# Patient Record
Sex: MALE | Race: WHITE | Employment: OTHER | URBAN - METROPOLITAN AREA
[De-identification: names, ages, dates, MRNs, and addresses within clinical notes are randomized per-mention and may not be internally consistent; named-entity substitution may affect disease eponyms.]

---

## 2017-01-12 RX ORDER — HYDROCHLOROTHIAZIDE 25 MG/1
25 TABLET ORAL DAILY
Qty: 90 TABLET | Refills: 1 | Status: SHIPPED | OUTPATIENT
Start: 2017-01-12 | End: 2017-03-06 | Stop reason: SDUPTHER

## 2017-01-20 ENCOUNTER — HOSPITAL ENCOUNTER (OUTPATIENT)
Dept: OTHER | Age: 77
Discharge: HOME | End: 2017-01-20
Attending: PHYSICIAN ASSISTANT | Admitting: PHYSICIAN ASSISTANT

## 2017-01-20 ENCOUNTER — OFFICE VISIT (OUTPATIENT)
Dept: ORTHOPEDIC SURGERY | Age: 77
End: 2017-01-20

## 2017-01-20 VITALS
WEIGHT: 259 LBS | BODY MASS INDEX: 37.08 KG/M2 | SYSTOLIC BLOOD PRESSURE: 152 MMHG | DIASTOLIC BLOOD PRESSURE: 80 MMHG | HEIGHT: 70 IN | HEART RATE: 66 BPM

## 2017-01-20 DIAGNOSIS — Z96.651 HISTORY OF TOTAL KNEE REPLACEMENT, RIGHT: Primary | ICD-10-CM

## 2017-01-20 DIAGNOSIS — M25.561 RIGHT KNEE PAIN, UNSPECIFIED CHRONICITY: ICD-10-CM

## 2017-01-20 PROBLEM — Z96.659 HISTORY OF TOTAL KNEE REPLACEMENT: Status: ACTIVE | Noted: 2017-01-20

## 2017-01-20 LAB
BASOPHILS ABSOLUTE: 0.1 K/UL (ref 0–0.2)
BASOPHILS RELATIVE PERCENT: 0.8 %
C-REACTIVE PROTEIN: 4.3 MG/L (ref 0–5.1)
EOSINOPHILS ABSOLUTE: 0.3 K/UL (ref 0–0.6)
EOSINOPHILS RELATIVE PERCENT: 3.1 %
HCT VFR BLD CALC: 42.9 % (ref 40.5–52.5)
HEMOGLOBIN: 14.5 G/DL (ref 13.5–17.5)
LYMPHOCYTES ABSOLUTE: 2.6 K/UL (ref 1–5.1)
LYMPHOCYTES RELATIVE PERCENT: 23.2 %
MCH RBC QN AUTO: 30.4 PG (ref 26–34)
MCHC RBC AUTO-ENTMCNC: 33.8 G/DL (ref 31–36)
MCV RBC AUTO: 90 FL (ref 80–100)
MONOCYTES ABSOLUTE: 0.8 K/UL (ref 0–1.3)
MONOCYTES RELATIVE PERCENT: 6.9 %
NEUTROPHILS ABSOLUTE: 7.4 K/UL (ref 1.7–7.7)
NEUTROPHILS RELATIVE PERCENT: 66 %
PDW BLD-RTO: 13.7 % (ref 12.4–15.4)
PLATELET # BLD: 340 K/UL (ref 135–450)
PMV BLD AUTO: 8.3 FL (ref 5–10.5)
RBC # BLD: 4.77 M/UL (ref 4.2–5.9)
SEDIMENTATION RATE, ERYTHROCYTE: 10 MM/HR (ref 0–20)
WBC # BLD: 11.2 K/UL (ref 4–11)

## 2017-01-20 PROCEDURE — G8427 DOCREV CUR MEDS BY ELIG CLIN: HCPCS | Performed by: PHYSICIAN ASSISTANT

## 2017-01-20 PROCEDURE — G8484 FLU IMMUNIZE NO ADMIN: HCPCS | Performed by: PHYSICIAN ASSISTANT

## 2017-01-20 PROCEDURE — 1036F TOBACCO NON-USER: CPT | Performed by: PHYSICIAN ASSISTANT

## 2017-01-20 PROCEDURE — 4040F PNEUMOC VAC/ADMIN/RCVD: CPT | Performed by: PHYSICIAN ASSISTANT

## 2017-01-20 PROCEDURE — 1123F ACP DISCUSS/DSCN MKR DOCD: CPT | Performed by: PHYSICIAN ASSISTANT

## 2017-01-20 PROCEDURE — G8419 CALC BMI OUT NRM PARAM NOF/U: HCPCS | Performed by: PHYSICIAN ASSISTANT

## 2017-01-20 PROCEDURE — 73562 X-RAY EXAM OF KNEE 3: CPT | Performed by: PHYSICIAN ASSISTANT

## 2017-01-20 PROCEDURE — 99213 OFFICE O/P EST LOW 20 MIN: CPT | Performed by: PHYSICIAN ASSISTANT

## 2017-01-20 PROCEDURE — G8599 NO ASA/ANTIPLAT THER USE RNG: HCPCS | Performed by: PHYSICIAN ASSISTANT

## 2017-01-25 ENCOUNTER — OFFICE VISIT (OUTPATIENT)
Dept: URGENT CARE | Age: 77
End: 2017-01-25

## 2017-01-25 VITALS
WEIGHT: 255 LBS | BODY MASS INDEX: 34.54 KG/M2 | HEART RATE: 80 BPM | HEIGHT: 72 IN | RESPIRATION RATE: 16 BRPM | SYSTOLIC BLOOD PRESSURE: 136 MMHG | TEMPERATURE: 98.6 F | DIASTOLIC BLOOD PRESSURE: 80 MMHG | OXYGEN SATURATION: 95 %

## 2017-01-25 DIAGNOSIS — J40 BRONCHITIS: Primary | ICD-10-CM

## 2017-01-25 PROCEDURE — G8598 ASA/ANTIPLAT THER USED: HCPCS | Performed by: EMERGENCY MEDICINE

## 2017-01-25 PROCEDURE — 1036F TOBACCO NON-USER: CPT | Performed by: EMERGENCY MEDICINE

## 2017-01-25 PROCEDURE — G8427 DOCREV CUR MEDS BY ELIG CLIN: HCPCS | Performed by: EMERGENCY MEDICINE

## 2017-01-25 PROCEDURE — 1123F ACP DISCUSS/DSCN MKR DOCD: CPT | Performed by: EMERGENCY MEDICINE

## 2017-01-25 PROCEDURE — G8419 CALC BMI OUT NRM PARAM NOF/U: HCPCS | Performed by: EMERGENCY MEDICINE

## 2017-01-25 PROCEDURE — 99214 OFFICE O/P EST MOD 30 MIN: CPT | Performed by: EMERGENCY MEDICINE

## 2017-01-25 PROCEDURE — 4040F PNEUMOC VAC/ADMIN/RCVD: CPT | Performed by: EMERGENCY MEDICINE

## 2017-01-25 PROCEDURE — G8484 FLU IMMUNIZE NO ADMIN: HCPCS | Performed by: EMERGENCY MEDICINE

## 2017-01-25 RX ORDER — AZITHROMYCIN 250 MG/1
TABLET, FILM COATED ORAL
Qty: 1 PACKET | Refills: 0 | Status: SHIPPED | OUTPATIENT
Start: 2017-01-25 | End: 2017-02-04

## 2017-01-25 RX ORDER — BENZONATATE 200 MG/1
200 CAPSULE ORAL 3 TIMES DAILY PRN
Qty: 20 CAPSULE | Refills: 0 | Status: SHIPPED | OUTPATIENT
Start: 2017-01-25 | End: 2017-08-10 | Stop reason: ALTCHOICE

## 2017-01-25 ASSESSMENT — ENCOUNTER SYMPTOMS
SHORTNESS OF BREATH: 1
SORE THROAT: 0
COUGH: 1

## 2017-01-30 ENCOUNTER — HOSPITAL ENCOUNTER (OUTPATIENT)
Dept: PHYSICAL THERAPY | Age: 77
Discharge: HOME | End: 2017-01-31
Admitting: PHYSICIAN ASSISTANT

## 2017-02-03 RX ORDER — LISINOPRIL 20 MG/1
20 TABLET ORAL 2 TIMES DAILY
Qty: 180 TABLET | Refills: 3 | Status: SHIPPED | OUTPATIENT
Start: 2017-02-03 | End: 2018-01-01 | Stop reason: SDUPTHER

## 2017-02-21 ENCOUNTER — HOSPITAL ENCOUNTER (OUTPATIENT)
Dept: PHYSICAL THERAPY | Age: 77
Discharge: HOME | End: 2017-02-21
Admitting: PHYSICIAN ASSISTANT

## 2017-02-24 ENCOUNTER — HOSPITAL ENCOUNTER (OUTPATIENT)
Dept: PHYSICAL THERAPY | Age: 77
Discharge: HOME | End: 2017-02-24
Admitting: PHYSICIAN ASSISTANT

## 2017-03-03 ENCOUNTER — HOSPITAL ENCOUNTER (OUTPATIENT)
Dept: PHYSICAL THERAPY | Age: 77
Discharge: HOME | End: 2017-03-03
Admitting: PHYSICIAN ASSISTANT

## 2017-03-06 DIAGNOSIS — E03.9 HYPOTHYROIDISM, UNSPECIFIED TYPE: Primary | ICD-10-CM

## 2017-03-06 RX ORDER — HYDROCHLOROTHIAZIDE 25 MG/1
25 TABLET ORAL 2 TIMES DAILY
Qty: 180 TABLET | Refills: 1 | Status: SHIPPED | OUTPATIENT
Start: 2017-03-06 | End: 2017-08-10 | Stop reason: SDUPTHER

## 2017-03-07 ENCOUNTER — HOSPITAL ENCOUNTER (OUTPATIENT)
Dept: PHYSICAL THERAPY | Age: 77
Discharge: HOME | End: 2017-03-07
Admitting: PHYSICIAN ASSISTANT

## 2017-03-10 ENCOUNTER — OFFICE VISIT (OUTPATIENT)
Dept: FAMILY MEDICINE CLINIC | Age: 77
End: 2017-03-10

## 2017-03-10 VITALS
HEART RATE: 85 BPM | HEIGHT: 72 IN | BODY MASS INDEX: 35.89 KG/M2 | OXYGEN SATURATION: 97 % | SYSTOLIC BLOOD PRESSURE: 135 MMHG | WEIGHT: 265 LBS | DIASTOLIC BLOOD PRESSURE: 80 MMHG

## 2017-03-10 DIAGNOSIS — M15.9 GENERALIZED OA: ICD-10-CM

## 2017-03-10 DIAGNOSIS — E11.9 TYPE 2 DIABETES MELLITUS WITHOUT COMPLICATION, WITHOUT LONG-TERM CURRENT USE OF INSULIN (HCC): Primary | ICD-10-CM

## 2017-03-10 DIAGNOSIS — E03.9 ACQUIRED HYPOTHYROIDISM: ICD-10-CM

## 2017-03-10 DIAGNOSIS — I10 ESSENTIAL HYPERTENSION: ICD-10-CM

## 2017-03-10 DIAGNOSIS — E78.2 MIXED HYPERLIPIDEMIA: ICD-10-CM

## 2017-03-10 DIAGNOSIS — Z12.5 SPECIAL SCREENING FOR MALIGNANT NEOPLASM OF PROSTATE: ICD-10-CM

## 2017-03-10 LAB
ALBUMIN SERPL-MCNC: 4.5 G/DL (ref 3.4–5)
ALP BLD-CCNC: 81 U/L (ref 40–129)
ALT SERPL-CCNC: 21 U/L (ref 10–40)
AST SERPL-CCNC: 15 U/L (ref 15–37)
BILIRUB SERPL-MCNC: 0.5 MG/DL (ref 0–1)
BILIRUBIN DIRECT: <0.2 MG/DL (ref 0–0.3)
BILIRUBIN, INDIRECT: NORMAL MG/DL (ref 0–1)
CHOLESTEROL, TOTAL: 116 MG/DL (ref 0–199)
HBA1C MFR BLD: 7 %
HDLC SERPL-MCNC: 32 MG/DL (ref 40–60)
LDL CHOLESTEROL CALCULATED: 52 MG/DL
PROSTATE SPECIFIC ANTIGEN: 0.02 NG/ML (ref 0–4)
TOTAL PROTEIN: 7.1 G/DL (ref 6.4–8.2)
TRIGL SERPL-MCNC: 159 MG/DL (ref 0–150)
TSH SERPL DL<=0.05 MIU/L-ACNC: 0.11 UIU/ML (ref 0.27–4.2)
VLDLC SERPL CALC-MCNC: 32 MG/DL

## 2017-03-10 PROCEDURE — G8484 FLU IMMUNIZE NO ADMIN: HCPCS | Performed by: FAMILY MEDICINE

## 2017-03-10 PROCEDURE — G8427 DOCREV CUR MEDS BY ELIG CLIN: HCPCS | Performed by: FAMILY MEDICINE

## 2017-03-10 PROCEDURE — G8417 CALC BMI ABV UP PARAM F/U: HCPCS | Performed by: FAMILY MEDICINE

## 2017-03-10 PROCEDURE — 83036 HEMOGLOBIN GLYCOSYLATED A1C: CPT | Performed by: FAMILY MEDICINE

## 2017-03-10 PROCEDURE — 1036F TOBACCO NON-USER: CPT | Performed by: FAMILY MEDICINE

## 2017-03-10 PROCEDURE — 1123F ACP DISCUSS/DSCN MKR DOCD: CPT | Performed by: FAMILY MEDICINE

## 2017-03-10 PROCEDURE — 4040F PNEUMOC VAC/ADMIN/RCVD: CPT | Performed by: FAMILY MEDICINE

## 2017-03-10 PROCEDURE — 99214 OFFICE O/P EST MOD 30 MIN: CPT | Performed by: FAMILY MEDICINE

## 2017-03-10 PROCEDURE — G8598 ASA/ANTIPLAT THER USED: HCPCS | Performed by: FAMILY MEDICINE

## 2017-03-10 ASSESSMENT — ENCOUNTER SYMPTOMS
CONSTIPATION: 1
RESPIRATORY NEGATIVE: 1

## 2017-03-27 RX ORDER — LEVOTHYROXINE SODIUM 112 UG/1
TABLET ORAL
Qty: 180 TABLET | Refills: 1 | Status: SHIPPED | OUTPATIENT
Start: 2017-03-27 | End: 2017-09-12 | Stop reason: SDUPTHER

## 2017-04-12 ENCOUNTER — OFFICE VISIT (OUTPATIENT)
Dept: FAMILY MEDICINE CLINIC | Age: 77
End: 2017-04-12

## 2017-04-12 VITALS
OXYGEN SATURATION: 98 % | DIASTOLIC BLOOD PRESSURE: 70 MMHG | WEIGHT: 262 LBS | SYSTOLIC BLOOD PRESSURE: 138 MMHG | BODY MASS INDEX: 35.49 KG/M2 | HEIGHT: 72 IN | HEART RATE: 59 BPM

## 2017-04-12 DIAGNOSIS — M16.0 PRIMARY OSTEOARTHRITIS OF BOTH HIPS: ICD-10-CM

## 2017-04-12 DIAGNOSIS — M48.061 FORAMINAL STENOSIS OF LUMBAR REGION: ICD-10-CM

## 2017-04-12 DIAGNOSIS — G89.4 CHRONIC PAIN SYNDROME: Primary | ICD-10-CM

## 2017-04-12 DIAGNOSIS — M51.36 DDD (DEGENERATIVE DISC DISEASE), LUMBAR: ICD-10-CM

## 2017-04-12 DIAGNOSIS — I10 ESSENTIAL HYPERTENSION: ICD-10-CM

## 2017-04-12 PROCEDURE — G8417 CALC BMI ABV UP PARAM F/U: HCPCS | Performed by: FAMILY MEDICINE

## 2017-04-12 PROCEDURE — G8427 DOCREV CUR MEDS BY ELIG CLIN: HCPCS | Performed by: FAMILY MEDICINE

## 2017-04-12 PROCEDURE — 4040F PNEUMOC VAC/ADMIN/RCVD: CPT | Performed by: FAMILY MEDICINE

## 2017-04-12 PROCEDURE — 1036F TOBACCO NON-USER: CPT | Performed by: FAMILY MEDICINE

## 2017-04-12 PROCEDURE — 99214 OFFICE O/P EST MOD 30 MIN: CPT | Performed by: FAMILY MEDICINE

## 2017-04-12 PROCEDURE — G8598 ASA/ANTIPLAT THER USED: HCPCS | Performed by: FAMILY MEDICINE

## 2017-04-12 PROCEDURE — 1123F ACP DISCUSS/DSCN MKR DOCD: CPT | Performed by: FAMILY MEDICINE

## 2017-04-12 ASSESSMENT — ENCOUNTER SYMPTOMS
RESPIRATORY NEGATIVE: 1
GASTROINTESTINAL NEGATIVE: 1
BACK PAIN: 1

## 2017-04-27 ENCOUNTER — HOSPITAL ENCOUNTER (OUTPATIENT)
Dept: MRI IMAGING | Age: 77
Discharge: HOME | End: 2017-04-27

## 2017-04-27 DIAGNOSIS — M51.36 LUMBAR DEGENERATIVE DISC DISEASE: ICD-10-CM

## 2017-04-27 DIAGNOSIS — M51.17 INTERVERTEBRAL DISC DISORDER WITH RADICULOPATHY OF LUMBOSACRAL REGION: ICD-10-CM

## 2017-04-27 DIAGNOSIS — G89.29 CHRONIC BILATERAL LOW BACK PAIN WITH SCIATICA, SCIATICA LATERALITY UNSPECIFIED: ICD-10-CM

## 2017-04-27 DIAGNOSIS — M54.40 CHRONIC BILATERAL LOW BACK PAIN WITH SCIATICA, SCIATICA LATERALITY UNSPECIFIED: ICD-10-CM

## 2017-05-04 ENCOUNTER — HOSPITAL ENCOUNTER (OUTPATIENT)
Dept: OTHER | Age: 77
Discharge: HOME | End: 2017-05-04
Attending: INTERNAL MEDICINE | Admitting: INTERNAL MEDICINE

## 2017-05-04 LAB
BASOPHILS ABSOLUTE: 0.1 K/UL (ref 0–0.2)
BASOPHILS RELATIVE PERCENT: 0.7 %
EOSINOPHILS ABSOLUTE: 0.3 K/UL (ref 0–0.6)
EOSINOPHILS RELATIVE PERCENT: 3.2 %
HCT VFR BLD CALC: 40.6 % (ref 40.5–52.5)
HEMOGLOBIN: 13.7 G/DL (ref 13.5–17.5)
LYMPHOCYTES ABSOLUTE: 2.2 K/UL (ref 1–5.1)
LYMPHOCYTES RELATIVE PERCENT: 22.7 %
MCH RBC QN AUTO: 31.6 PG (ref 26–34)
MCHC RBC AUTO-ENTMCNC: 33.8 G/DL (ref 31–36)
MCV RBC AUTO: 93.5 FL (ref 80–100)
MONOCYTES ABSOLUTE: 0.7 K/UL (ref 0–1.3)
MONOCYTES RELATIVE PERCENT: 7.6 %
NEUTROPHILS ABSOLUTE: 6.4 K/UL (ref 1.7–7.7)
NEUTROPHILS RELATIVE PERCENT: 65.8 %
PDW BLD-RTO: 14.4 % (ref 12.4–15.4)
PLATELET # BLD: 334 K/UL (ref 135–450)
PMV BLD AUTO: 8.2 FL (ref 5–10.5)
RBC # BLD: 4.34 M/UL (ref 4.2–5.9)
WBC # BLD: 9.7 K/UL (ref 4–11)

## 2017-05-19 ENCOUNTER — HOSPITAL ENCOUNTER (OUTPATIENT)
Dept: GENERAL RADIOLOGY | Age: 77
Discharge: HOME | End: 2017-05-19
Attending: ORTHOPAEDIC SURGERY | Admitting: ORTHOPAEDIC SURGERY

## 2017-05-19 VITALS — DIASTOLIC BLOOD PRESSURE: 81 MMHG | HEART RATE: 56 BPM | SYSTOLIC BLOOD PRESSURE: 156 MMHG | RESPIRATION RATE: 16 BRPM

## 2017-05-19 DIAGNOSIS — M51.26 OTHER INTERVERTEBRAL DISC DISPLACEMENT, LUMBAR REGION: ICD-10-CM

## 2017-05-19 DIAGNOSIS — G89.29 CHRONIC RIGHT-SIDED LOW BACK PAIN, WITH SCIATICA PRESENCE UNSPECIFIED: ICD-10-CM

## 2017-05-19 DIAGNOSIS — M54.5 CHRONIC RIGHT-SIDED LOW BACK PAIN, WITH SCIATICA PRESENCE UNSPECIFIED: ICD-10-CM

## 2017-05-19 ASSESSMENT — PAIN - FUNCTIONAL ASSESSMENT: PAIN_FUNCTIONAL_ASSESSMENT: 0-10

## 2017-07-14 ENCOUNTER — HOSPITAL ENCOUNTER (OUTPATIENT)
Dept: OTHER | Age: 77
Discharge: HOME | End: 2017-07-14
Attending: INTERNAL MEDICINE | Admitting: INTERNAL MEDICINE

## 2017-08-10 ENCOUNTER — OFFICE VISIT (OUTPATIENT)
Dept: FAMILY MEDICINE CLINIC | Age: 77
End: 2017-08-10

## 2017-08-10 VITALS
WEIGHT: 260 LBS | DIASTOLIC BLOOD PRESSURE: 72 MMHG | HEART RATE: 74 BPM | OXYGEN SATURATION: 98 % | BODY MASS INDEX: 36.4 KG/M2 | HEIGHT: 71 IN | SYSTOLIC BLOOD PRESSURE: 136 MMHG

## 2017-08-10 DIAGNOSIS — R19.7 DIARRHEA, UNSPECIFIED TYPE: ICD-10-CM

## 2017-08-10 DIAGNOSIS — R04.0 EPISTAXIS, RECURRENT: Primary | ICD-10-CM

## 2017-08-10 DIAGNOSIS — R25.2 MUSCLE CRAMPING: ICD-10-CM

## 2017-08-10 PROCEDURE — 4040F PNEUMOC VAC/ADMIN/RCVD: CPT | Performed by: FAMILY MEDICINE

## 2017-08-10 PROCEDURE — 99214 OFFICE O/P EST MOD 30 MIN: CPT | Performed by: FAMILY MEDICINE

## 2017-08-10 PROCEDURE — G8417 CALC BMI ABV UP PARAM F/U: HCPCS | Performed by: FAMILY MEDICINE

## 2017-08-10 PROCEDURE — 1036F TOBACCO NON-USER: CPT | Performed by: FAMILY MEDICINE

## 2017-08-10 PROCEDURE — G8427 DOCREV CUR MEDS BY ELIG CLIN: HCPCS | Performed by: FAMILY MEDICINE

## 2017-08-10 PROCEDURE — 1123F ACP DISCUSS/DSCN MKR DOCD: CPT | Performed by: FAMILY MEDICINE

## 2017-08-10 PROCEDURE — G8598 ASA/ANTIPLAT THER USED: HCPCS | Performed by: FAMILY MEDICINE

## 2017-08-10 RX ORDER — DICYCLOMINE HYDROCHLORIDE 10 MG/1
10 CAPSULE ORAL
Qty: 30 CAPSULE | Refills: 0 | Status: SHIPPED | OUTPATIENT
Start: 2017-08-10 | End: 2017-11-02 | Stop reason: ALTCHOICE

## 2017-08-10 RX ORDER — ERGOCALCIFEROL 1.25 MG/1
CAPSULE ORAL
Refills: 0 | COMMUNITY
Start: 2017-08-01 | End: 2017-08-10

## 2017-08-10 RX ORDER — FOLIC ACID 1 MG/1
1 TABLET ORAL DAILY
COMMUNITY
End: 2017-08-10

## 2017-08-10 RX ORDER — ATORVASTATIN CALCIUM 40 MG/1
40 TABLET, FILM COATED ORAL NIGHTLY
Qty: 90 TABLET | Refills: 3 | Status: SHIPPED | OUTPATIENT
Start: 2017-08-10 | End: 2017-08-23 | Stop reason: SDUPTHER

## 2017-08-10 RX ORDER — HYDROCHLOROTHIAZIDE 25 MG/1
25 TABLET ORAL 2 TIMES DAILY
Qty: 180 TABLET | Refills: 3 | Status: SHIPPED | OUTPATIENT
Start: 2017-08-10 | End: 2018-08-25 | Stop reason: SDUPTHER

## 2017-08-10 ASSESSMENT — ENCOUNTER SYMPTOMS
GASTROINTESTINAL NEGATIVE: 1
RESPIRATORY NEGATIVE: 1

## 2017-08-23 RX ORDER — ATORVASTATIN CALCIUM 40 MG/1
TABLET, FILM COATED ORAL
Qty: 30 TABLET | Refills: 0 | Status: SHIPPED | OUTPATIENT
Start: 2017-08-23 | End: 2019-03-19 | Stop reason: SDUPTHER

## 2017-09-01 ENCOUNTER — HOSPITAL ENCOUNTER (OUTPATIENT)
Dept: GENERAL RADIOLOGY | Age: 77
Discharge: HOME | End: 2017-09-01
Attending: ORTHOPAEDIC SURGERY | Admitting: ORTHOPAEDIC SURGERY

## 2017-09-01 VITALS
DIASTOLIC BLOOD PRESSURE: 100 MMHG | RESPIRATION RATE: 16 BRPM | SYSTOLIC BLOOD PRESSURE: 185 MMHG | OXYGEN SATURATION: 99 % | HEART RATE: 60 BPM

## 2017-09-01 DIAGNOSIS — M51.17 INTERVERTEBRAL DISC DISORDER WITH RADICULOPATHY OF LUMBOSACRAL REGION: ICD-10-CM

## 2017-10-12 ENCOUNTER — OFFICE VISIT (OUTPATIENT)
Dept: FAMILY MEDICINE CLINIC | Age: 77
End: 2017-10-12

## 2017-10-12 VITALS
SYSTOLIC BLOOD PRESSURE: 136 MMHG | DIASTOLIC BLOOD PRESSURE: 70 MMHG | WEIGHT: 256 LBS | OXYGEN SATURATION: 95 % | HEART RATE: 50 BPM | BODY MASS INDEX: 35.84 KG/M2 | HEIGHT: 71 IN

## 2017-10-12 DIAGNOSIS — M15.9 GENERALIZED OA: ICD-10-CM

## 2017-10-12 DIAGNOSIS — E11.9 TYPE 2 DIABETES MELLITUS WITHOUT COMPLICATION, WITHOUT LONG-TERM CURRENT USE OF INSULIN (HCC): Primary | ICD-10-CM

## 2017-10-12 DIAGNOSIS — E03.9 ACQUIRED HYPOTHYROIDISM: ICD-10-CM

## 2017-10-12 DIAGNOSIS — I10 ESSENTIAL HYPERTENSION: ICD-10-CM

## 2017-10-12 LAB
HBA1C MFR BLD: 6.7 %
T4 FREE: 1.6 NG/DL (ref 0.9–1.8)
TSH SERPL DL<=0.05 MIU/L-ACNC: 0.94 UIU/ML (ref 0.27–4.2)

## 2017-10-12 PROCEDURE — 99214 OFFICE O/P EST MOD 30 MIN: CPT | Performed by: FAMILY MEDICINE

## 2017-10-12 PROCEDURE — 1036F TOBACCO NON-USER: CPT | Performed by: FAMILY MEDICINE

## 2017-10-12 PROCEDURE — G8484 FLU IMMUNIZE NO ADMIN: HCPCS | Performed by: FAMILY MEDICINE

## 2017-10-12 PROCEDURE — G8598 ASA/ANTIPLAT THER USED: HCPCS | Performed by: FAMILY MEDICINE

## 2017-10-12 PROCEDURE — 90670 PCV13 VACCINE IM: CPT | Performed by: FAMILY MEDICINE

## 2017-10-12 PROCEDURE — 83036 HEMOGLOBIN GLYCOSYLATED A1C: CPT | Performed by: FAMILY MEDICINE

## 2017-10-12 PROCEDURE — 1123F ACP DISCUSS/DSCN MKR DOCD: CPT | Performed by: FAMILY MEDICINE

## 2017-10-12 PROCEDURE — 4040F PNEUMOC VAC/ADMIN/RCVD: CPT | Performed by: FAMILY MEDICINE

## 2017-10-12 PROCEDURE — G0008 ADMIN INFLUENZA VIRUS VAC: HCPCS | Performed by: FAMILY MEDICINE

## 2017-10-12 PROCEDURE — 90662 IIV NO PRSV INCREASED AG IM: CPT | Performed by: FAMILY MEDICINE

## 2017-10-12 PROCEDURE — G0009 ADMIN PNEUMOCOCCAL VACCINE: HCPCS | Performed by: FAMILY MEDICINE

## 2017-10-12 PROCEDURE — G8417 CALC BMI ABV UP PARAM F/U: HCPCS | Performed by: FAMILY MEDICINE

## 2017-10-12 PROCEDURE — G8427 DOCREV CUR MEDS BY ELIG CLIN: HCPCS | Performed by: FAMILY MEDICINE

## 2017-10-12 ASSESSMENT — ENCOUNTER SYMPTOMS
GASTROINTESTINAL NEGATIVE: 1
RESPIRATORY NEGATIVE: 1

## 2017-10-12 NOTE — PROGRESS NOTES
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 HENT: Negative. Respiratory: Negative. Cardiovascular: Negative. Gastrointestinal: Negative. Musculoskeletal: Positive for arthralgias. Neurological: Negative. Objective:   Physical Exam   Constitutional: He is oriented to person, place, and time. HENT:   Mouth/Throat: Oropharynx is clear and moist.   Eyes: Conjunctivae are normal.   Neck: Neck supple. Carotid bruit is not present. No thyromegaly present. Cardiovascular: Normal rate, regular rhythm and normal heart sounds. Pulmonary/Chest: Effort normal and breath sounds normal.   Abdominal: Soft. He exhibits no distension. There is no tenderness. Musculoskeletal: He exhibits no edema. Lymphadenopathy:     He has no cervical adenopathy. Neurological: He is alert and oriented to person, place, and time. Skin: Skin is warm and dry. Psychiatric: He has a normal mood and affect. His behavior is normal. Judgment and thought content normal.       Assessment:      1. Type 2 diabetes mellitus without complication, without long-term current use of insulin ([address]    2. Essential hypertension    3. Generalized OA            Plan:      Rina Dunbar was seen today for diabetes and hypertension.     Diagnoses and all orders for this visit:    Type 2 diabetes mellitus without complication, without long-term current use of insulin (HCC)  -     POCT glycosylated hemoglobin (Hb A1C)  AIC 6.7-continue meds-lower carbs  Essential hypertension  Continue meds-SOMMER diet  Generalized OA    Tyl as needed

## 2017-11-02 PROBLEM — E87.8 HYPOCHLOREMIA: Status: ACTIVE | Noted: 2017-11-02

## 2017-11-02 PROBLEM — M15.9 GENERALIZED OA: Status: RESOLVED | Noted: 2017-03-10 | Resolved: 2017-11-02

## 2017-11-02 PROBLEM — N17.9 AKI (ACUTE KIDNEY INJURY) (HCC): Status: ACTIVE | Noted: 2017-11-02

## 2017-11-02 PROBLEM — R19.7 DIARRHEA: Status: RESOLVED | Noted: 2017-08-10 | Resolved: 2017-11-02

## 2017-11-02 PROBLEM — E87.1 HYPONATREMIA: Status: ACTIVE | Noted: 2017-11-02

## 2017-11-02 PROBLEM — Z96.659 HISTORY OF TOTAL KNEE REPLACEMENT: Status: RESOLVED | Noted: 2017-01-20 | Resolved: 2017-11-02

## 2017-11-02 PROBLEM — J18.9 RML PNEUMONIA: Status: ACTIVE | Noted: 2017-11-02

## 2017-11-02 PROBLEM — A41.9 SEPSIS (HCC): Status: ACTIVE | Noted: 2017-11-02

## 2017-11-02 PROBLEM — J96.01 ACUTE HYPOXEMIC RESPIRATORY FAILURE (HCC): Status: ACTIVE | Noted: 2017-11-02

## 2017-11-02 PROBLEM — R26.2 AMBULATORY DYSFUNCTION: Chronic | Status: ACTIVE | Noted: 2017-11-02

## 2017-11-02 PROBLEM — I50.32 CHRONIC DIASTOLIC CHF (CONGESTIVE HEART FAILURE) (HCC): Chronic | Status: ACTIVE | Noted: 2017-11-02

## 2017-11-02 PROBLEM — M48.061 FORAMINAL STENOSIS OF LUMBAR REGION: Status: RESOLVED | Noted: 2017-04-12 | Resolved: 2017-11-02

## 2017-11-02 PROBLEM — D64.9 NORMOCYTIC ANEMIA: Status: ACTIVE | Noted: 2017-11-02

## 2017-11-02 PROBLEM — D72.829 LEUKOCYTOSIS: Status: ACTIVE | Noted: 2017-11-02

## 2017-11-03 PROBLEM — I95.9 HYPOTENSION: Status: ACTIVE | Noted: 2017-11-03

## 2017-11-03 PROBLEM — E66.9 OBESITY (BMI 30-39.9): Chronic | Status: ACTIVE | Noted: 2017-11-03

## 2017-11-06 ENCOUNTER — TELEPHONE (OUTPATIENT)
Dept: PHARMACY | Facility: CLINIC | Age: 77
End: 2017-11-06

## 2017-11-06 NOTE — TELEPHONE ENCOUNTER
Attempted to reach patient for transitions of care follow-up after discharge from [facility] on 11/4/17. Left voicemail for patient to return phone call. Will attempt again tomorrow. Tried calling [facility] pharmacy to confirm if antibiotics were picked up, but was put on hold and eventually someone hung up the phone on their end.      Jason Callaway PharmDEIDRE   [address] [place] [address]  [phone] or [phone] (Option 7)    For Pharmacy Admin Tracking Only    TCM Call Made?: Yes

## 2017-11-07 RX ORDER — SENNOSIDES 8.6 MG
650 CAPSULE ORAL EVERY 8 HOURS PRN
COMMUNITY

## 2017-11-07 NOTE — TELEPHONE ENCOUNTER
(TYLENOL 8 HOUR ARTHRITIS PAIN) 650 MG extended release tablet Take 650 mg by mouth every 8 hours as needed for Pain Updated medication list. Patient states he takes no more than 2-4 in a day. These are the medications you have told us you were taking at home, STOP taking them after you leave the hospital   N/A    Meds marked as reviewed    Meds to Beds:No    Estimated Creatinine Clearance: 103 mL/min (based on SCr of 0.8 mg/dL). Assessment/Plan:  - Medication reconciliation completed. Number of medications reviewed: 9    - Pt is taking medications as directed by discharging physician. Number of discrepancies: 1. Instructions per discharge list provided except per below documentation. Identified medication discrepancies/issues:   · Category 1 (0)  · Category 2 (0)  · Category 3 (0)  · Category 4 (2):  1. Tylenol - Not listed on AVS  2.  Hydrochlorothiazide - patient takes 2 tablets once daily instead of 1 tablet BID per AVS    - [facility] active medication list updated:  · Medications Added (1):  Tylenol  · Medications Removed (1):  Azithromycin (therapy complete)  · Medications Changed (1):  Hydrochlorothiazide    - Identified Potential Medication Interactions: No clinically significant interactions identified via LexicoLiquidM Interaction Analysis as category D or higher.    - Renal Dosing: No renal adjustments necessary.    - Follow up appointment date (7 days for more severe illness, 14 days for others):    · Patient was reminded of upcoming appointment with PCP on 11/17/17    Thank you,    Kathy Hernandez, PharmD  [address]  Phone: [phone]    CLINICAL PHARMACY NOTE   POST-DISCHARGE TELEPHONE FOLLOW-UP ADDENDUM    For Pharmacy Admin Tracking Only    TCM Call Made?: Yes  [facility] Patient?: Yes  Total # of Interventions Recommended: 1 - Discontinued Medication #: 1  - New Order #: 1  - Updated Order #: 1  Total # Interventions Accepted: 1  Intervention Severity:   - Level 1 Intervention Present?: No   - Level 2 #: 0   - Level 3 #: 0  Outreach Status: Review Complete  Care Coordinator Outreach to Patient?: No  Provider Contacted?: No  Time Spent (min): 15    Additional Documentation:

## 2017-11-17 ENCOUNTER — OFFICE VISIT (OUTPATIENT)
Dept: FAMILY MEDICINE CLINIC | Age: 77
End: 2017-11-17

## 2017-11-17 VITALS
OXYGEN SATURATION: 98 % | WEIGHT: 251 LBS | DIASTOLIC BLOOD PRESSURE: 76 MMHG | SYSTOLIC BLOOD PRESSURE: 122 MMHG | HEART RATE: 62 BPM | BODY MASS INDEX: 34.52 KG/M2

## 2017-11-17 DIAGNOSIS — I50.32 CHRONIC DIASTOLIC CHF (CONGESTIVE HEART FAILURE) (HCC): ICD-10-CM

## 2017-11-17 DIAGNOSIS — J18.9 PNEUMONIA OF RIGHT MIDDLE LOBE DUE TO INFECTIOUS ORGANISM: Primary | ICD-10-CM

## 2017-11-17 DIAGNOSIS — N17.9 AKI (ACUTE KIDNEY INJURY) (HCC): ICD-10-CM

## 2017-11-17 DIAGNOSIS — R05.9 COUGH: ICD-10-CM

## 2017-11-17 PROCEDURE — G8598 ASA/ANTIPLAT THER USED: HCPCS | Performed by: FAMILY MEDICINE

## 2017-11-17 PROCEDURE — G8417 CALC BMI ABV UP PARAM F/U: HCPCS | Performed by: FAMILY MEDICINE

## 2017-11-17 PROCEDURE — 99214 OFFICE O/P EST MOD 30 MIN: CPT | Performed by: FAMILY MEDICINE

## 2017-11-17 PROCEDURE — G8484 FLU IMMUNIZE NO ADMIN: HCPCS | Performed by: FAMILY MEDICINE

## 2017-11-17 PROCEDURE — 4040F PNEUMOC VAC/ADMIN/RCVD: CPT | Performed by: FAMILY MEDICINE

## 2017-11-17 PROCEDURE — 1123F ACP DISCUSS/DSCN MKR DOCD: CPT | Performed by: FAMILY MEDICINE

## 2017-11-17 PROCEDURE — G8427 DOCREV CUR MEDS BY ELIG CLIN: HCPCS | Performed by: FAMILY MEDICINE

## 2017-11-17 PROCEDURE — 1036F TOBACCO NON-USER: CPT | Performed by: FAMILY MEDICINE

## 2017-11-17 PROCEDURE — 1111F DSCHRG MED/CURRENT MED MERGE: CPT | Performed by: FAMILY MEDICINE

## 2017-11-17 RX ORDER — BENZONATATE 200 MG/1
200 CAPSULE ORAL 3 TIMES DAILY PRN
Qty: 30 CAPSULE | Refills: 1 | Status: SHIPPED | OUTPATIENT
Start: 2017-11-17 | End: 2017-12-10 | Stop reason: SDUPTHER

## 2017-11-17 RX ORDER — ALBUTEROL SULFATE 90 UG/1
2 AEROSOL, METERED RESPIRATORY (INHALATION) EVERY 6 HOURS PRN
Qty: 1 INHALER | Refills: 3 | Status: SHIPPED | OUTPATIENT
Start: 2017-11-17 | End: 2020-01-30

## 2017-11-17 RX ORDER — PROMETHAZINE HYDROCHLORIDE AND CODEINE PHOSPHATE 6.25; 1 MG/5ML; MG/5ML
5 SYRUP ORAL 4 TIMES DAILY PRN
Qty: 118 ML | Refills: 0 | Status: SHIPPED | OUTPATIENT
Start: 2017-11-17 | End: 2017-12-29

## 2017-11-17 NOTE — PROGRESS NOTES
Post-Discharge Transitional Care Management Services      Melaniekristel Estradaner [facility]   YOB: 1940    Date of Visit:  11/17/2017  30 Day Post-Discharge Date: 11/4/17    Allergies   Allergen Reactions    Penicillins Hives    Avelox [Moxifloxacin Hcl In Nacl] Itching, Swelling and Rash     Outpatient Prescriptions Marked as Taking for the 11/17/17 encounter (Office Visit) with Kiara Crawley DO   Medication Sig Dispense Refill    folic acid (FOLVITE) 1 MG tablet Take 1 mg by mouth daily      levothyroxine (SYNTHROID) 112 MCG tablet TAKE 2 TABLETS BY MOUTH EVERY  tablet 0    atorvastatin (LIPITOR) 40 MG tablet TAKE 1 TABLET BY MOUTH NIGHTLY 30 tablet 0    hydrochlorothiazide (HYDRODIURIL) 25 MG tablet Take 1 tablet by mouth 2 times daily (Patient taking differently: Take 50 mg by mouth daily ) 180 tablet 3    lisinopril (PRINIVIL;ZESTRIL) 20 MG tablet Take 1 tablet by mouth 2 times daily 180 tablet 3    metoprolol tartrate (LOPRESSOR) 25 MG tablet 1 bid 180 tablet 3         Vitals:    11/17/17 1252   BP: 122/76   Site: Left Arm   Position: Sitting   Cuff Size: Large Adult   Pulse: 62   SpO2: 98%   Weight: 251 lb (113.9 kg)     Body mass index is 34.52 kg/m². Wt Readings from Last 3 Encounters:   11/17/17 251 lb (113.9 kg)   11/03/17 264 lb (119.7 kg)   10/12/17 256 lb (116.1 kg)     BP Readings from Last 3 Encounters:   11/17/17 122/76   11/04/17 (!) 150/80   10/12/17 136/70        Patient was admitted to [facility] from 11/2/17 to 11/4/17 for pneumonia. 68 y. o. male with a hx of CAD, CHF, LEIA, DM2, prostate cancer, and other comorbidities, who presented to the [facility] ED from home via EMS with 1.5 days of Flu-like symptoms. CXR in the ED revealed a RML PNA. He was mildly hypoxemic to 90% and was placed on 2 L O2 NC. He met sepsis criteria. By discharge, his symptoms improved. He finished his Z-pack and omnicef. He is still having a cough, mostly dry cough.   No more fevers/chills. Inpatient course: Discharge summary reviewed- see chart. Current status: stable, but still having cough. Dry cough is present. Review of Systems:  A comprehensive review of systems was negative except for what was noted in the HPI. Physical Exam:  General Appearance: alert and oriented to person, place and time, well-developed and well-nourished, in no acute distress  Skin: warm and dry, no rash or erythema  Pulmonary/Chest: clear to auscultation bilaterally- no wheezes, rales or rhonchi, normal air movement, no respiratory distress  Cardiovascular: normal rate, normal S1 and S2, no gallops, intact distal pulses and no carotid bruits  Neurologic: gait and coordination normal and speech normal    Initial post-discharge communication occurred between nurse and patient on 11/4/17- see documentation in chart: progress note. Assessment/Plan:  Abraham Abreu was seen today for follow-up from hospital and cough. Diagnoses and all orders for this visit:    Pneumonia of right middle lobe due to infectious organism ([facility]  -     XR Chest PA and Lateral; Future    Cough    BRYON (acute kidney injury) ([facility] [address]    Other orders  -     promethazine-codeine (PHENERGAN WITH CODEINE) 6.25-10 MG/5ML syrup; Take 5 mLs by mouth 4 times daily as needed for Cough . -     benzonatate (TESSALON) 200 MG capsule; Take 1 capsule by mouth 3 times daily as needed for Cough  -     albuterol sulfate  (90 Base) MCG/ACT inhaler; Inhale 2 puffs into the lungs every 6 hours as needed for Shortness of Breath          Diagnostic test results reviewed: inpatient labs, culture/micro-respiratory and chest x-ray    Patient risk of morbidity and mortality: moderate    Medical Decision Making: moderate complexity     Given that the patient still has a cough, I will prescribe Tessalon Perles and he desires codeine. Discussed how this is a narcotic and he should use it in modest amounts.  Given an albuterol inhaler to help open up his lungs. These sound good on physical exam today which is reassuring. We will get a follow-up x-ray as it has been several weeks since his discharge. I doubt the cough is ACE-induced. I spent 25 minutes with the patient and reviewing hospital records, imaging and labs.

## 2017-11-18 ENCOUNTER — HOSPITAL ENCOUNTER (OUTPATIENT)
Dept: OTHER | Age: 77
Discharge: HOME | End: 2017-11-18
Attending: FAMILY MEDICINE | Admitting: FAMILY MEDICINE

## 2017-11-18 ENCOUNTER — HOSPITAL ENCOUNTER (OUTPATIENT)
Dept: OTHER | Age: 77
Discharge: HOME | End: 2017-11-18
Attending: INTERNAL MEDICINE | Admitting: INTERNAL MEDICINE

## 2017-11-18 DIAGNOSIS — J18.9 PNEUMONIA OF RIGHT MIDDLE LOBE DUE TO INFECTIOUS ORGANISM: ICD-10-CM

## 2017-11-18 LAB
BASOPHILS ABSOLUTE: 0.1 K/UL (ref 0–0.2)
BASOPHILS RELATIVE PERCENT: 1 %
EOSINOPHILS ABSOLUTE: 0.3 K/UL (ref 0–0.6)
EOSINOPHILS RELATIVE PERCENT: 2 %
HCT VFR BLD CALC: 37.3 % (ref 40.5–52.5)
HEMOGLOBIN: 12.4 G/DL (ref 13.5–17.5)
LYMPHOCYTES ABSOLUTE: 2.3 K/UL (ref 1–5.1)
LYMPHOCYTES RELATIVE PERCENT: 18 %
MCH RBC QN AUTO: 31.6 PG (ref 26–34)
MCHC RBC AUTO-ENTMCNC: 33.2 G/DL (ref 31–36)
MCV RBC AUTO: 95.1 FL (ref 80–100)
MONOCYTES ABSOLUTE: 0.7 K/UL (ref 0–1.3)
MONOCYTES RELATIVE PERCENT: 5 %
NEUTROPHILS ABSOLUTE: 9.6 K/UL (ref 1.7–7.7)
NEUTROPHILS RELATIVE PERCENT: 74 %
PDW BLD-RTO: 14.9 % (ref 12.4–15.4)
PLATELET # BLD: 532 K/UL (ref 135–450)
PLATELET SLIDE REVIEW: ABNORMAL
PMV BLD AUTO: 7.9 FL (ref 5–10.5)
RBC # BLD: 3.92 M/UL (ref 4.2–5.9)
RBC # BLD: NORMAL 10*6/UL
SLIDE REVIEW: ABNORMAL
WBC # BLD: 13 K/UL (ref 4–11)

## 2017-11-27 ENCOUNTER — OFFICE VISIT (OUTPATIENT)
Dept: FAMILY MEDICINE CLINIC | Age: 77
End: 2017-11-27

## 2017-11-27 VITALS
DIASTOLIC BLOOD PRESSURE: 84 MMHG | BODY MASS INDEX: 36.58 KG/M2 | WEIGHT: 266 LBS | HEART RATE: 67 BPM | SYSTOLIC BLOOD PRESSURE: 132 MMHG | OXYGEN SATURATION: 97 %

## 2017-11-27 DIAGNOSIS — R05.9 COUGH: ICD-10-CM

## 2017-11-27 DIAGNOSIS — J18.9 PNEUMONIA OF RIGHT MIDDLE LOBE DUE TO INFECTIOUS ORGANISM: Primary | ICD-10-CM

## 2017-11-27 LAB
A/G RATIO: 1.6 (ref 1.1–2.2)
ALBUMIN SERPL-MCNC: 4.2 G/DL (ref 3.4–5)
ALP BLD-CCNC: 80 U/L (ref 40–129)
ALT SERPL-CCNC: 19 U/L (ref 10–40)
ANION GAP SERPL CALCULATED.3IONS-SCNC: 17 MMOL/L (ref 3–16)
AST SERPL-CCNC: 16 U/L (ref 15–37)
BASOPHILS ABSOLUTE: 0.1 K/UL (ref 0–0.2)
BASOPHILS RELATIVE PERCENT: 1.2 %
BILIRUB SERPL-MCNC: 0.4 MG/DL (ref 0–1)
BUN BLDV-MCNC: 19 MG/DL (ref 7–20)
CALCIUM SERPL-MCNC: 9.4 MG/DL (ref 8.3–10.6)
CHLORIDE BLD-SCNC: 100 MMOL/L (ref 99–110)
CO2: 28 MMOL/L (ref 21–32)
CREAT SERPL-MCNC: 1 MG/DL (ref 0.8–1.3)
EOSINOPHILS ABSOLUTE: 0.4 K/UL (ref 0–0.6)
EOSINOPHILS RELATIVE PERCENT: 3.7 %
GFR AFRICAN AMERICAN: >60
GFR NON-AFRICAN AMERICAN: >60
GLOBULIN: 2.6 G/DL
GLUCOSE BLD-MCNC: 115 MG/DL (ref 70–99)
HCT VFR BLD CALC: 33.9 % (ref 40.5–52.5)
HEMOGLOBIN: 12.2 G/DL (ref 13.5–17.5)
LYMPHOCYTES ABSOLUTE: 1.9 K/UL (ref 1–5.1)
LYMPHOCYTES RELATIVE PERCENT: 19.2 %
MCH RBC QN AUTO: 36.2 PG (ref 26–34)
MCHC RBC AUTO-ENTMCNC: 36 G/DL (ref 31–36)
MCV RBC AUTO: 100.3 FL (ref 80–100)
MONOCYTES ABSOLUTE: 0.6 K/UL (ref 0–1.3)
MONOCYTES RELATIVE PERCENT: 6.1 %
NEUTROPHILS ABSOLUTE: 6.8 K/UL (ref 1.7–7.7)
NEUTROPHILS RELATIVE PERCENT: 69.8 %
PDW BLD-RTO: 15.1 % (ref 12.4–15.4)
PLATELET # BLD: 449 K/UL (ref 135–450)
PMV BLD AUTO: 7.5 FL (ref 5–10.5)
POTASSIUM SERPL-SCNC: 4.8 MMOL/L (ref 3.5–5.1)
RBC # BLD: 3.38 M/UL (ref 4.2–5.9)
SODIUM BLD-SCNC: 145 MMOL/L (ref 136–145)
TOTAL PROTEIN: 6.8 G/DL (ref 6.4–8.2)
WBC # BLD: 9.7 K/UL (ref 4–11)

## 2017-11-27 PROCEDURE — G8484 FLU IMMUNIZE NO ADMIN: HCPCS | Performed by: FAMILY MEDICINE

## 2017-11-27 PROCEDURE — 1036F TOBACCO NON-USER: CPT | Performed by: FAMILY MEDICINE

## 2017-11-27 PROCEDURE — G8417 CALC BMI ABV UP PARAM F/U: HCPCS | Performed by: FAMILY MEDICINE

## 2017-11-27 PROCEDURE — 99213 OFFICE O/P EST LOW 20 MIN: CPT | Performed by: FAMILY MEDICINE

## 2017-11-27 PROCEDURE — G8427 DOCREV CUR MEDS BY ELIG CLIN: HCPCS | Performed by: FAMILY MEDICINE

## 2017-11-27 PROCEDURE — 1111F DSCHRG MED/CURRENT MED MERGE: CPT | Performed by: FAMILY MEDICINE

## 2017-11-27 PROCEDURE — 4040F PNEUMOC VAC/ADMIN/RCVD: CPT | Performed by: FAMILY MEDICINE

## 2017-11-27 PROCEDURE — 1123F ACP DISCUSS/DSCN MKR DOCD: CPT | Performed by: FAMILY MEDICINE

## 2017-11-27 PROCEDURE — G8598 ASA/ANTIPLAT THER USED: HCPCS | Performed by: FAMILY MEDICINE

## 2017-11-27 ASSESSMENT — ENCOUNTER SYMPTOMS
WHEEZING: 0
COUGH: 1

## 2017-12-04 RX ORDER — LEVOTHYROXINE SODIUM 112 UG/1
TABLET ORAL
Qty: 180 TABLET | Refills: 1 | Status: SHIPPED | OUTPATIENT
Start: 2017-12-04 | End: 2018-08-25 | Stop reason: SDUPTHER

## 2017-12-11 RX ORDER — BENZONATATE 200 MG/1
200 CAPSULE ORAL 3 TIMES DAILY PRN
Qty: 30 CAPSULE | Refills: 1 | Status: SHIPPED | OUTPATIENT
Start: 2017-12-11 | End: 2017-12-29 | Stop reason: DRUGHIGH

## 2017-12-28 ENCOUNTER — TELEPHONE (OUTPATIENT)
Dept: FAMILY MEDICINE CLINIC | Age: 77
End: 2017-12-28

## 2017-12-28 NOTE — TELEPHONE ENCOUNTER
Pt has had coughing, back pain, achiness, chills, warm to touch, no thermometer to check temp. No appts open today or tomorrow. Wife asking for maybe something for the cough, and maybe a Z-pack of a round of prednisone.

## 2017-12-29 ENCOUNTER — OFFICE VISIT (OUTPATIENT)
Dept: FAMILY MEDICINE CLINIC | Age: 77
End: 2017-12-29

## 2017-12-29 VITALS
HEIGHT: 71 IN | WEIGHT: 264 LBS | DIASTOLIC BLOOD PRESSURE: 70 MMHG | SYSTOLIC BLOOD PRESSURE: 128 MMHG | HEART RATE: 62 BPM | BODY MASS INDEX: 36.96 KG/M2 | OXYGEN SATURATION: 87 %

## 2017-12-29 DIAGNOSIS — J20.9 ACUTE BRONCHITIS, UNSPECIFIED ORGANISM: Primary | ICD-10-CM

## 2017-12-29 PROCEDURE — 4040F PNEUMOC VAC/ADMIN/RCVD: CPT | Performed by: FAMILY MEDICINE

## 2017-12-29 PROCEDURE — 99213 OFFICE O/P EST LOW 20 MIN: CPT | Performed by: FAMILY MEDICINE

## 2017-12-29 PROCEDURE — 1123F ACP DISCUSS/DSCN MKR DOCD: CPT | Performed by: FAMILY MEDICINE

## 2017-12-29 PROCEDURE — G8484 FLU IMMUNIZE NO ADMIN: HCPCS | Performed by: FAMILY MEDICINE

## 2017-12-29 PROCEDURE — 1036F TOBACCO NON-USER: CPT | Performed by: FAMILY MEDICINE

## 2017-12-29 PROCEDURE — G8417 CALC BMI ABV UP PARAM F/U: HCPCS | Performed by: FAMILY MEDICINE

## 2017-12-29 PROCEDURE — G8427 DOCREV CUR MEDS BY ELIG CLIN: HCPCS | Performed by: FAMILY MEDICINE

## 2017-12-29 PROCEDURE — G8598 ASA/ANTIPLAT THER USED: HCPCS | Performed by: FAMILY MEDICINE

## 2017-12-29 RX ORDER — HYDROXYCHLOROQUINE SULFATE 200 MG/1
200 TABLET, FILM COATED ORAL 2 TIMES DAILY
COMMUNITY
End: 2021-10-15

## 2017-12-29 RX ORDER — BENZONATATE 100 MG/1
CAPSULE ORAL
Refills: 1 | COMMUNITY
Start: 2017-12-11 | End: 2018-04-11 | Stop reason: ALTCHOICE

## 2017-12-29 RX ORDER — PROMETHAZINE HYDROCHLORIDE AND CODEINE PHOSPHATE 6.25; 1 MG/5ML; MG/5ML
5 SYRUP ORAL EVERY 4 HOURS PRN
Qty: 180 ML | Refills: 0 | Status: SHIPPED | OUTPATIENT
Start: 2017-12-29 | End: 2018-01-05

## 2017-12-29 RX ORDER — AZITHROMYCIN 250 MG/1
TABLET, FILM COATED ORAL
Qty: 6 TABLET | Refills: 0 | Status: SHIPPED | OUTPATIENT
Start: 2017-12-29 | End: 2018-01-08

## 2017-12-29 ASSESSMENT — PATIENT HEALTH QUESTIONNAIRE - PHQ9
SUM OF ALL RESPONSES TO PHQ9 QUESTIONS 1 & 2: 0
1. LITTLE INTEREST OR PLEASURE IN DOING THINGS: 0
SUM OF ALL RESPONSES TO PHQ QUESTIONS 1-9: 0
2. FEELING DOWN, DEPRESSED OR HOPELESS: 0

## 2017-12-29 ASSESSMENT — ENCOUNTER SYMPTOMS
GASTROINTESTINAL NEGATIVE: 1
COUGH: 1

## 2017-12-29 NOTE — PROGRESS NOTES
Authorizing Provider Melina Salvador,     Medication promethazine-codeine (PHENERGAN WITH CODEINE) 6.25-10 MG/5ML syrup, Sig Take 5 mLs by mouth 4 times daily as needed for Cough . , Taking? , Authorizing Provider Miriam Landeros, DO      Past Medical History:  11/2/2017: BRYON (acute kidney injury) ([facility] [address]  8/1/2015: CAD (coronary artery disease)  No date: Cancer [facility])      Comment: PROSTATE  11/2/2017: Chronic dCHF (grade 1 LVDD)  No date: Deafness  No date: Diverticulosis  No date: Fatigue  No date: Hepatitis      Comment: PT NOT SURE WHAT KIND, HE HAD YEARS AGO  No date: Hyperlipidemia  No date: Hypertension  No date: Hypothyroidism  No date: Joint pain  No date: Numbness and tingling      Comment: HANDS AND FEET  7/21/2015: OA (osteoarthritis) of hip  11/3/2017: Obesity (BMI 30-39. 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 HENT: Positive for postnasal drip. Respiratory: Positive for cough. Cardiovascular: Negative. Gastrointestinal: Negative. Objective:   Physical Exam   Constitutional: He is oriented to person, place, and time. HENT:   Mouth/Throat: Oropharynx is clear and moist.   Eyes: Conjunctivae are normal.   Cardiovascular: Normal rate, regular rhythm and normal heart sounds. Pulmonary/Chest: Effort normal. He has rales. Neurological: He is alert and oriented to person, place, and time. Assessment:      1. Acute bronchitis, unspecified organism            Plan:      [address] [facility] was seen today for cough, other, back pain and chills.     Diagnoses and all orders for this visit:    Acute bronchitis, unspecified organism  Rx Z-Arcenio--Rx Phen cod

## 2018-01-02 RX ORDER — LISINOPRIL 20 MG/1
20 TABLET ORAL 2 TIMES DAILY
Qty: 180 TABLET | Refills: 3 | Status: SHIPPED | OUTPATIENT
Start: 2018-01-02 | End: 2019-02-07 | Stop reason: SDUPTHER

## 2018-02-21 DIAGNOSIS — J18.9 PNEUMONIA OF BOTH LUNGS DUE TO INFECTIOUS ORGANISM, UNSPECIFIED PART OF LUNG: Primary | ICD-10-CM

## 2018-03-06 ENCOUNTER — TELEPHONE (OUTPATIENT)
Dept: FAMILY MEDICINE CLINIC | Age: 78
End: 2018-03-06

## 2018-03-20 RX ORDER — CEPHALEXIN 500 MG/1
CAPSULE ORAL
Qty: 12 CAPSULE | Refills: 2 | Status: SHIPPED | OUTPATIENT
Start: 2018-03-20 | End: 2018-04-18

## 2018-03-20 NOTE — TELEPHONE ENCOUNTER
.  Last office visit 12/29/2017     Last written  12-8-16 #2 with 0 refills       Next office visit scheduled 3/22/2018    Requested Prescriptions     Pending Prescriptions Disp Refills    cephALEXin (KEFLEX) 500 MG capsule [Pharmacy Med Name: CEPHALEXIN 500 MG CAPSULE] 12 capsule 2     Sig: TAKE 3 CAPS 60 MINS PRIOR TO DENTAL WORK AND 3 CAPS 2 HOURS POST DENTAL WORK

## 2018-03-22 ENCOUNTER — OFFICE VISIT (OUTPATIENT)
Dept: FAMILY MEDICINE CLINIC | Age: 78
End: 2018-03-22

## 2018-03-22 VITALS
OXYGEN SATURATION: 95 % | BODY MASS INDEX: 36.54 KG/M2 | SYSTOLIC BLOOD PRESSURE: 130 MMHG | DIASTOLIC BLOOD PRESSURE: 66 MMHG | HEIGHT: 71 IN | WEIGHT: 261 LBS | HEART RATE: 78 BPM

## 2018-03-22 DIAGNOSIS — J18.9 PNEUMONIA DUE TO INFECTIOUS ORGANISM, UNSPECIFIED LATERALITY, UNSPECIFIED PART OF LUNG: Primary | ICD-10-CM

## 2018-03-22 PROCEDURE — 4040F PNEUMOC VAC/ADMIN/RCVD: CPT | Performed by: FAMILY MEDICINE

## 2018-03-22 PROCEDURE — 1123F ACP DISCUSS/DSCN MKR DOCD: CPT | Performed by: FAMILY MEDICINE

## 2018-03-22 PROCEDURE — G8599 NO ASA/ANTIPLAT THER USE RNG: HCPCS | Performed by: FAMILY MEDICINE

## 2018-03-22 PROCEDURE — G8482 FLU IMMUNIZE ORDER/ADMIN: HCPCS | Performed by: FAMILY MEDICINE

## 2018-03-22 PROCEDURE — G8427 DOCREV CUR MEDS BY ELIG CLIN: HCPCS | Performed by: FAMILY MEDICINE

## 2018-03-22 PROCEDURE — G8417 CALC BMI ABV UP PARAM F/U: HCPCS | Performed by: FAMILY MEDICINE

## 2018-03-22 PROCEDURE — 99213 OFFICE O/P EST LOW 20 MIN: CPT | Performed by: FAMILY MEDICINE

## 2018-03-22 PROCEDURE — 1036F TOBACCO NON-USER: CPT | Performed by: FAMILY MEDICINE

## 2018-03-22 RX ORDER — LEVOFLOXACIN 500 MG/1
500 TABLET, FILM COATED ORAL DAILY
Qty: 7 TABLET | Refills: 0 | Status: SHIPPED | OUTPATIENT
Start: 2018-03-22 | End: 2018-04-11 | Stop reason: ALTCHOICE

## 2018-03-22 ASSESSMENT — ENCOUNTER SYMPTOMS
WHEEZING: 1
COUGH: 1
SHORTNESS OF BREATH: 1
GASTROINTESTINAL NEGATIVE: 1

## 2018-03-22 NOTE — PATIENT INSTRUCTIONS
Collins Abreu was seen today for pneumonia.     Diagnoses and all orders for this visit:    Pneumonia due to infectious organism, unspecified laterality, unspecified part of lung    Rx levaquin 500  Refer to [facility]

## 2018-03-23 ENCOUNTER — TELEPHONE (OUTPATIENT)
Dept: FAMILY MEDICINE CLINIC | Age: 78
End: 2018-03-23

## 2018-03-26 ENCOUNTER — HOSPITAL ENCOUNTER (OUTPATIENT)
Dept: GENERAL RADIOLOGY | Age: 78
Discharge: HOME | End: 2018-03-26
Attending: INTERNAL MEDICINE | Admitting: INTERNAL MEDICINE

## 2018-03-26 ENCOUNTER — OFFICE VISIT (OUTPATIENT)
Dept: PULMONOLOGY | Age: 78
End: 2018-03-26

## 2018-03-26 VITALS
HEIGHT: 71 IN | WEIGHT: 259 LBS | SYSTOLIC BLOOD PRESSURE: 109 MMHG | OXYGEN SATURATION: 93 % | BODY MASS INDEX: 36.26 KG/M2 | TEMPERATURE: 97.8 F | RESPIRATION RATE: 16 BRPM | HEART RATE: 79 BPM | DIASTOLIC BLOOD PRESSURE: 56 MMHG

## 2018-03-26 DIAGNOSIS — J41.1 BRONCHITIS, MUCOPURULENT RECURRENT (HCC): Primary | ICD-10-CM

## 2018-03-26 DIAGNOSIS — R91.8 MULTIPLE PULMONARY NODULES: ICD-10-CM

## 2018-03-26 DIAGNOSIS — J41.1 BRONCHITIS, MUCOPURULENT RECURRENT (HCC): ICD-10-CM

## 2018-03-26 LAB
IGA: 283 MG/DL (ref 70–400)
IGG: 934 MG/DL (ref 700–1600)
IGM: 37 MG/DL (ref 40–230)

## 2018-03-26 PROCEDURE — G8926 SPIRO NO PERF OR DOC: HCPCS | Performed by: INTERNAL MEDICINE

## 2018-03-26 PROCEDURE — G8482 FLU IMMUNIZE ORDER/ADMIN: HCPCS | Performed by: INTERNAL MEDICINE

## 2018-03-26 PROCEDURE — G8427 DOCREV CUR MEDS BY ELIG CLIN: HCPCS | Performed by: INTERNAL MEDICINE

## 2018-03-26 PROCEDURE — 99205 OFFICE O/P NEW HI 60 MIN: CPT | Performed by: INTERNAL MEDICINE

## 2018-03-26 PROCEDURE — G8417 CALC BMI ABV UP PARAM F/U: HCPCS | Performed by: INTERNAL MEDICINE

## 2018-03-26 PROCEDURE — 3023F SPIROM DOC REV: CPT | Performed by: INTERNAL MEDICINE

## 2018-03-26 PROCEDURE — 4040F PNEUMOC VAC/ADMIN/RCVD: CPT | Performed by: INTERNAL MEDICINE

## 2018-03-26 RX ORDER — BUDESONIDE AND FORMOTEROL FUMARATE DIHYDRATE 160; 4.5 UG/1; UG/1
2 AEROSOL RESPIRATORY (INHALATION) 2 TIMES DAILY
Qty: 1 INHALER | Refills: 0 | COMMUNITY
Start: 2018-03-26 | End: 2018-04-11 | Stop reason: SDUPTHER

## 2018-03-26 ASSESSMENT — ENCOUNTER SYMPTOMS
VOICE CHANGE: 0
EYE DISCHARGE: 0
ABDOMINAL PAIN: 0
DIARRHEA: 0
COUGH: 1
CONSTIPATION: 0
STRIDOR: 0
EYE PAIN: 0
WHEEZING: 1
SHORTNESS OF BREATH: 1
SORE THROAT: 0
EYE ITCHING: 0

## 2018-03-26 NOTE — PROGRESS NOTES
profile    Alpha-1-Antitrypsin w Phenotype    FULL PFT STUDY    Carbon monoxide diffusing capacity       Gabbie Garcia MD

## 2018-03-26 NOTE — PATIENT INSTRUCTIONS
PFT PREP  You have been scheduled for a Pulmonary Function Test on 3/27/2018 at 8:30am at [facility].   Nothing my mouth 1 hour prior to test (water only is OK). No smoking or inhalers 4 hours prior to test unless directed differently by the physician. Please PRE-REGISTER at [phone] option 2, option 2,prior to your test date. Also, please remember to arrive 20 to 30 minutes prior to testing unless otherwise instructed.

## 2018-03-27 ENCOUNTER — HOSPITAL ENCOUNTER (OUTPATIENT)
Dept: PULMONOLOGY | Age: 78
Discharge: HOME | End: 2018-03-27
Attending: INTERNAL MEDICINE | Admitting: INTERNAL MEDICINE

## 2018-03-27 VITALS — OXYGEN SATURATION: 95 %

## 2018-03-27 RX ORDER — ALBUTEROL SULFATE 90 UG/1
4 AEROSOL, METERED RESPIRATORY (INHALATION) ONCE
Status: COMPLETED | OUTPATIENT
Start: 2018-03-27 | End: 2018-03-27

## 2018-03-27 RX ADMIN — ALBUTEROL SULFATE 4 PUFF: 90 AEROSOL, METERED RESPIRATORY (INHALATION) at 08:33

## 2018-03-28 LAB
2000687N OAK TREE IGE: <0.1 KU/L
ALLERGEN ASPERGILLUS ALTERNATA IGE: <0.1 KU/L
ALLERGEN ASPERGILLUS FUMIGATUS IGE: <0.1 KU/L
ALLERGEN BERMUDA GRASS IGE: <0.1 KU/L
ALLERGEN BIRCH IGE: <0.1 KU/L
ALLERGEN CAT DANDER IGE: <0.1 KU/L
ALLERGEN COMMON SHORT RAGWEED IGE: <0.1 KU/L
ALLERGEN COTTONWOOD: <0.1 KU/L
ALLERGEN COW MILK IGE: <0.1 KU/L
ALLERGEN DOG DANDER IGE: <0.1 KU/L
ALLERGEN ELM IGE: <0.1 KU/L
ALLERGEN FUNGI/MOLD M.RACEMOSUS IGE: <0.1 KU/L
ALLERGEN GERMAN COCKROACH IGE: <0.1 KU/L
ALLERGEN HORMODENDRUM HORDEI IGE: <0.1 KU/L
ALLERGEN MAPLE/BOX ELDER IGE: <0.1 KU/L
ALLERGEN MITE DUST FARINAE IGE: <0.1 KU/L
ALLERGEN MITE DUST PTERONYSSINUS IGE: <0.1 KU/L
ALLERGEN MOUNTAIN CEDAR: <0.1 KU/L
ALLERGEN MOUSE EPITHELIA IGE: <0.1 KU/L
ALLERGEN PEANUT (F13) IGE: <0.1 KU/L
ALLERGEN PECAN TREE IGE: <0.1 KU/L
ALLERGEN PENICILLIUM NOTATUM: <0.1 KU/L
ALLERGEN ROUGH PIGWEED (W14) IGE: <0.1 KU/L
ALLERGEN RUSSIAN THISTLE IGE: <0.1 KU/L
ALLERGEN SEE NOTE: NORMAL
ALLERGEN SHEEP SORREL (W18) IGE: <0.1 KU/L
ALLERGEN TIMOTHY GRASS: <0.1 KU/L
ALLERGEN TREE SYCAMORE: <0.1 KU/L
ALLERGEN WALNUT TREE IGE: <0.1 KU/L
ALLERGEN WHITE MULBERRY TREE, IGE: <0.1 KU/L
ALLERGEN, TREE, WHITE ASH IGE: <0.1 KU/L
ALPHA-1 ANTITRYPSIN PHENOTYPE: NORMAL
ALPHA-1 ANTITRYPSIN: 169 MG/DL (ref 90–200)
IGE: <2 KU/L

## 2018-03-29 ENCOUNTER — HOSPITAL ENCOUNTER (OUTPATIENT)
Dept: SPEECH THERAPY | Age: 78
Discharge: HOME | End: 2018-03-29
Admitting: INTERNAL MEDICINE

## 2018-03-29 DIAGNOSIS — J41.1 BRONCHITIS, MUCOPURULENT RECURRENT (HCC): ICD-10-CM

## 2018-03-29 NOTE — COMMUNICATION BODY
INSTRUMENTAL SWALLOW REPORT  MODIFIED BARIUM SWALLOW    NAME: Melecio Hopson   : 1940  MRN: [id number]       Date of Eval: 3/29/2018     Ordering Physician: Dr. Lachelle Gonzalez  Radiologist: Dr. Ladan Llamas     Referring Diagnosis(es): Referring Diagnosis: dysphagia    Past Medical History:  has a past medical history of BRYON (acute kidney injury) ([facility] [address] CAD (coronary artery disease); Cancer ([address] Chronic dCHF (grade 1 LVDD); Deafness; Diverticulosis; Fatigue; Hepatitis; Hyperlipidemia; Hypertension; Hypothyroidism; Joint pain; Numbness and tingling; OA (osteoarthritis) of hip; Obesity (BMI 30-39.9); Other disorders of kidney and ureter; Rheumatoid aortitis; Shortness of breath; Type 2 diabetes mellitus without complication, without long-term current use of insulin ([facility] [address] and Weakness. Past Surgical History:  has a past surgical history that includes Colon surgery; hernia repair; Vasectomy; Prostate surgery; Abdomen surgery (12); other surgical history (2013); Elbow surgery; shoulder surgery; Colonoscopy (2015); Coronary angioplasty with stent (2015); other surgical history (Right); joint replacement (Right, 2016); and knee surgery. Current Diet Solid Consistency: Regular  Current Diet Liquid Consistency: Thin     Type of Study: Initial MBS     Recent CT of Chest: 18 \"Mild Reticulonodular opacity is seen within the left to inferior aspect of right lower lobe, and within the lingula consistent with infectious or inflammatory process. Mild atelectasis within right middle lobe and lingula. MIld peribronchial thickening bilaterally consistent with bronchitis. \"    Patient Complaints/Reason for Referral:  Melecio Hopson was referred for a MBS to assess the efficiency of his/her swallow function, assess for aspiration, and to make recommendations regarding safe dietary consistencies, effective compensatory strategies, and safe eating environment.   Patient complaints: Pt reports that he has had chronic cough with increased phlegm production since 11/17. Pt states that his cough and bronchitis improved initially, and then worsened whiel he was in [place] recently for a few months. Pt reports worsening cough with time where he is unable to stop coughing for a period of time. Pt reports worsening cough when he lays down to go to sleep at night, and also while drinking milk. Pt reports globus sensation at level of suprasternal notch. Onset of problem: 11/17, per pt     Behavior/Cognition/Vision/Hearing:  Behavior/Cognition: Alert, Cooperative, Pleasant mood  Vision: Impaired  Vision Exceptions: Wears glasses at all times  Hearing: Exceptions to [facility]  Hearing Exceptions: Hard of hearing/hearing concerns    General:  Treatment Dx and ICD 10: dysphagia   Patient Position: Lateral, A-P and Patient Degrees: Sitting fully upright in MBS chair throughout study  Consistencies Administered: Reg solid, Puree, Thin cup, Thin teaspoon, Thin straw, Nectar cup  Compensatory Swallowing Strategies Attempted: Upright as possible for all oral intake, Eat/Feed slowly, Remain upright for 30-45 minutes after meals     Impressions:  Oral and pharyngeal phases of swallow appear WNL at time of MBS. No aspiration nor penetration observed. Please refer to pharyngeal and oral phase sections of this report for complete findings. Upper esophageal function appears WFL during MBS. However, based upon pt's report of chronic cough, worsening when laying down at night, and increased cough after po intake, SLP recommends evaluation of esophageal function to rule out GERD/LPRD as cause for chronic cough. Dysphagia Outcome Severity Scale: Level 7: Normal in all situations  Penetration-Aspiration Scale (PAS): 1 - Material does not enter the airway    Recommended Diet:  Solid consistency: Regular  Liquid consistency:  Thin  Liquid administration via: Straw, Cup    Medication administration: PO    Safe Swallow Protocol:  Supervision: Independent  Compensatory Swallowing Strategies:   · Upright as possible for all oral intake,   · Eat/Feed slowly,   · Remain upright for 30-45 minutes after meals    Recommendations/Treatment  Requires SLP Intervention: No  Recommendations: GI Eval to evaluate esophageal function        Education: Images and recommendations were reviewed with pt following this exam.   Patient Education: Dysphagia Tx: Extensive review with pt of results of MBS, aspiration precautions, GERD precautions, and consideration of GI consult to evaluate esophageal function and possible GERD as cause for chronic cough and phlegm production. Patient Education Response: Verbalizes understanding, Demonstrated understanding    Prognosis  Prognosis for safe diet advancement: good  Safety Devices  Safety Devices in place: Not Applicable    Oral Preparation / Oral Phase  Oral phase of swallow grossly appears WNL. No oral phase deficits noted during evaluation. Pharyngeal Phase  Pharyngeal phase of swallow appears grossly WNL  No aspiration/laryngeal penetration noted  Timely swallow initiation  Adequate laryngeal elevation and closure during swallow  Adequate anterior hyoid movement during swallow  Pharyngeal propulsion of bolus WNL  No pharyngeal residue noted post-swallow      Esophageal Phase  Appears WFL;   · no obvious narrowing at UES  · No obvious retrograde flow of trials in esophagus  · No obvious retention of po trials in upper esophagus    Pain   Patient Currently in Pain: Denies     G-Code:  SLP G-Codes  Functional Limitations: Swallowing  Swallow Current Status (): 0 percent impaired, limited or restricted  Swallow Goal Status (): 0 percent impaired, limited or restricted  Swallow Discharge Status (): 0 percent impaired, limited or restricted      Therapy Time:   Individual Concurrent Group Co-treatment   Time In 0930         Time Out 0955         Minutes 25               Genesis REA

## 2018-03-29 NOTE — PROCEDURES
INSTRUMENTAL SWALLOW REPORT  MODIFIED BARIUM SWALLOW    NAME: Haider Mcneill   : 1940  MRN: [id number]       Date of Eval: 3/29/2018     Ordering Physician: Dr. Edgar Santos  Radiologist: Dr. Cheyenne Castellanos     Referring Diagnosis(es): Referring Diagnosis: dysphagia    Past Medical History:  has a past medical history of BRYON (acute kidney injury) ([facility] [address] CAD (coronary artery disease); Cancer ([facility] [address] Chronic dCHF (grade 1 LVDD); Deafness; Diverticulosis; Fatigue; Hepatitis; Hyperlipidemia; Hypertension; Hypothyroidism; Joint pain; Numbness and tingling; OA (osteoarthritis) of hip; Obesity (BMI 30-39.9); Other disorders of kidney and ureter; Rheumatoid aortitis; Shortness of breath; Type 2 diabetes mellitus without complication, without long-term current use of insulin ([facility] [address] and Weakness. Past Surgical History:  has a past surgical history that includes Colon surgery; hernia repair; Vasectomy; Prostate surgery; Abdomen surgery (12); other surgical history (2013); Elbow surgery; shoulder surgery; Colonoscopy (2015); Coronary angioplasty with stent (2015); other surgical history (Right); joint replacement (Right, 2016); and knee surgery. Current Diet Solid Consistency: Regular  Current Diet Liquid Consistency: Thin     Type of Study: Initial MBS     Recent CT of Chest: 18 \"Mild Reticulonodular opacity is seen within the left to inferior aspect of right lower lobe, and within the lingula consistent with infectious or inflammatory process. Mild atelectasis within right middle lobe and lingula. MIld peribronchial thickening bilaterally consistent with bronchitis. \"    Patient Complaints/Reason for Referral:  Haider Mcneill was referred for a MBS to assess the efficiency of his/her swallow function, assess for aspiration, and to make recommendations regarding safe dietary consistencies, effective compensatory strategies, and safe eating environment.   Patient complaints: Pt reports that he

## 2018-04-02 ENCOUNTER — HOSPITAL ENCOUNTER (OUTPATIENT)
Dept: MRI IMAGING | Age: 78
Discharge: HOME | End: 2018-04-02
Attending: ORTHOPAEDIC SURGERY | Admitting: ORTHOPAEDIC SURGERY

## 2018-04-02 DIAGNOSIS — M54.5 LOW BACK PAIN, UNSPECIFIED BACK PAIN LATERALITY, UNSPECIFIED CHRONICITY, WITH SCIATICA PRESENCE UNSPECIFIED: ICD-10-CM

## 2018-04-02 DIAGNOSIS — M54.50 LOW BACK PAIN: ICD-10-CM

## 2018-04-11 ENCOUNTER — OFFICE VISIT (OUTPATIENT)
Dept: PULMONOLOGY | Age: 78
End: 2018-04-11

## 2018-04-11 VITALS
HEART RATE: 54 BPM | OXYGEN SATURATION: 97 % | DIASTOLIC BLOOD PRESSURE: 59 MMHG | WEIGHT: 261 LBS | BODY MASS INDEX: 36.54 KG/M2 | HEIGHT: 71 IN | RESPIRATION RATE: 16 BRPM | TEMPERATURE: 98.4 F | SYSTOLIC BLOOD PRESSURE: 116 MMHG

## 2018-04-11 DIAGNOSIS — J47.9 BRONCHIECTASIS WITHOUT COMPLICATION (HCC): Primary | ICD-10-CM

## 2018-04-11 DIAGNOSIS — R91.8 MULTIPLE PULMONARY NODULES: ICD-10-CM

## 2018-04-11 PROCEDURE — 1123F ACP DISCUSS/DSCN MKR DOCD: CPT | Performed by: INTERNAL MEDICINE

## 2018-04-11 PROCEDURE — G8417 CALC BMI ABV UP PARAM F/U: HCPCS | Performed by: INTERNAL MEDICINE

## 2018-04-11 PROCEDURE — 1036F TOBACCO NON-USER: CPT | Performed by: INTERNAL MEDICINE

## 2018-04-11 PROCEDURE — 99214 OFFICE O/P EST MOD 30 MIN: CPT | Performed by: INTERNAL MEDICINE

## 2018-04-11 PROCEDURE — G8599 NO ASA/ANTIPLAT THER USE RNG: HCPCS | Performed by: INTERNAL MEDICINE

## 2018-04-11 PROCEDURE — 4040F PNEUMOC VAC/ADMIN/RCVD: CPT | Performed by: INTERNAL MEDICINE

## 2018-04-11 PROCEDURE — G8427 DOCREV CUR MEDS BY ELIG CLIN: HCPCS | Performed by: INTERNAL MEDICINE

## 2018-04-11 RX ORDER — BUDESONIDE AND FORMOTEROL FUMARATE DIHYDRATE 160; 4.5 UG/1; UG/1
2 AEROSOL RESPIRATORY (INHALATION) 2 TIMES DAILY
Qty: 1 INHALER | Refills: 6 | Status: SHIPPED | OUTPATIENT
Start: 2018-04-11 | End: 2021-10-15 | Stop reason: ALTCHOICE

## 2018-04-11 ASSESSMENT — ENCOUNTER SYMPTOMS
VOICE CHANGE: 0
WHEEZING: 0
EYE ITCHING: 0
SORE THROAT: 0
SHORTNESS OF BREATH: 0
CONSTIPATION: 0
ABDOMINAL PAIN: 0
EYE PAIN: 0
DIARRHEA: 0
COUGH: 0
EYE DISCHARGE: 0

## 2018-04-18 ENCOUNTER — OFFICE VISIT (OUTPATIENT)
Dept: FAMILY MEDICINE CLINIC | Age: 78
End: 2018-04-18

## 2018-04-18 VITALS
WEIGHT: 262 LBS | HEIGHT: 71 IN | DIASTOLIC BLOOD PRESSURE: 74 MMHG | BODY MASS INDEX: 36.68 KG/M2 | SYSTOLIC BLOOD PRESSURE: 136 MMHG | OXYGEN SATURATION: 96 % | HEART RATE: 56 BPM

## 2018-04-18 DIAGNOSIS — E11.9 TYPE 2 DIABETES MELLITUS WITHOUT COMPLICATION, WITHOUT LONG-TERM CURRENT USE OF INSULIN (HCC): Primary | ICD-10-CM

## 2018-04-18 DIAGNOSIS — I10 ESSENTIAL HYPERTENSION: Chronic | ICD-10-CM

## 2018-04-18 DIAGNOSIS — E03.9 ACQUIRED HYPOTHYROIDISM: Chronic | ICD-10-CM

## 2018-04-18 LAB — HBA1C MFR BLD: 6.8 %

## 2018-04-18 PROCEDURE — 1036F TOBACCO NON-USER: CPT | Performed by: FAMILY MEDICINE

## 2018-04-18 PROCEDURE — 83036 HEMOGLOBIN GLYCOSYLATED A1C: CPT | Performed by: FAMILY MEDICINE

## 2018-04-18 PROCEDURE — G8417 CALC BMI ABV UP PARAM F/U: HCPCS | Performed by: FAMILY MEDICINE

## 2018-04-18 PROCEDURE — 4040F PNEUMOC VAC/ADMIN/RCVD: CPT | Performed by: FAMILY MEDICINE

## 2018-04-18 PROCEDURE — 1123F ACP DISCUSS/DSCN MKR DOCD: CPT | Performed by: FAMILY MEDICINE

## 2018-04-18 PROCEDURE — G8427 DOCREV CUR MEDS BY ELIG CLIN: HCPCS | Performed by: FAMILY MEDICINE

## 2018-04-18 PROCEDURE — G8599 NO ASA/ANTIPLAT THER USE RNG: HCPCS | Performed by: FAMILY MEDICINE

## 2018-04-18 PROCEDURE — 99214 OFFICE O/P EST MOD 30 MIN: CPT | Performed by: FAMILY MEDICINE

## 2018-04-18 ASSESSMENT — ENCOUNTER SYMPTOMS
BACK PAIN: 1
GASTROINTESTINAL NEGATIVE: 1

## 2018-05-04 ENCOUNTER — OFFICE VISIT (OUTPATIENT)
Dept: FAMILY MEDICINE CLINIC | Age: 78
End: 2018-05-04

## 2018-05-04 VITALS
WEIGHT: 263 LBS | HEART RATE: 76 BPM | DIASTOLIC BLOOD PRESSURE: 60 MMHG | OXYGEN SATURATION: 95 % | HEIGHT: 72 IN | SYSTOLIC BLOOD PRESSURE: 110 MMHG | BODY MASS INDEX: 35.62 KG/M2

## 2018-05-04 DIAGNOSIS — Z01.818 PRE-OP EVALUATION: ICD-10-CM

## 2018-05-04 DIAGNOSIS — Z01.818 PRE-OP EVALUATION: Primary | ICD-10-CM

## 2018-05-04 PROCEDURE — 93000 ELECTROCARDIOGRAM COMPLETE: CPT | Performed by: PHYSICIAN ASSISTANT

## 2018-05-04 PROCEDURE — 99213 OFFICE O/P EST LOW 20 MIN: CPT | Performed by: PHYSICIAN ASSISTANT

## 2018-05-05 LAB
A/G RATIO: 1.8 (ref 1.1–2.2)
ALBUMIN SERPL-MCNC: 4.4 G/DL (ref 3.4–5)
ALP BLD-CCNC: 93 U/L (ref 40–129)
ALT SERPL-CCNC: 12 U/L (ref 10–40)
ANION GAP SERPL CALCULATED.3IONS-SCNC: 14 MMOL/L (ref 3–16)
AST SERPL-CCNC: 12 U/L (ref 15–37)
BILIRUB SERPL-MCNC: 0.4 MG/DL (ref 0–1)
BUN BLDV-MCNC: 26 MG/DL (ref 7–20)
CALCIUM SERPL-MCNC: 9.6 MG/DL (ref 8.3–10.6)
CHLORIDE BLD-SCNC: 102 MMOL/L (ref 99–110)
CO2: 26 MMOL/L (ref 21–32)
CREAT SERPL-MCNC: 1 MG/DL (ref 0.8–1.3)
GFR AFRICAN AMERICAN: >60
GFR NON-AFRICAN AMERICAN: >60
GLOBULIN: 2.5 G/DL
GLUCOSE BLD-MCNC: 87 MG/DL (ref 70–99)
HCT VFR BLD CALC: 38.5 % (ref 40.5–52.5)
HEMOGLOBIN: 12.7 G/DL (ref 13.5–17.5)
MCH RBC QN AUTO: 30.3 PG (ref 26–34)
MCHC RBC AUTO-ENTMCNC: 32.9 G/DL (ref 31–36)
MCV RBC AUTO: 92.1 FL (ref 80–100)
PDW BLD-RTO: 15 % (ref 12.4–15.4)
PLATELET # BLD: 458 K/UL (ref 135–450)
PMV BLD AUTO: 7.9 FL (ref 5–10.5)
POTASSIUM SERPL-SCNC: 4.7 MMOL/L (ref 3.5–5.1)
RBC # BLD: 4.18 M/UL (ref 4.2–5.9)
SODIUM BLD-SCNC: 142 MMOL/L (ref 136–145)
TOTAL PROTEIN: 6.9 G/DL (ref 6.4–8.2)
WBC # BLD: 11.2 K/UL (ref 4–11)

## 2018-05-10 ENCOUNTER — TELEPHONE (OUTPATIENT)
Dept: FAMILY MEDICINE CLINIC | Age: 78
End: 2018-05-10

## 2018-05-11 PROBLEM — M54.16 LUMBAR RADICULOPATHY: Status: ACTIVE | Noted: 2018-05-11

## 2018-05-11 PROBLEM — M54.16 SPINAL STENOSIS OF LUMBAR REGION WITH RADICULOPATHY: Status: ACTIVE | Noted: 2018-05-11

## 2018-05-11 PROBLEM — M51.36 LUMBAR DEGENERATIVE DISC DISEASE: Status: ACTIVE | Noted: 2018-05-11

## 2018-05-11 PROBLEM — M47.16 LUMBAR SPONDYLOSIS WITH MYELOPATHY: Status: ACTIVE | Noted: 2018-05-11

## 2018-05-11 PROBLEM — M51.369 LUMBAR DEGENERATIVE DISC DISEASE: Status: ACTIVE | Noted: 2018-05-11

## 2018-05-11 PROBLEM — M48.061 SPINAL STENOSIS OF LUMBAR REGION WITH RADICULOPATHY: Status: ACTIVE | Noted: 2018-05-11

## 2018-05-11 PROBLEM — M43.16 SPONDYLOLISTHESIS OF LUMBAR REGION: Status: ACTIVE | Noted: 2018-05-11

## 2018-05-11 PROBLEM — M48.062 LUMBAR STENOSIS WITH NEUROGENIC CLAUDICATION: Status: ACTIVE | Noted: 2018-05-11

## 2018-05-29 ENCOUNTER — CARE COORDINATION (OUTPATIENT)
Dept: CASE MANAGEMENT | Age: 78
End: 2018-05-29

## 2018-06-05 ENCOUNTER — CARE COORDINATION (OUTPATIENT)
Dept: CASE MANAGEMENT | Age: 78
End: 2018-06-05

## 2018-06-14 ENCOUNTER — HOSPITAL ENCOUNTER (OUTPATIENT)
Dept: OTHER | Age: 78
Discharge: HOME | End: 2018-06-14
Attending: INTERNAL MEDICINE | Admitting: INTERNAL MEDICINE

## 2018-06-14 LAB
A/G RATIO: 1.3 (ref 1.1–2.2)
ALBUMIN SERPL-MCNC: 4.1 G/DL (ref 3.4–5)
ALP BLD-CCNC: 99 U/L (ref 40–129)
ALT SERPL-CCNC: 11 U/L (ref 10–40)
ANION GAP SERPL CALCULATED.3IONS-SCNC: 17 MMOL/L (ref 3–16)
AST SERPL-CCNC: 13 U/L (ref 15–37)
BILIRUB SERPL-MCNC: 0.3 MG/DL (ref 0–1)
BUN BLDV-MCNC: 19 MG/DL (ref 7–20)
C-REACTIVE PROTEIN: 7.1 MG/L (ref 0–5.1)
CALCIUM SERPL-MCNC: 9.1 MG/DL (ref 8.3–10.6)
CHLORIDE BLD-SCNC: 106 MMOL/L (ref 99–110)
CO2: 24 MMOL/L (ref 21–32)
CREAT SERPL-MCNC: 1.1 MG/DL (ref 0.8–1.3)
GFR AFRICAN AMERICAN: >60
GFR NON-AFRICAN AMERICAN: >60
GLOBULIN: 3.1 G/DL
GLUCOSE BLD-MCNC: 120 MG/DL (ref 70–99)
HCT VFR BLD CALC: 35.2 % (ref 40.5–52.5)
HEMOGLOBIN: 12 G/DL (ref 13.5–17.5)
MCH RBC QN AUTO: 30.1 PG (ref 26–34)
MCHC RBC AUTO-ENTMCNC: 34 G/DL (ref 31–36)
MCV RBC AUTO: 88.6 FL (ref 80–100)
PDW BLD-RTO: 15 % (ref 12.4–15.4)
PLATELET # BLD: 376 K/UL (ref 135–450)
PMV BLD AUTO: 7.8 FL (ref 5–10.5)
POTASSIUM SERPL-SCNC: 4.2 MMOL/L (ref 3.5–5.1)
RBC # BLD: 3.97 M/UL (ref 4.2–5.9)
SODIUM BLD-SCNC: 147 MMOL/L (ref 136–145)
TOTAL PROTEIN: 7.2 G/DL (ref 6.4–8.2)
WBC # BLD: 9.2 K/UL (ref 4–11)

## 2018-06-15 ENCOUNTER — CARE COORDINATION (OUTPATIENT)
Dept: CASE MANAGEMENT | Age: 78
End: 2018-06-15

## 2018-06-15 DIAGNOSIS — E66.9 OBESITY (BMI 30-39.9): Primary | Chronic | ICD-10-CM

## 2018-06-15 PROCEDURE — 1111F DSCHRG MED/CURRENT MED MERGE: CPT | Performed by: FAMILY MEDICINE

## 2018-06-18 ENCOUNTER — OFFICE VISIT (OUTPATIENT)
Dept: FAMILY MEDICINE CLINIC | Age: 78
End: 2018-06-18

## 2018-06-18 VITALS
DIASTOLIC BLOOD PRESSURE: 60 MMHG | HEIGHT: 71 IN | SYSTOLIC BLOOD PRESSURE: 130 MMHG | BODY MASS INDEX: 36.26 KG/M2 | WEIGHT: 259 LBS | HEART RATE: 68 BPM | OXYGEN SATURATION: 96 %

## 2018-06-18 DIAGNOSIS — Z98.890 POST-OPERATIVE STATE: Primary | ICD-10-CM

## 2018-06-18 PROCEDURE — G8427 DOCREV CUR MEDS BY ELIG CLIN: HCPCS | Performed by: FAMILY MEDICINE

## 2018-06-18 PROCEDURE — G8599 NO ASA/ANTIPLAT THER USE RNG: HCPCS | Performed by: FAMILY MEDICINE

## 2018-06-18 PROCEDURE — 1036F TOBACCO NON-USER: CPT | Performed by: FAMILY MEDICINE

## 2018-06-18 PROCEDURE — 4040F PNEUMOC VAC/ADMIN/RCVD: CPT | Performed by: FAMILY MEDICINE

## 2018-06-18 PROCEDURE — G8417 CALC BMI ABV UP PARAM F/U: HCPCS | Performed by: FAMILY MEDICINE

## 2018-06-18 PROCEDURE — 99214 OFFICE O/P EST MOD 30 MIN: CPT | Performed by: FAMILY MEDICINE

## 2018-06-18 PROCEDURE — 1123F ACP DISCUSS/DSCN MKR DOCD: CPT | Performed by: FAMILY MEDICINE

## 2018-06-18 RX ORDER — BUDESONIDE AND FORMOTEROL FUMARATE DIHYDRATE 160; 4.5 UG/1; UG/1
AEROSOL RESPIRATORY (INHALATION)
Refills: 6 | COMMUNITY
Start: 2018-06-07 | End: 2018-12-19

## 2018-06-18 RX ORDER — HYDROCODONE BITARTRATE AND ACETAMINOPHEN 5; 325 MG/1; MG/1
1 TABLET ORAL EVERY 6 HOURS PRN
COMMUNITY
End: 2018-10-31

## 2018-06-18 ASSESSMENT — ENCOUNTER SYMPTOMS
VOMITING: 0
NAUSEA: 0
COUGH: 0
ABDOMINAL PAIN: 0
SHORTNESS OF BREATH: 0
BACK PAIN: 1

## 2018-06-25 ENCOUNTER — TELEPHONE (OUTPATIENT)
Dept: FAMILY MEDICINE CLINIC | Age: 78
End: 2018-06-25

## 2018-06-27 ENCOUNTER — CARE COORDINATION (OUTPATIENT)
Dept: CASE MANAGEMENT | Age: 78
End: 2018-06-27

## 2018-07-30 ENCOUNTER — OFFICE VISIT (OUTPATIENT)
Dept: ORTHOPEDIC SURGERY | Age: 78
End: 2018-07-30

## 2018-07-30 VITALS
BODY MASS INDEX: 35.49 KG/M2 | HEIGHT: 72 IN | WEIGHT: 262 LBS | DIASTOLIC BLOOD PRESSURE: 82 MMHG | SYSTOLIC BLOOD PRESSURE: 156 MMHG | HEART RATE: 60 BPM

## 2018-07-30 DIAGNOSIS — I73.9 ARTERIAL INSUFFICIENCY OF LOWER EXTREMITY (HCC): ICD-10-CM

## 2018-07-30 DIAGNOSIS — Z96.659 PAINFUL TOTAL KNEE REPLACEMENT, INITIAL ENCOUNTER (HCC): ICD-10-CM

## 2018-07-30 DIAGNOSIS — Z96.651 HISTORY OF TOTAL KNEE REPLACEMENT, RIGHT: ICD-10-CM

## 2018-07-30 DIAGNOSIS — R52 PAIN: Primary | ICD-10-CM

## 2018-07-30 DIAGNOSIS — T84.84XA PAINFUL TOTAL KNEE REPLACEMENT, INITIAL ENCOUNTER (HCC): ICD-10-CM

## 2018-07-30 PROCEDURE — 99213 OFFICE O/P EST LOW 20 MIN: CPT | Performed by: ORTHOPAEDIC SURGERY

## 2018-07-30 PROCEDURE — G8427 DOCREV CUR MEDS BY ELIG CLIN: HCPCS | Performed by: ORTHOPAEDIC SURGERY

## 2018-07-30 PROCEDURE — G8417 CALC BMI ABV UP PARAM F/U: HCPCS | Performed by: ORTHOPAEDIC SURGERY

## 2018-07-30 PROCEDURE — 1123F ACP DISCUSS/DSCN MKR DOCD: CPT | Performed by: ORTHOPAEDIC SURGERY

## 2018-07-30 PROCEDURE — 1101F PT FALLS ASSESS-DOCD LE1/YR: CPT | Performed by: ORTHOPAEDIC SURGERY

## 2018-07-30 PROCEDURE — G8599 NO ASA/ANTIPLAT THER USE RNG: HCPCS | Performed by: ORTHOPAEDIC SURGERY

## 2018-07-30 PROCEDURE — 1036F TOBACCO NON-USER: CPT | Performed by: ORTHOPAEDIC SURGERY

## 2018-07-30 PROCEDURE — 4040F PNEUMOC VAC/ADMIN/RCVD: CPT | Performed by: ORTHOPAEDIC SURGERY

## 2018-08-08 ENCOUNTER — HOSPITAL ENCOUNTER (OUTPATIENT)
Age: 78
Discharge: HOME | End: 2018-08-08
Payer: MEDICARE

## 2018-08-08 ENCOUNTER — HOSPITAL ENCOUNTER (OUTPATIENT)
Dept: NUCLEAR MEDICINE | Age: 78
Discharge: HOME | End: 2018-08-08
Payer: MEDICARE

## 2018-08-08 DIAGNOSIS — Z96.659 PAINFUL TOTAL KNEE REPLACEMENT, INITIAL ENCOUNTER (HCC): ICD-10-CM

## 2018-08-08 DIAGNOSIS — T84.84XA PAINFUL TOTAL KNEE REPLACEMENT, INITIAL ENCOUNTER (HCC): ICD-10-CM

## 2018-08-08 DIAGNOSIS — I73.9 ARTERIAL INSUFFICIENCY OF LOWER EXTREMITY (HCC): ICD-10-CM

## 2018-08-08 DIAGNOSIS — R52 PAIN: ICD-10-CM

## 2018-08-08 DIAGNOSIS — Z96.651 HISTORY OF TOTAL KNEE REPLACEMENT, RIGHT: ICD-10-CM

## 2018-08-08 DIAGNOSIS — M25.561 RIGHT KNEE PAIN, UNSPECIFIED CHRONICITY: ICD-10-CM

## 2018-08-08 LAB
BASOPHILS ABSOLUTE: 0.1 K/UL (ref 0–0.2)
BASOPHILS RELATIVE PERCENT: 1 %
C-REACTIVE PROTEIN: 5.4 MG/L (ref 0–5.1)
EOSINOPHILS ABSOLUTE: 0.3 K/UL (ref 0–0.6)
EOSINOPHILS RELATIVE PERCENT: 3.6 %
HCT VFR BLD CALC: 37.8 % (ref 40.5–52.5)
HEMOGLOBIN: 12.6 G/DL (ref 13.5–17.5)
LYMPHOCYTES ABSOLUTE: 1.8 K/UL (ref 1–5.1)
LYMPHOCYTES RELATIVE PERCENT: 18.2 %
MCH RBC QN AUTO: 29.5 PG (ref 26–34)
MCHC RBC AUTO-ENTMCNC: 33.4 G/DL (ref 31–36)
MCV RBC AUTO: 88.4 FL (ref 80–100)
MONOCYTES ABSOLUTE: 0.8 K/UL (ref 0–1.3)
MONOCYTES RELATIVE PERCENT: 7.9 %
NEUTROPHILS ABSOLUTE: 6.7 K/UL (ref 1.7–7.7)
NEUTROPHILS RELATIVE PERCENT: 69.3 %
PDW BLD-RTO: 15.2 % (ref 12.4–15.4)
PLATELET # BLD: 414 K/UL (ref 135–450)
PMV BLD AUTO: 8.1 FL (ref 5–10.5)
RBC # BLD: 4.28 M/UL (ref 4.2–5.9)
SEDIMENTATION RATE, ERYTHROCYTE: 15 MM/HR (ref 0–20)
WBC # BLD: 9.7 K/UL (ref 4–11)

## 2018-08-08 PROCEDURE — 85652 RBC SED RATE AUTOMATED: CPT

## 2018-08-08 PROCEDURE — A9503 TC99M MEDRONATE: HCPCS | Performed by: ORTHOPAEDIC SURGERY

## 2018-08-08 PROCEDURE — 36415 COLL VENOUS BLD VENIPUNCTURE: CPT

## 2018-08-08 PROCEDURE — 86140 C-REACTIVE PROTEIN: CPT

## 2018-08-08 PROCEDURE — 3430000000 HC RX DIAGNOSTIC RADIOPHARMACEUTICAL: Performed by: ORTHOPAEDIC SURGERY

## 2018-08-08 PROCEDURE — 85025 COMPLETE CBC W/AUTO DIFF WBC: CPT

## 2018-08-08 RX ORDER — TC 99M MEDRONATE 20 MG/10ML
26.5 INJECTION, POWDER, LYOPHILIZED, FOR SOLUTION INTRAVENOUS ONCE
Status: COMPLETED | OUTPATIENT
Start: 2018-08-08 | End: 2018-08-08

## 2018-08-08 RX ADMIN — Medication 26.5 MILLICURIE: at 08:45

## 2018-08-16 ENCOUNTER — OFFICE VISIT (OUTPATIENT)
Dept: ORTHOPEDIC SURGERY | Age: 78
End: 2018-08-16

## 2018-08-16 DIAGNOSIS — M70.51 PES ANSERINUS BURSITIS OF RIGHT KNEE: Primary | ICD-10-CM

## 2018-08-16 PROCEDURE — 1101F PT FALLS ASSESS-DOCD LE1/YR: CPT | Performed by: ORTHOPAEDIC SURGERY

## 2018-08-16 PROCEDURE — 1036F TOBACCO NON-USER: CPT | Performed by: ORTHOPAEDIC SURGERY

## 2018-08-16 PROCEDURE — G8599 NO ASA/ANTIPLAT THER USE RNG: HCPCS | Performed by: ORTHOPAEDIC SURGERY

## 2018-08-16 PROCEDURE — G8427 DOCREV CUR MEDS BY ELIG CLIN: HCPCS | Performed by: ORTHOPAEDIC SURGERY

## 2018-08-16 PROCEDURE — 4040F PNEUMOC VAC/ADMIN/RCVD: CPT | Performed by: ORTHOPAEDIC SURGERY

## 2018-08-16 PROCEDURE — 1123F ACP DISCUSS/DSCN MKR DOCD: CPT | Performed by: ORTHOPAEDIC SURGERY

## 2018-08-16 PROCEDURE — G8417 CALC BMI ABV UP PARAM F/U: HCPCS | Performed by: ORTHOPAEDIC SURGERY

## 2018-08-16 PROCEDURE — 99213 OFFICE O/P EST LOW 20 MIN: CPT | Performed by: ORTHOPAEDIC SURGERY

## 2018-08-16 RX ORDER — DEXAMETHASONE SODIUM PHOSPHATE 4 MG/ML
INJECTION, SOLUTION INTRA-ARTICULAR; INTRALESIONAL; INTRAMUSCULAR; INTRAVENOUS; SOFT TISSUE
Qty: 30 ML | Refills: 0 | Status: SHIPPED | OUTPATIENT
Start: 2018-08-16 | End: 2018-10-31

## 2018-08-16 NOTE — PROGRESS NOTES
unspecified chronicity   TECHNOLOGIST PROVIDED HISTORY:   3 phase whole body, r/o loosening vs. Septic right knee   Ordering Physician Provided Reason for Exam: right knee replacement 2016   \"never been right\", prostate cancer   Acuity: Acute   Type of Exam: Initial   Relevant Medical/Surgical History: diabetic, former smoker, prostate cancer,   hx back surgery       FINDINGS:   In the flow phase, no marked asymmetric activity about the knees.       In the blood pool phase, photopenia is seen related to right knee   arthroplasty.  No marked asymmetric activity is seen about the knees.       In the delayed phase, photopenia is seen related to right knee arthroplasty. Only mild nonspecific activity is seen about the arthroplasty.  Mild activity   is seen at the native left knee as well.       Diffuse increased activity is seen throughout the lumbar spine.  A lesser   degree of diffuse activity is seen within the thoracic spine.       Asymmetric activity is demonstrated at the right sternoclavicular joint,   typically degenerative.  Mild activity at the shoulders and right foot,   likely also degenerative.       Physiologic activity is seen within the kidneys and urinary bladder.  Urinary   contamination is seen between the upper thighs.           Impression   Only mild nonspecific activity is seen about the right knee arthroplasty,   which can be postoperative.  No findings to explain etiology of pain.       Moderate to marked activity seen diffusely within the lumbar spine.  Some   considerations include postoperative change, advanced degenerative change,   and/or sequela of healing fracture if patient has such a history.  Metastatic   disease is less favored given lack of findings elsewhere.  If patient is   focally symptomatic, radiographs could be considered.      Acute phase reactants    WBC 9.7  Sed rate 15  CRP 5.4         Assessment : Pes anserine bursitis-status post total knee

## 2018-08-21 ENCOUNTER — HOSPITAL ENCOUNTER (OUTPATIENT)
Dept: PHYSICAL THERAPY | Age: 78
Setting detail: THERAPIES SERIES
Discharge: HOME | End: 2018-08-21
Payer: MEDICARE

## 2018-08-21 PROCEDURE — G8979 MOBILITY GOAL STATUS: HCPCS | Performed by: PHYSICAL THERAPIST

## 2018-08-21 PROCEDURE — 97161 PT EVAL LOW COMPLEX 20 MIN: CPT | Performed by: PHYSICAL THERAPIST

## 2018-08-21 PROCEDURE — G8978 MOBILITY CURRENT STATUS: HCPCS | Performed by: PHYSICAL THERAPIST

## 2018-08-21 PROCEDURE — 97110 THERAPEUTIC EXERCISES: CPT | Performed by: PHYSICAL THERAPIST

## 2018-08-21 PROCEDURE — 97033 APP MDLTY 1+IONTPHRSIS EA 15: CPT | Performed by: PHYSICAL THERAPIST

## 2018-08-21 NOTE — FLOWSHEET NOTE
[facility]  Orthopaedics and Sports Rehabilitation, [facility]    Physical Therapy Daily Treatment Note  Date:  2018    Patient Name:  Elisha Chilel    :  1940  MRN: [id number]  Restrictions/Precautions:    Medical/Treatment Diagnosis Information:    M70.51 R pes anserinus          ·  M25.561 R knee pain        Insurance/Certification information:   [facility]  Physician Information:   Walker Carrier of care signed (Y/N):     Date of Patient follow up with Physician:     G-Code (if applicable):      Date G-Code Applied:    PT G-Codes  Functional Assessment Tool Used: lefs  Score: 59%  Functional Limitation: Mobility: Walking and moving around  Mobility: Walking and Moving Around Current Status (): At least 40 percent but less than 60 percent impaired, limited or restricted  Mobility: Walking and Moving Around Goal Status ():  At least 1 percent but less than 20 percent impaired, limited or restricted    Progress Note: [x]  Yes  []  No  Next due by: Visit #10       Latex Allergy:  [x]NO      []YES  Preferred Language for Healthcare:   [x]English       []other:    Visit # Insurance Allowable   1 MC     Pain level:  5-7/10     SUBJECTIVE:  See eval    OBJECTIVE: See eval  Observation:   Test measurements:      RESTRICTIONS/PRECAUTIONS: no lifting over 25lbs from back surgery    Exercises/Interventions:     Therapeutic Ex Sets/sec Reps Notes HEP   Belt stretch  HSS seated  SLR  Bridge  LAQ  HS curls  3x30s  3x30s  x10  x10  x30  x15   Foot off edge    Not to increase LBP    red                                       ionto take home patch x  Instructed on use                                                                   Manual Intervention                                                 NMR re-education                                                                          Therapeutic Exercise and NMR EXR  [x] (68127) Provided verbal/tactile cueing for activities related to strengthening, flexibility, endurance, ROM for improvements in LE, proximal hip, and core control with self care, mobility, lifting, ambulation.  [] (37825) Provided verbal/tactile cueing for activities related to improving balance, coordination, kinesthetic sense, posture, motor skill, proprioception  to assist with LE, proximal hip, and core control in self care, mobility, lifting, ambulation and eccentric single leg control. NMR and Therapeutic Activities:    [] (12480 or 03665) Provided verbal/tactile cueing for activities related to improving balance, coordination, kinesthetic sense, posture, motor skill, proprioception and motor activation to allow for proper function of core, proximal hip and LE with self care and ADLs  [] (54585) Gait Re-education- Provided training and instruction to the patient for proper LE, core and proximal hip recruitment and positioning and eccentric body weight control with ambulation re-education including up and down stairs     Home Exercise Program:    [x] (62820) Reviewed/Progressed HEP activities related to strengthening, flexibility, endurance, ROM of core, proximal hip and LE for functional self-care, mobility, lifting and ambulation/stair navigation   [] (06677)Reviewed/Progressed HEP activities related to improving balance, coordination, kinesthetic sense, posture, motor skill, proprioception of core, proximal hip and LE for self care, mobility, lifting, and ambulation/stair navigation      Manual Treatments:  PROM / STM / Oscillations-Mobs:  G-I, II, III, IV (PA's, Inf., Post.)  [] (67754) Provided manual therapy to mobilize LE, proximal hip and/or LS spine soft tissue/joints for the purpose of modulating pain, promoting relaxation,  increasing ROM, reducing/eliminating soft tissue swelling/inflammation/restriction, improving soft tissue extensibility and allowing for proper ROM for normal function with self care, mobility, lifting and ambulation.      Modalities:  ionto take home Requires Follow-up: [x] Yes  [] No    PLAN: See eval  [] Continue per plan of care [] Alter current plan (see comments)  [x] Plan of care initiated [] Hold pending MD visit [] Discharge    Electronically signed by: Shanel Kraft PT

## 2018-08-21 NOTE — PLAN OF CARE
strength and neuromuscular control   [x]Decreased LE functional strength   [x]Reduced balance/proprioceptive control   []other:      Functional Activity Limitations (from functional questionnaire and intake)   []Reduced ability to tolerate prolonged functional positions   [x]Reduced ability or difficulty with changes of positions or transfers between positions   []Reduced ability to maintain good posture and demonstrate good body mechanics with sitting, bending, and lifting   [x]Reduced ability to sleep   [] Reduced ability or tolerance with driving and/or computer work   []Reduced ability to perform lifting, carrying tasks   [x]Reduced ability to squat   []Reduced ability to forward bend   [x]Reduced ability to ambulate prolonged functional periods/distances/surfaces   [x]Reduced ability to ascend/descend stairs   []Reduced ability to run, hop, cut or jump   []other:    Participation Restrictions   [x]Reduced participation in self care activities   [x]Reduced participation in home management activities   []Reduced participation in work activities   [x]Reduced participation in social activities. []Reduced participation in sport/recreation activities. Classification :    []Signs/symptoms consistent with post-surgical status including decreased ROM, strength and function.    [x]Signs/symptoms consistent with joint sprain/strain   []Signs/symptoms consistent with patella-femoral syndrome   []Signs/symptoms consistent with knee OA/hip OA   []Signs/symptoms consistent with internal derangement of knee/Hip   []Signs/symptoms consistent with functional hip weakness/NMR control      []Signs/symptoms consistent with tendinitis/tendinosis    []signs/symptoms consistent with pathology which may benefit from Dry needling      []other:      Prognosis/Rehab Potential:      []Excellent   [x]Good    []Fair   []Poor    Tolerance of evaluation/treatment:    []Excellent   [x]Good    []Fair   []Poor    Physical Therapy Evaluation in HEP and progression per patient tolerance, in order to prevent re-injury. 2. Patient will have a decrease in pain to facilitate improvement in movement, function, and ADLs as indicated by Functional Deficits. Long Term Goals: To be achieved in: 10 weeks  1. Disability index score of 19% or less for the LEFS to assist with reaching prior level of function. 2. Patient will demonstrate increased AROM to 0-120 to allow for proper joint functioning as indicated by patients Functional Deficits. 3. Patient will demonstrate an increase in Strength to good proximal hip strength and control, within 5/5 in LE to allow for proper functional mobility as indicated by patients Functional Deficits. 4. Patient will return to full functional activities without increased symptoms or restriction.    5. amb down steps pain free 100% of time (patient specific functional goal)       Electronically signed by:  Iris Ramos PT

## 2018-08-23 ENCOUNTER — HOSPITAL ENCOUNTER (OUTPATIENT)
Dept: PHYSICAL THERAPY | Age: 78
Setting detail: THERAPIES SERIES
Discharge: HOME | End: 2018-08-23
Payer: MEDICARE

## 2018-08-23 PROCEDURE — 97033 APP MDLTY 1+IONTPHRSIS EA 15: CPT | Performed by: PHYSICAL THERAPIST

## 2018-08-23 PROCEDURE — 97110 THERAPEUTIC EXERCISES: CPT | Performed by: PHYSICAL THERAPIST

## 2018-08-23 NOTE — FLOWSHEET NOTE
proprioception  to assist with LE, proximal hip, and core control in self care, mobility, lifting, ambulation and eccentric single leg control. NMR and Therapeutic Activities:    [] (37471 or 25860) Provided verbal/tactile cueing for activities related to improving balance, coordination, kinesthetic sense, posture, motor skill, proprioception and motor activation to allow for proper function of core, proximal hip and LE with self care and ADLs  [] (22734) Gait Re-education- Provided training and instruction to the patient for proper LE, core and proximal hip recruitment and positioning and eccentric body weight control with ambulation re-education including up and down stairs     Home Exercise Program:    [x] (29495) Reviewed/Progressed HEP activities related to strengthening, flexibility, endurance, ROM of core, proximal hip and LE for functional self-care, mobility, lifting and ambulation/stair navigation   [] (39067)Reviewed/Progressed HEP activities related to improving balance, coordination, kinesthetic sense, posture, motor skill, proprioception of core, proximal hip and LE for self care, mobility, lifting, and ambulation/stair navigation      Manual Treatments:  PROM / STM / Oscillations-Mobs:  G-I, II, III, IV (PA's, Inf., Post.)  [] (40523) Provided manual therapy to mobilize LE, proximal hip and/or LS spine soft tissue/joints for the purpose of modulating pain, promoting relaxation,  increasing ROM, reducing/eliminating soft tissue swelling/inflammation/restriction, improving soft tissue extensibility and allowing for proper ROM for normal function with self care, mobility, lifting and ambulation.      Modalities:  ionto take home patch    Charges:  Timed Code Treatment Minutes: 30   Total Treatment Minutes: 35     [] EVAL(LOW) 87842 (typically 20 minutes face-to-face)  [x] OT(16352) x  2   [x] IONTO  [] NMR (23610) x      [] VASO  [] Manual (79198) x       [] Other:  [] TA x       [] Mech Traction

## 2018-08-27 RX ORDER — HYDROCHLOROTHIAZIDE 25 MG/1
25 TABLET ORAL 2 TIMES DAILY
Qty: 180 TABLET | Refills: 3 | Status: SHIPPED | OUTPATIENT
Start: 2018-08-27 | End: 2019-07-23 | Stop reason: SDUPTHER

## 2018-08-27 RX ORDER — LEVOTHYROXINE SODIUM 112 UG/1
TABLET ORAL
Qty: 180 TABLET | Refills: 1 | Status: SHIPPED | OUTPATIENT
Start: 2018-08-27 | End: 2019-02-07 | Stop reason: SDUPTHER

## 2018-08-28 ENCOUNTER — HOSPITAL ENCOUNTER (OUTPATIENT)
Dept: PHYSICAL THERAPY | Age: 78
Setting detail: THERAPIES SERIES
Discharge: HOME | End: 2018-08-28
Payer: MEDICARE

## 2018-08-28 PROCEDURE — 97112 NEUROMUSCULAR REEDUCATION: CPT | Performed by: PHYSICAL THERAPIST

## 2018-08-28 PROCEDURE — 97033 APP MDLTY 1+IONTPHRSIS EA 15: CPT | Performed by: PHYSICAL THERAPIST

## 2018-08-28 PROCEDURE — 97110 THERAPEUTIC EXERCISES: CPT | Performed by: PHYSICAL THERAPIST

## 2018-08-28 NOTE — FLOWSHEET NOTE
[facility]  Orthopaedics and Sports Rehabilitation, [facility]    Physical Therapy Daily Treatment Note  Date:  2018    Patient Name:  Leyla Barclay    :  1940  MRN: [id number]  Restrictions/Precautions:    Medical/Treatment Diagnosis Information:    M70.51 R pes anserinus          ·  M25.561 R knee pain        Insurance/Certification information:   [facility]  Physician Information:   Ivon Rea of care signed (Y/N):     Date of Patient follow up with Physician:     G-Code (if applicable):      Date G-Code Applied:         Progress Note: [x]  Yes  []  No  Next due by: Visit #10  MD 4-6 wks if not doing well     Latex Allergy:  [x]NO      []YES  Preferred Language for Healthcare:   [x]English       []other:    Visit # Insurance Allowable   3 MC     Pain level:  -7/10     SUBJECTIVE:  Sharp pain has decreased in frequency.      OBJECTIVE:   Observation: tender med knee over HS  Test measurements:  Knee flex 121    RESTRICTIONS/PRECAUTIONS: no lifting over 25lbs from back surgery    Exercises/Interventions:     Therapeutic Ex Sets/sec Reps Notes HEP   Belt stretch  HSS seated  SLR  Bridge  LAQ  HS curls  SSLR  3x30s  3x30s B  x20  x30  x30 3#  x30  L x15/ R x25   Foot off edge  Fatigued easily  Not to increase LBP  increase  red             X                                       ionto take home patch x  Instructed on use                                                                   Manual Intervention                                                 NMR re-education                                                                          Therapeutic Exercise and NMR EXR  [x] (99652) Provided verbal/tactile cueing for activities related to strengthening, flexibility, endurance, ROM for improvements in LE, proximal hip, and core control with self care, mobility, lifting, ambulation.  [] (95147) Provided verbal/tactile cueing for activities related to improving balance, coordination, kinesthetic

## 2018-08-30 ENCOUNTER — HOSPITAL ENCOUNTER (OUTPATIENT)
Dept: PHYSICAL THERAPY | Age: 78
Setting detail: THERAPIES SERIES
Discharge: HOME | End: 2018-08-30
Payer: MEDICARE

## 2018-08-30 PROCEDURE — 97110 THERAPEUTIC EXERCISES: CPT | Performed by: PHYSICAL THERAPIST

## 2018-08-30 PROCEDURE — 97112 NEUROMUSCULAR REEDUCATION: CPT | Performed by: PHYSICAL THERAPIST

## 2018-08-30 PROCEDURE — 97033 APP MDLTY 1+IONTPHRSIS EA 15: CPT | Performed by: PHYSICAL THERAPIST

## 2018-08-30 NOTE — FLOWSHEET NOTE
[facility]  Orthopaedics and Sports Rehabilitation, [facility]    Physical Therapy Daily Treatment Note  Date:  2018    Patient Name:  Michael Yeager    :  1940  MRN: [id number]  Restrictions/Precautions:    Medical/Treatment Diagnosis Information:    M70.51 R pes anserinus          ·  M25.561 R knee pain        Insurance/Certification information:   [facility]  Physician Information:   Marni Hernandez of care signed (Y/N):     Date of Patient follow up with Physician:     G-Code (if applicable):      Date G-Code Applied:         Progress Note: [x]  Yes  []  No  Next due by: Visit #10  MD 4-6 wks if not doing well     Latex Allergy:  [x]NO      []YES  Preferred Language for Healthcare:   [x]English       []other:    Visit # Insurance Allowable   4 MC     Pain level:  -7/10     SUBJECTIVE:  Sore today.  Patch stayed on    OBJECTIVE:   Observation: tender med knee over HS  Test measurements:  Knee flex 121    RESTRICTIONS/PRECAUTIONS: no lifting over 25lbs from back surgery    Exercises/Interventions:     Therapeutic Ex Sets/sec Reps Notes HEP   Belt stretch  HSS seated  SLR  Bridge  LAQ  HS curls  SSLR  3x30s  3x30s B  x30  x30  x30 5#  x30  L x20/ R x30   Foot off edge  Fatigued easily  Not to increase LBP  increase  red             X                                       ionto take home patch x  Instructed on use                                                                   Manual Intervention                                                 NMR re-education                                                                          Therapeutic Exercise and NMR EXR  [x] (50481) Provided verbal/tactile cueing for activities related to strengthening, flexibility, endurance, ROM for improvements in LE, proximal hip, and core control with self care, mobility, lifting, ambulation.  [] (14849) Provided verbal/tactile cueing for activities related to improving balance, coordination, kinesthetic sense, posture, motor skill, proprioception  to assist with LE, proximal hip, and core control in self care, mobility, lifting, ambulation and eccentric single leg control. NMR and Therapeutic Activities:    [] (07741 or 89825) Provided verbal/tactile cueing for activities related to improving balance, coordination, kinesthetic sense, posture, motor skill, proprioception and motor activation to allow for proper function of core, proximal hip and LE with self care and ADLs  [] (00474) Gait Re-education- Provided training and instruction to the patient for proper LE, core and proximal hip recruitment and positioning and eccentric body weight control with ambulation re-education including up and down stairs     Home Exercise Program:    [x] (89262) Reviewed/Progressed HEP activities related to strengthening, flexibility, endurance, ROM of core, proximal hip and LE for functional self-care, mobility, lifting and ambulation/stair navigation   [] (45446)Reviewed/Progressed HEP activities related to improving balance, coordination, kinesthetic sense, posture, motor skill, proprioception of core, proximal hip and LE for self care, mobility, lifting, and ambulation/stair navigation      Manual Treatments:  PROM / STM / Oscillations-Mobs:  G-I, II, III, IV (PA's, Inf., Post.)  [] (14480) Provided manual therapy to mobilize LE, proximal hip and/or LS spine soft tissue/joints for the purpose of modulating pain, promoting relaxation,  increasing ROM, reducing/eliminating soft tissue swelling/inflammation/restriction, improving soft tissue extensibility and allowing for proper ROM for normal function with self care, mobility, lifting and ambulation.      Modalities:  ionto take home patch    Charges:  Timed Code Treatment Minutes: 40   Total Treatment Minutes: 45     [] EVAL(LOW) 47968 (typically 20 minutes face-to-face)  [x] MY(97175) x  2   [x] IONTO  [x] NMR (08740) x      [] VASO  [] Manual (39212) x       [] Other:  [] TA x       [] Susu Lobitok Traction (48505)  [] ES(attended) (45999)      [] ES (un) (90625):     Herman Christina stated goal: no pain with function     Therapist goals for Patient:   Short Term Goals: To be achieved in: 2 weeks  1. Independent in HEP and progression per patient tolerance, in order to prevent re-injury. 2. Patient will have a decrease in pain to facilitate improvement in movement, function, and ADLs as indicated by Functional Deficits.     Long Term Goals: To be achieved in: 10 weeks  1. Disability index score of 19% or less for the LEFS to assist with reaching prior level of function. 2. Patient will demonstrate increased AROM to 0-120 to allow for proper joint functioning as indicated by patients Functional Deficits. 3. Patient will demonstrate an increase in Strength to good proximal hip strength and control, within 5/5 in LE to allow for proper functional mobility as indicated by patients Functional Deficits. 4. Patient will return to full functional activities without increased symptoms or restriction. 5. amb down steps pain free 100% of time (patient specific functional goal)                   Progression Towards Functional goals:  [] Patient is progressing as expected towards functional goals listed. [] Progression is slowed due to complexities listed. [] Progression has been slowed due to co-morbidities.   [x] Plan just implemented, too soon to assess goals progression  [] Other:     ASSESSMENT:  Progressing with increased reps and weights    Treatment/Activity Tolerance:  [x] Patient tolerated treatment well [] Patient limited by fatique  [] Patient limited by pain  [] Patient limited by other medical complications  [] Other:     Prognosis: [x] Good [] Fair  [] Poor    Patient Requires Follow-up: [x] Yes  [] No    PLAN:   [x] Continue per plan of care [] Alter current plan (see comments)  [] Plan of care initiated [] Hold pending MD visit [] Discharge    Electronically signed by: Sydnee Kirkland PT

## 2018-09-04 ENCOUNTER — HOSPITAL ENCOUNTER (OUTPATIENT)
Dept: PHYSICAL THERAPY | Age: 78
Setting detail: THERAPIES SERIES
Discharge: HOME | End: 2018-09-04
Payer: MEDICARE

## 2018-09-04 PROCEDURE — 97033 APP MDLTY 1+IONTPHRSIS EA 15: CPT | Performed by: PHYSICAL THERAPIST

## 2018-09-04 PROCEDURE — 97112 NEUROMUSCULAR REEDUCATION: CPT | Performed by: PHYSICAL THERAPIST

## 2018-09-04 PROCEDURE — 97110 THERAPEUTIC EXERCISES: CPT | Performed by: PHYSICAL THERAPIST

## 2018-09-04 NOTE — FLOWSHEET NOTE
ambulation.  [] (43352) Provided verbal/tactile cueing for activities related to improving balance, coordination, kinesthetic sense, posture, motor skill, proprioception  to assist with LE, proximal hip, and core control in self care, mobility, lifting, ambulation and eccentric single leg control. NMR and Therapeutic Activities:    [] (71560 or 50603) Provided verbal/tactile cueing for activities related to improving balance, coordination, kinesthetic sense, posture, motor skill, proprioception and motor activation to allow for proper function of core, proximal hip and LE with self care and ADLs  [] (66120) Gait Re-education- Provided training and instruction to the patient for proper LE, core and proximal hip recruitment and positioning and eccentric body weight control with ambulation re-education including up and down stairs     Home Exercise Program:    [x] (68810) Reviewed/Progressed HEP activities related to strengthening, flexibility, endurance, ROM of core, proximal hip and LE for functional self-care, mobility, lifting and ambulation/stair navigation   [] (68986)Reviewed/Progressed HEP activities related to improving balance, coordination, kinesthetic sense, posture, motor skill, proprioception of core, proximal hip and LE for self care, mobility, lifting, and ambulation/stair navigation      Manual Treatments:  PROM / STM / Oscillations-Mobs:  G-I, II, III, IV (PA's, Inf., Post.)  [] (83805) Provided manual therapy to mobilize LE, proximal hip and/or LS spine soft tissue/joints for the purpose of modulating pain, promoting relaxation,  increasing ROM, reducing/eliminating soft tissue swelling/inflammation/restriction, improving soft tissue extensibility and allowing for proper ROM for normal function with self care, mobility, lifting and ambulation.      Modalities:  ionto take home patch    Charges:  Timed Code Treatment Minutes: 40   Total Treatment Minutes: 45     [] EVAL(LOW) 31198 (typically 20 current plan (see comments)  [] Plan of care initiated [] Hold pending MD visit [] Discharge    Electronically signed by: Bandar Del Cid PT

## 2018-09-06 ENCOUNTER — HOSPITAL ENCOUNTER (OUTPATIENT)
Dept: PHYSICAL THERAPY | Age: 78
Setting detail: THERAPIES SERIES
Discharge: HOME | End: 2018-09-06
Payer: MEDICARE

## 2018-09-06 PROCEDURE — 97033 APP MDLTY 1+IONTPHRSIS EA 15: CPT | Performed by: PHYSICAL THERAPIST

## 2018-09-06 PROCEDURE — 97110 THERAPEUTIC EXERCISES: CPT | Performed by: PHYSICAL THERAPIST

## 2018-09-06 PROCEDURE — 97112 NEUROMUSCULAR REEDUCATION: CPT | Performed by: PHYSICAL THERAPIST

## 2018-09-06 NOTE — FLOWSHEET NOTE
motor skill, proprioception  to assist with LE, proximal hip, and core control in self care, mobility, lifting, ambulation and eccentric single leg control. NMR and Therapeutic Activities:    [] (81070 or 73214) Provided verbal/tactile cueing for activities related to improving balance, coordination, kinesthetic sense, posture, motor skill, proprioception and motor activation to allow for proper function of core, proximal hip and LE with self care and ADLs  [] (50527) Gait Re-education- Provided training and instruction to the patient for proper LE, core and proximal hip recruitment and positioning and eccentric body weight control with ambulation re-education including up and down stairs     Home Exercise Program:    [x] (54491) Reviewed/Progressed HEP activities related to strengthening, flexibility, endurance, ROM of core, proximal hip and LE for functional self-care, mobility, lifting and ambulation/stair navigation   [] (97508)Reviewed/Progressed HEP activities related to improving balance, coordination, kinesthetic sense, posture, motor skill, proprioception of core, proximal hip and LE for self care, mobility, lifting, and ambulation/stair navigation      Manual Treatments:  PROM / STM / Oscillations-Mobs:  G-I, II, III, IV (PA's, Inf., Post.)  [] (10825) Provided manual therapy to mobilize LE, proximal hip and/or LS spine soft tissue/joints for the purpose of modulating pain, promoting relaxation,  increasing ROM, reducing/eliminating soft tissue swelling/inflammation/restriction, improving soft tissue extensibility and allowing for proper ROM for normal function with self care, mobility, lifting and ambulation.      Modalities:  ionto take home patch    Charges:  Timed Code Treatment Minutes: 40   Total Treatment Minutes: 45     [] EVAL(LOW) 33547 (typically 20 minutes face-to-face)  [x] FY(04174) x  2   [x] IONTO  [x] NMR (10185) x      [] VASO  [] Manual (93309) x       [] Other:  [] TA x       [] Thu Aris

## 2018-09-11 ENCOUNTER — HOSPITAL ENCOUNTER (OUTPATIENT)
Dept: CT IMAGING | Age: 78
Discharge: HOME | End: 2018-09-11
Payer: MEDICARE

## 2018-09-11 DIAGNOSIS — R91.8 MULTIPLE PULMONARY NODULES: ICD-10-CM

## 2018-09-11 DIAGNOSIS — R91.8 OTHER NONSPECIFIC ABNORMAL FINDING OF LUNG FIELD (CODE): ICD-10-CM

## 2018-09-11 PROCEDURE — 71250 CT THORAX DX C-: CPT

## 2018-09-12 ENCOUNTER — OFFICE VISIT (OUTPATIENT)
Dept: PULMONOLOGY | Age: 78
End: 2018-09-12

## 2018-09-12 VITALS
HEART RATE: 60 BPM | HEIGHT: 71 IN | TEMPERATURE: 97.8 F | BODY MASS INDEX: 37.1 KG/M2 | SYSTOLIC BLOOD PRESSURE: 125 MMHG | WEIGHT: 265 LBS | DIASTOLIC BLOOD PRESSURE: 67 MMHG | OXYGEN SATURATION: 95 % | RESPIRATION RATE: 16 BRPM

## 2018-09-12 DIAGNOSIS — J43.2 CENTRILOBULAR EMPHYSEMA (HCC): ICD-10-CM

## 2018-09-12 DIAGNOSIS — J06.9 ACUTE UPPER RESPIRATORY INFECTION: Primary | ICD-10-CM

## 2018-09-12 PROCEDURE — G8599 NO ASA/ANTIPLAT THER USE RNG: HCPCS | Performed by: INTERNAL MEDICINE

## 2018-09-12 PROCEDURE — 1101F PT FALLS ASSESS-DOCD LE1/YR: CPT | Performed by: INTERNAL MEDICINE

## 2018-09-12 PROCEDURE — G8926 SPIRO NO PERF OR DOC: HCPCS | Performed by: INTERNAL MEDICINE

## 2018-09-12 PROCEDURE — G8427 DOCREV CUR MEDS BY ELIG CLIN: HCPCS | Performed by: INTERNAL MEDICINE

## 2018-09-12 PROCEDURE — 99214 OFFICE O/P EST MOD 30 MIN: CPT | Performed by: INTERNAL MEDICINE

## 2018-09-12 PROCEDURE — G8417 CALC BMI ABV UP PARAM F/U: HCPCS | Performed by: INTERNAL MEDICINE

## 2018-09-12 PROCEDURE — 4040F PNEUMOC VAC/ADMIN/RCVD: CPT | Performed by: INTERNAL MEDICINE

## 2018-09-12 PROCEDURE — 3023F SPIROM DOC REV: CPT | Performed by: INTERNAL MEDICINE

## 2018-09-12 PROCEDURE — 1036F TOBACCO NON-USER: CPT | Performed by: INTERNAL MEDICINE

## 2018-09-12 PROCEDURE — 1123F ACP DISCUSS/DSCN MKR DOCD: CPT | Performed by: INTERNAL MEDICINE

## 2018-09-12 RX ORDER — AZELASTINE 1 MG/ML
1 SPRAY, METERED NASAL 2 TIMES DAILY
Qty: 1 BOTTLE | Refills: 0 | Status: SHIPPED | OUTPATIENT
Start: 2018-09-12 | End: 2018-10-09 | Stop reason: SDUPTHER

## 2018-09-12 RX ORDER — AZITHROMYCIN 250 MG/1
500 TABLET, FILM COATED ORAL DAILY
Qty: 10 TABLET | Refills: 0 | Status: SHIPPED | OUTPATIENT
Start: 2018-09-12 | End: 2018-09-17

## 2018-09-12 ASSESSMENT — ENCOUNTER SYMPTOMS
VOICE CHANGE: 0
ABDOMINAL PAIN: 0
EYE PAIN: 0
SORE THROAT: 0
SHORTNESS OF BREATH: 0
EYE ITCHING: 0
EYE DISCHARGE: 0
CONSTIPATION: 0
DIARRHEA: 0
COUGH: 0
CHOKING: 0

## 2018-09-12 NOTE — PROGRESS NOTES
Past Surgical History:   Procedure Laterality Date    ABDOMEN SURGERY  2/7/12    abdominal wall exploration,w/ mesh;lysis of adhesions    COLON SURGERY      COLONOSCOPY  03/26/2015    diverticulosis    CORONARY ANGIOPLASTY WITH STENT PLACEMENT  7/31/2015    ELBOW SURGERY      bilat    HERNIA REPAIR      JOINT REPLACEMENT Right 07/05/2016    Right total knee replacement    KNEE SURGERY      LUMBAR LAMINECTOMY  05/11/2018    LUMBAR LAMINECTOMIES , BILATERAL LAMINOTOMIES L23 TO L5S1, BONE GRAFTING FOR FUSION L23 TO L5S1 , INST FUSION STABILIZATION REDUCTION OF SPONDYLOLISTHESIS L34 AND L45 , AUTOGRAFT , ALLOGRAFT     OTHER SURGICAL HISTORY  01/09/2013    excision of suture granuloma on the abdomen    OTHER SURGICAL HISTORY Right     excision of cyst on thigh    PROSTATE SURGERY      cancer    SHOULDER SURGERY      right    VASECTOMY         Social History   Substance Use Topics    Smoking status: Former Smoker     Packs/day: 9.00     Years: 24.00     Types: Cigarettes, Cigars     Quit date: 2/7/1982    Smokeless tobacco: Former User     Types: Snuff, Chew     Quit date: 7/5/1982    Alcohol use No       Family History   Problem Relation Age of Onset    Heart Disease Neg Hx          Current Outpatient Prescriptions:     azithromycin (ZITHROMAX) 250 MG tablet, Take 2 tablets by mouth daily for 5 days, Disp: 10 tablet, Rfl: 0    azelastine (ASTELIN) 0.1 % nasal spray, 1 spray by Nasal route 2 times daily Use in each nostril as directed, Disp: 1 Bottle, Rfl: 0    levothyroxine (SYNTHROID) 112 MCG tablet, TAKE 2 TABLETS BY MOUTH EVERY DAY, Disp: 180 tablet, Rfl: 1    hydrochlorothiazide (HYDRODIURIL) 25 MG tablet, TAKE 1 TABLET BY MOUTH 2 TIMES DAILY, Disp: 180 tablet, Rfl: 3    dexamethasone (DECADRON) 4 MG/ML injection, 1.3-1.5mL applied transdermally q 24-48 hours as directed by physical therapist, Disp: 30 mL, Rfl: 0    SYMBICORT 160-4.5 MCG/ACT AERO, INHALE 2 PUFFS INTO THE LUNGS 2 TIMES DAILY, Disp: , Rfl: 6    HYDROcodone-acetaminophen (NORCO) 5-325 MG per tablet, Take 1 tablet by mouth every 6 hours as needed for Pain. ., Disp: , Rfl:     metoprolol tartrate (LOPRESSOR) 25 MG tablet, TAKE 1 TABLET BY MOUTH TWICE A DAY, Disp: 180 tablet, Rfl: 1    lisinopril (PRINIVIL;ZESTRIL) 20 MG tablet, TAKE 1 TABLET BY MOUTH 2 TIMES DAILY, Disp: 180 tablet, Rfl: 3    hydroxychloroquine (PLAQUENIL) 200 MG tablet, Take 200 mg by mouth 2 times daily, Disp: , Rfl:     Ergocalciferol (VITAMIN D2 PO), Take 1.25 mg by mouth once a week, Disp: , Rfl:     albuterol sulfate  (90 Base) MCG/ACT inhaler, Inhale 2 puffs into the lungs every 6 hours as needed for Shortness of Breath, Disp: 1 Inhaler, Rfl: 3    acetaminophen (TYLENOL 8 HOUR ARTHRITIS PAIN) 650 MG extended release tablet, Take 650 mg by mouth every 8 hours as needed for Pain, Disp: , Rfl:     folic acid (FOLVITE) 1 MG tablet, Take 1 mg by mouth daily, Disp: , Rfl:     atorvastatin (LIPITOR) 40 MG tablet, TAKE 1 TABLET BY MOUTH NIGHTLY, Disp: 30 tablet, Rfl: 0    gabapentin (NEURONTIN) 100 MG capsule, Take 1 capsule by mouth 3 times daily for 30 days. ., Disp: 90 capsule, Rfl: 1    budesonide-formoterol (SYMBICORT) 160-4.5 MCG/ACT AERO, Inhale 2 puffs into the lungs 2 times daily, Disp: 1 Inhaler, Rfl: 6    Penicillins and Avelox [moxifloxacin hcl in nacl]    Vitals:    09/12/18 0833 09/12/18 0840   BP: (!) 147/74 125/67   Site: Right Upper Arm Left Upper Arm   Cuff Size: Large Adult Large Adult   Pulse: 60    Resp: 16    Temp: 97.8 °F (36.6 °C)    TempSrc: Oral    SpO2: 95%    Weight: 265 lb (120.2 kg)    Height: 5' 11\" (1.803 m)        Review of Systems   Constitutional: Negative for chills, fever and unexpected weight change. HENT: Negative for mouth sores, sore throat and voice change. Eyes: Negative for pain, discharge and itching. Respiratory: Negative for cough, choking and shortness of breath.     Cardiovascular: Negative for to restart. -CT imaging reviewed, nodules are stable and favor a benign etiology. No additional imaging needed at this time.   -Will check nocturnal oxim study for desaturations given his sleep issues and known emphysema, if abnormal will start supplemental oxygen at night which I discussed with him. Can consider a sleep study test in the future if symptoms are not improving.      Orders Placed This Encounter   Procedures    Pulse oximetry, overnight       Margie Malone MD

## 2018-09-13 ENCOUNTER — HOSPITAL ENCOUNTER (OUTPATIENT)
Dept: PHYSICAL THERAPY | Age: 78
Setting detail: THERAPIES SERIES
Discharge: HOME | End: 2018-09-13
Payer: MEDICARE

## 2018-09-13 PROCEDURE — 97033 APP MDLTY 1+IONTPHRSIS EA 15: CPT | Performed by: PHYSICAL THERAPIST

## 2018-09-13 PROCEDURE — 97110 THERAPEUTIC EXERCISES: CPT | Performed by: PHYSICAL THERAPIST

## 2018-09-13 PROCEDURE — 97112 NEUROMUSCULAR REEDUCATION: CPT | Performed by: PHYSICAL THERAPIST

## 2018-09-13 NOTE — FLOWSHEET NOTE
[facility]  Orthopaedics and Sports Rehabilitation, [employer]    Physical [facility] Daily Treatment Note  Date:  2018    Patient Name:  Светлана Chaparro    :  1940  MRN: [id number]  Restrictions/Precautions:    Medical/Treatment Diagnosis Information:    M70.51 R pes anserinus          ·  M25.561 R knee pain        Insurance/Certification information:   [facility]  Physician Information:   Stephen Narrow of care signed (Y/N):     Date of Patient follow up with Physician:     G-Code (if applicable):      Date G-Code Applied:         Progress Note: [x]  Yes  []  No  Next due by: Visit #10  MD 4-6 wks if not doing well     Latex Allergy:  [x]NO      []YES  Preferred Language for Healthcare:   [x]English       []other:    Visit # Insurance Allowable   7 MC     Pain level:  -7/10     SUBJECTIVE:  Has been sick all week with sinus infection, so has not noticed his knee pain. States he had a CT of his chest where MD wants to monitor his O2 levels while he sleeps so he may have to hold on therapy for a little.     OBJECTIVE:   Observation: tender med knee over HS  Test measurements:  Knee flex 121    RESTRICTIONS/PRECAUTIONS: no lifting over 25lbs from back surgery    Exercises/Interventions:     Therapeutic Ex Sets/sec Reps Notes HEP   Belt stretch  HSS seated  SLR  Bridge  LAQ  HS curls  SSLR  3x30s  3x30s B  X20 R/ x20 L  x30  x40 7.5#  x30  L x20/ R x20   Foot off edge  Fatigued easily  Not to increase LBP  increase  green             X    Bike 5 min                                   ionto take home patch x  Instructed on use                                                                   Manual Intervention                                                 NMR re-education                                                                          Therapeutic Exercise and NMR EXR  [x] (36966) Provided verbal/tactile cueing for activities related to strengthening, flexibility, endurance, ROM for improvements in

## 2018-09-18 ENCOUNTER — HOSPITAL ENCOUNTER (OUTPATIENT)
Dept: PHYSICAL THERAPY | Age: 78
Setting detail: THERAPIES SERIES
Discharge: HOME | End: 2018-09-18
Payer: MEDICARE

## 2018-09-18 PROCEDURE — 97110 THERAPEUTIC EXERCISES: CPT | Performed by: PHYSICAL THERAPIST

## 2018-09-18 PROCEDURE — 97033 APP MDLTY 1+IONTPHRSIS EA 15: CPT | Performed by: PHYSICAL THERAPIST

## 2018-09-18 NOTE — FLOWSHEET NOTE
current plan (see comments)  [] Plan of care initiated [] Hold pending MD visit [] Discharge    Electronically signed by: Timothy Bello PT

## 2018-09-20 ENCOUNTER — HOSPITAL ENCOUNTER (OUTPATIENT)
Dept: PHYSICAL THERAPY | Age: 78
Setting detail: THERAPIES SERIES
Discharge: HOME | End: 2018-09-20
Payer: MEDICARE

## 2018-09-20 PROCEDURE — G8980 MOBILITY D/C STATUS: HCPCS | Performed by: PHYSICAL THERAPIST

## 2018-09-20 PROCEDURE — G8979 MOBILITY GOAL STATUS: HCPCS | Performed by: PHYSICAL THERAPIST

## 2018-09-20 PROCEDURE — 97110 THERAPEUTIC EXERCISES: CPT | Performed by: PHYSICAL THERAPIST

## 2018-09-20 PROCEDURE — G8978 MOBILITY CURRENT STATUS: HCPCS | Performed by: PHYSICAL THERAPIST

## 2018-09-20 PROCEDURE — 97033 APP MDLTY 1+IONTPHRSIS EA 15: CPT | Performed by: PHYSICAL THERAPIST

## 2018-09-20 PROCEDURE — 97112 NEUROMUSCULAR REEDUCATION: CPT | Performed by: PHYSICAL THERAPIST

## 2018-09-20 NOTE — FLOWSHEET NOTE
Physical Therapist Assistant Activity Sheet    Date:  2018    Patient Name:  Valentin Cline    :  1940  MRN: [id number]  Restrictions/Precautions:    Medical/Treatment Diagnosis Information:     M70.51 R pes anserinus          ·  B43.992 R knee pain        Physician Information:       Weeks Post-op  8 wks  12 wks 16 wks 20 wks   24 wks                            Activities                                                DOS/DOI:                                                    Date: 18    Bike    Elliptical    Treadmill    Airdyne        Gastroc stretch    Soleus stretch    Hamstring stretch    ITB stretch    Hip Flexor stretch    Quad stretch    Adductor stretch        Weight Shifting sp                              fp                              tp    Lateral walking (with/w/o TB)        Balance: PEP/[place]ison board                   SLS 5\" x 10         Star excursion load/explode          Extremity reach UE/LE        Leg Press Mario. Ecc.                      Inv. Calf Press Mario. Ecc.                      Inv.        SAHIL   Flex 30# x30 ea              ABd R/L 30# x 30 ea              ADd               TKE               Ext R/L 30# x 30 ea       Steps  Up 8\" x 20               Up and Over                Down                Lateral                Rotation        Squats:  Mini                   Wall                   BOSU        Lunges:  Lunge to Balance                   Balance to Lunge                   Walking        Knee Extension Bilat. Ecc.                               Inv. 30# x 35       Hamstring Curls Bilat. Ecc.                                Inv. 40# x 35       Soleus Press Bilat. Ecc.                            Inv.         Ladders    Square    Jump/Hop  Low                       Med.                       High            Modality    PTA Assessment Verbalized understanding of exercises to continue at [facility] [employer].    Time Based Treatment 35 minutes

## 2018-09-20 NOTE — DISCHARGE SUMMARY
than 40 percent impaired, limited or restricted  Mobility: Walking and Moving Around Goal Status (): At least 1 percent but less than 20 percent impaired, limited or restricted  Mobility: Walking and Moving Around Discharge Status (): At least 20 percent but less than 40 percent impaired, limited or restricted    Progress Note: [x]  Yes  []  No  Next due by: Visit #10  MD 4-6 wks if not doing well     Latex Allergy:  [x]NO      []YES  Preferred Language for Healthcare:   [x]English       []other:    Visit # Insurance Allowable   9 MC     Pain level:  5-7/10     SUBJECTIVE:  States he doesn't feel the therapy is helping.  Still having the same sharp pain in his knee with certain movements    OBJECTIVE:   Observation: tender med knee over HS  Test measurements:  Knee flex 121       MMT: Hip Flex: 4/5 (pain in hip) Knee Flex: 4/5 Ext: 4/5    RESTRICTIONS/PRECAUTIONS: no lifting over 25lbs from back surgery    Exercises/Interventions:     Therapeutic Ex Sets/sec Reps Notes HEP   Belt stretch  HSS seated  SLR  Bridge  LAQ  HS curls  SSLR     Foot off edge  Fatigued easily  Not to increase LBP  increase  green             X    Bike 5 min                     See other flow sheet  x            ionto take home patch x  Instructed on use           Pt ed on joining a gym for cont strengthenting  5 min                                                      Manual Intervention                                                 NMR re-education                                                                          Therapeutic Exercise and NMR EXR  [x] (65468) Provided verbal/tactile cueing for activities related to strengthening, flexibility, endurance, ROM for improvements in LE, proximal hip, and core control with self care, mobility, lifting, ambulation.  [] (07404) Provided verbal/tactile cueing for activities related to improving balance, coordination, kinesthetic sense, posture, motor skill, proprioception  to assist (14669)      [] ES () (88590):     Quintin Goodrich stated goal: no pain with function     Therapist goals for Patient:   Short Term Goals: To be achieved in: 2 weeks  1. Independent in HEP and progression per patient tolerance, in order to prevent re-injury. 2. Patient will have a decrease in pain to facilitate improvement in movement, function, and ADLs as indicated by Functional Deficits.     Long Term Goals: To be achieved in: 10 weeks  1. Disability index score of 19% or less for the LEFS to assist with reaching prior level of function. 2. Patient will demonstrate increased AROM to 0-120 to allow for proper joint functioning as indicated by patients Functional Deficits. 3. Patient will demonstrate an increase in Strength to good proximal hip strength and control, within 5/5 in LE to allow for proper functional mobility as indicated by patients Functional Deficits. 4. Patient will return to full functional activities without increased symptoms or restriction. 5. amb down steps pain free 100% of time (patient specific functional goal)                   Progression Towards Functional goals:  [] Patient is progressing as expected towards functional goals listed. [] Progression is slowed due to complexities listed. [] Progression has been slowed due to co-morbidities. [x] Plan just implemented, too soon to assess goals progression  [] Other:     ASSESSMENT:  Patient feels he is not making progress, wishes to continue I with HEP/ gym routine at [facility] [employer].      Treatment/Activity Tolerance:  [x] Patient tolerated treatment well [] Patient limited by fatique  [] Patient limited by pain  [] Patient limited by other medical complications  [] Other:     Prognosis: [x] Good [] Fair  [] Poor    Patient Requires Follow-up: [x] Yes  [] No    PLAN:  Patient plans to start gym routine with [employer], considering FU with MD.  [] Continue per plan of care [] Alter current plan (see comments)  [] Plan of care initiated [] Hold pending MD visit [x] Discharge    Electronically signed by: Crissy Mahoney PT

## 2018-10-01 ENCOUNTER — TELEPHONE (OUTPATIENT)
Dept: PULMONOLOGY | Age: 78
End: 2018-10-01

## 2018-10-01 DIAGNOSIS — J43.2 CENTRILOBULAR EMPHYSEMA (HCC): ICD-10-CM

## 2018-10-01 DIAGNOSIS — J96.11 CHRONIC RESPIRATORY FAILURE WITH HYPOXIA (HCC): Primary | ICD-10-CM

## 2018-10-04 ENCOUNTER — OFFICE VISIT (OUTPATIENT)
Dept: PULMONOLOGY | Age: 78
End: 2018-10-04
Payer: MEDICARE

## 2018-10-04 VITALS
DIASTOLIC BLOOD PRESSURE: 66 MMHG | HEIGHT: 71 IN | HEART RATE: 68 BPM | RESPIRATION RATE: 16 BRPM | BODY MASS INDEX: 36.26 KG/M2 | OXYGEN SATURATION: 94 % | SYSTOLIC BLOOD PRESSURE: 124 MMHG | WEIGHT: 259 LBS | TEMPERATURE: 98.2 F

## 2018-10-04 DIAGNOSIS — J96.11 CHRONIC RESPIRATORY FAILURE WITH HYPOXIA (HCC): ICD-10-CM

## 2018-10-04 DIAGNOSIS — J43.2 CENTRILOBULAR EMPHYSEMA (HCC): Primary | ICD-10-CM

## 2018-10-04 PROCEDURE — 99214 OFFICE O/P EST MOD 30 MIN: CPT | Performed by: INTERNAL MEDICINE

## 2018-10-04 PROCEDURE — G8417 CALC BMI ABV UP PARAM F/U: HCPCS | Performed by: INTERNAL MEDICINE

## 2018-10-04 PROCEDURE — 4040F PNEUMOC VAC/ADMIN/RCVD: CPT | Performed by: INTERNAL MEDICINE

## 2018-10-04 PROCEDURE — 90662 IIV NO PRSV INCREASED AG IM: CPT | Performed by: INTERNAL MEDICINE

## 2018-10-04 PROCEDURE — G8427 DOCREV CUR MEDS BY ELIG CLIN: HCPCS | Performed by: INTERNAL MEDICINE

## 2018-10-04 PROCEDURE — 1123F ACP DISCUSS/DSCN MKR DOCD: CPT | Performed by: INTERNAL MEDICINE

## 2018-10-04 PROCEDURE — 3023F SPIROM DOC REV: CPT | Performed by: INTERNAL MEDICINE

## 2018-10-04 PROCEDURE — G8599 NO ASA/ANTIPLAT THER USE RNG: HCPCS | Performed by: INTERNAL MEDICINE

## 2018-10-04 PROCEDURE — G8482 FLU IMMUNIZE ORDER/ADMIN: HCPCS | Performed by: INTERNAL MEDICINE

## 2018-10-04 PROCEDURE — 1036F TOBACCO NON-USER: CPT | Performed by: INTERNAL MEDICINE

## 2018-10-04 PROCEDURE — G8926 SPIRO NO PERF OR DOC: HCPCS | Performed by: INTERNAL MEDICINE

## 2018-10-04 PROCEDURE — G0008 ADMIN INFLUENZA VIRUS VAC: HCPCS | Performed by: INTERNAL MEDICINE

## 2018-10-04 PROCEDURE — 1101F PT FALLS ASSESS-DOCD LE1/YR: CPT | Performed by: INTERNAL MEDICINE

## 2018-10-04 ASSESSMENT — ENCOUNTER SYMPTOMS
VOICE CHANGE: 0
CONSTIPATION: 0
EYE DISCHARGE: 0
EYE ITCHING: 0
ABDOMINAL PAIN: 0
SORE THROAT: 0
CHOKING: 0
SHORTNESS OF BREATH: 0
COUGH: 0
EYE PAIN: 0
DIARRHEA: 0

## 2018-10-04 NOTE — PATIENT INSTRUCTIONS
available. There is no live flu virus in flu shots. They cannot cause the flu. There are many flu viruses, and they are always changing. Each year a new flu vaccine is made to protect against three or four viruses that are likely to cause disease in the upcoming flu season. But even when the vaccine doesnt exactly match these viruses, it may still provide some protection    Flu vaccine cannot prevent:   flu that is caused by a virus not covered by the vaccine, or   illnesses that look like flu but are not. It takes about 2 weeks for protection to develop after vaccination, and protection lasts through the flu season. 3. Some people should not get this vaccine    Tell the person who is giving you the vaccine:     If you have any severe, life-threatening allergies. If you ever had a life-threatening allergic reaction after a dose of flu vaccine, or have a severe allergy to any part of this vaccine, you may be advised not to get vaccinated. Most, but not all, types of flu vaccine contain a small amount of egg protein.  If you ever had Guillain-Barré Syndrome (also called GBS). Some people with a history of GBS should not get this vaccine. This should be discussed with your doctor.  If you are not feeling well. It is usually okay to get flu vaccine when you have a mild illness, but you might be asked to come back when you feel better. 4. Risks of a vaccine reaction    With any medicine, including vaccines, there is a chance of reactions. These are usually mild and go away on their own, but serious reactions are also possible. Most people who get a flu shot do not have any problems with it.      Minor problems following a flu shot include:    soreness, redness, or swelling where the shot was given     hoarseness   sore, red or itchy eyes   cough   fever   aches   headache   itching   fatigue  If these problems occur, they usually begin soon after the shot and last 1 or 2 days. More serious problems following a flu shot can include the following:     There may be a small increased risk of Guillain-Barré Syndrome (GBS) after inactivated flu vaccine. This risk has been estimated at 1 or 2 additional cases per million people vaccinated. This is much lower than the risk of severe complications from flu, which can be prevented by flu vaccine.  Young children who get the flu shot along with pneumococcal vaccine (PCV13) and/or DTaP vaccine at the same time might be slightly more likely to have a seizure caused by fever. Ask your doctor for more information. Tell your doctor if a child who is getting flu vaccine has ever had a seizure. Problems that could happen after any injected vaccine:      People sometimes faint after a medical procedure, including vaccination. Sitting or lying down for about 15 minutes can help prevent fainting, and injuries caused by a fall. Tell your doctor if you feel dizzy, or have vision changes or ringing in the ears.  Some people get severe pain in the shoulder and have difficulty moving the arm where a shot was given. This happens very rarely.  Any medication can cause a severe allergic reaction. Such reactions from a vaccine are very rare, estimated at about 1 in a million doses, and would happen within a few minutes to a few hours after the vaccination. As with any medicine, there is a very remote chance of a vaccine causing a serious injury or death. The safety of vaccines is always being monitored. For more information, visit: www.cdc.gov/vaccinesafety/    5. What if there is a serious reaction? What should I look for?  Look for anything that concerns you, such as signs of a severe allergic reaction, very high fever, or unusual behavior.     Signs of a severe allergic reaction can include hives, swelling of the face and throat, difficulty breathing, a fast heartbeat, dizziness, and weakness  usually within a few

## 2018-10-04 NOTE — PROGRESS NOTES
Vaccine Information Sheet, \"Influenza - Inactivated\"  given to Mendoza Martinez, or parent/legal guardian of  Mendoza Martinez and verbalized understanding. Patient responses:    Have you ever had a reaction to a flu vaccine? No  Are you able to eat eggs without adverse effects? Yes  Do you have any current illness?  no  Have you ever had Guillian Ellenwood Syndrome? No    Flu vaccine given per order. Please see immunization tab.
LAMINOTOMIES L23 TO L5S1, BONE GRAFTING FOR FUSION L23 TO L5S1 , INST FUSION STABILIZATION REDUCTION OF SPONDYLOLISTHESIS L34 AND L45 , AUTOGRAFT , ALLOGRAFT     OTHER SURGICAL HISTORY  01/09/2013    excision of suture granuloma on the abdomen    OTHER SURGICAL HISTORY Right     excision of cyst on thigh    PROSTATE SURGERY      cancer    SHOULDER SURGERY      right    VASECTOMY         Social History   Substance Use Topics    Smoking status: Former Smoker     Packs/day: 9.00     Years: 24.00     Types: Cigarettes, Cigars     Quit date: 2/7/1982    Smokeless tobacco: Former User     Types: Snuff, Chew     Quit date: 7/5/1982    Alcohol use No       Family History   Problem Relation Age of Onset    Heart Disease Neg Hx          Current Outpatient Prescriptions:     azelastine (ASTELIN) 0.1 % nasal spray, 1 spray by Nasal route 2 times daily Use in each nostril as directed, Disp: 1 Bottle, Rfl: 0    levothyroxine (SYNTHROID) 112 MCG tablet, TAKE 2 TABLETS BY MOUTH EVERY DAY, Disp: 180 tablet, Rfl: 1    hydrochlorothiazide (HYDRODIURIL) 25 MG tablet, TAKE 1 TABLET BY MOUTH 2 TIMES DAILY, Disp: 180 tablet, Rfl: 3    dexamethasone (DECADRON) 4 MG/ML injection, 1.3-1.5mL applied transdermally q 24-48 hours as directed by physical therapist, Disp: 30 mL, Rfl: 0    SYMBICORT 160-4.5 MCG/ACT AERO, INHALE 2 PUFFS INTO THE LUNGS 2 TIMES DAILY, Disp: , Rfl: 6    HYDROcodone-acetaminophen (NORCO) 5-325 MG per tablet, Take 1 tablet by mouth every 6 hours as needed for Pain. ., Disp: , Rfl:     metoprolol tartrate (LOPRESSOR) 25 MG tablet, TAKE 1 TABLET BY MOUTH TWICE A DAY, Disp: 180 tablet, Rfl: 1    lisinopril (PRINIVIL;ZESTRIL) 20 MG tablet, TAKE 1 TABLET BY MOUTH 2 TIMES DAILY, Disp: 180 tablet, Rfl: 3    hydroxychloroquine (PLAQUENIL) 200 MG tablet, Take 200 mg by mouth 2 times daily, Disp: , Rfl:     Ergocalciferol (VITAMIN D2 PO), Take 1.25 mg by mouth once a week, Disp: , Rfl:     albuterol sulfate HFA

## 2018-10-09 ENCOUNTER — OFFICE VISIT (OUTPATIENT)
Dept: ORTHOPEDIC SURGERY | Age: 78
End: 2018-10-09
Payer: MEDICARE

## 2018-10-09 VITALS — WEIGHT: 260 LBS | HEIGHT: 72 IN | BODY MASS INDEX: 35.21 KG/M2

## 2018-10-09 DIAGNOSIS — J06.9 ACUTE UPPER RESPIRATORY INFECTION: ICD-10-CM

## 2018-10-09 DIAGNOSIS — M70.62 TROCHANTERIC BURSITIS OF BOTH HIPS: Primary | ICD-10-CM

## 2018-10-09 DIAGNOSIS — M70.61 TROCHANTERIC BURSITIS OF BOTH HIPS: Primary | ICD-10-CM

## 2018-10-09 PROCEDURE — G8599 NO ASA/ANTIPLAT THER USE RNG: HCPCS | Performed by: ORTHOPAEDIC SURGERY

## 2018-10-09 PROCEDURE — 1101F PT FALLS ASSESS-DOCD LE1/YR: CPT | Performed by: ORTHOPAEDIC SURGERY

## 2018-10-09 PROCEDURE — 4040F PNEUMOC VAC/ADMIN/RCVD: CPT | Performed by: ORTHOPAEDIC SURGERY

## 2018-10-09 PROCEDURE — G8482 FLU IMMUNIZE ORDER/ADMIN: HCPCS | Performed by: ORTHOPAEDIC SURGERY

## 2018-10-09 PROCEDURE — G8427 DOCREV CUR MEDS BY ELIG CLIN: HCPCS | Performed by: ORTHOPAEDIC SURGERY

## 2018-10-09 PROCEDURE — 99213 OFFICE O/P EST LOW 20 MIN: CPT | Performed by: ORTHOPAEDIC SURGERY

## 2018-10-09 PROCEDURE — G8417 CALC BMI ABV UP PARAM F/U: HCPCS | Performed by: ORTHOPAEDIC SURGERY

## 2018-10-09 PROCEDURE — 1036F TOBACCO NON-USER: CPT | Performed by: ORTHOPAEDIC SURGERY

## 2018-10-09 PROCEDURE — 1123F ACP DISCUSS/DSCN MKR DOCD: CPT | Performed by: ORTHOPAEDIC SURGERY

## 2018-10-09 PROCEDURE — 20610 DRAIN/INJ JOINT/BURSA W/O US: CPT | Performed by: ORTHOPAEDIC SURGERY

## 2018-10-09 RX ORDER — AZELASTINE 1 MG/ML
1 SPRAY, METERED NASAL 2 TIMES DAILY
Qty: 1 BOTTLE | Refills: 3 | Status: SHIPPED | OUTPATIENT
Start: 2018-10-09 | End: 2021-10-15 | Stop reason: ALTCHOICE

## 2018-10-09 NOTE — PROGRESS NOTES
Site: BILATERAL HIPS    Betamethazone  Lot number: [id number]  NDC#  6490-2678-67(DELT)    Lidocaine  LidocaineNDC# 5639-4528-52  Lot number: [id number]    Bupivacaine  NDC# 9829-5353-93  Lot number: [id number]
hip range of motion with no signs of failure or restriction    Strength:  Normal symmetric to both lower extremities    Special Tests:  Negative straight leg raise but positive Evan's maneuver    Skin: There are no rashes, ulcerations or lesions. Gait: Relatively normal    Reflex present bilaterally    Additional Comments:       Additional Examinations:             Radiology:     X-rays 2 views of both hips that were taken elsewhere were reviewed. Unremarkable for acute or chronic pathology   An MRI confirmed greater trochanteric bursitis bilaterally      Assessment :  Bilateral hip pain secondary to greater trochanteric bursitis. Impression:  Encounter Diagnosis   Name Primary?  Trochanteric bursitis of both hips Yes       Office Procedures:  Orders Placed This Encounter   Procedures    NM BETAMETHASONE ACET&SOD PHOSP    NM ARTHROCENTESIS ASPIR&/INJ MAJOR JT/BURSA W/O US       Treatment Plan:  Cortisone injections into both hips. Return in 2 weeks. I will determine whether any additional therapy such as therapy, injections or surgery might be indicated at that time    After informed consent was received from the patient, the left hip was sterilely prepped using Betadine with the patient in a lateral decubitusposition then using ethyl chloride as a topical refrigerant andan injection using 1 mL of 6mg/ml Bethamethasone, 2 mL each of 1% Xylocaine and 0.5%Marcaine in a 5 mL syringe and a 25-gauge spinal needle was used to inject the medication into the greater trochanteric region and to the point of maximum  tenderness. The patient otherwise appeared to tolerate the injection well.       After informed consent was received from the patient, the right hip was sterilely prepped using Betadine with the patient in a lateral decubitusposition then using ethyl chloride as a topical refrigerant andan injection using 1 mL of 6mg/ml Bethamethasone, 2 mL each of 1% Xylocaine and 0.5%Marcaine in a 5 mL syringe and

## 2018-10-16 PROBLEM — J41.1 BRONCHITIS, MUCOPURULENT RECURRENT (HCC): Status: ACTIVE | Noted: 2018-10-16

## 2018-10-23 ENCOUNTER — OFFICE VISIT (OUTPATIENT)
Dept: ORTHOPEDIC SURGERY | Age: 78
End: 2018-10-23
Payer: MEDICARE

## 2018-10-23 VITALS — WEIGHT: 259.92 LBS | BODY MASS INDEX: 36.39 KG/M2 | HEIGHT: 71 IN

## 2018-10-23 DIAGNOSIS — M70.61 TROCHANTERIC BURSITIS OF BOTH HIPS: Primary | ICD-10-CM

## 2018-10-23 DIAGNOSIS — M70.62 TROCHANTERIC BURSITIS OF BOTH HIPS: Primary | ICD-10-CM

## 2018-10-23 DIAGNOSIS — G57.03 PIRIFORMIS SYNDROME OF BOTH SIDES: ICD-10-CM

## 2018-10-23 PROCEDURE — G8599 NO ASA/ANTIPLAT THER USE RNG: HCPCS | Performed by: ORTHOPAEDIC SURGERY

## 2018-10-23 PROCEDURE — 4040F PNEUMOC VAC/ADMIN/RCVD: CPT | Performed by: ORTHOPAEDIC SURGERY

## 2018-10-23 PROCEDURE — G8417 CALC BMI ABV UP PARAM F/U: HCPCS | Performed by: ORTHOPAEDIC SURGERY

## 2018-10-23 PROCEDURE — 1101F PT FALLS ASSESS-DOCD LE1/YR: CPT | Performed by: ORTHOPAEDIC SURGERY

## 2018-10-23 PROCEDURE — G8482 FLU IMMUNIZE ORDER/ADMIN: HCPCS | Performed by: ORTHOPAEDIC SURGERY

## 2018-10-23 PROCEDURE — 1123F ACP DISCUSS/DSCN MKR DOCD: CPT | Performed by: ORTHOPAEDIC SURGERY

## 2018-10-23 PROCEDURE — 1036F TOBACCO NON-USER: CPT | Performed by: ORTHOPAEDIC SURGERY

## 2018-10-23 PROCEDURE — G8427 DOCREV CUR MEDS BY ELIG CLIN: HCPCS | Performed by: ORTHOPAEDIC SURGERY

## 2018-10-23 PROCEDURE — 99213 OFFICE O/P EST LOW 20 MIN: CPT | Performed by: ORTHOPAEDIC SURGERY

## 2018-10-23 NOTE — PROGRESS NOTES
Radiology:     X-rays 2 views of both hips that were taken elsewhere were reviewed. Unremarkable for acute or chronic pathology   An MRI confirmed greater trochanteric bursitis bilaterally      Assessment :  Bilateral hip pain secondary to greater trochanteric bursitis. Bilateral piriformis syndrome     Impression:  Encounter Diagnoses   Name Primary?  Trochanteric bursitis of both hips Yes    Piriformis syndrome of both sides        Office Procedures:  No orders of the defined types were placed in this encounter. Treatment Plan:  Because of the nature of his pain. I would recommend doing. I left hip abductor fasciotomy with greater trochanteric bursectomy and piriformis release. Depending on how he recovers from that surgery would tend or at least determine how we approach his right side. INFORMED CONSENT NOTE        We discussed the risks, benefits, and alternatives to the proposed procedure, as well as the necessity of other members of the healthcare team participating in the procedure. All questions were answered and the patient elected to proceed with the proposed procedure and signed the informed consent form.

## 2018-10-24 ENCOUNTER — TELEPHONE (OUTPATIENT)
Dept: ORTHOPEDIC SURGERY | Age: 78
End: 2018-10-24

## 2018-10-24 NOTE — TELEPHONE ENCOUNTER
Auth: NPR  Date: 11/5/18  Reference # None  Type of SX: Outpatient  Location: [address] [id number], [id number], [id number]   SX area: [facility]

## 2018-10-31 ENCOUNTER — OFFICE VISIT (OUTPATIENT)
Dept: FAMILY MEDICINE CLINIC | Age: 78
End: 2018-10-31
Payer: MEDICARE

## 2018-10-31 VITALS
SYSTOLIC BLOOD PRESSURE: 130 MMHG | OXYGEN SATURATION: 94 % | BODY MASS INDEX: 37 KG/M2 | WEIGHT: 269 LBS | HEART RATE: 65 BPM | DIASTOLIC BLOOD PRESSURE: 80 MMHG

## 2018-10-31 DIAGNOSIS — G47.33 OSA (OBSTRUCTIVE SLEEP APNEA): Chronic | ICD-10-CM

## 2018-10-31 DIAGNOSIS — I25.10 CORONARY ARTERY DISEASE INVOLVING NATIVE CORONARY ARTERY OF NATIVE HEART WITHOUT ANGINA PECTORIS: Chronic | ICD-10-CM

## 2018-10-31 DIAGNOSIS — E03.9 HYPOTHYROIDISM, UNSPECIFIED TYPE: ICD-10-CM

## 2018-10-31 DIAGNOSIS — E78.5 HYPERLIPIDEMIA, UNSPECIFIED HYPERLIPIDEMIA TYPE: Chronic | ICD-10-CM

## 2018-10-31 DIAGNOSIS — M70.62 TROCHANTERIC BURSITIS OF LEFT HIP: ICD-10-CM

## 2018-10-31 DIAGNOSIS — I10 ESSENTIAL HYPERTENSION: Chronic | ICD-10-CM

## 2018-10-31 DIAGNOSIS — Z01.818 PRE-OP EXAM: Primary | ICD-10-CM

## 2018-10-31 DIAGNOSIS — Z01.818 PRE-OP EXAM: ICD-10-CM

## 2018-10-31 LAB
A/G RATIO: 1.8 (ref 1.1–2.2)
ALBUMIN SERPL-MCNC: 4.4 G/DL (ref 3.4–5)
ALP BLD-CCNC: 85 U/L (ref 40–129)
ALT SERPL-CCNC: 17 U/L (ref 10–40)
ANION GAP SERPL CALCULATED.3IONS-SCNC: 15 MMOL/L (ref 3–16)
AST SERPL-CCNC: 14 U/L (ref 15–37)
BILIRUB SERPL-MCNC: 0.4 MG/DL (ref 0–1)
BUN BLDV-MCNC: 20 MG/DL (ref 7–20)
CALCIUM SERPL-MCNC: 9.4 MG/DL (ref 8.3–10.6)
CHLORIDE BLD-SCNC: 102 MMOL/L (ref 99–110)
CHOLESTEROL, TOTAL: 112 MG/DL (ref 0–199)
CO2: 27 MMOL/L (ref 21–32)
CREAT SERPL-MCNC: 1.1 MG/DL (ref 0.8–1.3)
GFR AFRICAN AMERICAN: >60
GFR NON-AFRICAN AMERICAN: >60
GLOBULIN: 2.5 G/DL
GLUCOSE BLD-MCNC: 135 MG/DL (ref 70–99)
HCT VFR BLD CALC: 39.1 % (ref 40.5–52.5)
HDLC SERPL-MCNC: 28 MG/DL (ref 40–60)
HEMOGLOBIN: 12.9 G/DL (ref 13.5–17.5)
LDL CHOLESTEROL CALCULATED: 38 MG/DL
MCH RBC QN AUTO: 30.3 PG (ref 26–34)
MCHC RBC AUTO-ENTMCNC: 33 G/DL (ref 31–36)
MCV RBC AUTO: 91.9 FL (ref 80–100)
PDW BLD-RTO: 15.2 % (ref 12.4–15.4)
PLATELET # BLD: 332 K/UL (ref 135–450)
PMV BLD AUTO: 7.6 FL (ref 5–10.5)
POTASSIUM SERPL-SCNC: 4.1 MMOL/L (ref 3.5–5.1)
RBC # BLD: 4.26 M/UL (ref 4.2–5.9)
SODIUM BLD-SCNC: 144 MMOL/L (ref 136–145)
T4 FREE: 1.4 NG/DL (ref 0.9–1.8)
TOTAL PROTEIN: 6.9 G/DL (ref 6.4–8.2)
TRIGL SERPL-MCNC: 232 MG/DL (ref 0–150)
TSH SERPL DL<=0.05 MIU/L-ACNC: 4.01 UIU/ML (ref 0.27–4.2)
VLDLC SERPL CALC-MCNC: 46 MG/DL
WBC # BLD: 8.9 K/UL (ref 4–11)

## 2018-10-31 PROCEDURE — G8427 DOCREV CUR MEDS BY ELIG CLIN: HCPCS | Performed by: PHYSICIAN ASSISTANT

## 2018-10-31 PROCEDURE — 4040F PNEUMOC VAC/ADMIN/RCVD: CPT | Performed by: PHYSICIAN ASSISTANT

## 2018-10-31 PROCEDURE — 1123F ACP DISCUSS/DSCN MKR DOCD: CPT | Performed by: PHYSICIAN ASSISTANT

## 2018-10-31 PROCEDURE — 1036F TOBACCO NON-USER: CPT | Performed by: PHYSICIAN ASSISTANT

## 2018-10-31 PROCEDURE — 93000 ELECTROCARDIOGRAM COMPLETE: CPT | Performed by: PHYSICIAN ASSISTANT

## 2018-10-31 PROCEDURE — 1101F PT FALLS ASSESS-DOCD LE1/YR: CPT | Performed by: PHYSICIAN ASSISTANT

## 2018-10-31 PROCEDURE — G8599 NO ASA/ANTIPLAT THER USE RNG: HCPCS | Performed by: PHYSICIAN ASSISTANT

## 2018-10-31 PROCEDURE — 99214 OFFICE O/P EST MOD 30 MIN: CPT | Performed by: PHYSICIAN ASSISTANT

## 2018-10-31 PROCEDURE — G8417 CALC BMI ABV UP PARAM F/U: HCPCS | Performed by: PHYSICIAN ASSISTANT

## 2018-10-31 PROCEDURE — G8482 FLU IMMUNIZE ORDER/ADMIN: HCPCS | Performed by: PHYSICIAN ASSISTANT

## 2018-10-31 ASSESSMENT — PATIENT HEALTH QUESTIONNAIRE - PHQ9
2. FEELING DOWN, DEPRESSED OR HOPELESS: 0
SUM OF ALL RESPONSES TO PHQ9 QUESTIONS 1 & 2: 0
SUM OF ALL RESPONSES TO PHQ QUESTIONS 1-9: 0
1. LITTLE INTEREST OR PLEASURE IN DOING THINGS: 0
SUM OF ALL RESPONSES TO PHQ QUESTIONS 1-9: 0

## 2018-10-31 NOTE — PROGRESS NOTES
Preoperative Consultation      Ægissidu 65  YOB: 1940    Date of Service:  10/31/2018    Vitals:    10/31/18 0756   BP: 130/80   Site: Left Upper Arm   Position: Sitting   Cuff Size: Large Adult   Pulse: 65   SpO2: 94%   Weight: 269 lb (122 kg)      Wt Readings from Last 2 Encounters:   10/31/18 269 lb (122 kg)   10/23/18 259 lb 14.8 oz (117.9 kg)     BP Readings from Last 3 Encounters:   10/31/18 130/80   10/04/18 124/66   09/12/18 125/67        Chief Complaint   Patient presents with   Lala Tapia Pre-op Exam     Surgery 11-5-18, [facility], LEFT HIP ABDUCTOR FASCIOTOMY, GREATER TROCHANTERIC BURSECTOMY, PIRIFORMIS RELEASE, Dr. Penny Marrero     Allergies   Allergen Reactions    Penicillins Hives    Avelox [Moxifloxacin Hcl In Nacl] Itching, Swelling and Rash     Outpatient Prescriptions Marked as Taking for the 10/31/18 encounter (Office Visit) with TARA Nguyen   Medication Sig Dispense Refill    azelastine (ASTELIN) 0.1 % nasal spray 1 SPRAY BY NASAL ROUTE 2 TIMES DAILY USE IN EACH NOSTRIL AS DIRECTED 1 Bottle 3    levothyroxine (SYNTHROID) 112 MCG tablet TAKE 2 TABLETS BY MOUTH EVERY  tablet 1    hydrochlorothiazide (HYDRODIURIL) 25 MG tablet TAKE 1 TABLET BY MOUTH 2 TIMES DAILY 180 tablet 3    dexamethasone (DECADRON) 4 MG/ML injection 1.3-1.5mL applied transdermally q 24-48 hours as directed by physical therapist 30 mL 0    SYMBICORT 160-4.5 MCG/ACT AERO INHALE 2 PUFFS INTO THE LUNGS 2 TIMES DAILY  6    HYDROcodone-acetaminophen (NORCO) 5-325 MG per tablet Take 1 tablet by mouth every 6 hours as needed for Pain. Nora Southern metoprolol tartrate (LOPRESSOR) 25 MG tablet TAKE 1 TABLET BY MOUTH TWICE A  tablet 1    lisinopril (PRINIVIL;ZESTRIL) 20 MG tablet TAKE 1 TABLET BY MOUTH 2 TIMES DAILY 180 tablet 3    hydroxychloroquine (PLAQUENIL) 200 MG tablet Take 200 mg by mouth 2 times daily      Ergocalciferol (VITAMIN D2 PO) Take 1.25 mg by mouth once a week     

## 2018-11-02 ENCOUNTER — ANESTHESIA EVENT (OUTPATIENT)
Dept: OPERATING ROOM | Age: 78
End: 2018-11-02
Payer: MEDICARE

## 2018-11-05 ENCOUNTER — ANESTHESIA (OUTPATIENT)
Dept: OPERATING ROOM | Age: 78
End: 2018-11-05
Payer: MEDICARE

## 2018-11-05 ENCOUNTER — HOSPITAL ENCOUNTER (OUTPATIENT)
Age: 78
Setting detail: OUTPATIENT SURGERY
Discharge: HOME | End: 2018-11-05
Attending: ORTHOPAEDIC SURGERY | Admitting: ORTHOPAEDIC SURGERY
Payer: MEDICARE

## 2018-11-05 VITALS
RESPIRATION RATE: 1 BRPM | DIASTOLIC BLOOD PRESSURE: 81 MMHG | SYSTOLIC BLOOD PRESSURE: 153 MMHG | OXYGEN SATURATION: 94 %

## 2018-11-05 VITALS
BODY MASS INDEX: 35.89 KG/M2 | RESPIRATION RATE: 15 BRPM | WEIGHT: 265 LBS | TEMPERATURE: 98 F | DIASTOLIC BLOOD PRESSURE: 77 MMHG | OXYGEN SATURATION: 95 % | SYSTOLIC BLOOD PRESSURE: 134 MMHG | HEIGHT: 72 IN | HEART RATE: 65 BPM

## 2018-11-05 DIAGNOSIS — M70.62 TROCHANTERIC BURSITIS OF BOTH HIPS: ICD-10-CM

## 2018-11-05 DIAGNOSIS — M70.61 TROCHANTERIC BURSITIS OF BOTH HIPS: ICD-10-CM

## 2018-11-05 DIAGNOSIS — G57.03 PIRIFORMIS SYNDROME OF BOTH SIDES: Primary | ICD-10-CM

## 2018-11-05 LAB
GLUCOSE BLD-MCNC: 142 MG/DL (ref 70–99)
GLUCOSE BLD-MCNC: 146 MG/DL (ref 70–99)
PERFORMED ON: ABNORMAL
PERFORMED ON: ABNORMAL

## 2018-11-05 PROCEDURE — 7100000010 HC PHASE II RECOVERY - FIRST 15 MIN: Performed by: ORTHOPAEDIC SURGERY

## 2018-11-05 PROCEDURE — 6360000002 HC RX W HCPCS: Performed by: ORTHOPAEDIC SURGERY

## 2018-11-05 PROCEDURE — 7100000011 HC PHASE II RECOVERY - ADDTL 15 MIN: Performed by: ORTHOPAEDIC SURGERY

## 2018-11-05 PROCEDURE — 3600000013 HC SURGERY LEVEL 3 ADDTL 15MIN: Performed by: ORTHOPAEDIC SURGERY

## 2018-11-05 PROCEDURE — 7100000001 HC PACU RECOVERY - ADDTL 15 MIN: Performed by: ORTHOPAEDIC SURGERY

## 2018-11-05 PROCEDURE — 3700000000 HC ANESTHESIA ATTENDED CARE: Performed by: ORTHOPAEDIC SURGERY

## 2018-11-05 PROCEDURE — 2500000003 HC RX 250 WO HCPCS: Performed by: ANESTHESIOLOGY

## 2018-11-05 PROCEDURE — 3700000001 HC ADD 15 MINUTES (ANESTHESIA): Performed by: ORTHOPAEDIC SURGERY

## 2018-11-05 PROCEDURE — 2580000003 HC RX 258: Performed by: NURSE ANESTHETIST, CERTIFIED REGISTERED

## 2018-11-05 PROCEDURE — 6360000002 HC RX W HCPCS: Performed by: ANESTHESIOLOGY

## 2018-11-05 PROCEDURE — 3600000003 HC SURGERY LEVEL 3 BASE: Performed by: ORTHOPAEDIC SURGERY

## 2018-11-05 PROCEDURE — 2500000003 HC RX 250 WO HCPCS: Performed by: NURSE ANESTHETIST, CERTIFIED REGISTERED

## 2018-11-05 PROCEDURE — 2709999900 HC NON-CHARGEABLE SUPPLY: Performed by: ORTHOPAEDIC SURGERY

## 2018-11-05 PROCEDURE — 6360000002 HC RX W HCPCS: Performed by: NURSE ANESTHETIST, CERTIFIED REGISTERED

## 2018-11-05 PROCEDURE — 7100000000 HC PACU RECOVERY - FIRST 15 MIN: Performed by: ORTHOPAEDIC SURGERY

## 2018-11-05 PROCEDURE — 2580000003 HC RX 258: Performed by: ANESTHESIOLOGY

## 2018-11-05 RX ORDER — OXYCODONE HYDROCHLORIDE AND ACETAMINOPHEN 5; 325 MG/1; MG/1
1 TABLET ORAL PRN
Status: DISCONTINUED | OUTPATIENT
Start: 2018-11-05 | End: 2018-11-05 | Stop reason: HOSPADM

## 2018-11-05 RX ORDER — SODIUM CHLORIDE, SODIUM LACTATE, POTASSIUM CHLORIDE, CALCIUM CHLORIDE 600; 310; 30; 20 MG/100ML; MG/100ML; MG/100ML; MG/100ML
INJECTION, SOLUTION INTRAVENOUS CONTINUOUS
Status: DISCONTINUED | OUTPATIENT
Start: 2018-11-05 | End: 2018-11-05 | Stop reason: HOSPADM

## 2018-11-05 RX ORDER — OXYCODONE HYDROCHLORIDE AND ACETAMINOPHEN 5; 325 MG/1; MG/1
2 TABLET ORAL PRN
Status: DISCONTINUED | OUTPATIENT
Start: 2018-11-05 | End: 2018-11-05 | Stop reason: HOSPADM

## 2018-11-05 RX ORDER — LABETALOL HYDROCHLORIDE 5 MG/ML
5 INJECTION, SOLUTION INTRAVENOUS EVERY 10 MIN PRN
Status: DISCONTINUED | OUTPATIENT
Start: 2018-11-05 | End: 2018-11-05 | Stop reason: HOSPADM

## 2018-11-05 RX ORDER — LIDOCAINE HYDROCHLORIDE 20 MG/ML
INJECTION, SOLUTION EPIDURAL; INFILTRATION; INTRACAUDAL; PERINEURAL PRN
Status: DISCONTINUED | OUTPATIENT
Start: 2018-11-05 | End: 2018-11-05 | Stop reason: SDUPTHER

## 2018-11-05 RX ORDER — SODIUM CHLORIDE 0.9 % (FLUSH) 0.9 %
10 SYRINGE (ML) INJECTION EVERY 12 HOURS SCHEDULED
Status: DISCONTINUED | OUTPATIENT
Start: 2018-11-05 | End: 2018-11-05 | Stop reason: HOSPADM

## 2018-11-05 RX ORDER — ONDANSETRON 2 MG/ML
INJECTION INTRAMUSCULAR; INTRAVENOUS PRN
Status: DISCONTINUED | OUTPATIENT
Start: 2018-11-05 | End: 2018-11-05 | Stop reason: SDUPTHER

## 2018-11-05 RX ORDER — ATORVASTATIN CALCIUM 40 MG/1
40 TABLET, FILM COATED ORAL NIGHTLY
Qty: 90 TABLET | Refills: 3 | Status: SHIPPED | OUTPATIENT
Start: 2018-11-05 | End: 2019-10-18 | Stop reason: SDUPTHER

## 2018-11-05 RX ORDER — PROPOFOL 10 MG/ML
INJECTION, EMULSION INTRAVENOUS PRN
Status: DISCONTINUED | OUTPATIENT
Start: 2018-11-05 | End: 2018-11-05 | Stop reason: SDUPTHER

## 2018-11-05 RX ORDER — HYDROMORPHONE HCL 110MG/55ML
0.25 PATIENT CONTROLLED ANALGESIA SYRINGE INTRAVENOUS EVERY 5 MIN PRN
Status: DISCONTINUED | OUTPATIENT
Start: 2018-11-05 | End: 2018-11-05 | Stop reason: HOSPADM

## 2018-11-05 RX ORDER — SODIUM CHLORIDE, SODIUM LACTATE, POTASSIUM CHLORIDE, CALCIUM CHLORIDE 600; 310; 30; 20 MG/100ML; MG/100ML; MG/100ML; MG/100ML
INJECTION, SOLUTION INTRAVENOUS CONTINUOUS PRN
Status: DISCONTINUED | OUTPATIENT
Start: 2018-11-05 | End: 2018-11-05 | Stop reason: SDUPTHER

## 2018-11-05 RX ORDER — FENTANYL CITRATE 50 UG/ML
INJECTION, SOLUTION INTRAMUSCULAR; INTRAVENOUS PRN
Status: DISCONTINUED | OUTPATIENT
Start: 2018-11-05 | End: 2018-11-05 | Stop reason: SDUPTHER

## 2018-11-05 RX ORDER — LIDOCAINE HYDROCHLORIDE 10 MG/ML
1 INJECTION, SOLUTION EPIDURAL; INFILTRATION; INTRACAUDAL; PERINEURAL
Status: COMPLETED | OUTPATIENT
Start: 2018-11-05 | End: 2018-11-05

## 2018-11-05 RX ORDER — HYDROMORPHONE HCL 110MG/55ML
0.5 PATIENT CONTROLLED ANALGESIA SYRINGE INTRAVENOUS EVERY 5 MIN PRN
Status: DISCONTINUED | OUTPATIENT
Start: 2018-11-05 | End: 2018-11-05 | Stop reason: HOSPADM

## 2018-11-05 RX ORDER — HYDROCODONE BITARTRATE AND ACETAMINOPHEN 7.5; 325 MG/1; MG/1
1 TABLET ORAL EVERY 6 HOURS PRN
Qty: 28 TABLET | Refills: 0 | Status: SHIPPED | OUTPATIENT
Start: 2018-11-05 | End: 2018-11-12

## 2018-11-05 RX ORDER — SODIUM CHLORIDE 0.9 % (FLUSH) 0.9 %
10 SYRINGE (ML) INJECTION PRN
Status: DISCONTINUED | OUTPATIENT
Start: 2018-11-05 | End: 2018-11-05 | Stop reason: HOSPADM

## 2018-11-05 RX ORDER — BUPIVACAINE HYDROCHLORIDE AND EPINEPHRINE 5; 5 MG/ML; UG/ML
INJECTION, SOLUTION PERINEURAL PRN
Status: DISCONTINUED | OUTPATIENT
Start: 2018-11-05 | End: 2018-11-05 | Stop reason: HOSPADM

## 2018-11-05 RX ORDER — MEPERIDINE HYDROCHLORIDE 25 MG/ML
12.5 INJECTION INTRAMUSCULAR; INTRAVENOUS; SUBCUTANEOUS EVERY 5 MIN PRN
Status: DISCONTINUED | OUTPATIENT
Start: 2018-11-05 | End: 2018-11-05 | Stop reason: HOSPADM

## 2018-11-05 RX ORDER — MORPHINE SULFATE 10 MG/ML
2 INJECTION, SOLUTION INTRAMUSCULAR; INTRAVENOUS EVERY 5 MIN PRN
Status: DISCONTINUED | OUTPATIENT
Start: 2018-11-05 | End: 2018-11-05 | Stop reason: HOSPADM

## 2018-11-05 RX ORDER — HYDRALAZINE HYDROCHLORIDE 20 MG/ML
5 INJECTION INTRAMUSCULAR; INTRAVENOUS
Status: DISCONTINUED | OUTPATIENT
Start: 2018-11-05 | End: 2018-11-05 | Stop reason: HOSPADM

## 2018-11-05 RX ORDER — ACETAMINOPHEN 10 MG/ML
1000 INJECTION, SOLUTION INTRAVENOUS ONCE
Status: COMPLETED | OUTPATIENT
Start: 2018-11-05 | End: 2018-11-05

## 2018-11-05 RX ORDER — MORPHINE SULFATE 10 MG/ML
1 INJECTION, SOLUTION INTRAMUSCULAR; INTRAVENOUS EVERY 5 MIN PRN
Status: DISCONTINUED | OUTPATIENT
Start: 2018-11-05 | End: 2018-11-05 | Stop reason: HOSPADM

## 2018-11-05 RX ORDER — ONDANSETRON 2 MG/ML
4 INJECTION INTRAMUSCULAR; INTRAVENOUS
Status: DISCONTINUED | OUTPATIENT
Start: 2018-11-05 | End: 2018-11-05 | Stop reason: HOSPADM

## 2018-11-05 RX ADMIN — ACETAMINOPHEN 1000 MG: 10 INJECTION, SOLUTION INTRAVENOUS at 08:22

## 2018-11-05 RX ADMIN — ONDANSETRON 4 MG: 2 INJECTION, SOLUTION INTRAMUSCULAR; INTRAVENOUS at 09:41

## 2018-11-05 RX ADMIN — LIDOCAINE HYDROCHLORIDE 0.1 ML: 10 INJECTION, SOLUTION EPIDURAL; INFILTRATION; INTRACAUDAL; PERINEURAL at 08:21

## 2018-11-05 RX ADMIN — Medication 2000 MG: at 08:38

## 2018-11-05 RX ADMIN — SODIUM CHLORIDE, POTASSIUM CHLORIDE, SODIUM LACTATE AND CALCIUM CHLORIDE: 600; 310; 30; 20 INJECTION, SOLUTION INTRAVENOUS at 08:22

## 2018-11-05 RX ADMIN — LIDOCAINE HYDROCHLORIDE 50 MG: 20 INJECTION, SOLUTION EPIDURAL; INFILTRATION; INTRACAUDAL; PERINEURAL at 09:15

## 2018-11-05 RX ADMIN — PROPOFOL 140 MG: 10 INJECTION, EMULSION INTRAVENOUS at 09:15

## 2018-11-05 RX ADMIN — FENTANYL CITRATE 50 MCG: 50 INJECTION INTRAMUSCULAR; INTRAVENOUS at 09:26

## 2018-11-05 RX ADMIN — FENTANYL CITRATE 50 MCG: 50 INJECTION INTRAMUSCULAR; INTRAVENOUS at 09:15

## 2018-11-05 RX ADMIN — SODIUM CHLORIDE, POTASSIUM CHLORIDE, SODIUM LACTATE AND CALCIUM CHLORIDE: 600; 310; 30; 20 INJECTION, SOLUTION INTRAVENOUS at 09:10

## 2018-11-05 RX ADMIN — HYDROMORPHONE HYDROCHLORIDE 0.5 MG: 2 INJECTION INTRAMUSCULAR; INTRAVENOUS; SUBCUTANEOUS at 10:14

## 2018-11-05 ASSESSMENT — PULMONARY FUNCTION TESTS
PIF_VALUE: 3
PIF_VALUE: 2
PIF_VALUE: 1
PIF_VALUE: 17
PIF_VALUE: 3
PIF_VALUE: 2
PIF_VALUE: 2
PIF_VALUE: 3
PIF_VALUE: 2
PIF_VALUE: 2
PIF_VALUE: 3
PIF_VALUE: 16
PIF_VALUE: 8
PIF_VALUE: 2
PIF_VALUE: 3
PIF_VALUE: 2
PIF_VALUE: 3
PIF_VALUE: 17
PIF_VALUE: 3
PIF_VALUE: 3
PIF_VALUE: 2
PIF_VALUE: 3
PIF_VALUE: 1
PIF_VALUE: 2
PIF_VALUE: 3
PIF_VALUE: 2
PIF_VALUE: 2
PIF_VALUE: 15
PIF_VALUE: 2
PIF_VALUE: 17
PIF_VALUE: 3
PIF_VALUE: 2
PIF_VALUE: 2
PIF_VALUE: 1
PIF_VALUE: 1
PIF_VALUE: 2
PIF_VALUE: 1
PIF_VALUE: 2
PIF_VALUE: 2
PIF_VALUE: 17
PIF_VALUE: 2
PIF_VALUE: 2
PIF_VALUE: 7
PIF_VALUE: 2
PIF_VALUE: 2

## 2018-11-05 ASSESSMENT — PAIN DESCRIPTION - LOCATION
LOCATION: HIP
LOCATION: HIP

## 2018-11-05 ASSESSMENT — PAIN DESCRIPTION - PAIN TYPE
TYPE: SURGICAL PAIN
TYPE: SURGICAL PAIN

## 2018-11-05 ASSESSMENT — PAIN - FUNCTIONAL ASSESSMENT: PAIN_FUNCTIONAL_ASSESSMENT: 0-10

## 2018-11-05 ASSESSMENT — PAIN DESCRIPTION - ORIENTATION
ORIENTATION: LEFT
ORIENTATION: LEFT

## 2018-11-05 ASSESSMENT — PAIN SCALES - GENERAL
PAINLEVEL_OUTOF10: 4
PAINLEVEL_OUTOF10: 7

## 2018-11-05 ASSESSMENT — ACTIVITIES OF DAILY LIVING (ADL): EFFECT OF PAIN ON DAILY ACTIVITIES: WALKING

## 2018-11-05 ASSESSMENT — PAIN DESCRIPTION - DESCRIPTORS
DESCRIPTORS: ACHING
DESCRIPTORS: SHARP
DESCRIPTORS: ACHING

## 2018-11-05 ASSESSMENT — ENCOUNTER SYMPTOMS: SHORTNESS OF BREATH: 1

## 2018-11-05 NOTE — ANESTHESIA PRE PROCEDURE
VASECTOMY         Social History:    Social History   Substance Use Topics    Smoking status: Former Smoker     Packs/day: 9.00     Years: 24.00     Types: Cigarettes, Cigars     Quit date: 2/7/1982    Smokeless tobacco: Former User     Types: Snuff, Chew     Quit date: 7/5/1982    Alcohol use No                                Counseling given: Not Answered      Vital Signs (Current):   Vitals:    11/05/18 0757 11/05/18 0759   BP: (!) 143/79    Pulse: 67    Resp: 16    Temp: 97.6 °F (36.4 °C)    SpO2: 99%    Weight: 262 lb (118.8 kg) 265 lb (120.2 kg)   Height: 5' 11.5\" (1.816 m)                                               BP Readings from Last 3 Encounters:   11/05/18 (!) 143/79   10/31/18 130/80   10/04/18 124/66       NPO Status: Time of last liquid consumption: 2230                        Time of last solid consumption: 1700                        Date of last liquid consumption: 11/04/18                        Date of last solid food consumption: 11/04/18    BMI:   Wt Readings from Last 3 Encounters:   11/05/18 265 lb (120.2 kg)   10/31/18 269 lb (122 kg)   10/23/18 259 lb 14.8 oz (117.9 kg)     Body mass index is 36.44 kg/m².     CBC:   Lab Results   Component Value Date    WBC 8.9 10/31/2018    RBC 4.26 10/31/2018    HGB 12.9 10/31/2018    HCT 39.1 10/31/2018    MCV 91.9 10/31/2018    RDW 15.2 10/31/2018     10/31/2018       CMP:   Lab Results   Component Value Date     10/31/2018    K 4.1 10/31/2018     10/31/2018    CO2 27 10/31/2018    BUN 20 10/31/2018    CREATININE 1.1 10/31/2018    GFRAA >60 10/31/2018    GFRAA >60 02/12/2012    AGRATIO 1.8 10/31/2018    LABGLOM >60 10/31/2018    GLUCOSE 135 10/31/2018    PROT 6.9 10/31/2018    PROT 7.0 12/19/2011    CALCIUM 9.4 10/31/2018    BILITOT 0.4 10/31/2018    ALKPHOS 85 10/31/2018    AST 14 10/31/2018    ALT 17 10/31/2018       POC Tests:   Recent Labs      11/05/18   0819   POCGLU  142*       Coags:   Lab Results   Component Value Date

## 2018-11-05 NOTE — ANESTHESIA POSTPROCEDURE EVALUATION
WBC                      8.9                 10/31/2018 08:55 AM        HGB                      12.9 (L)            10/31/2018 08:55 AM        HCT                      39.1 (L)            10/31/2018 08:55 AM        PLT                      332                 10/31/2018 08:55 AM   RENAL  Lab Results       Component                Value               Date/Time                  NA                       144                 10/31/2018 08:55 AM        K                        4.1                 10/31/2018 08:55 AM        CL                       102                 10/31/2018 08:55 AM        CO2                      27                  10/31/2018 08:55 AM        BUN                      20                  10/31/2018 08:55 AM        CREATININE               1.1                 10/31/2018 08:55 AM        GLUCOSE                  135 (H)             10/31/2018 08:55 AM   COAGS  Lab Results       Component                Value               Date/Time                  PROTIME                  13.9 (H)            11/03/2017 05:38 AM        INR                      1.23 (H)            11/03/2017 05:38 AM        APTT                     28.3                11/03/2017 05:38 AM     Intake & Output:  @23VSXB@    Nausea & Vomiting:  No    Level of Consciousness:  Awake    Pain Assessment:  Adequate analgesia    Anesthesia Complications:  No apparent anesthetic complications    SUMMARY      Vital signs stable  OK to discharge from Stage I post anesthesia care.   Care transferred from Anesthesiology department on discharge from perioperative area

## 2018-11-05 NOTE — PROGRESS NOTES
Discharged home via w/c in good condition. Verbalized understanding of discharge instructions and written instructions also given to patient's caregiver. No changes to previous assessment.

## 2018-11-13 ENCOUNTER — OFFICE VISIT (OUTPATIENT)
Dept: ORTHOPEDIC SURGERY | Age: 78
End: 2018-11-13

## 2018-11-13 DIAGNOSIS — M70.62 TROCHANTERIC BURSITIS OF BOTH HIPS: Primary | ICD-10-CM

## 2018-11-13 DIAGNOSIS — M70.61 TROCHANTERIC BURSITIS OF BOTH HIPS: Primary | ICD-10-CM

## 2018-11-13 PROCEDURE — 99024 POSTOP FOLLOW-UP VISIT: CPT | Performed by: ORTHOPAEDIC SURGERY

## 2018-12-12 ENCOUNTER — OFFICE VISIT (OUTPATIENT)
Dept: PULMONOLOGY | Age: 78
End: 2018-12-12
Payer: MEDICARE

## 2018-12-12 VITALS
RESPIRATION RATE: 16 BRPM | BODY MASS INDEX: 37.8 KG/M2 | SYSTOLIC BLOOD PRESSURE: 143 MMHG | TEMPERATURE: 98.8 F | OXYGEN SATURATION: 98 % | DIASTOLIC BLOOD PRESSURE: 68 MMHG | WEIGHT: 270 LBS | HEART RATE: 62 BPM | HEIGHT: 71 IN

## 2018-12-12 DIAGNOSIS — J96.11 CHRONIC RESPIRATORY FAILURE WITH HYPOXIA (HCC): ICD-10-CM

## 2018-12-12 DIAGNOSIS — J43.2 CENTRILOBULAR EMPHYSEMA (HCC): Primary | ICD-10-CM

## 2018-12-12 PROCEDURE — G8482 FLU IMMUNIZE ORDER/ADMIN: HCPCS | Performed by: INTERNAL MEDICINE

## 2018-12-12 PROCEDURE — G8599 NO ASA/ANTIPLAT THER USE RNG: HCPCS | Performed by: INTERNAL MEDICINE

## 2018-12-12 PROCEDURE — G8427 DOCREV CUR MEDS BY ELIG CLIN: HCPCS | Performed by: INTERNAL MEDICINE

## 2018-12-12 PROCEDURE — 1036F TOBACCO NON-USER: CPT | Performed by: INTERNAL MEDICINE

## 2018-12-12 PROCEDURE — G8417 CALC BMI ABV UP PARAM F/U: HCPCS | Performed by: INTERNAL MEDICINE

## 2018-12-12 PROCEDURE — 1101F PT FALLS ASSESS-DOCD LE1/YR: CPT | Performed by: INTERNAL MEDICINE

## 2018-12-12 PROCEDURE — 4040F PNEUMOC VAC/ADMIN/RCVD: CPT | Performed by: INTERNAL MEDICINE

## 2018-12-12 PROCEDURE — 1123F ACP DISCUSS/DSCN MKR DOCD: CPT | Performed by: INTERNAL MEDICINE

## 2018-12-12 PROCEDURE — 3023F SPIROM DOC REV: CPT | Performed by: INTERNAL MEDICINE

## 2018-12-12 PROCEDURE — 99214 OFFICE O/P EST MOD 30 MIN: CPT | Performed by: INTERNAL MEDICINE

## 2018-12-12 PROCEDURE — G8926 SPIRO NO PERF OR DOC: HCPCS | Performed by: INTERNAL MEDICINE

## 2018-12-12 ASSESSMENT — ENCOUNTER SYMPTOMS
ABDOMINAL PAIN: 0
CHEST TIGHTNESS: 0
EYE PAIN: 0
EYE ITCHING: 0
EYE DISCHARGE: 0
SORE THROAT: 0
CHOKING: 0
DIARRHEA: 0
VOICE CHANGE: 0
APNEA: 0
CONSTIPATION: 0

## 2018-12-12 NOTE — PROGRESS NOTES
exudate. Eyes: Pupils are equal, round, and reactive to light. EOM are normal.   Neck: Neck supple. No JVD present. Cardiovascular: Normal heart sounds. Exam reveals no gallop and no friction rub. No murmur heard. Pulmonary/Chest: Effort normal. He has no wheezes. He has no rales. Equal chest rise and expansion bilaterally   Abdominal: Soft. Bowel sounds are normal. He exhibits no distension. There is no tenderness. Musculoskeletal: Normal range of motion. He exhibits no edema. Lymphadenopathy:     He has no cervical adenopathy. Neurological: He is alert. No cranial nerve deficit. CN 2-12 grossly intact   Skin: Skin is warm and dry. No rash noted. He is not diaphoretic. PFT personally reviewed, mild obstruction without significant post bronchodilator improvement  CT chest personally reviewed, stable pulm nodules with calcifications favoring granulomatous disease. Centrilobular emphysema, no focal airspace disease or inflammation. ASSESSMENT:    1. Centrilobular emphysema ([address]    2. Chronic respiratory failure with hypoxia (HCC)      PLAN:    -Continue PRN albuterol, I encouraged him to be more adherent with his ICS/LABA agent; especially if his symptoms worsen  -Ongoing cough due to bronchiectasis, no further treatment needed unless he experiences interval worsening.  Low threshold to initiate treatment for an acute exac with steroids and atb if he develops associated symptoms which I reviewed with him (increased sputum production, shortness of breath, fevers, etc)  -Was tried on azelastine nasal spray for his cough without efficacy; ok to discontinue to minimize adverse effects   -Up to date on vaccinations  -Return to clinic in 4-5 months; sooner if needed        Natan Santiago MD

## 2018-12-18 ENCOUNTER — OFFICE VISIT (OUTPATIENT)
Dept: ORTHOPEDIC SURGERY | Age: 78
End: 2018-12-18

## 2018-12-18 VITALS — HEIGHT: 71 IN | BODY MASS INDEX: 37.81 KG/M2 | WEIGHT: 270.06 LBS

## 2018-12-18 DIAGNOSIS — M70.62 TROCHANTERIC BURSITIS OF BOTH HIPS: Primary | ICD-10-CM

## 2018-12-18 DIAGNOSIS — M70.61 TROCHANTERIC BURSITIS OF BOTH HIPS: Primary | ICD-10-CM

## 2018-12-18 PROCEDURE — 99024 POSTOP FOLLOW-UP VISIT: CPT | Performed by: ORTHOPAEDIC SURGERY

## 2018-12-19 ENCOUNTER — OFFICE VISIT (OUTPATIENT)
Dept: FAMILY MEDICINE CLINIC | Age: 78
End: 2018-12-19
Payer: MEDICARE

## 2018-12-19 VITALS
BODY MASS INDEX: 37.26 KG/M2 | OXYGEN SATURATION: 96 % | WEIGHT: 267 LBS | DIASTOLIC BLOOD PRESSURE: 60 MMHG | HEART RATE: 70 BPM | SYSTOLIC BLOOD PRESSURE: 120 MMHG

## 2018-12-19 DIAGNOSIS — E11.9 TYPE 2 DIABETES MELLITUS WITHOUT COMPLICATION, WITHOUT LONG-TERM CURRENT USE OF INSULIN (HCC): Primary | Chronic | ICD-10-CM

## 2018-12-19 DIAGNOSIS — I10 ESSENTIAL HYPERTENSION: ICD-10-CM

## 2018-12-19 DIAGNOSIS — E03.9 HYPOTHYROIDISM, UNSPECIFIED TYPE: Chronic | ICD-10-CM

## 2018-12-19 LAB — HBA1C MFR BLD: 6.8 %

## 2018-12-19 PROCEDURE — 1036F TOBACCO NON-USER: CPT | Performed by: FAMILY MEDICINE

## 2018-12-19 PROCEDURE — G8427 DOCREV CUR MEDS BY ELIG CLIN: HCPCS | Performed by: FAMILY MEDICINE

## 2018-12-19 PROCEDURE — 1101F PT FALLS ASSESS-DOCD LE1/YR: CPT | Performed by: FAMILY MEDICINE

## 2018-12-19 PROCEDURE — G8417 CALC BMI ABV UP PARAM F/U: HCPCS | Performed by: FAMILY MEDICINE

## 2018-12-19 PROCEDURE — 99214 OFFICE O/P EST MOD 30 MIN: CPT | Performed by: FAMILY MEDICINE

## 2018-12-19 PROCEDURE — G8599 NO ASA/ANTIPLAT THER USE RNG: HCPCS | Performed by: FAMILY MEDICINE

## 2018-12-19 PROCEDURE — 83036 HEMOGLOBIN GLYCOSYLATED A1C: CPT | Performed by: FAMILY MEDICINE

## 2018-12-19 PROCEDURE — 1123F ACP DISCUSS/DSCN MKR DOCD: CPT | Performed by: FAMILY MEDICINE

## 2018-12-19 PROCEDURE — 4040F PNEUMOC VAC/ADMIN/RCVD: CPT | Performed by: FAMILY MEDICINE

## 2018-12-19 PROCEDURE — G8482 FLU IMMUNIZE ORDER/ADMIN: HCPCS | Performed by: FAMILY MEDICINE

## 2018-12-19 NOTE — PROGRESS NOTES
cervical adenopathy. Neurological: He is alert and oriented to person, place, and time. Skin: Skin is warm and dry. Psychiatric: He has a normal mood and affect. His behavior is normal. Judgment and thought content normal.       Assessment:       Diagnosis Orders   1. Type 2 diabetes mellitus without complication, without long-term current use of insulin (HCC)  POCT glycosylated hemoglobin (Hb A1C)   2. Essential hypertension     3. Hypothyroidism, unspecified type           Plan:      Nika Little was seen today for 6 month follow-up, diabetes, hypertension and other.     Diagnoses and all orders for this visit:    Type 2 diabetes mellitus without complication, without long-term current use of insulin (HCC)  -     POCT glycosylated hemoglobin (Hb A1C)  AIC 6.8-continue meds-lower carbs  Essential hypertension  Continue meds-SOMMER diet  Hypothyroidism, unspecified type  Continue meds     See me 6 mos     Jeromy Salvador, DO

## 2019-02-07 RX ORDER — DULOXETIN HYDROCHLORIDE 30 MG/1
30 CAPSULE, DELAYED RELEASE ORAL DAILY
Qty: 30 CAPSULE | Refills: 0 | Status: SHIPPED | OUTPATIENT
Start: 2019-02-07 | End: 2019-02-20

## 2019-02-08 RX ORDER — LISINOPRIL 20 MG/1
20 TABLET ORAL 2 TIMES DAILY
Qty: 180 TABLET | Refills: 3 | Status: SHIPPED | OUTPATIENT
Start: 2019-02-08 | End: 2019-10-16 | Stop reason: SDUPTHER

## 2019-02-20 RX ORDER — BUPROPION HYDROCHLORIDE 150 MG/1
TABLET, EXTENDED RELEASE ORAL
Qty: 60 TABLET | Refills: 0 | Status: SHIPPED | OUTPATIENT
Start: 2019-02-20 | End: 2019-04-03 | Stop reason: SDUPTHER

## 2019-03-12 ENCOUNTER — TELEPHONE (OUTPATIENT)
Dept: PULMONOLOGY | Age: 79
End: 2019-03-12

## 2019-03-13 RX ORDER — PREDNISONE 20 MG/1
20 TABLET ORAL 2 TIMES DAILY
Qty: 10 TABLET | Refills: 0 | Status: SHIPPED | OUTPATIENT
Start: 2019-03-13 | End: 2019-03-18

## 2019-03-13 RX ORDER — DOXYCYCLINE HYCLATE 100 MG
100 TABLET ORAL 2 TIMES DAILY
Qty: 14 TABLET | Refills: 0 | Status: SHIPPED | OUTPATIENT
Start: 2019-03-13 | End: 2019-03-19 | Stop reason: CLARIF

## 2019-03-19 ENCOUNTER — OFFICE VISIT (OUTPATIENT)
Dept: FAMILY MEDICINE CLINIC | Age: 79
End: 2019-03-19
Payer: MEDICARE

## 2019-03-19 VITALS
WEIGHT: 264 LBS | TEMPERATURE: 97.6 F | OXYGEN SATURATION: 95 % | RESPIRATION RATE: 20 BRPM | HEART RATE: 90 BPM | BODY MASS INDEX: 36.84 KG/M2 | SYSTOLIC BLOOD PRESSURE: 126 MMHG | DIASTOLIC BLOOD PRESSURE: 68 MMHG

## 2019-03-19 DIAGNOSIS — R68.83 CHILLS: ICD-10-CM

## 2019-03-19 DIAGNOSIS — R52 BODY ACHES: ICD-10-CM

## 2019-03-19 DIAGNOSIS — J10.1 INFLUENZA A: ICD-10-CM

## 2019-03-19 LAB
INFLUENZA A ANTIBODY: ABNORMAL
INFLUENZA B ANTIBODY: ABNORMAL

## 2019-03-19 PROCEDURE — 99213 OFFICE O/P EST LOW 20 MIN: CPT | Performed by: NURSE PRACTITIONER

## 2019-03-19 PROCEDURE — 87804 INFLUENZA ASSAY W/OPTIC: CPT | Performed by: NURSE PRACTITIONER

## 2019-03-19 PROCEDURE — G8510 SCR DEP NEG, NO PLAN REQD: HCPCS | Performed by: NURSE PRACTITIONER

## 2019-03-19 PROCEDURE — G8599 NO ASA/ANTIPLAT THER USE RNG: HCPCS | Performed by: NURSE PRACTITIONER

## 2019-03-19 PROCEDURE — G8417 CALC BMI ABV UP PARAM F/U: HCPCS | Performed by: NURSE PRACTITIONER

## 2019-03-19 PROCEDURE — 4040F PNEUMOC VAC/ADMIN/RCVD: CPT | Performed by: NURSE PRACTITIONER

## 2019-03-19 PROCEDURE — 1123F ACP DISCUSS/DSCN MKR DOCD: CPT | Performed by: NURSE PRACTITIONER

## 2019-03-19 PROCEDURE — 1101F PT FALLS ASSESS-DOCD LE1/YR: CPT | Performed by: NURSE PRACTITIONER

## 2019-03-19 PROCEDURE — 1036F TOBACCO NON-USER: CPT | Performed by: NURSE PRACTITIONER

## 2019-03-19 PROCEDURE — G8482 FLU IMMUNIZE ORDER/ADMIN: HCPCS | Performed by: NURSE PRACTITIONER

## 2019-03-19 PROCEDURE — G8427 DOCREV CUR MEDS BY ELIG CLIN: HCPCS | Performed by: NURSE PRACTITIONER

## 2019-03-19 RX ORDER — OSELTAMIVIR PHOSPHATE 75 MG/1
75 CAPSULE ORAL 2 TIMES DAILY
Qty: 10 CAPSULE | Refills: 0 | Status: SHIPPED | OUTPATIENT
Start: 2019-03-19 | End: 2019-03-24

## 2019-03-19 ASSESSMENT — ENCOUNTER SYMPTOMS
RHINORRHEA: 1
NAUSEA: 1
WHEEZING: 0
SHORTNESS OF BREATH: 0
ABDOMINAL PAIN: 0
COUGH: 1

## 2019-03-19 ASSESSMENT — PATIENT HEALTH QUESTIONNAIRE - PHQ9
SUM OF ALL RESPONSES TO PHQ9 QUESTIONS 1 & 2: 0
SUM OF ALL RESPONSES TO PHQ QUESTIONS 1-9: 0
2. FEELING DOWN, DEPRESSED OR HOPELESS: 0
1. LITTLE INTEREST OR PLEASURE IN DOING THINGS: 0
SUM OF ALL RESPONSES TO PHQ QUESTIONS 1-9: 0

## 2019-03-22 ENCOUNTER — OFFICE VISIT (OUTPATIENT)
Dept: FAMILY MEDICINE CLINIC | Age: 79
End: 2019-03-22
Payer: MEDICARE

## 2019-03-22 VITALS
BODY MASS INDEX: 36.28 KG/M2 | TEMPERATURE: 97.4 F | OXYGEN SATURATION: 96 % | SYSTOLIC BLOOD PRESSURE: 128 MMHG | DIASTOLIC BLOOD PRESSURE: 74 MMHG | WEIGHT: 260 LBS | HEART RATE: 56 BPM | RESPIRATION RATE: 18 BRPM

## 2019-03-22 DIAGNOSIS — J10.1 INFLUENZA A: ICD-10-CM

## 2019-03-22 DIAGNOSIS — J43.2 CENTRILOBULAR EMPHYSEMA (HCC): ICD-10-CM

## 2019-03-22 PROCEDURE — G8599 NO ASA/ANTIPLAT THER USE RNG: HCPCS | Performed by: NURSE PRACTITIONER

## 2019-03-22 PROCEDURE — 1123F ACP DISCUSS/DSCN MKR DOCD: CPT | Performed by: NURSE PRACTITIONER

## 2019-03-22 PROCEDURE — 1036F TOBACCO NON-USER: CPT | Performed by: NURSE PRACTITIONER

## 2019-03-22 PROCEDURE — G8427 DOCREV CUR MEDS BY ELIG CLIN: HCPCS | Performed by: NURSE PRACTITIONER

## 2019-03-22 PROCEDURE — G8482 FLU IMMUNIZE ORDER/ADMIN: HCPCS | Performed by: NURSE PRACTITIONER

## 2019-03-22 PROCEDURE — 4040F PNEUMOC VAC/ADMIN/RCVD: CPT | Performed by: NURSE PRACTITIONER

## 2019-03-22 PROCEDURE — 99213 OFFICE O/P EST LOW 20 MIN: CPT | Performed by: NURSE PRACTITIONER

## 2019-03-22 PROCEDURE — G8417 CALC BMI ABV UP PARAM F/U: HCPCS | Performed by: NURSE PRACTITIONER

## 2019-03-22 PROCEDURE — G8926 SPIRO NO PERF OR DOC: HCPCS | Performed by: NURSE PRACTITIONER

## 2019-03-22 PROCEDURE — 1101F PT FALLS ASSESS-DOCD LE1/YR: CPT | Performed by: NURSE PRACTITIONER

## 2019-03-22 PROCEDURE — 3023F SPIROM DOC REV: CPT | Performed by: NURSE PRACTITIONER

## 2019-03-22 ASSESSMENT — ENCOUNTER SYMPTOMS
COUGH: 0
GASTROINTESTINAL NEGATIVE: 1
RHINORRHEA: 1
CHEST TIGHTNESS: 0
SHORTNESS OF BREATH: 1

## 2019-03-25 ENCOUNTER — OFFICE VISIT (OUTPATIENT)
Dept: ORTHOPEDIC SURGERY | Age: 79
End: 2019-03-25
Payer: MEDICARE

## 2019-03-25 DIAGNOSIS — M48.062 SPINAL STENOSIS, LUMBAR REGION, WITH NEUROGENIC CLAUDICATION: Primary | Chronic | ICD-10-CM

## 2019-03-25 PROCEDURE — 99213 OFFICE O/P EST LOW 20 MIN: CPT | Performed by: ORTHOPAEDIC SURGERY

## 2019-03-25 PROCEDURE — G8482 FLU IMMUNIZE ORDER/ADMIN: HCPCS | Performed by: ORTHOPAEDIC SURGERY

## 2019-03-25 PROCEDURE — G8599 NO ASA/ANTIPLAT THER USE RNG: HCPCS | Performed by: ORTHOPAEDIC SURGERY

## 2019-03-25 PROCEDURE — 1036F TOBACCO NON-USER: CPT | Performed by: ORTHOPAEDIC SURGERY

## 2019-03-25 PROCEDURE — 4040F PNEUMOC VAC/ADMIN/RCVD: CPT | Performed by: ORTHOPAEDIC SURGERY

## 2019-03-25 PROCEDURE — 1101F PT FALLS ASSESS-DOCD LE1/YR: CPT | Performed by: ORTHOPAEDIC SURGERY

## 2019-03-25 PROCEDURE — G8417 CALC BMI ABV UP PARAM F/U: HCPCS | Performed by: ORTHOPAEDIC SURGERY

## 2019-03-25 PROCEDURE — 1123F ACP DISCUSS/DSCN MKR DOCD: CPT | Performed by: ORTHOPAEDIC SURGERY

## 2019-03-25 PROCEDURE — G8427 DOCREV CUR MEDS BY ELIG CLIN: HCPCS | Performed by: ORTHOPAEDIC SURGERY

## 2019-04-03 RX ORDER — BUPROPION HYDROCHLORIDE 150 MG/1
TABLET, EXTENDED RELEASE ORAL
Qty: 60 TABLET | Refills: 2 | Status: SHIPPED | OUTPATIENT
Start: 2019-04-03 | End: 2019-06-19 | Stop reason: SDUPTHER

## 2019-04-03 NOTE — TELEPHONE ENCOUNTER
.  Last office visit 3/22/2019     Last written 19 #60 no refills    Next office visit scheduled 2019    Requested Prescriptions     Pending Prescriptions Disp Refills    buPROPion (WELLBUTRIN SR) 150 MG extended release tablet 60 tablet 0     Si daily x 1 week then 1 bid--2nd dose no later than 5 PM

## 2019-05-08 RX ORDER — LEVOTHYROXINE SODIUM 112 UG/1
TABLET ORAL
Qty: 180 TABLET | Refills: 1 | Status: SHIPPED | OUTPATIENT
Start: 2019-05-08 | End: 2019-10-16 | Stop reason: SDUPTHER

## 2019-06-18 ENCOUNTER — OFFICE VISIT (OUTPATIENT)
Dept: PULMONOLOGY | Age: 79
End: 2019-06-18
Payer: MEDICARE

## 2019-06-18 VITALS
HEART RATE: 57 BPM | BODY MASS INDEX: 34.16 KG/M2 | HEIGHT: 71 IN | RESPIRATION RATE: 18 BRPM | SYSTOLIC BLOOD PRESSURE: 128 MMHG | OXYGEN SATURATION: 98 % | WEIGHT: 244 LBS | DIASTOLIC BLOOD PRESSURE: 65 MMHG

## 2019-06-18 DIAGNOSIS — J41.0 SIMPLE CHRONIC BRONCHITIS (HCC): Primary | ICD-10-CM

## 2019-06-18 PROCEDURE — G8926 SPIRO NO PERF OR DOC: HCPCS | Performed by: INTERNAL MEDICINE

## 2019-06-18 PROCEDURE — 4040F PNEUMOC VAC/ADMIN/RCVD: CPT | Performed by: INTERNAL MEDICINE

## 2019-06-18 PROCEDURE — G8599 NO ASA/ANTIPLAT THER USE RNG: HCPCS | Performed by: INTERNAL MEDICINE

## 2019-06-18 PROCEDURE — 1123F ACP DISCUSS/DSCN MKR DOCD: CPT | Performed by: INTERNAL MEDICINE

## 2019-06-18 PROCEDURE — 99213 OFFICE O/P EST LOW 20 MIN: CPT | Performed by: INTERNAL MEDICINE

## 2019-06-18 PROCEDURE — 3023F SPIROM DOC REV: CPT | Performed by: INTERNAL MEDICINE

## 2019-06-18 PROCEDURE — G8427 DOCREV CUR MEDS BY ELIG CLIN: HCPCS | Performed by: INTERNAL MEDICINE

## 2019-06-18 PROCEDURE — 1036F TOBACCO NON-USER: CPT | Performed by: INTERNAL MEDICINE

## 2019-06-18 PROCEDURE — G8417 CALC BMI ABV UP PARAM F/U: HCPCS | Performed by: INTERNAL MEDICINE

## 2019-06-18 ASSESSMENT — ENCOUNTER SYMPTOMS
DIARRHEA: 0
ABDOMINAL PAIN: 0
CHEST TIGHTNESS: 0
CONSTIPATION: 0
EYE DISCHARGE: 0
CHOKING: 0
EYE ITCHING: 0
STRIDOR: 0
EYE PAIN: 0
VOICE CHANGE: 0
SORE THROAT: 0

## 2019-06-18 NOTE — PROGRESS NOTES
Pulmonary Outpatient Note   Talita Mays MD       6/18/2019    Chief Complaint:  6 Month Follow-Up     HPI:   78y.o. year old male here for follow up of COPD. No longer using oxygen or Symbicort, states that he lost over 35 lbs with a new weight loss program he participated in and as a result his respiratory symptoms has improved. Treated for an acute influenza infection in March. Has had no issues since. Denies limitations with activity due to respiratory symptoms.      Past Medical History:   Diagnosis Date    BRYON (acute kidney injury) ([address] 11/2/2017    CAD (coronary artery disease) 8/1/2015    Cancer ([address]     PROSTATE    Chronic dCHF (grade 1 LVDD) 11/2/2017    Deafness     Diverticulosis     Fatigue     Hepatitis     PT NOT SURE WHAT KIND, HE HAD YEARS AGO    Hyperlipidemia     Hypertension     Hypothyroidism     Joint pain     Lumbar radiculopathy  R AND L  5/11/2018    Lumbar stenosis with neurogenic claudication  severe L23 TO L5S1 , WORSE L45      Numbness and tingling     HANDS AND FEET    OA (osteoarthritis) of hip 7/21/2015    Obesity (BMI 30-39.9) 11/3/2017    Other disorders of kidney and ureter     Rheumatoid aortitis     Shortness of breath     Spinal stenosis, lumbar region, with neurogenic claudication     Type 2 diabetes mellitus without complication, without long-term current use of insulin ([address] 9/9/2016    Weakness        Past Surgical History:   Procedure Laterality Date    ABDOMEN SURGERY  2/7/12    abdominal wall exploration,w/ mesh;lysis of adhesions    COLON SURGERY      COLONOSCOPY  03/26/2015    diverticulosis    CORONARY ANGIOPLASTY WITH STENT PLACEMENT  7/31/2015    ELBOW SURGERY      bilat    HERNIA REPAIR      HIP SURGERY Left     JOINT REPLACEMENT Right 07/05/2016    Right total knee replacement    KNEE SURGERY      LUMBAR LAMINECTOMY  05/11/2018    LUMBAR LAMINECTOMIES , BILATERAL LAMINOTOMIES L23 TO L5S1, BONE GRAFTING FOR FUSION L23 TO L5S1 , INST FUSION STABILIZATION REDUCTION OF SPONDYLOLISTHESIS L34 AND L45 , AUTOGRAFT , ALLOGRAFT     OTHER SURGICAL HISTORY  2013    excision of suture granuloma on the abdomen    OTHER SURGICAL HISTORY Right     excision of cyst on thigh    LA INCIS HIP ADDUCTORS,OPEN Left 2018    LEFT HIP ABDUCTOR FASCIOTOMY, GREATER TROCHANTERIC BURSECTOMY, PIRIFORMIS RELEASE performed by [place]ia Benton MD at [address] [place]      right    VASECTOMY         Social History     Tobacco Use    Smoking status: Former Smoker     Packs/day: 9.00     Years: 24.00     Pack years: 216.00     Types: Cigarettes, Cigars     Last attempt to quit: 1982     Years since quittin.3    Smokeless tobacco: Former User     Types: Snuff, Chew     Quit date: 1982   Substance Use Topics    Alcohol use: No     Alcohol/week: 0.0 oz          Family History   Problem Relation Age of Onset    Heart Disease Neg Hx          Current Outpatient Medications:     levothyroxine (SYNTHROID) 112 MCG tablet, TAKE 2 TABLETS BY MOUTH EVERY DAY, Disp: 180 tablet, Rfl: 1    buPROPion (WELLBUTRIN SR) 150 MG extended release tablet, 1 bid, Disp: 60 tablet, Rfl: 2    lisinopril (PRINIVIL;ZESTRIL) 20 MG tablet, TAKE 1 TABLET BY MOUTH 2 TIMES DAILY, Disp: 180 tablet, Rfl: 3    metoprolol tartrate (LOPRESSOR) 25 MG tablet, TAKE 1 TABLET BY MOUTH TWICE A DAY, Disp: 180 tablet, Rfl: 3    atorvastatin (LIPITOR) 40 MG tablet, TAKE 1 TABLET BY MOUTH NIGHTLY, Disp: 90 tablet, Rfl: 3    azelastine (ASTELIN) 0.1 % nasal spray, 1 SPRAY BY NASAL ROUTE 2 TIMES DAILY USE IN EACH NOSTRIL AS DIRECTED, Disp: 1 Bottle, Rfl: 3    hydrochlorothiazide (HYDRODIURIL) 25 MG tablet, TAKE 1 TABLET BY MOUTH 2 TIMES DAILY, Disp: 180 tablet, Rfl: 3    budesonide-formoterol (SYMBICORT) 160-4.5 MCG/ACT AERO, Inhale 2 puffs into the lungs 2 times daily (Patient taking differently: Inhale 2 puffs into the lungs 2 oropharyngeal exudate. Eyes: Pupils are equal, round, and reactive to light. EOM are normal.   Neck: Neck supple. No JVD present. Cardiovascular: Normal heart sounds. Exam reveals no gallop and no friction rub. No murmur heard. Pulmonary/Chest: Effort normal. He has no wheezes. He has no rales. Equal chest rise and expansion bilaterally   Abdominal: Soft. Bowel sounds are normal. He exhibits no distension. There is no tenderness. Musculoskeletal: Normal range of motion. He exhibits no edema. Lymphadenopathy:     He has no cervical adenopathy. Neurological: He is alert. No cranial nerve deficit. CN 2-12 grossly intact   Skin: Skin is warm and dry. No rash noted. He is not diaphoretic. ASSESSMENT:    1.  Simple chronic bronchitis (HCC)      PLAN:    -Continue to monitor off long acting inhaled therapy  -Discontinue oxygen  -Low threshold to reimplement the above if he develops worsening symptoms in the future  -Return to clinic in 12 months, sooner if symptoms worsen    Orders Placed This Encounter   Procedures    Nasal Cannula Joby Alejandra MD

## 2019-06-19 ENCOUNTER — OFFICE VISIT (OUTPATIENT)
Dept: FAMILY MEDICINE CLINIC | Age: 79
End: 2019-06-19
Payer: MEDICARE

## 2019-06-19 VITALS
OXYGEN SATURATION: 99 % | SYSTOLIC BLOOD PRESSURE: 130 MMHG | BODY MASS INDEX: 35.05 KG/M2 | HEART RATE: 56 BPM | DIASTOLIC BLOOD PRESSURE: 78 MMHG | HEIGHT: 70 IN | WEIGHT: 244.8 LBS

## 2019-06-19 DIAGNOSIS — E03.9 HYPOTHYROIDISM, UNSPECIFIED TYPE: ICD-10-CM

## 2019-06-19 DIAGNOSIS — E11.9 TYPE 2 DIABETES MELLITUS WITHOUT COMPLICATION, WITHOUT LONG-TERM CURRENT USE OF INSULIN (HCC): Primary | ICD-10-CM

## 2019-06-19 DIAGNOSIS — I50.32 CHRONIC DIASTOLIC HEART FAILURE (HCC): ICD-10-CM

## 2019-06-19 DIAGNOSIS — I10 ESSENTIAL HYPERTENSION: ICD-10-CM

## 2019-06-19 LAB — HBA1C MFR BLD: 6 %

## 2019-06-19 PROCEDURE — 1123F ACP DISCUSS/DSCN MKR DOCD: CPT | Performed by: FAMILY MEDICINE

## 2019-06-19 PROCEDURE — 83036 HEMOGLOBIN GLYCOSYLATED A1C: CPT | Performed by: FAMILY MEDICINE

## 2019-06-19 PROCEDURE — 99214 OFFICE O/P EST MOD 30 MIN: CPT | Performed by: FAMILY MEDICINE

## 2019-06-19 PROCEDURE — G8417 CALC BMI ABV UP PARAM F/U: HCPCS | Performed by: FAMILY MEDICINE

## 2019-06-19 PROCEDURE — G8599 NO ASA/ANTIPLAT THER USE RNG: HCPCS | Performed by: FAMILY MEDICINE

## 2019-06-19 PROCEDURE — 4040F PNEUMOC VAC/ADMIN/RCVD: CPT | Performed by: FAMILY MEDICINE

## 2019-06-19 PROCEDURE — 1036F TOBACCO NON-USER: CPT | Performed by: FAMILY MEDICINE

## 2019-06-19 PROCEDURE — G8427 DOCREV CUR MEDS BY ELIG CLIN: HCPCS | Performed by: FAMILY MEDICINE

## 2019-06-19 RX ORDER — BUPROPION HYDROCHLORIDE 150 MG/1
TABLET, EXTENDED RELEASE ORAL
Qty: 90 TABLET | Refills: 3 | Status: SHIPPED | OUTPATIENT
Start: 2019-06-19 | End: 2020-04-29

## 2019-06-19 ASSESSMENT — ENCOUNTER SYMPTOMS
BACK PAIN: 1
ABDOMINAL PAIN: 0
SHORTNESS OF BREATH: 0
COUGH: 0
BLOOD IN STOOL: 0

## 2019-06-19 NOTE — PATIENT INSTRUCTIONS
Type 2 diabetes mellitus without complication, without long-term current use of insulin ([facility])  -     POCT glycosylated hemoglobin (Hb A1C)  AIC 6.0-he was instructed to try and lose another 10 to 12 pounds in the next 6 months. He needs to try and be as active as possible. He does not take any diabetic medication. Essential hypertension  He does not take any medication for blood pressure but as mentioned above he should try to do a little more weight loss in the next 6 months. Hypothyroidism, unspecified type  Continue thyroid medication and no testing is needed at this time. Chronic diastolic heart failure ([facility]  This problem has been resolved and is not an issue anymore. He is to see me in approximately 6 months.

## 2019-07-23 RX ORDER — HYDROCHLOROTHIAZIDE 25 MG/1
25 TABLET ORAL 2 TIMES DAILY
Qty: 180 TABLET | Refills: 0 | Status: SHIPPED | OUTPATIENT
Start: 2019-07-23 | End: 2019-10-16 | Stop reason: SDUPTHER

## 2019-10-07 ENCOUNTER — OFFICE VISIT (OUTPATIENT)
Dept: FAMILY MEDICINE CLINIC | Age: 79
End: 2019-10-07
Payer: MEDICARE

## 2019-10-07 VITALS
BODY MASS INDEX: 34.47 KG/M2 | RESPIRATION RATE: 14 BRPM | WEIGHT: 240.8 LBS | OXYGEN SATURATION: 97 % | SYSTOLIC BLOOD PRESSURE: 110 MMHG | HEIGHT: 70 IN | DIASTOLIC BLOOD PRESSURE: 60 MMHG | HEART RATE: 51 BPM

## 2019-10-07 DIAGNOSIS — Z00.00 ROUTINE GENERAL MEDICAL EXAMINATION AT A HEALTH CARE FACILITY: ICD-10-CM

## 2019-10-07 DIAGNOSIS — Z23 NEEDS FLU SHOT: ICD-10-CM

## 2019-10-07 PROCEDURE — 90653 IIV ADJUVANT VACCINE IM: CPT | Performed by: FAMILY MEDICINE

## 2019-10-07 PROCEDURE — 4040F PNEUMOC VAC/ADMIN/RCVD: CPT | Performed by: FAMILY MEDICINE

## 2019-10-07 PROCEDURE — G0439 PPPS, SUBSEQ VISIT: HCPCS | Performed by: FAMILY MEDICINE

## 2019-10-07 PROCEDURE — G0008 ADMIN INFLUENZA VIRUS VAC: HCPCS | Performed by: FAMILY MEDICINE

## 2019-10-07 PROCEDURE — G8482 FLU IMMUNIZE ORDER/ADMIN: HCPCS | Performed by: FAMILY MEDICINE

## 2019-10-07 PROCEDURE — G8599 NO ASA/ANTIPLAT THER USE RNG: HCPCS | Performed by: FAMILY MEDICINE

## 2019-10-07 PROCEDURE — 1123F ACP DISCUSS/DSCN MKR DOCD: CPT | Performed by: FAMILY MEDICINE

## 2019-10-07 ASSESSMENT — PATIENT HEALTH QUESTIONNAIRE - PHQ9
SUM OF ALL RESPONSES TO PHQ QUESTIONS 1-9: 0
SUM OF ALL RESPONSES TO PHQ QUESTIONS 1-9: 0

## 2019-10-07 ASSESSMENT — LIFESTYLE VARIABLES: HOW OFTEN DO YOU HAVE A DRINK CONTAINING ALCOHOL: 0

## 2019-10-08 ENCOUNTER — APPOINTMENT (OUTPATIENT)
Dept: CT IMAGING | Age: 79
End: 2019-10-08
Payer: MEDICARE

## 2019-10-08 ENCOUNTER — APPOINTMENT (OUTPATIENT)
Dept: GENERAL RADIOLOGY | Age: 79
End: 2019-10-08
Payer: MEDICARE

## 2019-10-08 ENCOUNTER — HOSPITAL ENCOUNTER (EMERGENCY)
Age: 79
Discharge: HOME | End: 2019-10-08
Payer: MEDICARE

## 2019-10-08 VITALS
RESPIRATION RATE: 16 BRPM | OXYGEN SATURATION: 100 % | HEIGHT: 71 IN | BODY MASS INDEX: 32.73 KG/M2 | DIASTOLIC BLOOD PRESSURE: 81 MMHG | TEMPERATURE: 98.5 F | HEART RATE: 60 BPM | SYSTOLIC BLOOD PRESSURE: 181 MMHG | WEIGHT: 233.8 LBS

## 2019-10-08 DIAGNOSIS — S09.90XA CLOSED HEAD INJURY, INITIAL ENCOUNTER: ICD-10-CM
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
## 2019-10-08 DIAGNOSIS — S61.411A LACERATION OF RIGHT HAND WITHOUT FOREIGN BODY, INITIAL ENCOUNTER: ICD-10-CM

## 2019-10-08 PROCEDURE — 70450 CT HEAD/BRAIN W/O DYE: CPT

## 2019-10-08 PROCEDURE — 90715 TDAP VACCINE 7 YRS/> IM: CPT | Performed by: NURSE PRACTITIONER

## 2019-10-08 PROCEDURE — 4500000022 HC ED LEVEL 2 PROCEDURE

## 2019-10-08 PROCEDURE — 99282 EMERGENCY DEPT VISIT SF MDM: CPT

## 2019-10-08 PROCEDURE — 90471 IMMUNIZATION ADMIN: CPT | Performed by: NURSE PRACTITIONER

## 2019-10-08 PROCEDURE — 6360000002 HC RX W HCPCS: Performed by: NURSE PRACTITIONER

## 2019-10-08 PROCEDURE — 73130 X-RAY EXAM OF HAND: CPT

## 2019-10-08 RX ORDER — OXYCODONE HYDROCHLORIDE AND ACETAMINOPHEN 5; 325 MG/1; MG/1
1 TABLET ORAL EVERY 8 HOURS PRN
Qty: 9 TABLET | Refills: 0 | Status: SHIPPED | OUTPATIENT
Start: 2019-10-08 | End: 2019-10-11

## 2019-10-08 RX ADMIN — TETANUS TOXOID, REDUCED DIPHTHERIA TOXOID AND ACELLULAR PERTUSSIS VACCINE, ADSORBED 0.5 ML: 5; 2.5; 8; 8; 2.5 SUSPENSION INTRAMUSCULAR at 11:16

## 2019-10-08 ASSESSMENT — ENCOUNTER SYMPTOMS
BACK PAIN: 0
SHORTNESS OF BREATH: 0
COUGH: 0
ABDOMINAL PAIN: 0

## 2019-10-08 ASSESSMENT — PAIN SCALES - GENERAL: PAINLEVEL_OUTOF10: 5

## 2019-10-08 ASSESSMENT — PAIN DESCRIPTION - FREQUENCY: FREQUENCY: CONTINUOUS

## 2019-10-08 ASSESSMENT — PAIN DESCRIPTION - PAIN TYPE: TYPE: ACUTE PAIN

## 2019-10-17 ENCOUNTER — PROCEDURE VISIT (OUTPATIENT)
Dept: FAMILY MEDICINE CLINIC | Age: 79
End: 2019-10-17
Payer: MEDICARE

## 2019-10-17 VITALS
WEIGHT: 244 LBS | TEMPERATURE: 98 F | OXYGEN SATURATION: 96 % | SYSTOLIC BLOOD PRESSURE: 138 MMHG | HEART RATE: 53 BPM | DIASTOLIC BLOOD PRESSURE: 60 MMHG | BODY MASS INDEX: 34.03 KG/M2

## 2019-10-17 DIAGNOSIS — Z48.02 ENCOUNTER FOR REMOVAL OF SUTURES: Primary | ICD-10-CM

## 2019-10-17 PROCEDURE — 99213 OFFICE O/P EST LOW 20 MIN: CPT | Performed by: PHYSICIAN ASSISTANT

## 2019-10-17 ASSESSMENT — ENCOUNTER SYMPTOMS: COLOR CHANGE: 0

## 2019-10-30 DIAGNOSIS — Z96.651 HISTORY OF TOTAL KNEE ARTHROPLASTY, RIGHT: Primary | ICD-10-CM

## 2019-10-30 RX ORDER — CLINDAMYCIN HYDROCHLORIDE 300 MG/1
CAPSULE ORAL
Qty: 12 CAPSULE | Refills: 0 | Status: ON HOLD | OUTPATIENT
Start: 2019-10-30 | End: 2020-01-12 | Stop reason: HOSPADM

## 2019-12-18 ENCOUNTER — TELEPHONE (OUTPATIENT)
Dept: FAMILY MEDICINE CLINIC | Age: 79
End: 2019-12-18

## 2019-12-18 ENCOUNTER — OFFICE VISIT (OUTPATIENT)
Dept: FAMILY MEDICINE CLINIC | Age: 79
End: 2019-12-18
Payer: MEDICARE

## 2019-12-18 VITALS
HEART RATE: 55 BPM | WEIGHT: 248 LBS | HEIGHT: 70 IN | DIASTOLIC BLOOD PRESSURE: 72 MMHG | OXYGEN SATURATION: 96 % | SYSTOLIC BLOOD PRESSURE: 132 MMHG | BODY MASS INDEX: 35.5 KG/M2

## 2019-12-18 DIAGNOSIS — E11.9 TYPE 2 DIABETES MELLITUS WITHOUT COMPLICATION, WITHOUT LONG-TERM CURRENT USE OF INSULIN (HCC): Primary | ICD-10-CM

## 2019-12-18 DIAGNOSIS — Z12.5 SPECIAL SCREENING FOR MALIGNANT NEOPLASM OF PROSTATE: ICD-10-CM

## 2019-12-18 DIAGNOSIS — M19.90 CHRONIC OSTEOARTHRITIS: ICD-10-CM

## 2019-12-18 DIAGNOSIS — E03.9 HYPOTHYROIDISM, UNSPECIFIED TYPE: ICD-10-CM

## 2019-12-18 DIAGNOSIS — I10 ESSENTIAL HYPERTENSION: ICD-10-CM

## 2019-12-18 LAB
A/G RATIO: 1.9 (ref 1.1–2.2)
ALBUMIN SERPL-MCNC: 4.5 G/DL (ref 3.4–5)
ALP BLD-CCNC: 76 U/L (ref 40–129)
ALT SERPL-CCNC: 13 U/L (ref 10–40)
ANION GAP SERPL CALCULATED.3IONS-SCNC: 13 MMOL/L (ref 3–16)
AST SERPL-CCNC: 14 U/L (ref 15–37)
BASOPHILS ABSOLUTE: 0.1 K/UL (ref 0–0.2)
BASOPHILS RELATIVE PERCENT: 1.2 %
BILIRUB SERPL-MCNC: 0.3 MG/DL (ref 0–1)
BUN BLDV-MCNC: 23 MG/DL (ref 7–20)
CALCIUM SERPL-MCNC: 9.4 MG/DL (ref 8.3–10.6)
CHLORIDE BLD-SCNC: 104 MMOL/L (ref 99–110)
CHOLESTEROL, TOTAL: 106 MG/DL (ref 0–199)
CO2: 28 MMOL/L (ref 21–32)
CREAT SERPL-MCNC: 1.3 MG/DL (ref 0.8–1.3)
EOSINOPHILS ABSOLUTE: 0.4 K/UL (ref 0–0.6)
EOSINOPHILS RELATIVE PERCENT: 4 %
GFR AFRICAN AMERICAN: >60
GFR NON-AFRICAN AMERICAN: 53
GLOBULIN: 2.4 G/DL
GLUCOSE BLD-MCNC: 102 MG/DL (ref 70–99)
HCT VFR BLD CALC: 41.1 % (ref 40.5–52.5)
HDLC SERPL-MCNC: 35 MG/DL (ref 40–60)
HEMOGLOBIN: 13.8 G/DL (ref 13.5–17.5)
LDL CHOLESTEROL CALCULATED: 41 MG/DL
LYMPHOCYTES ABSOLUTE: 2.1 K/UL (ref 1–5.1)
LYMPHOCYTES RELATIVE PERCENT: 19.8 %
MCH RBC QN AUTO: 30.9 PG (ref 26–34)
MCHC RBC AUTO-ENTMCNC: 33.5 G/DL (ref 31–36)
MCV RBC AUTO: 92.5 FL (ref 80–100)
MONOCYTES ABSOLUTE: 0.7 K/UL (ref 0–1.3)
MONOCYTES RELATIVE PERCENT: 7 %
NEUTROPHILS ABSOLUTE: 7.2 K/UL (ref 1.7–7.7)
NEUTROPHILS RELATIVE PERCENT: 68 %
PDW BLD-RTO: 14.4 % (ref 12.4–15.4)
PLATELET # BLD: 344 K/UL (ref 135–450)
PMV BLD AUTO: 8.5 FL (ref 5–10.5)
POTASSIUM SERPL-SCNC: 4.5 MMOL/L (ref 3.5–5.1)
PROSTATE SPECIFIC ANTIGEN: 0.04 NG/ML (ref 0–4)
RBC # BLD: 4.45 M/UL (ref 4.2–5.9)
SODIUM BLD-SCNC: 145 MMOL/L (ref 136–145)
TOTAL PROTEIN: 6.9 G/DL (ref 6.4–8.2)
TRIGL SERPL-MCNC: 148 MG/DL (ref 0–150)
TSH SERPL DL<=0.05 MIU/L-ACNC: 8.75 UIU/ML (ref 0.27–4.2)
VLDLC SERPL CALC-MCNC: 30 MG/DL
WBC # BLD: 10.5 K/UL (ref 4–11)

## 2019-12-18 PROCEDURE — G8482 FLU IMMUNIZE ORDER/ADMIN: HCPCS | Performed by: FAMILY MEDICINE

## 2019-12-18 PROCEDURE — G8417 CALC BMI ABV UP PARAM F/U: HCPCS | Performed by: FAMILY MEDICINE

## 2019-12-18 PROCEDURE — 1036F TOBACCO NON-USER: CPT | Performed by: FAMILY MEDICINE

## 2019-12-18 PROCEDURE — 99214 OFFICE O/P EST MOD 30 MIN: CPT | Performed by: FAMILY MEDICINE

## 2019-12-18 PROCEDURE — 1123F ACP DISCUSS/DSCN MKR DOCD: CPT | Performed by: FAMILY MEDICINE

## 2019-12-18 PROCEDURE — 4040F PNEUMOC VAC/ADMIN/RCVD: CPT | Performed by: FAMILY MEDICINE

## 2019-12-18 PROCEDURE — G8599 NO ASA/ANTIPLAT THER USE RNG: HCPCS | Performed by: FAMILY MEDICINE

## 2019-12-18 PROCEDURE — G8427 DOCREV CUR MEDS BY ELIG CLIN: HCPCS | Performed by: FAMILY MEDICINE

## 2019-12-18 ASSESSMENT — ENCOUNTER SYMPTOMS
SHORTNESS OF BREATH: 0
CHEST TIGHTNESS: 0
COUGH: 0
CONSTIPATION: 1
BACK PAIN: 1
BLOOD IN STOOL: 0
ABDOMINAL PAIN: 0

## 2019-12-19 LAB
ESTIMATED AVERAGE GLUCOSE: 111.2 MG/DL
HBA1C MFR BLD: 5.5 %

## 2020-01-09 ENCOUNTER — HOSPITAL ENCOUNTER (OUTPATIENT)
Dept: CT IMAGING | Age: 80
Discharge: HOME | End: 2020-01-09
Payer: MEDICARE

## 2020-01-09 ENCOUNTER — HOSPITAL ENCOUNTER (OUTPATIENT)
Age: 80
Discharge: HOME | End: 2020-01-09
Payer: MEDICARE

## 2020-01-09 ENCOUNTER — OFFICE VISIT (OUTPATIENT)
Dept: FAMILY MEDICINE CLINIC | Age: 80
End: 2020-01-09
Payer: MEDICARE

## 2020-01-09 ENCOUNTER — TELEPHONE (OUTPATIENT)
Dept: FAMILY MEDICINE CLINIC | Age: 80
End: 2020-01-09

## 2020-01-09 VITALS
HEIGHT: 70 IN | TEMPERATURE: 97.5 F | SYSTOLIC BLOOD PRESSURE: 126 MMHG | BODY MASS INDEX: 35.13 KG/M2 | DIASTOLIC BLOOD PRESSURE: 70 MMHG | WEIGHT: 245.4 LBS

## 2020-01-09 LAB
A/G RATIO: 1.6 (ref 1.1–2.2)
ALBUMIN SERPL-MCNC: 4 G/DL (ref 3.4–5)
ALP BLD-CCNC: 64 U/L (ref 40–129)
ALT SERPL-CCNC: 10 U/L (ref 10–40)
ANION GAP SERPL CALCULATED.3IONS-SCNC: 13 MMOL/L (ref 3–16)
AST SERPL-CCNC: 10 U/L (ref 15–37)
BASOPHILS ABSOLUTE: 0.1 K/UL (ref 0–0.2)
BASOPHILS RELATIVE PERCENT: 0.9 %
BILIRUB SERPL-MCNC: 0.3 MG/DL (ref 0–1)
BILIRUBIN, POC: NORMAL
BLOOD URINE, POC: NORMAL
BUN BLDV-MCNC: 68 MG/DL (ref 7–20)
CALCIUM SERPL-MCNC: 9 MG/DL (ref 8.3–10.6)
CHLORIDE BLD-SCNC: 104 MMOL/L (ref 99–110)
CLARITY, POC: CLEAR
CO2: 26 MMOL/L (ref 21–32)
COLOR, POC: YELLOW
CREAT SERPL-MCNC: 1.9 MG/DL (ref 0.8–1.3)
EOSINOPHILS ABSOLUTE: 0.6 K/UL (ref 0–0.6)
EOSINOPHILS RELATIVE PERCENT: 3.7 %
GFR AFRICAN AMERICAN: 41
GFR NON-AFRICAN AMERICAN: 34
GLOBULIN: 2.5 G/DL
GLUCOSE BLD-MCNC: 109 MG/DL (ref 70–99)
GLUCOSE URINE, POC: NORMAL
HCT VFR BLD CALC: 29.1 % (ref 40.5–52.5)
HEMOGLOBIN: 9.7 G/DL (ref 13.5–17.5)
KETONES, POC: NORMAL
LEUKOCYTE EST, POC: NORMAL
LIPASE: 77 U/L (ref 13–60)
LYMPHOCYTES ABSOLUTE: 2.6 K/UL (ref 1–5.1)
LYMPHOCYTES RELATIVE PERCENT: 17.3 %
MCH RBC QN AUTO: 30.5 PG (ref 26–34)
MCHC RBC AUTO-ENTMCNC: 33.4 G/DL (ref 31–36)
MCV RBC AUTO: 91.3 FL (ref 80–100)
MONOCYTES ABSOLUTE: 1.1 K/UL (ref 0–1.3)
MONOCYTES RELATIVE PERCENT: 7.3 %
NEUTROPHILS ABSOLUTE: 10.5 K/UL (ref 1.7–7.7)
NEUTROPHILS RELATIVE PERCENT: 70.8 %
NITRITE, POC: NORMAL
PDW BLD-RTO: 14.4 % (ref 12.4–15.4)
PH, POC: 5
PLATELET # BLD: 404 K/UL (ref 135–450)
PMV BLD AUTO: 8.3 FL (ref 5–10.5)
POTASSIUM SERPL-SCNC: 4 MMOL/L (ref 3.5–5.1)
PROTEIN, POC: NORMAL
RBC # BLD: 3.18 M/UL (ref 4.2–5.9)
SODIUM BLD-SCNC: 143 MMOL/L (ref 136–145)
SPECIFIC GRAVITY, POC: 1.02
TOTAL PROTEIN: 6.5 G/DL (ref 6.4–8.2)
UROBILINOGEN, POC: 0.2
WBC # BLD: 14.9 K/UL (ref 4–11)

## 2020-01-09 PROCEDURE — 36415 COLL VENOUS BLD VENIPUNCTURE: CPT

## 2020-01-09 PROCEDURE — 74176 CT ABD & PELVIS W/O CONTRAST: CPT

## 2020-01-09 PROCEDURE — 81002 URINALYSIS NONAUTO W/O SCOPE: CPT | Performed by: FAMILY MEDICINE

## 2020-01-09 PROCEDURE — 1123F ACP DISCUSS/DSCN MKR DOCD: CPT | Performed by: FAMILY MEDICINE

## 2020-01-09 PROCEDURE — 83690 ASSAY OF LIPASE: CPT

## 2020-01-09 PROCEDURE — G8428 CUR MEDS NOT DOCUMENT: HCPCS | Performed by: FAMILY MEDICINE

## 2020-01-09 PROCEDURE — 99214 OFFICE O/P EST MOD 30 MIN: CPT | Performed by: FAMILY MEDICINE

## 2020-01-09 PROCEDURE — 1036F TOBACCO NON-USER: CPT | Performed by: FAMILY MEDICINE

## 2020-01-09 PROCEDURE — 6360000004 HC RX CONTRAST MEDICATION: Performed by: FAMILY MEDICINE

## 2020-01-09 PROCEDURE — 80053 COMPREHEN METABOLIC PANEL: CPT

## 2020-01-09 PROCEDURE — 4040F PNEUMOC VAC/ADMIN/RCVD: CPT | Performed by: FAMILY MEDICINE

## 2020-01-09 PROCEDURE — G8417 CALC BMI ABV UP PARAM F/U: HCPCS | Performed by: FAMILY MEDICINE

## 2020-01-09 PROCEDURE — 85025 COMPLETE CBC W/AUTO DIFF WBC: CPT

## 2020-01-09 PROCEDURE — G8482 FLU IMMUNIZE ORDER/ADMIN: HCPCS | Performed by: FAMILY MEDICINE

## 2020-01-09 RX ORDER — MAGNESIUM OXIDE 400 MG/1
400 TABLET ORAL DAILY
COMMUNITY

## 2020-01-09 RX ADMIN — IOHEXOL 50 ML: 240 INJECTION, SOLUTION INTRATHECAL; INTRAVASCULAR; INTRAVENOUS; ORAL at 12:55

## 2020-01-10 ENCOUNTER — TELEPHONE (OUTPATIENT)
Dept: FAMILY MEDICINE CLINIC | Age: 80
End: 2020-01-10

## 2020-01-10 ENCOUNTER — HOSPITAL ENCOUNTER (INPATIENT)
Age: 80
LOS: 2 days | Discharge: HOME | DRG: 378 | End: 2020-01-12
Attending: EMERGENCY MEDICINE | Admitting: INTERNAL MEDICINE
Payer: MEDICARE

## 2020-01-10 PROBLEM — K92.2 GIB (GASTROINTESTINAL BLEEDING): Status: ACTIVE | Noted: 2020-01-10

## 2020-01-10 LAB
A/G RATIO: 1.6 (ref 1.1–2.2)
ABO/RH: NORMAL
ALBUMIN SERPL-MCNC: 4.2 G/DL (ref 3.4–5)
ALP BLD-CCNC: 65 U/L (ref 40–129)
ALT SERPL-CCNC: 14 U/L (ref 10–40)
ANION GAP SERPL CALCULATED.3IONS-SCNC: 14 MMOL/L (ref 3–16)
ANTIBODY SCREEN: NORMAL
AST SERPL-CCNC: 16 U/L (ref 15–37)
BASOPHILS ABSOLUTE: 0.1 K/UL (ref 0–0.2)
BASOPHILS RELATIVE PERCENT: 1.2 %
BILIRUB SERPL-MCNC: 0.3 MG/DL (ref 0–1)
BILIRUBIN URINE: NEGATIVE
BLOOD, URINE: NEGATIVE
BUN BLDV-MCNC: 66 MG/DL (ref 7–20)
CALCIUM SERPL-MCNC: 8.8 MG/DL (ref 8.3–10.6)
CHLORIDE BLD-SCNC: 103 MMOL/L (ref 99–110)
CLARITY: CLEAR
CO2: 23 MMOL/L (ref 21–32)
COLOR: YELLOW
CREAT SERPL-MCNC: 1.6 MG/DL (ref 0.8–1.3)
EOSINOPHILS ABSOLUTE: 0.5 K/UL (ref 0–0.6)
EOSINOPHILS RELATIVE PERCENT: 4 %
GFR AFRICAN AMERICAN: 51
GFR NON-AFRICAN AMERICAN: 42
GLOBULIN: 2.7 G/DL
GLUCOSE BLD-MCNC: 103 MG/DL (ref 70–99)
GLUCOSE BLD-MCNC: 68 MG/DL (ref 70–99)
GLUCOSE BLD-MCNC: 74 MG/DL (ref 70–99)
GLUCOSE BLD-MCNC: 82 MG/DL (ref 70–99)
GLUCOSE BLD-MCNC: 99 MG/DL (ref 70–99)
GLUCOSE URINE: NEGATIVE MG/DL
HCT VFR BLD CALC: 28.7 % (ref 40.5–52.5)
HEMOGLOBIN: 9.4 G/DL (ref 13.5–17.5)
HEMOGLOBIN: 9.4 G/DL (ref 13.5–17.5)
KETONES, URINE: NEGATIVE MG/DL
LEUKOCYTE ESTERASE, URINE: NEGATIVE
LIPASE: 91 U/L (ref 13–60)
LYMPHOCYTES ABSOLUTE: 2.1 K/UL (ref 1–5.1)
LYMPHOCYTES RELATIVE PERCENT: 18.4 %
MCH RBC QN AUTO: 30.4 PG (ref 26–34)
MCHC RBC AUTO-ENTMCNC: 32.9 G/DL (ref 31–36)
MCV RBC AUTO: 92.2 FL (ref 80–100)
MICROSCOPIC EXAMINATION: NORMAL
MONOCYTES ABSOLUTE: 0.9 K/UL (ref 0–1.3)
MONOCYTES RELATIVE PERCENT: 7.4 %
NEUTROPHILS ABSOLUTE: 7.9 K/UL (ref 1.7–7.7)
NEUTROPHILS RELATIVE PERCENT: 69 %
NITRITE, URINE: NEGATIVE
OCCULT BLOOD DIAGNOSTIC: ABNORMAL
PDW BLD-RTO: 14.1 % (ref 12.4–15.4)
PERFORMED ON: ABNORMAL
PERFORMED ON: ABNORMAL
PERFORMED ON: NORMAL
PERFORMED ON: NORMAL
PH UA: 6.5 (ref 5–8)
PLATELET # BLD: 406 K/UL (ref 135–450)
PMV BLD AUTO: 7.9 FL (ref 5–10.5)
POTASSIUM REFLEX MAGNESIUM: 4.4 MMOL/L (ref 3.5–5.1)
PROTEIN UA: NEGATIVE MG/DL
RBC # BLD: 3.11 M/UL (ref 4.2–5.9)
SODIUM BLD-SCNC: 140 MMOL/L (ref 136–145)
SPECIFIC GRAVITY UA: 1.01 (ref 1–1.03)
TOTAL PROTEIN: 6.9 G/DL (ref 6.4–8.2)
URINE REFLEX TO CULTURE: NORMAL
URINE TYPE: NORMAL
UROBILINOGEN, URINE: 0.2 E.U./DL
WBC # BLD: 11.5 K/UL (ref 4–11)

## 2020-01-10 PROCEDURE — 6370000000 HC RX 637 (ALT 250 FOR IP): Performed by: PHYSICIAN ASSISTANT

## 2020-01-10 PROCEDURE — 80053 COMPREHEN METABOLIC PANEL: CPT

## 2020-01-10 PROCEDURE — 86901 BLOOD TYPING SEROLOGIC RH(D): CPT

## 2020-01-10 PROCEDURE — 83690 ASSAY OF LIPASE: CPT

## 2020-01-10 PROCEDURE — 36415 COLL VENOUS BLD VENIPUNCTURE: CPT

## 2020-01-10 PROCEDURE — C9113 INJ PANTOPRAZOLE SODIUM, VIA: HCPCS | Performed by: EMERGENCY MEDICINE

## 2020-01-10 PROCEDURE — 6360000002 HC RX W HCPCS: Performed by: PHYSICIAN ASSISTANT

## 2020-01-10 PROCEDURE — 85025 COMPLETE CBC W/AUTO DIFF WBC: CPT

## 2020-01-10 PROCEDURE — 81003 URINALYSIS AUTO W/O SCOPE: CPT

## 2020-01-10 PROCEDURE — 1200000000 HC SEMI PRIVATE

## 2020-01-10 PROCEDURE — 86900 BLOOD TYPING SEROLOGIC ABO: CPT

## 2020-01-10 PROCEDURE — 2580000003 HC RX 258: Performed by: EMERGENCY MEDICINE

## 2020-01-10 PROCEDURE — 86850 RBC ANTIBODY SCREEN: CPT

## 2020-01-10 PROCEDURE — 99285 EMERGENCY DEPT VISIT HI MDM: CPT

## 2020-01-10 PROCEDURE — 2580000003 HC RX 258: Performed by: PHYSICIAN ASSISTANT

## 2020-01-10 PROCEDURE — G0328 FECAL BLOOD SCRN IMMUNOASSAY: HCPCS

## 2020-01-10 PROCEDURE — 2580000003 HC RX 258: Performed by: INTERNAL MEDICINE

## 2020-01-10 PROCEDURE — 85018 HEMOGLOBIN: CPT

## 2020-01-10 PROCEDURE — C9113 INJ PANTOPRAZOLE SODIUM, VIA: HCPCS | Performed by: PHYSICIAN ASSISTANT

## 2020-01-10 PROCEDURE — 6360000002 HC RX W HCPCS: Performed by: EMERGENCY MEDICINE

## 2020-01-10 RX ORDER — DEXTROSE MONOHYDRATE 25 G/50ML
12.5 INJECTION, SOLUTION INTRAVENOUS PRN
Status: DISCONTINUED | OUTPATIENT
Start: 2020-01-10 | End: 2020-01-12 | Stop reason: HOSPADM

## 2020-01-10 RX ORDER — PANTOPRAZOLE SODIUM 40 MG/10ML
40 INJECTION, POWDER, LYOPHILIZED, FOR SOLUTION INTRAVENOUS EVERY 12 HOURS
Status: DISCONTINUED | OUTPATIENT
Start: 2020-01-10 | End: 2020-01-12 | Stop reason: HOSPADM

## 2020-01-10 RX ORDER — PANTOPRAZOLE SODIUM 40 MG/10ML
40 INJECTION, POWDER, LYOPHILIZED, FOR SOLUTION INTRAVENOUS ONCE
Status: COMPLETED | OUTPATIENT
Start: 2020-01-10 | End: 2020-01-10

## 2020-01-10 RX ORDER — SODIUM CHLORIDE 9 MG/ML
INJECTION, SOLUTION INTRAVENOUS CONTINUOUS
Status: DISCONTINUED | OUTPATIENT
Start: 2020-01-10 | End: 2020-01-10

## 2020-01-10 RX ORDER — SODIUM CHLORIDE 0.9 % (FLUSH) 0.9 %
10 SYRINGE (ML) INJECTION EVERY 12 HOURS SCHEDULED
Status: DISCONTINUED | OUTPATIENT
Start: 2020-01-10 | End: 2020-01-12 | Stop reason: HOSPADM

## 2020-01-10 RX ORDER — ONDANSETRON 2 MG/ML
4 INJECTION INTRAMUSCULAR; INTRAVENOUS EVERY 6 HOURS PRN
Status: DISCONTINUED | OUTPATIENT
Start: 2020-01-10 | End: 2020-01-12 | Stop reason: HOSPADM

## 2020-01-10 RX ORDER — NICOTINE POLACRILEX 4 MG
15 LOZENGE BUCCAL PRN
Status: DISCONTINUED | OUTPATIENT
Start: 2020-01-10 | End: 2020-01-12 | Stop reason: HOSPADM

## 2020-01-10 RX ORDER — ATORVASTATIN CALCIUM 40 MG/1
40 TABLET, FILM COATED ORAL NIGHTLY
Status: DISCONTINUED | OUTPATIENT
Start: 2020-01-10 | End: 2020-01-12 | Stop reason: HOSPADM

## 2020-01-10 RX ORDER — SODIUM CHLORIDE 0.9 % (FLUSH) 0.9 %
10 SYRINGE (ML) INJECTION EVERY 12 HOURS
Status: DISCONTINUED | OUTPATIENT
Start: 2020-01-10 | End: 2020-01-12 | Stop reason: HOSPADM

## 2020-01-10 RX ORDER — HYDROXYCHLOROQUINE SULFATE 200 MG/1
200 TABLET, FILM COATED ORAL 2 TIMES DAILY
Status: DISCONTINUED | OUTPATIENT
Start: 2020-01-10 | End: 2020-01-12 | Stop reason: HOSPADM

## 2020-01-10 RX ORDER — 0.9 % SODIUM CHLORIDE 0.9 %
1000 INTRAVENOUS SOLUTION INTRAVENOUS ONCE
Status: COMPLETED | OUTPATIENT
Start: 2020-01-10 | End: 2020-01-10

## 2020-01-10 RX ORDER — SODIUM CHLORIDE 9 MG/ML
INJECTION, SOLUTION INTRAVENOUS CONTINUOUS
Status: DISCONTINUED | OUTPATIENT
Start: 2020-01-10 | End: 2020-01-11

## 2020-01-10 RX ORDER — LEVOTHYROXINE SODIUM 112 UG/1
112 TABLET ORAL DAILY
Status: DISCONTINUED | OUTPATIENT
Start: 2020-01-10 | End: 2020-01-12 | Stop reason: HOSPADM

## 2020-01-10 RX ORDER — SODIUM CHLORIDE 0.9 % (FLUSH) 0.9 %
10 SYRINGE (ML) INJECTION PRN
Status: DISCONTINUED | OUTPATIENT
Start: 2020-01-10 | End: 2020-01-12 | Stop reason: HOSPADM

## 2020-01-10 RX ORDER — BUPROPION HYDROCHLORIDE 150 MG/1
150 TABLET, EXTENDED RELEASE ORAL DAILY
Status: DISCONTINUED | OUTPATIENT
Start: 2020-01-10 | End: 2020-01-12 | Stop reason: HOSPADM

## 2020-01-10 RX ORDER — ACETAMINOPHEN 325 MG/1
650 TABLET ORAL EVERY 4 HOURS PRN
Status: DISCONTINUED | OUTPATIENT
Start: 2020-01-10 | End: 2020-01-12 | Stop reason: HOSPADM

## 2020-01-10 RX ORDER — DEXTROSE MONOHYDRATE 50 MG/ML
100 INJECTION, SOLUTION INTRAVENOUS PRN
Status: DISCONTINUED | OUTPATIENT
Start: 2020-01-10 | End: 2020-01-12 | Stop reason: HOSPADM

## 2020-01-10 RX ADMIN — Medication 10 ML: at 20:43

## 2020-01-10 RX ADMIN — HYDROXYCHLOROQUINE SULFATE 200 MG: 200 TABLET ORAL at 20:43

## 2020-01-10 RX ADMIN — DEXTROSE MONOHYDRATE 12.5 G: 500 INJECTION PARENTERAL at 15:46

## 2020-01-10 RX ADMIN — SODIUM CHLORIDE: 9 INJECTION, SOLUTION INTRAVENOUS at 15:53

## 2020-01-10 RX ADMIN — PANTOPRAZOLE SODIUM 40 MG: 40 INJECTION, POWDER, FOR SOLUTION INTRAVENOUS at 13:32

## 2020-01-10 RX ADMIN — METOPROLOL TARTRATE 25 MG: 25 TABLET ORAL at 20:43

## 2020-01-10 RX ADMIN — SODIUM CHLORIDE: 9 INJECTION, SOLUTION INTRAVENOUS at 13:34

## 2020-01-10 RX ADMIN — PANTOPRAZOLE SODIUM 40 MG: 40 INJECTION, POWDER, FOR SOLUTION INTRAVENOUS at 20:43

## 2020-01-10 RX ADMIN — SODIUM CHLORIDE 1000 ML: 9 INJECTION, SOLUTION INTRAVENOUS at 12:41

## 2020-01-10 RX ADMIN — ATORVASTATIN CALCIUM 40 MG: 40 TABLET, FILM COATED ORAL at 20:43

## 2020-01-10 ASSESSMENT — ENCOUNTER SYMPTOMS
RECTAL PAIN: 0
HEMATOCHEZIA: 1
COLOR CHANGE: 0
BACK PAIN: 1
DIARRHEA: 0
SHORTNESS OF BREATH: 0
CONSTIPATION: 1
ABDOMINAL PAIN: 1
BLOOD IN STOOL: 1
NAUSEA: 1
VOMITING: 0

## 2020-01-10 ASSESSMENT — PAIN SCALES - GENERAL
PAINLEVEL_OUTOF10: 0
PAINLEVEL_OUTOF10: 0

## 2020-01-10 NOTE — CARE COORDINATION
pt. Role of dcp explained. Pt from home with spouse and plan return. Pt IPTA and probably no needs. Follow. Explained Case Management role/services.

## 2020-01-10 NOTE — PROGRESS NOTES
and light-headedness. Prior to Visit Medications    Medication Sig Taking?  Authorizing Provider   magnesium oxide (MAG-OX) 400 MG tablet Take 400 mg by mouth daily Yes Historical Provider, MD VALDOVINOS CO by Combination route Yes Historical Provider, MD   metoprolol tartrate (LOPRESSOR) 25 MG tablet TAKE 1 TABLET BY MOUTH TWICE A DAY Yes Julio C Salvador DO   clindamycin (CLEOCIN) 300 MG capsule 2 BID beginning day prior to procedure for 3 days total Yes TARA Partida   atorvastatin (LIPITOR) 40 MG tablet TAKE 1 TABLET BY MOUTH NIGHTLY Yes Jeromy Salvador DO   lisinopril (PRINIVIL;ZESTRIL) 20 MG tablet TAKE 1 TABLET BY MOUTH 2 TIMES DAILY Yes Julio C Salvador DO   levothyroxine (SYNTHROID) 112 MCG tablet TAKE 2 TABLETS BY MOUTH EVERY DAY Yes Julio C Salvador DO   hydrochlorothiazide (HYDRODIURIL) 25 MG tablet TAKE 1 TABLET BY MOUTH TWICE A DAY Yes Julio C Salvador DO   buPROPion (WELLBUTRIN SR) 150 MG extended release tablet 1 daily Yes Jeromy Salvador DO   dextromethorphan-guaiFENesin ([address] DM)  MG per extended release tablet Take 1 tablet by mouth every 12 hours as needed Yes Historical Provider, MD   diclofenac sodium 1 % GEL Apply 2 g topically 2 times daily Yes Historical Provider, MD   azelastine (ASTELIN) 0.1 % nasal spray 1 SPRAY BY NASAL ROUTE 2 TIMES DAILY USE IN EACH NOSTRIL AS DIRECTED Yes Yelena Phipps MD   hydroxychloroquine (PLAQUENIL) 200 MG tablet Take 200 mg by mouth 2 times daily Yes Historical Provider, MD   Ergocalciferol (VITAMIN D2 PO) Take 1.25 mg by mouth once a week Yes Historical Provider, MD   albuterol sulfate  (90 Base) MCG/ACT inhaler Inhale 2 puffs into the lungs every 6 hours as needed for Shortness of Breath Yes Lubna Chiu,    acetaminophen (TYLENOL 8 HOUR ARTHRITIS PAIN) 650 MG extended release tablet Take 650 mg by mouth every 8 hours as needed for Pain Yes Historical Provider, MD   budesonide-formoterol (SYMBICORT) 160-4.5 MCG/ACT AERO Inhale 2 puffs into the lungs 2 times daily  Patient taking differently: Inhale 2 puffs into the lungs 2 times daily as needed   Krista Donohue MD        Social History     Tobacco Use    Smoking status: Former Smoker     Packs/day: 9.00     Years: 24.00     Pack years: 216.00     Types: Cigarettes, Cigars     Last attempt to quit: 1982     Years since quittin.9    Smokeless tobacco: Former User     Types: Snuff, Chew     Quit date: 1982   Substance Use Topics    Alcohol use: No     Alcohol/week: 0.0 standard drinks        Vitals:    20 0855   BP: 126/70   Site: Right Upper Arm   Position: Sitting   Cuff Size: Large Adult   Temp: 97.5 °F (36.4 °C)   TempSrc: Oral   Weight: 245 lb 6.4 oz (111.3 kg)   Height: 5' 10\" (1.778 m)     Estimated body mass index is 35.21 kg/m² as calculated from the following:    Height as of this encounter: 5' 10\" (1.778 m). Weight as of this encounter: 245 lb 6.4 oz (111.3 kg). Physical Exam  Vitals signs and nursing note reviewed. Constitutional:       General: He is not in acute distress. Appearance: He is well-developed. He is not diaphoretic. HENT:      Head: Normocephalic and atraumatic. Right Ear: Hearing, tympanic membrane, ear canal and external ear normal. No drainage or tenderness. Left Ear: Hearing, tympanic membrane, ear canal and external ear normal. No drainage or tenderness. Nose: Nose normal. No mucosal edema or rhinorrhea. Right Sinus: No maxillary sinus tenderness or frontal sinus tenderness. Left Sinus: No maxillary sinus tenderness or frontal sinus tenderness. Mouth/Throat:      Pharynx: Uvula midline. No oropharyngeal exudate or posterior oropharyngeal erythema. Eyes:      Conjunctiva/sclera: Conjunctivae normal.      Pupils: Pupils are equal, round, and reactive to light. Neck:      Musculoskeletal: Neck supple. Cardiovascular:      Rate and Rhythm: Normal rate and regular rhythm. Heart sounds: Normal heart sounds. Pulmonary:      Effort: Pulmonary effort is normal. No respiratory distress. Breath sounds: Normal breath sounds. No wheezing or rales. Chest:      Chest wall: No tenderness. Abdominal:      General: Abdomen is protuberant. A surgical scar is present. Bowel sounds are normal. There is no distension. Palpations: Abdomen is soft. There is no hepatomegaly or splenomegaly. Tenderness: There is tenderness in the epigastric area and left lower quadrant. There is guarding. There is no right CVA tenderness, left CVA tenderness or rebound. Hernia: A hernia is present. Lymphadenopathy:      Cervical: No cervical adenopathy. Neurological:      Mental Status: He is alert. ASSESSMENT/PLAN:  1. Epigastric abdominal pain  Stat labs, stat CT abd/pel. Concerning exam findings in setting of likely GI bleeding.   - Lipase  - POCT Urinalysis no Micro  - Comprehensive Metabolic Panel  - CBC Auto Differential    2. LLQ abdominal pain  R/o GI path such as diverticulitis or colitis, obstruction (less likely). - POCT Urinalysis no Micro  - Comprehensive Metabolic Panel  - CBC Auto Differential    3. Black stool  Stat labs, stat CT abd/pel. Will need to see GI - will determine the urgency of that based on labs and CT. No follow-ups on file. An electronic signature was used to authenticate this note.     --Lucien Lopez MD on 1/10/2020 at 5:21 AM

## 2020-01-10 NOTE — ED PROVIDER NOTES
[address] MEDICAL-SURGICAL      CHIEF COMPLAINT  Abnormal Lab (patient wife states that patient was seen by PCP and had blood work and CT done yesterday. pt. wife states that patient was anemic, with elevated BUN and creatanine. pt. also c/o constipation and \"dark stools\". )       HISTORY OF PRESENT ILLNESS  Bernard Feldman is a [de-identified] y.o. male  who presents to the ED complaining of nausea, melanotic/maroon blood in stool. Feels like his abdomen is \"sore\" but denies any focal pain.   + dizziness with standing. Feels like he may fall with this but no falls at this point. + subjective fever/sweats. + decreased oral intake. Last colonoscopy 3 years ago was unremarkable per patient- per chart was done 3/2015 and was normal to the cecum other than diverticulosis, performed by Dr. Rojas Mayorga. Last EGD > 8 yrs ago per patient. Had CT a/p and labs done as outpatient yesterday which showed no acute finding on CT but labs showed mild leukocytosis of 14, anemia to 9.7, and elevated BUN and Cr.  + wt loss of about 40 lbs over 6 months last march-sept (no significant wt loss since), but has been intentional about this and working with a physician about this. No other complaints, modifying factors or associated symptoms. I have reviewed the following from the nursing documentation. Past Medical History:   Diagnosis Date    BRYON (acute kidney injury) ([facility] [address] 11/2/2017    CAD (coronary artery disease) 8/1/2015    Cancer (HCC)     PROSTATE    Chronic dCHF (grade 1 LVDD) 11/2/2017    Deafness     Diverticulitis     Diverticulosis     Fatigue     Hepatitis     PT NOT SURE WHAT KIND, HE HAD YEARS AGO    Hyperlipidemia     Hypertension     Hypothyroidism     Joint pain     Lumbar radiculopathy  R AND L  5/11/2018    Lumbar stenosis with neurogenic claudication  severe L23 TO L5S1 , WORSE L45      Numbness and tingling     HANDS AND FEET    OA (osteoarthritis) of hip 7/21/2015    Obesity (BMI 30-39. 9) Leukocyte Esterase, Urine Negative Negative    Microscopic Examination Not Indicated     Urine Type NotGiven     Urine Reflex to Culture Not Indicated    Blood occult stool #1   Result Value Ref Range    Occult Blood Diagnostic Result: POSITIVE  Normal range: Negative   (A)      RADIOLOGY  Ct Abdomen Pelvis Wo Contrast Additional Contrast? Oral    Result Date: 1/9/2020  EXAMINATION: CT OF THE ABDOMEN AND PELVIS WITHOUT CONTRAST 1/9/2020 12:48 pm TECHNIQUE: CT of the abdomen and pelvis was performed without the administration of intravenous contrast. Multiplanar reformatted images are provided for review. Dose modulation, iterative reconstruction, and/or weight based adjustment of the mA/kV was utilized to reduce the radiation dose to as low as reasonably achievable. COMPARISON: 12/08/2016 and prior HISTORY: ORDERING SYSTEM PROVIDED HISTORY: Epigastric abdominal pain TECHNOLOGIST PROVIDED HISTORY: Additional Contrast?->Oral Reason for Exam: mid abd pain since yesterday morning with nausea-dark stool-constipation Acuity: Acute Type of Exam: Initial Additional signs and symptoms: Prostate CA  sev yrs ago Relevant Medical/Surgical History: colon surg for rupture x 2 FINDINGS: Lower Chest:  Visualized portion of the lower chest demonstrates no acute abnormality. No free air noted within the abdomen or pelvis. Organs: Limited noncontrast imaging of the liver, gallbladder, spleen, pancreas and adrenal glands is unremarkable in appearance. Vascular calcifications are noted within the kidneys. No evidence of obstructive uropathy is noted. GI/Bowel: Limited noncontrast imaging of the stomach is unremarkable in appearance. Small bowel demonstrates no acute abnormality. There is dilation of a loop of small bowel within the mid inferior abdomen compatible with postsurgical changes. There is no evidence of obstruction.  Small defect within the anterior abdominal wall compatible with small hernia containing loop of bowel without evidence of obstruction. This is unchanged. There is diverticulosis without evidence of acute diverticulitis. There is a moderate stool burden. Ileocecal valve and appendix appear normal. Pelvis: Small probable spigelian hernia in left lower abdomen. Fat noted within the inguinal canals bilaterally. No bowel involvement appreciated. Urinary bladder is unremarkable. Brachytherapy seeds noted within the prostate which is otherwise grossly unremarkable in appearance. Peritoneum/Retroperitoneum: Aorta is normal in caliber. Mild atherosclerotic changes are noted. No suspicious retroperitoneal adenopathy is appreciated. Bones/Soft Tissues: Postsurgical changes from prior back surgery again noted. Remote compression deformity of L3 again noted. No acute intra-abdominal or intrapelvic abnormality noted on noncontrast imaging of the abdomen and pelvis. There is diverticulosis without evidence of acute diverticulitis. ED COURSE/MDM  Patient seen and evaluated. Old records reviewed. Labs and imaging reviewed and results discussed with patient. Patient presenting for evaluation of abnormal labs as an outpatient. I did review the CT scan from yesterday that was performed as an outpatient as well as labs that were performed there. Hemoglobin trending slightly downward at 9.4 today from 9.7. His kidney function slightly improved from yesterday. I did perform a rectal examination which showed no evidence of hemorrhoids. Blood occult stool was noted to be positive. IV Protonix 40 mg given x1. Suspect possible GI bleed from possible upper source given the appearance of the stool but certainly cannot rule out lower GI bleed as well. GI was been consulted and I spoke with Dr. Jan Johnson who agreed with plan for admission to the hospitalist for overall management I will see the patient in consultation.     I spoke with Donald Francisco with the hospitalist team. We thoroughly discussed the history, physical exam, laboratory and imaging studies, as well as, emergency department course. Based upon that discussion, we've decided to admit William Eastman for further observation and evaluation of Jorge Gaffney's BRYON and GI bleed. As I have deemed necessary from their history, physical and studies, I have considered and evaluated William Eastman for the following diagnoses:  ACUTE APPENDICITIS, BOWEL OBSTRUCTION, CHOLECYSTITIS, DIVERTICULITIS, INCARCERATED HERNIA, PANCREATITIS, or PERFORATED BOWEL or ULCER.         During the patient's ED course, the patient was given:  Medications   atorvastatin (LIPITOR) tablet 40 mg (has no administration in time range)   mometasone-formoterol (DULERA) 200-5 MCG/ACT inhaler 2 puff (has no administration in time range)   buPROPion [facility]) extended release tablet 150 mg (has no administration in time range)   levothyroxine (SYNTHROID) tablet 112 mcg (has no administration in time range)   magnesium oxide (MAG-OX) tablet 400 mg (has no administration in time range)   metoprolol tartrate (LOPRESSOR) tablet 25 mg (has no administration in time range)   hydroxychloroquine (PLAQUENIL) tablet 200 mg (has no administration in time range)   sodium chloride flush 0.9 % injection 10 mL (has no administration in time range)   sodium chloride flush 0.9 % injection 10 mL (has no administration in time range)   acetaminophen (TYLENOL) tablet 650 mg (has no administration in time range)   ondansetron (ZOFRAN) injection 4 mg (has no administration in time range)   0.9 % sodium chloride infusion (has no administration in time range)   pantoprazole (PROTONIX) injection 40 mg (has no administration in time range)     And   sodium chloride flush 0.9 % injection 10 mL (has no administration in time range)   0.9 % sodium chloride bolus (1,000 mLs Intravenous New Bag 1/10/20 1241)   pantoprazole (PROTONIX) injection 40 mg (40 mg Intravenous Given 1/10/20 1332)        CLINICAL IMPRESSION  1.

## 2020-01-10 NOTE — ED NOTES
Perfect serve sent to dr Kelly Cantu  01/10/20 9988  [id number] scotty called back talking to dr Jesse Brown  01/10/20 4404

## 2020-01-11 PROBLEM — D62 ACUTE BLOOD LOSS ANEMIA: Status: ACTIVE | Noted: 2020-01-11

## 2020-01-11 LAB
ANION GAP SERPL CALCULATED.3IONS-SCNC: 10 MMOL/L (ref 3–16)
BASOPHILS ABSOLUTE: 0.1 K/UL (ref 0–0.2)
BASOPHILS RELATIVE PERCENT: 1 %
BUN BLDV-MCNC: 41 MG/DL (ref 7–20)
CALCIUM SERPL-MCNC: 8.6 MG/DL (ref 8.3–10.6)
CHLORIDE BLD-SCNC: 104 MMOL/L (ref 99–110)
CO2: 21 MMOL/L (ref 21–32)
CREAT SERPL-MCNC: 1.5 MG/DL (ref 0.8–1.3)
EOSINOPHILS ABSOLUTE: 0.3 K/UL (ref 0–0.6)
EOSINOPHILS RELATIVE PERCENT: 3.3 %
GFR AFRICAN AMERICAN: 54
GFR NON-AFRICAN AMERICAN: 45
GLUCOSE BLD-MCNC: 100 MG/DL (ref 70–99)
GLUCOSE BLD-MCNC: 109 MG/DL (ref 70–99)
GLUCOSE BLD-MCNC: 82 MG/DL (ref 70–99)
GLUCOSE BLD-MCNC: 88 MG/DL (ref 70–99)
GLUCOSE BLD-MCNC: 90 MG/DL (ref 70–99)
GLUCOSE BLD-MCNC: 96 MG/DL (ref 70–99)
HCT VFR BLD CALC: 27.2 % (ref 40.5–52.5)
HEMOGLOBIN: 8.5 G/DL (ref 13.5–17.5)
HEMOGLOBIN: 9.1 G/DL (ref 13.5–17.5)
HEMOGLOBIN: 9.5 G/DL (ref 13.5–17.5)
HEMOGLOBIN: 9.6 G/DL (ref 13.5–17.5)
LYMPHOCYTES ABSOLUTE: 1.7 K/UL (ref 1–5.1)
LYMPHOCYTES RELATIVE PERCENT: 17 %
MCH RBC QN AUTO: 30.9 PG (ref 26–34)
MCHC RBC AUTO-ENTMCNC: 33.5 G/DL (ref 31–36)
MCV RBC AUTO: 92.3 FL (ref 80–100)
MONOCYTES ABSOLUTE: 0.8 K/UL (ref 0–1.3)
MONOCYTES RELATIVE PERCENT: 7.3 %
NEUTROPHILS ABSOLUTE: 7.3 K/UL (ref 1.7–7.7)
NEUTROPHILS RELATIVE PERCENT: 71.4 %
PDW BLD-RTO: 14.3 % (ref 12.4–15.4)
PERFORMED ON: ABNORMAL
PERFORMED ON: ABNORMAL
PERFORMED ON: NORMAL
PLATELET # BLD: 369 K/UL (ref 135–450)
PMV BLD AUTO: 7.4 FL (ref 5–10.5)
POTASSIUM REFLEX MAGNESIUM: 4.1 MMOL/L (ref 3.5–5.1)
RBC # BLD: 2.95 M/UL (ref 4.2–5.9)
SODIUM BLD-SCNC: 135 MMOL/L (ref 136–145)
WBC # BLD: 10.2 K/UL (ref 4–11)

## 2020-01-11 PROCEDURE — 6360000002 HC RX W HCPCS: Performed by: PHYSICIAN ASSISTANT

## 2020-01-11 PROCEDURE — 36415 COLL VENOUS BLD VENIPUNCTURE: CPT

## 2020-01-11 PROCEDURE — 3609012400 HC EGD TRANSORAL BIOPSY SINGLE/MULTIPLE: Performed by: INTERNAL MEDICINE

## 2020-01-11 PROCEDURE — 7100000011 HC PHASE II RECOVERY - ADDTL 15 MIN: Performed by: INTERNAL MEDICINE

## 2020-01-11 PROCEDURE — 88305 TISSUE EXAM BY PATHOLOGIST: CPT

## 2020-01-11 PROCEDURE — 85025 COMPLETE CBC W/AUTO DIFF WBC: CPT

## 2020-01-11 PROCEDURE — 99153 MOD SED SAME PHYS/QHP EA: CPT | Performed by: INTERNAL MEDICINE

## 2020-01-11 PROCEDURE — C9113 INJ PANTOPRAZOLE SODIUM, VIA: HCPCS | Performed by: PHYSICIAN ASSISTANT

## 2020-01-11 PROCEDURE — 0DB58ZX EXCISION OF ESOPHAGUS, VIA NATURAL OR ARTIFICIAL OPENING ENDOSCOPIC, DIAGNOSTIC: ICD-10-PCS | Performed by: INTERNAL MEDICINE

## 2020-01-11 PROCEDURE — 2580000003 HC RX 258: Performed by: PHYSICIAN ASSISTANT

## 2020-01-11 PROCEDURE — 99152 MOD SED SAME PHYS/QHP 5/>YRS: CPT | Performed by: INTERNAL MEDICINE

## 2020-01-11 PROCEDURE — 3609016200 HC ESOPHAGOSCOPY CONTROL HEMORRHAGE: Performed by: INTERNAL MEDICINE

## 2020-01-11 PROCEDURE — 6360000002 HC RX W HCPCS: Performed by: INTERNAL MEDICINE

## 2020-01-11 PROCEDURE — 7100000010 HC PHASE II RECOVERY - FIRST 15 MIN: Performed by: INTERNAL MEDICINE

## 2020-01-11 PROCEDURE — 0DB78ZX EXCISION OF STOMACH, PYLORUS, VIA NATURAL OR ARTIFICIAL OPENING ENDOSCOPIC, DIAGNOSTIC: ICD-10-PCS | Performed by: INTERNAL MEDICINE

## 2020-01-11 PROCEDURE — 0W3P8ZZ CONTROL BLEEDING IN GASTROINTESTINAL TRACT, VIA NATURAL OR ARTIFICIAL OPENING ENDOSCOPIC: ICD-10-PCS | Performed by: INTERNAL MEDICINE

## 2020-01-11 PROCEDURE — 2709999900 HC NON-CHARGEABLE SUPPLY: Performed by: INTERNAL MEDICINE

## 2020-01-11 PROCEDURE — 1200000000 HC SEMI PRIVATE

## 2020-01-11 PROCEDURE — 6370000000 HC RX 637 (ALT 250 FOR IP): Performed by: PHYSICIAN ASSISTANT

## 2020-01-11 PROCEDURE — 99223 1ST HOSP IP/OBS HIGH 75: CPT | Performed by: INTERNAL MEDICINE

## 2020-01-11 PROCEDURE — 2720000010 HC SURG SUPPLY STERILE: Performed by: INTERNAL MEDICINE

## 2020-01-11 PROCEDURE — 88342 IMHCHEM/IMCYTCHM 1ST ANTB: CPT

## 2020-01-11 PROCEDURE — 85018 HEMOGLOBIN: CPT

## 2020-01-11 PROCEDURE — 80048 BASIC METABOLIC PNL TOTAL CA: CPT

## 2020-01-11 RX ORDER — MIDAZOLAM HYDROCHLORIDE 5 MG/ML
INJECTION INTRAMUSCULAR; INTRAVENOUS PRN
Status: DISCONTINUED | OUTPATIENT
Start: 2020-01-11 | End: 2020-01-11 | Stop reason: ALTCHOICE

## 2020-01-11 RX ORDER — FENTANYL CITRATE 50 UG/ML
INJECTION, SOLUTION INTRAMUSCULAR; INTRAVENOUS PRN
Status: DISCONTINUED | OUTPATIENT
Start: 2020-01-11 | End: 2020-01-11 | Stop reason: ALTCHOICE

## 2020-01-11 RX ADMIN — Medication 10 ML: at 11:21

## 2020-01-11 RX ADMIN — LEVOTHYROXINE SODIUM 112 MCG: 112 TABLET ORAL at 11:22

## 2020-01-11 RX ADMIN — Medication 400 MG: at 11:23

## 2020-01-11 RX ADMIN — HYDROXYCHLOROQUINE SULFATE 200 MG: 200 TABLET ORAL at 21:53

## 2020-01-11 RX ADMIN — HYDROXYCHLOROQUINE SULFATE 200 MG: 200 TABLET ORAL at 11:23

## 2020-01-11 RX ADMIN — SODIUM CHLORIDE: 9 INJECTION, SOLUTION INTRAVENOUS at 14:10

## 2020-01-11 RX ADMIN — ATORVASTATIN CALCIUM 40 MG: 40 TABLET, FILM COATED ORAL at 21:53

## 2020-01-11 RX ADMIN — Medication 10 ML: at 11:25

## 2020-01-11 RX ADMIN — METOPROLOL TARTRATE 25 MG: 25 TABLET ORAL at 21:53

## 2020-01-11 RX ADMIN — PANTOPRAZOLE SODIUM 40 MG: 40 INJECTION, POWDER, FOR SOLUTION INTRAVENOUS at 11:21

## 2020-01-11 RX ADMIN — Medication 10 ML: at 21:54

## 2020-01-11 RX ADMIN — BUPROPION HYDROCHLORIDE 150 MG: 150 TABLET, EXTENDED RELEASE ORAL at 11:23

## 2020-01-11 RX ADMIN — PANTOPRAZOLE SODIUM 40 MG: 40 INJECTION, POWDER, FOR SOLUTION INTRAVENOUS at 21:54

## 2020-01-11 ASSESSMENT — PAIN SCALES - GENERAL
PAINLEVEL_OUTOF10: 0
PAINLEVEL_OUTOF10: 0

## 2020-01-11 ASSESSMENT — PAIN - FUNCTIONAL ASSESSMENT: PAIN_FUNCTIONAL_ASSESSMENT: 0-10

## 2020-01-11 NOTE — H&P
[facility] Medicine History & Physical      PCP: Sandra Castillo DO    Date of Admission: 1/10/2020    Date of Service: Pt seen/examined on 1/11/2020    Chief Complaint:    Chief Complaint   Patient presents with    Abnormal Lab     patient wife states that patient was seen by PCP and had blood work and CT done yesterday. pt. wife states that patient was anemic, with elevated BUN and creatanine. pt. also c/o constipation and \"dark stools\". History Of Present Illness: The patient is a [de-identified] y.o. male with a PMH of CAD s/p stent, HLD, HTN, Hypothyroidism, Rheumatoid Arthritis who presented to the ED with complaint of nausea, melena x 4 days with mild generalized abdominal discomfort, dizziness and cold sweats. Pt denies any emesis, diarrhea or constipation. No chest pain or SOB. Reports he does take a blood thinner but cannot recall what it is. He states he takes it for his heart as he has a hx of stents placed for CAD. According to most recent office visits and update medication list, pt was not noted to be on any AC. He does seem very confused by what medications he actually takes. He reported his last colonoscopy was 3 years ago and his last EGD was 8 years ago. Colonoscopy was unremarkable per patient- per chart was done 3/2015 and was normal to the cecum other than diverticulosis, performed by Dr. Alberto Reinoso. Pt was admitted to Med Surg and GI was consulted.     Past Medical History:        Diagnosis Date    BRYON (acute kidney injury) ([facility] [address] 11/2/2017    CAD (coronary artery disease) 8/1/2015    Cancer (HCC)     PROSTATE    Chronic dCHF (grade 1 LVDD) 11/2/2017    Deafness     Diverticulitis     Diverticulosis     Fatigue     Hepatitis     PT NOT SURE WHAT KIND, HE HAD YEARS AGO    Hyperlipidemia     Hypertension     Hypothyroidism     Joint pain     Lumbar radiculopathy  R AND L  5/11/2018    Lumbar stenosis with neurogenic claudication  severe L23 TO L5S1 , WORSE L45      Numbness and (LIPITOR) 40 MG tablet TAKE 1 TABLET BY MOUTH NIGHTLY 10/19/19  Yes Jeromy Salvador DO   lisinopril (PRINIVIL;ZESTRIL) 20 MG tablet TAKE 1 TABLET BY MOUTH 2 TIMES DAILY 10/17/19  Yes Cynthia Salvador DO   levothyroxine (SYNTHROID) 112 MCG tablet TAKE 2 TABLETS BY MOUTH EVERY DAY 10/17/19  Yes Jeromy Salvador DO   hydrochlorothiazide (HYDRODIURIL) 25 MG tablet TAKE 1 TABLET BY MOUTH TWICE A DAY 10/16/19  Yes Jeromy Salvador DO   buPROPion (WELLBUTRIN SR) 150 MG extended release tablet 1 daily 6/19/19  Yes Jeromy Salvador DO   diclofenac sodium 1 % GEL Apply 2 g topically 2 times daily   Yes Historical Provider, MD   azelastine (ASTELIN) 0.1 % nasal spray 1 SPRAY BY NASAL ROUTE 2 TIMES DAILY USE IN EACH NOSTRIL AS DIRECTED 10/9/18  Yes Alexander Ferrer MD   budesonide-formoterol (SYMBICORT) 160-4.5 MCG/ACT AERO Inhale 2 puffs into the lungs 2 times daily  Patient taking differently: Inhale 2 puffs into the lungs 2 times daily as needed  4/11/18 1/10/20 Yes Dorrie Lanes, MD   hydroxychloroquine (PLAQUENIL) 200 MG tablet Take 200 mg by mouth 2 times daily   Yes Historical Provider, MD   acetaminophen (TYLENOL 8 HOUR ARTHRITIS PAIN) 650 MG extended release tablet Take 650 mg by mouth every 8 hours as needed for Pain   Yes Historical Provider, MD   clindamycin (CLEOCIN) 300 MG capsule 2 BID beginning day prior to procedure for 3 days total 10/30/19   TARA Sanchez   dextromethorphan-guaiFENesin [facility] DM)  MG per extended release tablet Take 1 tablet by mouth every 12 hours as needed    Historical Provider, MD   Ergocalciferol (VITAMIN D2 PO) Take 1.25 mg by mouth once a week    Historical Provider, MD   albuterol sulfate  (90 Base) MCG/ACT inhaler Inhale 2 puffs into the lungs every 6 hours as needed for Shortness of Breath 11/17/17   Lubna Chiu DO       Allergies:  Penicillins and Avelox [moxifloxacin hcl in nacl]    Social History:  The patient currently lives at home.     TOBACCO:   reports that he quit smoking about 37 years ago. His smoking use included cigarettes and cigars. He has a 216.00 pack-year smoking history. He quit smokeless tobacco use about 37 years ago. His smokeless tobacco use included snuff and chew. ETOH:   reports no history of alcohol use. Family History:   Positive as follows:        Problem Relation Age of Onset    Heart Disease Neg Hx        REVIEW OF SYSTEMS:     Constitutional: Negative for fever   HENT: Negative for sore throat   Eyes: Negative for redness   Respiratory: Negative  for dyspnea, cough   Cardiovascular: Negative for chest pain   Gastrointestinal: Negative for vomiting, diarrhea; + melena & generalized abdominal discomfort  Genitourinary: Negative for hematuria   Musculoskeletal: Negative for arthralgias   Skin: Negative for rash   Neurological: Negative for syncope; + dizziness   Hematological: Negative for adenopathy   Psychiatric/Behavorial: Negative for anxiety    PHYSICAL EXAM:    /63   Pulse 56   Temp 97 °F (36.1 °C) (Temporal)   Resp 16   Ht 5' 11\" (1.803 m)   Wt 238 lb 3.2 oz (108 kg)   SpO2 93%   BMI 33.22 kg/m²   Gen: No distress. Alert. Pleasant  male, elderly  Eyes: No sclera icterus. No conjunctival injection. Resp: No accessory muscle use. No crackles. No wheezes. No rhonchi. CV: Regular rate. Regular rhythm. No murmur. No rub. No edema. Capillary Refill: Brisk,< 3 seconds   Peripheral Pulses: +2 palpable, equal bilaterally   GI: Mild generalized tenderness throughout w/ deep palpation. Non-distended. No masses. No organomegaly. Normal bowel sounds. No hernia. Skin: Warm and dry. No nodule on exposed extremities. No rash on exposed extremities. M/S: No cyanosis. No joint deformity. No clubbing. TTP of left hip 2/2 bursitis  Neuro: Awake. Grossly nonfocal    Psych: Oriented x 3. No anxiety or agitation.      CBC:   Recent Labs     01/09/20  1035 01/10/20  1154  01/11/20  0043 01/11/20  0604 01/11/20  1144   WBC 14.9* 11.5* --   --  10.2  --    HGB 9.7* 9.4*   < > 8.5* 9.1* 9.5*   HCT 29.1* 28.7*  --   --  27.2*  --    MCV 91.3 92.2  --   --  92.3  --     406  --   --  369  --     < > = values in this interval not displayed. BMP:   Recent Labs     01/09/20  1035 01/10/20  1154 01/11/20  0604    140 135*   K 4.0 4.4 4.1    103 104   CO2 26 23 21   BUN 68* 66* 41*   CREATININE 1.9* 1.6* 1.5*     LIVER PROFILE:   Recent Labs     01/09/20  1035 01/10/20  1154   AST 10* 16   ALT 10 14   LIPASE 77.0* 91.0*   BILITOT 0.3 0.3   ALKPHOS 64 65     UA:  Recent Labs     01/09/20  1010 01/10/20  1337   NITRITE neg  --    COLORU yellow Yellow   PHUR 5.0 6.5   CLARITYU clear Clear   SPECGRAV 1.020 1.015   LEUKOCYTESUR neg Negative   UROBILINOGEN  --  0.2   BILIRUBINUR neg Negative   BLOODU neg Negative   GLUCOSEU neg Negative      U/A:    Lab Results   Component Value Date    NITRITE neg 01/09/2020    COLORU Yellow 01/10/2020    WBCUA 0-2 11/02/2017    RBCUA 0-2 11/02/2017    MUCUS Rare 11/02/2017    BACTERIA Rare 11/02/2017    CLARITYU Clear 01/10/2020    SPECGRAV 1.015 01/10/2020    LEUKOCYTESUR Negative 01/10/2020    BLOODU Negative 01/10/2020    GLUCOSEU Negative 01/10/2020    AMORPHOUS 1+ 11/02/2017     CULTURES  N/A    RADIOLOGY  N/A      JOS Sutton have reviewed the chart on Art Gaffney and personally interviewed and examined patient, reviewed the data (labs and imaging) and after discussion with my PA formulated the plan. Agree with note with the following edits. HPI:     Patient is an 59-year-old white male who came to the emergency room with complaints of black stools ongoing for the last 3 days. He was admitted to hospital.  Work-up was concerning for GI bleed. He had an EGD done today. A small bleeding ulcer was noted on the EGD. The gastroenterologist tried to place a clip but that did not work. APC was done. The patient wants to go home.     I reviewed the patient's Past Medical

## 2020-01-11 NOTE — FLOWSHEET NOTE
01/11/20 1335   Encounter Summary   Services provided to: Patient   Referral/Consult From: Nurse   Support System Spouse   Continue Visiting No   Volunteer Visit No   Complexity of Encounter High   Length of Encounter 45 minutes   Spiritual Assessment Completed Yes   Advance Care Planning Yes   Routine   Type Post-procedure   Assessment Approachable;Coping;Resentful   Intervention Active listening;Explored feelings, thoughts, concerns;Explored coping resources;Nurtured hope;Empowerment   Outcome Expressed gratitude; Concerns relieved;Engaged in conversation; Shared life review;Expressed feelings/needs/concerns;Coping;Less anxious, less agitated; Shared reminiscences; Venting emotion   Spiritual/Religion   Type Spiritual support   Assessment Approachable;Coping;Resentful   Intervention Active listening;Explored feelings, thoughts, concerns;Explored coping resources;Nurtured hope;Empowerment; Discussed illness/injury and it's impact   Outcome Expressed gratitude; Concerns relieved;Conflict resolved;Engaged in conversation; Shared life review;Expressed feelings/needs/concerns;Coping;Less anxious, less agitated; Shared reminiscences; Encouraged;Venting emotion   Advance Directives (For Healthcare)   Healthcare Directive Yes, patient has an advance directive for healthcare treatment   Type of Healthcare Directive Durable power of  for health care; Health care treatment directive; Living will   Copy in Chart Yes, copy in chart   Information on Healthcare Directives Requested Yes   Patient Requests Assistance No  (Pt has completed A/D)   Advance Directives   (Pt has been pt @ [place] [id number] since early 1970s. Has A/Ds)   Healthcare Agent's Name   (spouse)     Pt expressed irritation w/ noises, lack of sleep, temp of room; stated he has been a Pt here since 1974, has advance directives in the system. Pt's mo-in-law in LTC hospice. Pt anxious for discharge to support wife & family.  Pt alert, oriented x4, easily engaged

## 2020-01-11 NOTE — H&P
SURGICAL HISTORY Right     excision of cyst on thigh    [place] INCIS HIP ADDUCTORS,OPEN Left 2018    LEFT HIP ABDUCTOR FASCIOTOMY, GREATER TROCHANTERIC BURSECTOMY, PIRIFORMIS RELEASE performed by Jimmy Purcell MD at [address] [place] [address]      right    VASECTOMY         Social:   Social History     Tobacco Use    Smoking status: Former Smoker     Packs/day: 9.00     Years: 24.00     Pack years: 216.00     Types: Cigarettes, Cigars     Last attempt to quit: 1982     Years since quittin.9    Smokeless tobacco: Former User     Types: Snuff, Chew     Quit date: 1982   Substance Use Topics    Alcohol use: No     Alcohol/week: 0.0 standard drinks     Family:   Family History   Problem Relation Age of Onset    Heart Disease Neg Hx        Scheduled Medications:    atorvastatin  40 mg Oral Nightly    mometasone-formoterol  2 puff Inhalation BID    buPROPion  150 mg Oral Daily    levothyroxine  112 mcg Oral Daily    magnesium oxide  400 mg Oral Daily    metoprolol tartrate  25 mg Oral BID    hydroxychloroquine  200 mg Oral BID    sodium chloride flush  10 mL Intravenous 2 times per day    pantoprazole  40 mg Intravenous Q12H    And    sodium chloride flush  10 mL Intravenous Q12H       Current Medications:    Prior to Admission medications    Medication Sig Start Date End Date Taking?  Authorizing Provider   magnesium oxide (MAG-OX) 400 MG tablet Take 400 mg by mouth daily   Yes Historical Provider, MD VALDOVIN[facility] by Combination route   Yes Historical Provider, MD   metoprolol tartrate (LOPRESSOR) 25 MG tablet TAKE 1 TABLET BY MOUTH TWICE A DAY 19  Yes Jeromy Salvador DO   atorvastatin (LIPITOR) 40 MG tablet TAKE 1 TABLET BY MOUTH NIGHTLY 10/19/19  Yes Jeromy Salvador DO   lisinopril (PRINIVIL;ZESTRIL) 20 MG tablet TAKE 1 TABLET BY MOUTH 2 TIMES DAILY 10/17/19  Yes Jeromy Salvador DO   levothyroxine (SYNTHROID) 112 MCG tablet TAKE 2 TABLETS BY

## 2020-01-12 VITALS
RESPIRATION RATE: 16 BRPM | WEIGHT: 238.2 LBS | DIASTOLIC BLOOD PRESSURE: 57 MMHG | OXYGEN SATURATION: 94 % | BODY MASS INDEX: 33.35 KG/M2 | SYSTOLIC BLOOD PRESSURE: 121 MMHG | HEART RATE: 84 BPM | TEMPERATURE: 96.8 F | HEIGHT: 71 IN

## 2020-01-12 PROBLEM — K92.2 GIB (GASTROINTESTINAL BLEEDING): Status: RESOLVED | Noted: 2020-01-10 | Resolved: 2020-01-12

## 2020-01-12 PROBLEM — D62 ACUTE BLOOD LOSS ANEMIA: Status: RESOLVED | Noted: 2020-01-11 | Resolved: 2020-01-12

## 2020-01-12 PROBLEM — I10 ESSENTIAL HYPERTENSION: Status: RESOLVED | Noted: 2019-06-19 | Resolved: 2020-01-12

## 2020-01-12 PROBLEM — N17.9 AKI (ACUTE KIDNEY INJURY) (HCC): Status: RESOLVED | Noted: 2017-11-02 | Resolved: 2020-01-12

## 2020-01-12 LAB
ANION GAP SERPL CALCULATED.3IONS-SCNC: 11 MMOL/L (ref 3–16)
BASOPHILS ABSOLUTE: 0.1 K/UL (ref 0–0.2)
BASOPHILS RELATIVE PERCENT: 1 %
BUN BLDV-MCNC: 28 MG/DL (ref 7–20)
CALCIUM SERPL-MCNC: 9 MG/DL (ref 8.3–10.6)
CHLORIDE BLD-SCNC: 105 MMOL/L (ref 99–110)
CO2: 18 MMOL/L (ref 21–32)
CREAT SERPL-MCNC: 1.4 MG/DL (ref 0.8–1.3)
EOSINOPHILS ABSOLUTE: 0.3 K/UL (ref 0–0.6)
EOSINOPHILS RELATIVE PERCENT: 3.6 %
GFR AFRICAN AMERICAN: 59
GFR NON-AFRICAN AMERICAN: 49
GLUCOSE BLD-MCNC: 100 MG/DL (ref 70–99)
HCT VFR BLD CALC: 28.9 % (ref 40.5–52.5)
HEMOGLOBIN: 8.2 G/DL (ref 13.5–17.5)
HEMOGLOBIN: 9.1 G/DL (ref 13.5–17.5)
HEMOGLOBIN: 9.6 G/DL (ref 13.5–17.5)
LYMPHOCYTES ABSOLUTE: 1.8 K/UL (ref 1–5.1)
LYMPHOCYTES RELATIVE PERCENT: 20.6 %
MCH RBC QN AUTO: 30.9 PG (ref 26–34)
MCHC RBC AUTO-ENTMCNC: 31.4 G/DL (ref 31–36)
MCV RBC AUTO: 98.5 FL (ref 80–100)
MONOCYTES ABSOLUTE: 0.7 K/UL (ref 0–1.3)
MONOCYTES RELATIVE PERCENT: 8.5 %
NEUTROPHILS ABSOLUTE: 5.8 K/UL (ref 1.7–7.7)
NEUTROPHILS RELATIVE PERCENT: 66.3 %
PDW BLD-RTO: 14.6 % (ref 12.4–15.4)
PERFORMED ON: ABNORMAL
PERFORMED ON: ABNORMAL
PLATELET # BLD: 312 K/UL (ref 135–450)
PMV BLD AUTO: 7.6 FL (ref 5–10.5)
POTASSIUM SERPL-SCNC: 4.3 MMOL/L (ref 3.5–5.1)
RBC # BLD: 2.94 M/UL (ref 4.2–5.9)
SODIUM BLD-SCNC: 134 MMOL/L (ref 136–145)
WBC # BLD: 8.7 K/UL (ref 4–11)

## 2020-01-12 PROCEDURE — C9113 INJ PANTOPRAZOLE SODIUM, VIA: HCPCS | Performed by: PHYSICIAN ASSISTANT

## 2020-01-12 PROCEDURE — 80048 BASIC METABOLIC PNL TOTAL CA: CPT

## 2020-01-12 PROCEDURE — 6370000000 HC RX 637 (ALT 250 FOR IP): Performed by: PHYSICIAN ASSISTANT

## 2020-01-12 PROCEDURE — 85025 COMPLETE CBC W/AUTO DIFF WBC: CPT

## 2020-01-12 PROCEDURE — 99238 HOSP IP/OBS DSCHRG MGMT 30/<: CPT | Performed by: INTERNAL MEDICINE

## 2020-01-12 PROCEDURE — 36415 COLL VENOUS BLD VENIPUNCTURE: CPT

## 2020-01-12 PROCEDURE — 2580000003 HC RX 258: Performed by: PHYSICIAN ASSISTANT

## 2020-01-12 PROCEDURE — 6360000002 HC RX W HCPCS: Performed by: PHYSICIAN ASSISTANT

## 2020-01-12 PROCEDURE — 85018 HEMOGLOBIN: CPT

## 2020-01-12 RX ORDER — PANTOPRAZOLE SODIUM 40 MG/1
40 TABLET, DELAYED RELEASE ORAL DAILY
Qty: 30 TABLET | Refills: 1 | Status: SHIPPED | OUTPATIENT
Start: 2020-01-12 | End: 2020-01-30 | Stop reason: SDUPTHER

## 2020-01-12 RX ADMIN — Medication 400 MG: at 11:30

## 2020-01-12 RX ADMIN — METOPROLOL TARTRATE 25 MG: 25 TABLET ORAL at 11:30

## 2020-01-12 RX ADMIN — BUPROPION HYDROCHLORIDE 150 MG: 150 TABLET, EXTENDED RELEASE ORAL at 11:30

## 2020-01-12 RX ADMIN — Medication 10 ML: at 11:31

## 2020-01-12 RX ADMIN — Medication 10 ML: at 11:32

## 2020-01-12 RX ADMIN — HYDROXYCHLOROQUINE SULFATE 200 MG: 200 TABLET ORAL at 11:30

## 2020-01-12 RX ADMIN — PANTOPRAZOLE SODIUM 40 MG: 40 INJECTION, POWDER, FOR SOLUTION INTRAVENOUS at 11:30

## 2020-01-12 RX ADMIN — LEVOTHYROXINE SODIUM 112 MCG: 112 TABLET ORAL at 06:42

## 2020-01-12 NOTE — DISCHARGE SUMMARY
Name:  Denia Meza  Room:  0218/0218-01  MRN:    [id number]    Discharge Summary      This discharge summary is in conjunction with a complete physical exam done on the day of discharge. Discharging Physician: Elaine Ordonez MD       Admit: 1/10/2020  Discharge:  1/12/2020    HPI taken from admission H&P:      The patient is a [de-identified] y.o. male with a PMH of CAD s/p stent, HLD, HTN, Hypothyroidism, Rheumatoid Arthritis who presented to the ED with complaint of nausea, melena x 4 days with mild generalized abdominal discomfort, dizziness and cold sweats. Pt denies any emesis, diarrhea or constipation. No chest pain or SOB. Reports he does take a blood thinner but cannot recall what it is. He states he takes it for his heart as he has a hx of stents placed for CAD. According to most recent office visits and update medication list, pt was not noted to be on any AC. He does seem very confused by what medications he actually takes. He reported his last colonoscopy was 3 years ago and his last EGD was 8 years ago. Colonoscopy was unremarkable per patient- per chart was done 3/2015 and was normal to the cecum other than diverticulosis, performed by Dr. Ney Rogers. Pt was admitted to Med Surg and GI was consulted.     Diagnoses this Admission and Hospital Course     Melena  GI Bleed - 2/2 Duodenal Ulcer - s/p APC  Small AVM - s/p APC  - Admitted to Med Surg  - monitored H&H  - NPO, IVF, PPI  - GI consulted --> s/p EGD which showed bleeding duodenal ulcer and small AVM s/p APC  - resumed diet  - Protonix at d/c, will need f/u with GI in two weeks, repeat CBC in 1 week     BRYON  - likely pre-renal  - 1.9 on admission --> 1.5 --> 1.4  - IVF; held HCTZ  - repeat renal panel in 1 week at d/c, resume HCTZ     Acute Blood Loss Anemia  - Hgb 9.7 on admission  - previously 13.8 on 12/18/2019  - monitored --> repeat 9.5  - appears stable  - repeat labs in 1 week following d/c     CAD s/p stent  - no c/o chest pain  - not noted to be on ASA or Plavix in chart & med rec  - cont'd Lipitor, BB     HTN  - stable  - cont'd Lopressor     COPD  - no AE  - follows w/ Dr. Eddi Desia  - cont'd Farrah Ninoelman     Type II DM  - managed with diet  - last Hgb A1c: 5.5  - carb control diet     Rheumatoid Arthritis  - on Plaquenil --> continued     Hypothyroidism  - home dose of synthroid 112 mcg      Depression  - cont'd Wellbutrin     Procedures (Please Review Full Report for Details)    EGD with control of bleeding  - bleeding duodenal ulcer   --------------------------- Remainder of findings as below:  Esophagus: normal. There was one small tongue of ectopic mucosa at the GE junction consistent with small segment Barretts. Biopsies were obtained. Stomach: Mild erythema in the antrum s/p biopsies. Normal fundus and body. Duodenum: An actively oozing small ulcer was seen in the duodenum along the sweep. A Resolution clip was attempted to be placed. One clip malfunction. The other clip was unable to get in good angulation for deployment. APC was used to ablate the area with control of bleeding. A small AVM was seen deeper in the small bowel and also was ablated with apc. Consults    Gastroenterology    Physical Exam at Discharge:    BP (!) 121/57   Pulse 84   Temp 96.8 °F (36 °C) (Oral)   Resp 16   Ht 5' 11\" (1.803 m)   Wt 238 lb 3.2 oz (108 kg)   SpO2 94%   BMI 33.22 kg/m²     Gen: No distress. Alert. Eyes: PERRL. No sclera icterus. No conjunctival injection. ENT: No discharge. Pharynx clear. Neck: Trachea midline. Normal thyroid. Resp: No accessory muscle use. No crackles. No wheezes. No rhonchi. No dullness on percussion. CV: Regular rate. Regular rhythm. No murmur or rub. No edema. GI: Non-tender. Non-distended. No masses. No organomegaly. Normal bowel sounds. No hernia.      CBC:   Recent Labs     01/10/20  1154  01/11/20  0604  01/12/20  0018 01/12/20  0632 01/12/20  1219   WBC 11.5*  --  10.2  --   --  8.7  --    HGB 9.4*   < > 9.1*   < > 8.2* 9.1* 9.6*   HCT 28.7*  --  27.2*  --   --  28.9*  --    MCV 92.2  --  92.3  --   --  98.5  --      --  369  --   --  312  --     < > = values in this interval not displayed.      BMP:   Recent Labs     01/10/20  1154 01/11/20  0604 01/12/20  0632    135* 134*   K 4.4 4.1 4.3    104 105   CO2 23 21 18*   BUN 66* 41* 28*   CREATININE 1.6* 1.5* 1.4*     LIVER PROFILE:   Recent Labs     01/10/20  1154   AST 16   ALT 14   LIPASE 91.0*   BILITOT 0.3   ALKPHOS 65     UA:  Recent Labs     01/10/20  1337   COLORU Yellow   PHUR 6.5   CLARITYU Clear   SPECGRAV 1.015   LEUKOCYTESUR Negative   UROBILINOGEN 0.2   BILIRUBINUR Negative   BLOODU Negative   GLUCOSEU Negative     CULTURES  N/A    RADIOLOGY  N/A    Discharge Medications     Medication List      START taking these medications    pantoprazole 40 MG tablet  Commonly known as:  PROTONIX  Take 1 tablet by mouth daily        CHANGE how you take these medications    budesonide-formoterol 160-4.5 MCG/ACT Aero  Commonly known as:  SYMBICORT  Inhale 2 puffs into the lungs 2 times daily  What changed:    · when to take this  · reasons to take this        CONTINUE taking these medications    albuterol sulfate  (90 Base) MCG/ACT inhaler  Inhale 2 puffs into the lungs every 6 hours as needed for Shortness of Breath     atorvastatin 40 MG tablet  Commonly known as:  LIPITOR  TAKE 1 TABLET BY MOUTH NIGHTLY     azelastine 0.1 % nasal spray  Commonly known as:  ASTELIN  1 SPRAY BY NASAL ROUTE 2 TIMES DAILY USE IN EACH NOSTRIL AS DIRECTED     buPROPion 150 MG extended release tablet  Commonly known as:  WELLBUTRIN SR  1 daily     dextromethorphan-guaiFENesin  MG per extended release tablet  Commonly known as:  MUCINEX DM     diclofenac sodium 1 % Gel     hydroxychloroquine 200 MG tablet  Commonly known as:  PLAQUENIL     levothyroxine 112 MCG tablet  Commonly known as:  SYNTHROID  TAKE 2 TABLETS BY MOUTH EVERY DAY     lisinopril 20 MG

## 2020-01-12 NOTE — DISCHARGE INSTR - COC
INST FUSION STABILIZATION REDUCTION OF SPONDYLOLISTHESIS L34 AND L45 , AUTOGRAFT , ALLOGRAFT     OTHER SURGICAL HISTORY  01/09/2013    excision of suture granuloma on the abdomen    OTHER SURGICAL HISTORY Right     excision of cyst on thigh    ID INCIS HIP ADDUCTORS,OPEN Left 11/5/2018    LEFT HIP ABDUCTOR FASCIOTOMY, GREATER TROCHANTERIC BURSECTOMY, PIRIFORMIS RELEASE performed by Marlene Todd MD at [address]      right    UPPER GASTROINTESTINAL ENDOSCOPY Bilateral 01/11/2020    VASECTOMY         Immunization History:   Immunization History   Administered Date(s) Administered    Influenza Vaccine, unspecified formulation 09/28/2015    Influenza Virus Vaccine 09/01/2014    Influenza Whole 10/18/2011, 09/01/2014    Influenza, High Dose (Fluzone 65 yrs and older) 11/21/2016, 10/12/2017, 10/04/2018    Influenza, Triv, inactivated, subunit, adjuvanted, IM (Fluad 65 yrs and older) 10/07/2019    Pneumococcal Conjugate 13-valent ([id number]) 10/12/2017    Pneumococcal Conjugate 7-valent (Prevnar7) 02/07/2010    Pneumococcal Polysaccharide ([id number]) 11/21/2016    Tdap (Boostrix, Adacel) 10/08/2019    Zoster Live (Zostavax) 11/21/2014       Active Problems:  Patient Active Problem List   Diagnosis Code    Diffuse osteoarthritis M19.90    History of prostate cancer Z85.46    Osteopenia M85.80    Spinal stenosis, lumbar region, with neurogenic claudication M48.062    CAD s/p stent I25.10    HTN (hypertension) I10    HLD (hyperlipidemia) E78.5    DM2 (diabetes mellitus, type 2) ([facility] [address] E11.9    LEIA (obstructive sleep apnea) G47.33    RML pneumonia (CAP) J18.1    Acute hypoxemic respiratory failure (HCC) J96.01    Hyponatremia E87.1    Hypochloremia E87.8    Sepsis (HCC) A41.9    Normocytic anemia D64.9    Chronic diastolic heart failure (HCC) I50.32    Ambulatory dysfunction (uses cane) R26.2    Hypotension I95.9    Obesity (BMI 30-39. 9) E66.9    Lumbar degenerative disc disease  severe L23 TO L5S1 , WORSE L45  M51.36    Lumbar stenosis with neurogenic claudication  severe L23 TO L5S1 , WORSE L45  M48.062    Lumbar radiculopathy  R AND L  M54.16    Spondylolisthesis of lumbar region M43.16    Lumbar spondylosis with myelopathy M47.16    Spinal stenosis of lumbar region with radiculopathy M48.061, M54.16    History of total knee replacement, right Z96.651    Pes anserinus bursitis of right knee M70.51    Trochanteric bursitis of both hips M70.61, M70.62    Bronchitis, mucopurulent recurrent (HCC) J41.1    Piriformis syndrome of both sides G57.03       Isolation/Infection:   Isolation          No Isolation        Patient Infection Status     None to display          Nurse Assessment:  Last Vital Signs: BP (!) 121/57   Pulse 84   Temp 96.8 °F (36 °C) (Oral)   Resp 16   Ht 5' 11\" (1.803 m)   Wt 238 lb 3.2 oz (108 kg)   SpO2 94%   BMI 33.22 kg/m²     Last documented pain score (0-10 scale): Pain Level: 0  Last Weight:   Wt Readings from Last 1 Encounters:   01/10/20 238 lb 3.2 oz (108 kg)     Mental Status:  {IP PT MENTAL STATUS:20030}    IV Access:  { TERRI IV ACCESS:777173797}    Nursing Mobility/ADLs:  Walking   {P DME XTYK:586895766}  Transfer  {P DME DTRS:310802031}  Bathing  {P DME GUWP:259328574}  Dressing  {P DME SDQA:540969480}  Toileting  {P DME RFIS:103781305}  Feeding  {[facility] DME BPBA:819170387}  Med Admin  {[facility] DME AURW:707146040}  Med Delivery   {[facility] MED Delivery:125224924}    Wound Care Documentation and Therapy:        Elimination:  Continence:   · Bowel: {YES / II:26623}  · Bladder: {YES / XF:94217}  Urinary Catheter: {Urinary Catheter:430022683}   Colostomy/Ileostomy/Ileal Conduit: {YES / ID:40533}       Date of Last BM: ***    Intake/Output Summary (Last 24 hours) at 1/12/2020 1319  Last data filed at 1/12/2020 0830  Gross per 24 hour   Intake 240 ml   Output --   Net 240 ml     I/O last 3

## 2020-01-12 NOTE — PROGRESS NOTES
01/10/20 1454   Vital Signs   Temp 97.6 °F (36.4 °C)   Temp Source Oral   Pulse 64   Heart Rate Source Monitor   Resp 18   /60   BP Location Left upper arm   BP Upper/Lower Upper   Patient Position Sitting   Level of Consciousness 0   MEWS Score 1   Patient Currently in Pain Denies   Height and Weight   Height 5' 11\" (1.803 m)   Weight 238 lb 3.2 oz (108 kg)   Weight Method Actual;Bed scale   BSA (Calculated - sq m) 2.33 sq meters   BMI (Calculated) 33.3   Oxygen Therapy   SpO2 95 %   O2 Device None (Room air)     Patient arrived to room in stable condition via wheelchair. Patient oriented to room and call light. Patient denies needs at this time, has call light within reach.
01/11/20 0815   Vital Signs   Temp 97.2 °F (36.2 °C)   Temp Source Axillary   Pulse 64   Heart Rate Source Monitor   Resp 18   /65   BP Location Right upper arm   BP Upper/Lower Upper   Patient Position Sitting   Level of Consciousness 0   MEWS Score 1   Patient Currently in Pain Denies   Pain Assessment   Pain Assessment 0-10   Pain Level 0   Oxygen Therapy   SpO2 97 %   O2 Device None (Room air)       Assessment completed. Patient sitting on side of bed, no signs or symptoms of distress noted. Patient being called down for endoscopy procedure at this time. Patient shows no signs or symptoms of distress at this time. Patient travelling to procedure by wheelchair, denies needs at this time. Medications held till patient returns from procedure.
4 Eyes Skin Assessment     The patient is being assess for   Admission    I agree that 2 RN's have performed a thorough Head to Toe Skin Assessment on the patient. ALL assessment sites listed below have been assessed. Areas assessed by both nurses:   [x]   Head, Face, and Ears   [x]   Shoulders, Back, and Chest, Abdomen  [x]   Arms, Elbows, and Hands   [x]   Coccyx, Sacrum, and Ischium  [x]   Legs, Feet, and Heels        Patient has scattered scabs and bruises and many surgical scars. Most notably to his abdomen, where he has a hernia surrounding a surgical scar. Coccyx WNL, Heels WNL. **SHARE this note so that the co-signing nurse is able to place an eSignature**    Co-signer eSignature: Electronically signed by Felecia Gunn RN on 1/10/20 at 6:51 PM    Does the Patient have Skin Breakdown?   No          Trae Prevention initiated:  No   Wound Care Orders initiated:  No      WOC nurse consulted for Pressure Injury (Stage 3,4, Unstageable, DTI, NWPT, Complex wounds)and New or Established Ostomies:  No      Primary Nurse eSignature: Electronically signed by Maisha Martinez RN on 1/10/20 at 4:12 PM
Consult has been called to Dr. Kyra Newby on 1/10/2020. Spoke with office.  4:19 PM    Brant Hays  1/10/2020
Copies of discharge given to patient and discharge paper instructions gone over with patient. Patient shows V/U. All questions answered. Prescription sent to patient pharmacy. IV d/c with tip intact. Pressure held over site and no bleeding noted. All belongings packed and patient ready to be d/c home in stable condition.
Handoff report and transfer of care given at bedside to [facility]. Patient in stable condition, denies needs/concerns at this time. Call light within reach.
Patient care handed off to [address] [place] [address] at this time,reort given at bedside.
Patient recently arrived to unit from ER. Patient is NPO, admitted for GI Bleed. Patient is a diabetic, blood glucose is 68, and I have no orders for hypoglycemia treatment. The above message was sent to the physician. Awaiting reply.
Patient refuses to have IV fluids attached at this time.
Patient sent back to room on a stretcher per transport staff. Left in stable condition.
Pt resting as manifested by eyes closed in dark room. Respirations even and easy. Call light and bedside table in reach. Bed in low locked position. Denies any needs at this time.
Received order for hypoglycemia treatment. Patient received 12.5 g of dextrose 50% IV solution at 1546. Will recheck. Patient is asymptomatic.
Shift assessment complete; see flow sheet. IV infusing without difficulty. Pt denies pain at this time. Pt denies any needs at this time.  Pt educated on use of call light and to call out with needs, verbalized understanding, bed in low locked position for pt safety
Skilled assessment completed see flow sheet. Pt in bed eyes open call light within reach respirations even and easy. Call light and bedside table within reach. Will continue to monitor pt.
 104 105   CO2 23 21 18*   BUN 66* 41* 28*   CREATININE 1.6* 1.5* 1.4*     LIVER PROFILE:   Recent Labs     01/09/20  1035 01/10/20  1154   AST 10* 16   ALT 10 14   LIPASE 77.0* 91.0*   PROT 6.5 6.9   BILITOT 0.3 0.3   ALKPHOS 64 65     PT/INR: No results for input(s): INR in the last 72 hours. Invalid input(s): PT    IMAGING:  No results found. Hospital Problems           Last Modified POA    Hypothyroidism (Chronic) 1/11/2020 Yes    CAD s/p stent (Chronic) 1/11/2020 Yes    BRYON (acute kidney injury) ([facility] [address] 1/11/2020 Yes    Essential hypertension 1/11/2020 Yes    GIB (gastrointestinal bleeding) 1/10/2020 Yes    Acute blood loss anemia 1/11/2020 Yes         Impression:  [de-identified] yo male with melena with EGD demonstrating AVM and duodenal ulcer    Recommendation:  1. Avoid nsaids  2. Daily ppi  3. OK with discharge  4.  GI clinic fu in 2-4 weeks      Zofia Davis DO  9:47 AM 1/12/2020            [address] [place] [address]    [address]      [address] [place] [address]     Phone: [phone]     Fax: [phone]

## 2020-01-12 NOTE — PLAN OF CARE
Admit 2 W  GIB--baseline hgb ~12, now 9 (stable from 1 day ago), + fecal occult, NPO, GI c/s   BRYON-- hold HCTZ and Lisinopril, IVF
Problem: SAFETY  Goal: Free from accidental physical injury  1/11/2020 0337 by Holger Alvarado RN  Outcome: Ongoing  1/10/2020 2332 by Loma Rinne, RN  Outcome: Ongoing  Goal: Free from intentional harm  1/11/2020 0337 by Holger Alvarado RN  Outcome: Ongoing  1/10/2020 2332 by Loma Rinne, RN  Outcome: Ongoing     Problem: DAILY CARE  Goal: Daily care needs are met  1/11/2020 0337 by Holger Alvarado RN  Outcome: Ongoing  1/10/2020 2332 by Loma Rinne, RN  Outcome: Ongoing     Problem: PAIN  Goal: Patient's pain/discomfort is manageable  1/11/2020 0337 by Holger Alvarado RN  Outcome: Ongoing  1/10/2020 2332 by Loma Rinne, RN  Outcome: Ongoing     Problem: SKIN INTEGRITY  Goal: Skin integrity is maintained or improved  1/11/2020 0337 by Holger Alvarado RN  Outcome: Ongoing  1/10/2020 2332 by Loma Rinne, RN  Outcome: Ongoing     Problem: KNOWLEDGE DEFICIT  Goal: Patient/S.O. demonstrates understanding of disease process, treatment plan, medications, and discharge instructions.   1/11/2020 0337 by Holger Alvarado RN  Outcome: Ongoing  1/10/2020 2332 by Loma Rinne, RN  Outcome: Ongoing     Problem: OXYGENATION/RESPIRATORY FUNCTION  Goal: Patient will maintain patent airway  1/11/2020 0337 by Holger Alvarado RN  Outcome: Ongoing  1/10/2020 2332 by Loma Rinne, RN  Outcome: Ongoing  Goal: Patient will achieve/maintain normal respiratory rate/effort  Description  Respiratory rate and effort will be within normal limits for the patient  1/11/2020 0337 by Holger Alvarado RN  Outcome: Ongoing  1/10/2020 2332 by Loma Rinne, RN  Outcome: Ongoing     Problem: HEMODYNAMIC STATUS  Goal: Patient has stable vital signs and fluid balance  1/11/2020 0337 by Holger Alvarado RN  Outcome: Ongoing  1/10/2020 2332 by Loma Rinne, RN  Outcome: Ongoing     Problem: FLUID AND ELECTROLYTE IMBALANCE  Goal: Fluid and electrolyte balance are achieved/maintained  1/11/2020 0337 by Holger Alvarado RN  Outcome: Ongoing  1/10/2020 2332 by Loma Rinne,
Problem: SAFETY  Goal: Free from accidental physical injury  Outcome: Ongoing  Goal: Free from intentional harm  Outcome: Ongoing     Problem: DAILY CARE  Goal: Daily care needs are met  Outcome: Ongoing     Problem: PAIN  Goal: Patient's pain/discomfort is manageable  Outcome: Ongoing     Problem: SKIN INTEGRITY  Goal: Skin integrity is maintained or improved  Outcome: Ongoing     Problem: KNOWLEDGE DEFICIT  Goal: Patient/S.O. demonstrates understanding of disease process, treatment plan, medications, and discharge instructions.   Outcome: Ongoing     Problem: OXYGENATION/RESPIRATORY FUNCTION  Goal: Patient will maintain patent airway  Outcome: Ongoing  Goal: Patient will achieve/maintain normal respiratory rate/effort  Description  Respiratory rate and effort will be within normal limits for the patient  Outcome: Ongoing     Problem: HEMODYNAMIC STATUS  Goal: Patient has stable vital signs and fluid balance  Outcome: Ongoing     Problem: FLUID AND ELECTROLYTE IMBALANCE  Goal: Fluid and electrolyte balance are achieved/maintained  Outcome: Ongoing     Problem: ACTIVITY INTOLERANCE/IMPAIRED MOBILITY  Goal: Mobility/activity is maintained at optimum level for patient  Outcome: Ongoing
7609 by Elder Chinchilla RN  Outcome: Ongoing  1/10/2020 2332 by Kiersten Hung RN  Outcome: Ongoing     Problem: HEMODYNAMIC STATUS  Goal: Patient has stable vital signs and fluid balance  1/11/2020 1101 by Giovana Oshae RN  Outcome: Ongoing  1/11/2020 0337 by Elder Chinchilla RN  Outcome: Ongoing  1/10/2020 2332 by Kiersten Hung RN  Outcome: Ongoing     Problem: FLUID AND ELECTROLYTE IMBALANCE  Goal: Fluid and electrolyte balance are achieved/maintained  1/11/2020 1101 by Giovana Oshea RN  Outcome: Ongoing  1/11/2020 0337 by Elder Chinchilla RN  Outcome: Ongoing  1/10/2020 2332 by Kiersten Hung RN  Outcome: Ongoing     Problem: ACTIVITY INTOLERANCE/IMPAIRED MOBILITY  Goal: Mobility/activity is maintained at optimum level for patient  1/11/2020 1101 by Giovana Oshea RN  Outcome: Ongoing  1/11/2020 0337 by Elder Chinchilla RN  Outcome: Ongoing  1/10/2020 2332 by Kiersten Hung RN  Outcome: Ongoing

## 2020-01-13 ENCOUNTER — CARE COORDINATION (OUTPATIENT)
Dept: CASE MANAGEMENT | Age: 80
End: 2020-01-13

## 2020-01-13 NOTE — CARE COORDINATION
dizziness, SOB. States he has appt with Chapis Person tomorrow to investigate R hip pain but otherwise doing great. Care transition following.        Care Transitions 24 Hour Call    Do you have any ongoing symptoms?:  No  Do you have a copy of your discharge instructions?:  Yes  Do you have all of your prescriptions and are they filled?:  Yes  Have you been contacted by a [employer] [address]?:  No  Have you scheduled your follow up appointment?:  No  Were you discharged with any Home Care or Post Acute Services:  No  Do you feel like you have everything you need to keep you well at home?:  Yes  Care Transitions Interventions  No Identified Needs                                 Follow Up  Future Appointments   Date Time Provider Minh Wilcoxi   1/14/2020  8:30 AM Jaylan Carrillo MD [facility]   6/16/2020 10:00 AM Luis M Tellez MD AND PULM [facility]   [facility]19[facility]2020 10[facility]00 [facility]   10/12/2020  8:30 AM SCHEDULE, Jeri JETER LPKAYLEIGH [facility]       Gi Parisi RN

## 2020-01-14 ENCOUNTER — OFFICE VISIT (OUTPATIENT)
Dept: ORTHOPEDIC SURGERY | Age: 80
End: 2020-01-14
Payer: MEDICARE

## 2020-01-14 VITALS — HEIGHT: 71 IN | RESPIRATION RATE: 12 BRPM | BODY MASS INDEX: 33.33 KG/M2 | WEIGHT: 238.1 LBS

## 2020-01-14 PROBLEM — M54.50 CHRONIC BILATERAL LOW BACK PAIN: Status: ACTIVE | Noted: 2020-01-14

## 2020-01-14 PROBLEM — G89.29 CHRONIC BILATERAL LOW BACK PAIN: Status: ACTIVE | Noted: 2020-01-14

## 2020-01-14 PROBLEM — M70.61 TROCHANTERIC BURSITIS OF RIGHT HIP: Status: ACTIVE | Noted: 2020-01-14

## 2020-01-14 PROBLEM — M25.551 PAIN OF RIGHT HIP JOINT: Status: ACTIVE | Noted: 2020-01-14

## 2020-01-14 PROCEDURE — 1036F TOBACCO NON-USER: CPT | Performed by: ORTHOPAEDIC SURGERY

## 2020-01-14 PROCEDURE — 1123F ACP DISCUSS/DSCN MKR DOCD: CPT | Performed by: ORTHOPAEDIC SURGERY

## 2020-01-14 PROCEDURE — G8417 CALC BMI ABV UP PARAM F/U: HCPCS | Performed by: ORTHOPAEDIC SURGERY

## 2020-01-14 PROCEDURE — G8482 FLU IMMUNIZE ORDER/ADMIN: HCPCS | Performed by: ORTHOPAEDIC SURGERY

## 2020-01-14 PROCEDURE — 99214 OFFICE O/P EST MOD 30 MIN: CPT | Performed by: ORTHOPAEDIC SURGERY

## 2020-01-14 PROCEDURE — G8428 CUR MEDS NOT DOCUMENT: HCPCS | Performed by: ORTHOPAEDIC SURGERY

## 2020-01-14 PROCEDURE — 4040F PNEUMOC VAC/ADMIN/RCVD: CPT | Performed by: ORTHOPAEDIC SURGERY

## 2020-01-14 PROCEDURE — 20610 DRAIN/INJ JOINT/BURSA W/O US: CPT | Performed by: ORTHOPAEDIC SURGERY

## 2020-01-14 PROCEDURE — 1111F DSCHRG MED/CURRENT MED MERGE: CPT | Performed by: ORTHOPAEDIC SURGERY

## 2020-01-14 RX ORDER — BETAMETHASONE SODIUM PHOSPHATE AND BETAMETHASONE ACETATE 3; 3 MG/ML; MG/ML
12 INJECTION, SUSPENSION INTRA-ARTICULAR; INTRALESIONAL; INTRAMUSCULAR; SOFT TISSUE ONCE
Status: COMPLETED | OUTPATIENT
Start: 2020-01-14 | End: 2020-01-14

## 2020-01-14 RX ADMIN — BETAMETHASONE SODIUM PHOSPHATE AND BETAMETHASONE ACETATE 12 MG: 3; 3 INJECTION, SUSPENSION INTRA-ARTICULAR; INTRALESIONAL; INTRAMUSCULAR; SOFT TISSUE at 09:08

## 2020-01-14 NOTE — PROGRESS NOTES
uniformly    Additional Comments:       Additional Examinations:         Left Lower Extremity: Examination of the left lower extremity does not show any tenderness, deformity or injury. Range of motion is unremarkable. There is no gross instability. There are no rashes, ulcerations or lesions. Strength and tone are normal.    Radiology:     X-rays   2 views of the right hip reveal minimal degenerative change    Assessment : Right hip pain secondary to greater trochanteric bursitis with neurogenic claudication from spine    Impression:  Encounter Diagnoses   Name Primary?  Pain of right hip joint     Trochanteric bursitis of right hip Yes    Chronic bilateral low back pain, unspecified whether sciatica present        Office Procedures:  Orders Placed This Encounter   Procedures    XR HIP 2-3 VW W PELVIS RIGHT     Standing Status:   Future     Number of Occurrences:   1     Standing Expiration Date:   1/14/2021   Ruthie Smith MD, Spine Surgery, [facility]     Referral Priority:   Routine     Referral Type:   Eval and Treat     Referral Reason:   Specialty Services Required     Referred to Provider:   Radha Gracia MD     Requested Specialty:   Pain Management     Number of Visits Requested:   1    TX ARTHROCENTESIS ASPIR&/INJ MAJOR JT/BURSA W/O US       Treatment Plan: Injection into the right hip for the bursitis but also recommended referral to Dr. Jo-Ann Carlin for consultation in regards to his back and neurogenic claudication. After informed consent was received from the patient, the right hip was sterilely prepped using Betadine with the patient in a lateral decubitusposition then using ethyl chloride as a topical refrigerant andan injection using 1 mL of 6mg/ml Bethamethasone, 2 mL each of 1% Xylocaine and 0.5%Marcaine in a 5 mL syringe and a 25-gauge spinal needle was used to inject the medication into the greater trochanteric region and to the point of maximum  tenderness.  The patient otherwise appeared to tolerate the injection well. Encounter Diagnoses   Name Primary?     Pain of right hip joint     Trochanteric bursitis of right hip Yes    Chronic bilateral low back pain, unspecified whether sciatica present       Orders Placed This Encounter   Procedures    XR HIP 2-3 VW W PELVIS RIGHT     Standing Status:   Future     Number of Occurrences:   1     Standing Expiration Date:   1/14/2021   Cliff Dickinson MD, Spine Surgery, [facility]     Referral Priority:   Routine     Referral Type:   Eval and Treat     Referral Reason:   Specialty Services Required     Referred to Provider:   Paty Barba MD     Requested Specialty:   Pain Management     Number of Visits Requested:   1    SD ARTHROCENTESIS ASPIR&/INJ MAJOR JT/BURSA W/O US

## 2020-01-17 ENCOUNTER — CARE COORDINATION (OUTPATIENT)
Dept: CASE MANAGEMENT | Age: 80
End: 2020-01-17

## 2020-01-17 NOTE — CARE COORDINATION
[facility] Transitions Follow Up Call    2020    Patient: Eliazar Reyna  Patient : 1940   MRN: [id number]  Reason for Admission: GIB  Discharge Date: 20 RARS: Readmission Risk Score: 16         Unable to reach patient by phone. Message left stating purpose of call with contact information requesting return call, included reminder for labwork due. Care transition following.     Follow Up  Future Appointments   Date Time Provider Minh Valdez   2020  9:00 AM Marcello Montero [facility]   2020 11:15 AM Roel Cooper MD [facility]   2020  8:40 AM Monet Brock MD [facility]   2020 10:00 AM [facility]   2020 10:00 AM Jeromy Salvador DO [facility]   10/12/2020  8:30 AM SCHEDULE, [facility] AWRICHIE LPN [facility]       Vladislav Diehl RN

## 2020-01-20 ENCOUNTER — CARE COORDINATION (OUTPATIENT)
Dept: CASE MANAGEMENT | Age: 80
End: 2020-01-20

## 2020-01-24 ENCOUNTER — CARE COORDINATION (OUTPATIENT)
Dept: CASE MANAGEMENT | Age: 80
End: 2020-01-24

## 2020-01-24 DIAGNOSIS — N17.9 AKI (ACUTE KIDNEY INJURY) (HCC): ICD-10-CM

## 2020-01-24 DIAGNOSIS — K26.4 GASTROINTESTINAL HEMORRHAGE ASSOCIATED WITH DUODENAL ULCER: ICD-10-CM

## 2020-01-24 LAB
ANION GAP SERPL CALCULATED.3IONS-SCNC: 13 MMOL/L (ref 3–16)
BASOPHILS ABSOLUTE: 0.1 K/UL (ref 0–0.2)
BASOPHILS RELATIVE PERCENT: 0.9 %
BUN BLDV-MCNC: 23 MG/DL (ref 7–20)
CALCIUM SERPL-MCNC: 8.6 MG/DL (ref 8.3–10.6)
CHLORIDE BLD-SCNC: 104 MMOL/L (ref 99–110)
CO2: 25 MMOL/L (ref 21–32)
CREAT SERPL-MCNC: 1.2 MG/DL (ref 0.8–1.3)
EOSINOPHILS ABSOLUTE: 0.3 K/UL (ref 0–0.6)
EOSINOPHILS RELATIVE PERCENT: 2.7 %
GFR AFRICAN AMERICAN: >60
GFR NON-AFRICAN AMERICAN: 58
GLUCOSE BLD-MCNC: 114 MG/DL (ref 70–99)
HCT VFR BLD CALC: 28.7 % (ref 40.5–52.5)
HEMOGLOBIN: 9.1 G/DL (ref 13.5–17.5)
LYMPHOCYTES ABSOLUTE: 1.7 K/UL (ref 1–5.1)
LYMPHOCYTES RELATIVE PERCENT: 14.7 %
MCH RBC QN AUTO: 29.2 PG (ref 26–34)
MCHC RBC AUTO-ENTMCNC: 31.9 G/DL (ref 31–36)
MCV RBC AUTO: 91.5 FL (ref 80–100)
MONOCYTES ABSOLUTE: 1 K/UL (ref 0–1.3)
MONOCYTES RELATIVE PERCENT: 8.9 %
NEUTROPHILS ABSOLUTE: 8.5 K/UL (ref 1.7–7.7)
NEUTROPHILS RELATIVE PERCENT: 72.8 %
PDW BLD-RTO: 15.1 % (ref 12.4–15.4)
PLATELET # BLD: 557 K/UL (ref 135–450)
PMV BLD AUTO: 6.7 FL (ref 5–10.5)
POTASSIUM SERPL-SCNC: 4.5 MMOL/L (ref 3.5–5.1)
RBC # BLD: 3.13 M/UL (ref 4.2–5.9)
SODIUM BLD-SCNC: 142 MMOL/L (ref 136–145)
WBC # BLD: 11.7 K/UL (ref 4–11)

## 2020-01-24 NOTE — CARE COORDINATION
[facility] Transitions FINAL Call    2020    Patient: Zane Cross  Patient : 1940   MRN: [id number]  Reason for Admission: GIB, BRYON  Discharge Date: 20 RARS: Readmission Risk Score: 16         Spoke with: Zane Cross (patient)    Completed lab work today. Sees Dr Dru Sanchez on 2020. No further s/s bleeding. Denies SOB. Denies needs/concerns. Care transition outreach complete. Care Transitions Subsequent and Final Call    Schedule Follow Up Appointment with PCP:  Completed  Subsequent and Final Calls  Do you have any ongoing symptoms?:  No  Have your medications changed?:  No  Do you have any questions related to your medications?:  No  Do you currently have any active services?:  No  Do you have any needs or concerns that I can assist you with?:  No  Identified Barriers:  None  Care Transitions Interventions  No Identified Needs                          Other Interventions:             Follow Up  Future Appointments   Date Time Provider Minh Valdez   2020  9:00 AM Cheryl Montero [facility]   2020 11:15 AM Yuly Frias MD [facility]   2020  8:40 AM Sara Sands MD [facility]   2020 10:00 AM [facility]   2020 10:00 AM Jeromy Salvador DO [facility]   10/12/2020  8:30 AM SCHEDULE, [facility] AWIRCHIE LPN [facility]       Shant Rivera RN

## 2020-01-30 ENCOUNTER — OFFICE VISIT (OUTPATIENT)
Dept: FAMILY MEDICINE CLINIC | Age: 80
End: 2020-01-30
Payer: MEDICARE

## 2020-01-30 VITALS
HEART RATE: 71 BPM | DIASTOLIC BLOOD PRESSURE: 75 MMHG | WEIGHT: 249 LBS | OXYGEN SATURATION: 92 % | SYSTOLIC BLOOD PRESSURE: 140 MMHG | HEIGHT: 70 IN | BODY MASS INDEX: 35.65 KG/M2

## 2020-01-30 PROCEDURE — 4040F PNEUMOC VAC/ADMIN/RCVD: CPT | Performed by: FAMILY MEDICINE

## 2020-01-30 PROCEDURE — 1123F ACP DISCUSS/DSCN MKR DOCD: CPT | Performed by: FAMILY MEDICINE

## 2020-01-30 PROCEDURE — G8417 CALC BMI ABV UP PARAM F/U: HCPCS | Performed by: FAMILY MEDICINE

## 2020-01-30 PROCEDURE — G8427 DOCREV CUR MEDS BY ELIG CLIN: HCPCS | Performed by: FAMILY MEDICINE

## 2020-01-30 PROCEDURE — 1111F DSCHRG MED/CURRENT MED MERGE: CPT | Performed by: FAMILY MEDICINE

## 2020-01-30 PROCEDURE — 99214 OFFICE O/P EST MOD 30 MIN: CPT | Performed by: FAMILY MEDICINE

## 2020-01-30 PROCEDURE — G8482 FLU IMMUNIZE ORDER/ADMIN: HCPCS | Performed by: FAMILY MEDICINE

## 2020-01-30 PROCEDURE — 1036F TOBACCO NON-USER: CPT | Performed by: FAMILY MEDICINE

## 2020-01-30 RX ORDER — PANTOPRAZOLE SODIUM 40 MG/1
40 TABLET, DELAYED RELEASE ORAL DAILY
Qty: 90 TABLET | Refills: 1 | Status: SHIPPED | OUTPATIENT
Start: 2020-01-30 | End: 2020-02-05

## 2020-01-30 ASSESSMENT — ENCOUNTER SYMPTOMS
ABDOMINAL PAIN: 0
COUGH: 0
CHEST TIGHTNESS: 0
SHORTNESS OF BREATH: 0
BLOOD IN STOOL: 0

## 2020-01-30 NOTE — PROGRESS NOTES
Apply 2 g topically 2 times daily, Taking? Yes, Authorizing Provider Historical Provider, MD    Medication azelastine (ASTELIN) 0.1 % nasal spray, Sig 1 SPRAY BY NASAL ROUTE 2 TIMES DAILY USE IN EACH NOSTRIL AS DIRECTED, Taking? Yes, Authorizing Provider Shira Madrid MD    Medication hydroxychloroquine (PLAQUENIL) 200 MG tablet, Sig Take 200 mg by mouth 2 times daily, Taking? Yes, Authorizing Provider Historical Provider, MD    Medication Ergocalciferol (VITAMIN D2 PO), Sig Take 1.25 mg by mouth once a week, Taking? Yes, Authorizing Provider Historical Provider, MD    Medication acetaminophen (TYLENOL 8 HOUR ARTHRITIS PAIN) 650 MG extended release tablet, Sig Take 650 mg by mouth every 8 hours as needed for Pain, Taking?  Yes, Authorizing Provider Historical Provider, MD    Medication dextromethorphan-guaiFENesin (MUCINEX DM)  MG per extended release tablet, Sig Take 1 tablet by mouth every 12 hours as needed, Taking? , Authorizing Provider Historical Provider, MD    Medication budesonide-formoterol (SYMBICORT) 160-4.5 MCG/ACT AERO, Sig Inhale 2 puffs into the lungs 2 times daily  Patient taking differently: Inhale 2 puffs into the lungs 2 times daily as needed , Taking? , Authorizing Provider Maria Stephens MD      Past Medical History:  11/2/2017: BRYON (acute kidney injury) ([facility] [address]  8/1/2015: CAD (coronary artery disease)  No date: Cancer [facility])      Comment:  PROSTATE  11/2/2017: Chronic dCHF (grade 1 LVDD)  No date: Deafness  No date: Diverticulitis  No date: Diverticulosis  No date: Fatigue  No date: Hepatitis      Comment:  PT NOT SURE WHAT KIND, HE HAD YEARS AGO  No date: Hyperlipidemia  No date: Hypertension  No date: Hypothyroidism  No date: Joint pain  5/11/2018: Lumbar radiculopathy  R AND L   No date: Lumbar stenosis with neurogenic claudication  severe L23 TO   L5S1 , WORSE L45   No date: Numbness and tingling      Comment:  HANDS AND FEET  7/21/2015: OA (osteoarthritis) of hip  11/3/2017: Obesity (BMI 30-39. 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 HENT: Negative for congestion and postnasal drip. Respiratory: Negative for cough, chest tightness and shortness of breath. Cardiovascular: Negative for chest pain, palpitations and leg swelling. Gastrointestinal: Negative for abdominal pain and blood in stool. Genitourinary: Negative for dysuria, frequency and hematuria. Musculoskeletal: Positive for arthralgias and myalgias. Neurological: Negative for tremors and headaches. Psychiatric/Behavioral: Negative for sleep disturbance. The patient is not nervous/anxious. Objective:   Physical Exam      Physical Exam  Constitutional:       Appearance: Normal appearance. He is well-developed. He is obese. HENT:      Head: Normocephalic. Mouth/Throat:      Mouth: Mucous membranes are moist.      Pharynx: Oropharynx is clear. Eyes:      Conjunctiva/sclera: Conjunctivae normal.   Neck:      Musculoskeletal: Neck supple. Thyroid: No thyromegaly. Vascular: No carotid bruit. Cardiovascular:      Rate and Rhythm: Normal rate and regular rhythm. Heart sounds: Normal heart sounds. Pulmonary:      Effort: Pulmonary effort is normal.      Breath sounds: Normal breath sounds. Abdominal:      General: There is no distension. Palpations: Abdomen is soft. There is no mass. Tenderness: There is no abdominal tenderness. Musculoskeletal:      Comments: Ambulating with a cane due to his generalized osteoarthritis. Lymphadenopathy:      Cervical: No cervical adenopathy.    Skin:     General: Skin is warm and

## 2020-01-30 NOTE — PATIENT INSTRUCTIONS
Iron deficiency anemia due to chronic blood loss  Instructed to  iron tablets and take 1 daily. See me 6 weeks to have CBC checked. Continue stool softeners and take laxatives as needed. Essential hypertension  Continue medications and no added salt diet. Work on weight loss by reducing carbs. Chronic pain syndrome  -     [facility] ([facility]) - Edilberto Mata MD, Pain Management, [facility]  Refer to Dr. at [facility] for his chronic arthritic pain since he cannot take anti-inflammatory medications. Other orders  -     pantoprazole (PROTONIX) 40 MG tablet;  Take 1 tablet by mouth daily    See me 6 weeks

## 2020-02-04 ENCOUNTER — OFFICE VISIT (OUTPATIENT)
Dept: ORTHOPEDIC SURGERY | Age: 80
End: 2020-02-04
Payer: MEDICARE

## 2020-02-04 VITALS
DIASTOLIC BLOOD PRESSURE: 84 MMHG | BODY MASS INDEX: 35.63 KG/M2 | WEIGHT: 248.9 LBS | SYSTOLIC BLOOD PRESSURE: 138 MMHG | HEART RATE: 86 BPM | HEIGHT: 70 IN

## 2020-02-04 PROCEDURE — G8482 FLU IMMUNIZE ORDER/ADMIN: HCPCS | Performed by: PHYSICAL MEDICINE & REHABILITATION

## 2020-02-04 PROCEDURE — 1123F ACP DISCUSS/DSCN MKR DOCD: CPT | Performed by: PHYSICAL MEDICINE & REHABILITATION

## 2020-02-04 PROCEDURE — 1036F TOBACCO NON-USER: CPT | Performed by: PHYSICAL MEDICINE & REHABILITATION

## 2020-02-04 PROCEDURE — 4040F PNEUMOC VAC/ADMIN/RCVD: CPT | Performed by: PHYSICAL MEDICINE & REHABILITATION

## 2020-02-04 PROCEDURE — G8427 DOCREV CUR MEDS BY ELIG CLIN: HCPCS | Performed by: PHYSICAL MEDICINE & REHABILITATION

## 2020-02-04 PROCEDURE — 1111F DSCHRG MED/CURRENT MED MERGE: CPT | Performed by: PHYSICAL MEDICINE & REHABILITATION

## 2020-02-04 PROCEDURE — G8417 CALC BMI ABV UP PARAM F/U: HCPCS | Performed by: PHYSICAL MEDICINE & REHABILITATION

## 2020-02-04 PROCEDURE — 99204 OFFICE O/P NEW MOD 45 MIN: CPT | Performed by: PHYSICAL MEDICINE & REHABILITATION

## 2020-02-04 NOTE — PROGRESS NOTES
normal, patient ambulates without assistance and no ataxia  · Additional Examinations:  · RIGHT LOWER EXTREMITY: Inspection/examination of the right lower extremity does not show any tenderness, deformity or injury. Range of motion is unremarkable. There is no gross instability. There are no rashes, ulcerations or lesions. Strength and tone are normal. No atrophy or abnormal movements are noted. · LEFT LOWER EXTREMITY:  Inspection/examination of the left lower extremity does not show any tenderness, deformity or injury. Range of motion is unremarkable. There is no gross instability. There are no rashes, ulcerations or lesions. Strength and tone are normal. No atrophy or abnormal movements are noted. Diagnostic Testing:    Xrays:   None  MRI or CT:  MR lumbar -    Multilevel degenerative changes of the lumbar spine.  There is moderate   spinal canal stenosis at L3-L4.  There is mild spinal canal stenosis at L2-L3   and L4-L5.       Multilevel neural foraminal narrowing, including severe right neural   foraminal stenosis at L4-L5. EMG:  None  Results for orders placed or performed in visit on 96/74/36   Basic Metabolic Panel   Result Value Ref Range    Sodium 142 136 - 145 mmol/L    Potassium 4.5 3.5 - 5.1 mmol/L    Chloride 104 99 - 110 mmol/L    CO2 25 21 - 32 mmol/L    Anion Gap 13 3 - 16    Glucose 114 (H) 70 - 99 mg/dL    BUN 23 (H) 7 - 20 mg/dL    CREATININE 1.2 0.8 - 1.3 mg/dL    GFR Non-African American 58 (A) >60    GFR African American >60 >60    Calcium 8.6 8.3 - 10.6 mg/dL       Impression (Medical Decision Making):       1. Lumbar post-laminectomy syndrome    2. Lumbar radiculopathy    3. Lumbar foraminal stenosis        Plan (Medical Decision Making):    I discussed the diagnosis and the treatment options with Justin Lopez today.      In Summary:  The various treatment options were outlined and discussed with Justin Lopez including:  Conservative care options: physical therapy, ice, medications, bracing, and activity modification. The indications for therapeutic injections. The indications for additional imaging/laboratory studies. The indications for (possible future) interventions. After considering the various options discussed, Kallie Navarro elected to pursue a course of treatment that includes the followin. Medications: We have discussed consideration of tramadol. He would like to discuss with Dr Teddy Chase    2. PT:  Prescribed home exercise program.    3. Further studies: No further studies. 4. Interventional:  Consider SCS trial. He was given information to review. 5. Healthy Lifestyle Measures:  Patient education material reviewing the following was distributed to Kallie Navarro  Anatomic drawings  Healthy lifestyle education  Osteoporosis prevention,   Back and neck pain educational information   Advanced imaging preparedness    Posture education   Proper lifting and carrying techniques,   Weight management  Quitting smoking and   Minor ways to treat back pain  For further information regarding the spine conditions and to review interventional treatments the patient was directed to [facility].    6.  Follow up:  4-6 weeks    Kalile Navarro was instructed to call the office if his symptoms worsen or if new symptoms appear prior to the next scheduled visit. He is specifically instructed to contact the office between now & his scheduled appointment if he has concerns related to his condition or if he needs assistance in scheduling the above tests. He is welcome to call for an appointment sooner if he has any additional concerns or questions. Andrea Street ATC, am scribing for and in the presence of Dr. aJylan Vences. 20 12:49 PM Shari Boyd ATC    The physical examination was performed between the patient and Dr. Jaylan Vences. All counseling during the appointment was performed between the patient and provider. IDr. Mono.

## 2020-02-05 ENCOUNTER — TELEPHONE (OUTPATIENT)
Dept: FAMILY MEDICINE CLINIC | Age: 80
End: 2020-02-05

## 2020-02-05 RX ORDER — OMEPRAZOLE 40 MG/1
40 CAPSULE, DELAYED RELEASE ORAL
Qty: 90 CAPSULE | Refills: 1 | Status: SHIPPED | OUTPATIENT
Start: 2020-02-05 | End: 2020-07-20 | Stop reason: SDUPTHER

## 2020-03-10 ENCOUNTER — HOSPITAL ENCOUNTER (OUTPATIENT)
Dept: MRI IMAGING | Age: 80
Discharge: HOME | End: 2020-03-10
Payer: MEDICARE

## 2020-03-10 ENCOUNTER — HOSPITAL ENCOUNTER (OUTPATIENT)
Age: 80
Discharge: HOME | End: 2020-03-10
Payer: MEDICARE

## 2020-03-10 ENCOUNTER — HOSPITAL ENCOUNTER (OUTPATIENT)
Dept: GENERAL RADIOLOGY | Age: 80
Discharge: HOME | End: 2020-03-10
Payer: MEDICARE

## 2020-03-10 LAB
CREAT SERPL-MCNC: 1.4 MG/DL (ref 0.8–1.3)
GFR AFRICAN AMERICAN: 59
GFR NON-AFRICAN AMERICAN: 49

## 2020-03-10 PROCEDURE — 36415 COLL VENOUS BLD VENIPUNCTURE: CPT

## 2020-03-10 PROCEDURE — 6360000004 HC RX CONTRAST MEDICATION: Performed by: PAIN MEDICINE

## 2020-03-10 PROCEDURE — A9579 GAD-BASE MR CONTRAST NOS,1ML: HCPCS | Performed by: PAIN MEDICINE

## 2020-03-10 PROCEDURE — 72158 MRI LUMBAR SPINE W/O & W/DYE: CPT

## 2020-03-10 PROCEDURE — 73723 MRI JOINT LWR EXTR W/O&W/DYE: CPT

## 2020-03-10 PROCEDURE — 73721 MRI JNT OF LWR EXTRE W/O DYE: CPT

## 2020-03-10 PROCEDURE — 70030 X-RAY EYE FOR FOREIGN BODY: CPT

## 2020-03-10 PROCEDURE — 82565 ASSAY OF CREATININE: CPT

## 2020-03-10 RX ADMIN — GADOTERIDOL 22 ML: 279.3 INJECTION, SOLUTION INTRAVENOUS at 09:17

## 2020-03-17 ENCOUNTER — OFFICE VISIT (OUTPATIENT)
Dept: FAMILY MEDICINE CLINIC | Age: 80
End: 2020-03-17
Payer: MEDICARE

## 2020-03-17 VITALS
OXYGEN SATURATION: 94 % | HEART RATE: 61 BPM | TEMPERATURE: 97.3 F | WEIGHT: 246 LBS | DIASTOLIC BLOOD PRESSURE: 58 MMHG | SYSTOLIC BLOOD PRESSURE: 128 MMHG | BODY MASS INDEX: 35.3 KG/M2

## 2020-03-17 LAB
BASOPHILS ABSOLUTE: 0.1 K/UL (ref 0–0.2)
BASOPHILS RELATIVE PERCENT: 0.7 %
EOSINOPHILS ABSOLUTE: 0.2 K/UL (ref 0–0.6)
EOSINOPHILS RELATIVE PERCENT: 2.5 %
HCT VFR BLD CALC: 39.1 % (ref 40.5–52.5)
HEMOGLOBIN: 13 G/DL (ref 13.5–17.5)
LYMPHOCYTES ABSOLUTE: 1.6 K/UL (ref 1–5.1)
LYMPHOCYTES RELATIVE PERCENT: 16.7 %
MCH RBC QN AUTO: 30 PG (ref 26–34)
MCHC RBC AUTO-ENTMCNC: 33.2 G/DL (ref 31–36)
MCV RBC AUTO: 90.3 FL (ref 80–100)
MONOCYTES ABSOLUTE: 0.7 K/UL (ref 0–1.3)
MONOCYTES RELATIVE PERCENT: 7.6 %
NEUTROPHILS ABSOLUTE: 7 K/UL (ref 1.7–7.7)
NEUTROPHILS RELATIVE PERCENT: 72.5 %
PDW BLD-RTO: 16.6 % (ref 12.4–15.4)
PLATELET # BLD: 404 K/UL (ref 135–450)
PMV BLD AUTO: 7.6 FL (ref 5–10.5)
RBC # BLD: 4.33 M/UL (ref 4.2–5.9)
WBC # BLD: 9.6 K/UL (ref 4–11)

## 2020-03-17 PROCEDURE — 99214 OFFICE O/P EST MOD 30 MIN: CPT | Performed by: FAMILY MEDICINE

## 2020-03-17 PROCEDURE — G8510 SCR DEP NEG, NO PLAN REQD: HCPCS | Performed by: FAMILY MEDICINE

## 2020-03-17 PROCEDURE — G8417 CALC BMI ABV UP PARAM F/U: HCPCS | Performed by: FAMILY MEDICINE

## 2020-03-17 PROCEDURE — 1123F ACP DISCUSS/DSCN MKR DOCD: CPT | Performed by: FAMILY MEDICINE

## 2020-03-17 PROCEDURE — G8482 FLU IMMUNIZE ORDER/ADMIN: HCPCS | Performed by: FAMILY MEDICINE

## 2020-03-17 PROCEDURE — 1036F TOBACCO NON-USER: CPT | Performed by: FAMILY MEDICINE

## 2020-03-17 PROCEDURE — 4040F PNEUMOC VAC/ADMIN/RCVD: CPT | Performed by: FAMILY MEDICINE

## 2020-03-17 PROCEDURE — G8427 DOCREV CUR MEDS BY ELIG CLIN: HCPCS | Performed by: FAMILY MEDICINE

## 2020-03-17 RX ORDER — CLARITHROMYCIN 500 MG/1
TABLET, COATED ORAL
COMMUNITY
Start: 2020-03-17 | End: 2021-10-15 | Stop reason: ALTCHOICE

## 2020-03-17 ASSESSMENT — ENCOUNTER SYMPTOMS
BACK PAIN: 1
SHORTNESS OF BREATH: 0
BLOOD IN STOOL: 0
ABDOMINAL PAIN: 0
COUGH: 0

## 2020-03-17 ASSESSMENT — PATIENT HEALTH QUESTIONNAIRE - PHQ9
DEPRESSION UNABLE TO ASSESS: FUNCTIONAL CAPACITY MOTIVATION LIMITS ACCURACY
1. LITTLE INTEREST OR PLEASURE IN DOING THINGS: 0
2. FEELING DOWN, DEPRESSED OR HOPELESS: 0
SUM OF ALL RESPONSES TO PHQ QUESTIONS 1-9: 0
SUM OF ALL RESPONSES TO PHQ9 QUESTIONS 1 & 2: 0
SUM OF ALL RESPONSES TO PHQ QUESTIONS 1-9: 0

## 2020-03-17 NOTE — PROGRESS NOTES
DAY, Taking? Yes, Authorizing Provider Jeromy Salvador, DO    Medication buPROPion (WELLBUTRIN SR) 150 MG extended release tablet, Sig 1 daily, Taking? Yes, Authorizing Provider Jeromy Salvador, DO    Medication dextromethorphan-guaiFENesin (MUCINEX DM)  MG per extended release tablet, Sig Take 1 tablet by mouth every 12 hours as needed, Taking? Yes, Authorizing Provider Paula Nix MD    Medication azelastine (ASTELIN) 0.1 % nasal spray, Sig 1 SPRAY BY NASAL ROUTE 2 TIMES DAILY USE IN EACH NOSTRIL AS DIRECTED, Taking? Yes, Authorizing Provider Madhavi Tapia MD    Medication budesonide-formoterol (SYMBICORT) 160-4.5 MCG/ACT AERO, Sig Inhale 2 puffs into the lungs 2 times daily  Patient taking differently: Inhale 2 puffs into the lungs as needed , Taking? Yes, Authorizing Provider Silvino Oconnor MD    Medication hydroxychloroquine (PLAQUENIL) 200 MG tablet, Sig Take 200 mg by mouth 2 times daily, Taking? Yes, Authorizing Provider Paula Nix MD    Medication Ergocalciferol (VITAMIN D2 PO), Sig Take 1.25 mg by mouth once a week, Taking? Yes, Authorizing Provider Historical MD Dinah    Medication acetaminophen (TYLENOL 8 HOUR ARTHRITIS PAIN) 650 MG extended release tablet, Sig Take 650 mg by mouth every 8 hours as needed for Pain, Taking? Yes, Authorizing Provider Paula Nix MD    Medication oxyCODONE-acetaminophen (PERCOCET) 5-325 MG per tablet, Sig Take 1 tablet by mouth 2 times daily as needed for Pain for up to 30 days.   Patient not taking: Reported on 3/17/2020, Taking? , Authorizing Provider Terell Kenny MD    Medication diclofenac sodium 1 % GEL, Sig Apply 2 g topically 2 times daily, Taking? , Authorizing Provider Paula Nix MD      Past Medical History:  11/2/2017: BRYON (acute kidney injury) ([facility] [address]  8/1/2015: CAD (coronary artery disease)  No date: Cancer [facility])      Comment:  PROSTATE  11/2/2017: Chronic dCHF (grade 1 LVDD)  No date: Deafness  No date: carotid bruit. Cardiovascular:      Rate and Rhythm: Normal rate and regular rhythm. Heart sounds: Normal heart sounds. Pulmonary:      Effort: Pulmonary effort is normal.      Breath sounds: Normal breath sounds. Abdominal:      General: There is no distension. Palpations: Abdomen is soft. There is no mass. Tenderness: There is no abdominal tenderness. Lymphadenopathy:      Cervical: No cervical adenopathy. Skin:     General: Skin is warm and dry. Neurological:      Mental Status: He is alert and oriented to person, place, and time. Psychiatric:         Mood and Affect: Mood normal.         Behavior: Behavior normal.         Thought Content: Thought content normal.         Judgment: Judgment normal.         Assessment:       Diagnosis Orders   1. Iron deficiency anemia due to chronic blood loss     2. Essential hypertension           Plan:      Silver Juarez was seen today for diabetes and anemia. Diagnoses and all orders for this visit:    Iron deficiency anemia due to chronic blood loss  Continue iron tablets for now and CBC will be done today. Continue with stool softeners to keep bowel movements open. Essential hypertension  Continue medications and no added salt diet-work on some weight loss by reducing carbs and increasing activity as tolerated. See me 4 to 6 months.      Jeromy Salvador, DO

## 2020-06-01 ENCOUNTER — NURSE TRIAGE (OUTPATIENT)
Dept: OTHER | Facility: CLINIC | Age: 80
End: 2020-06-01

## 2020-06-01 ENCOUNTER — OFFICE VISIT (OUTPATIENT)
Dept: FAMILY MEDICINE CLINIC | Age: 80
End: 2020-06-01
Payer: MEDICARE

## 2020-06-01 VITALS
TEMPERATURE: 98.2 F | BODY MASS INDEX: 31.97 KG/M2 | WEIGHT: 236 LBS | OXYGEN SATURATION: 96 % | HEART RATE: 61 BPM | DIASTOLIC BLOOD PRESSURE: 72 MMHG | SYSTOLIC BLOOD PRESSURE: 148 MMHG | HEIGHT: 72 IN

## 2020-06-01 PROCEDURE — 99213 OFFICE O/P EST LOW 20 MIN: CPT | Performed by: FAMILY MEDICINE

## 2020-06-01 PROCEDURE — G8427 DOCREV CUR MEDS BY ELIG CLIN: HCPCS | Performed by: FAMILY MEDICINE

## 2020-06-01 PROCEDURE — 1123F ACP DISCUSS/DSCN MKR DOCD: CPT | Performed by: FAMILY MEDICINE

## 2020-06-01 PROCEDURE — G8417 CALC BMI ABV UP PARAM F/U: HCPCS | Performed by: FAMILY MEDICINE

## 2020-06-01 PROCEDURE — 1036F TOBACCO NON-USER: CPT | Performed by: FAMILY MEDICINE

## 2020-06-01 PROCEDURE — 4040F PNEUMOC VAC/ADMIN/RCVD: CPT | Performed by: FAMILY MEDICINE

## 2020-06-01 RX ORDER — PREDNISONE 20 MG/1
20 TABLET ORAL 2 TIMES DAILY
Qty: 10 TABLET | Refills: 0 | Status: SHIPPED | OUTPATIENT
Start: 2020-06-01 | End: 2020-06-06

## 2020-06-01 ASSESSMENT — ENCOUNTER SYMPTOMS
VOMITING: 0
NAUSEA: 0
DIARRHEA: 0
RHINORRHEA: 1
SHORTNESS OF BREATH: 0
SORE THROAT: 1
COUGH: 0

## 2020-06-01 NOTE — PROGRESS NOTES
Subjective:      Patient ID: Cr Jordan is a [de-identified] y.o. male. HPI  Pain Rt side of head,face,neck-onset 3 weeks-no worsening-no rashes---tender to touch. Most tender is Rt TMJ. No trauma. Prior to Visit Medications :  Medication predniSONE (DELTASONE) 20 MG tablet, Sig Take 1 tablet by mouth 2 times daily for 5 days, Taking? Yes, Authorizing Provider Jeromy Salvador, DO    Medication buPROPion (WELLBUTRIN SR) 150 MG extended release tablet, Sig TAKE 1 TABLET BY MOUTH EVERY DAY, Taking? Yes, Authorizing Provider Jeromy Salvador, DO    Medication hydroCHLOROthiazide (HYDRODIURIL) 25 MG tablet, Sig TAKE 1 TABLET BY MOUTH TWICE A DAY, Taking? Yes, Authorizing Provider Jeromy Salvador, DO    Medication levothyroxine (SYNTHROID) 112 MCG tablet, Sig TAKE 2 TABLETS BY MOUTH EVERY DAY, Taking? Yes, Authorizing Provider Jeromy Salvador, DO    Medication lisinopril (PRINIVIL;ZESTRIL) 20 MG tablet, Sig TAKE 1 TABLET BY MOUTH 2 TIMES DAILY, Taking? Yes, Authorizing Provider Jeromy Salvador, DO    Medication clarithromycin (BIAXIN) 500 MG tablet, Sig , Taking? Yes, Authorizing Provider Historical Provider, MD    Medication omeprazole (PRILOSEC) 40 MG delayed release capsule, Sig Take 1 capsule by mouth every morning (before breakfast), Taking? Yes, Authorizing Provider Jeromy Salvador, DO    Medication magnesium oxide (MAG-OX) 400 MG tablet, Sig Take 400 mg by mouth daily, Taking? Yes, Authorizing Provider Historical Provider, MD    Medication SENNA CO, Sig by Combination route, Taking? Yes, Authorizing Provider Historical Provider, MD    Medication metoprolol tartrate (LOPRESSOR) 25 MG tablet, Sig TAKE 1 TABLET BY MOUTH TWICE A DAY, Taking? Yes, Authorizing Provider Jeromy Salvador, DO    Medication atorvastatin (LIPITOR) 40 MG tablet, Sig TAKE 1 TABLET BY MOUTH NIGHTLY, Taking?  Yes, Authorizing Provider Jeromy Salvador, DO    Medication dextromethorphan-guaiFENesin (MUCINEX DM)  MG per extended release tablet, Sig Take 1 tablet by mouth every 12 hours as needed, Taking? Yes, Authorizing Provider Historical MD Dinah    Medication diclofenac sodium 1 % GEL, Sig Apply 2 g topically 2 times daily, Taking? Yes, Authorizing Provider Paula Nix MD    Medication azelastine (ASTELIN) 0.1 % nasal spray, Sig 1 SPRAY BY NASAL ROUTE 2 TIMES DAILY USE IN EACH NOSTRIL AS DIRECTED, Taking? Yes, Authorizing Provider Ronnie Lyman MD    Medication hydroxychloroquine (PLAQUENIL) 200 MG tablet, Sig Take 200 mg by mouth 2 times daily, Taking? Yes, Authorizing Provider Historical MD Dinah    Medication Ergocalciferol (VITAMIN D2 PO), Sig Take 1.25 mg by mouth once a week, Taking? Yes, Authorizing Provider Historical MD Dinah    Medication acetaminophen (TYLENOL 8 HOUR ARTHRITIS PAIN) 650 MG extended release tablet, Sig Take 650 mg by mouth every 8 hours as needed for Pain, Taking? Yes, Authorizing Provider Historical MD Dinah    Medication budesonide-formoterol (SYMBICORT) 160-4.5 MCG/ACT AERO, Sig Inhale 2 puffs into the lungs 2 times daily  Patient taking differently: Inhale 2 puffs into the lungs as needed , Taking? , Authorizing Provider Marciano Salazar MD      Past Medical History:  11/2/2017: BRYON (acute kidney injury) ([facility] [address]  8/1/2015: CAD (coronary artery disease)  No date: Cancer [facility])      Comment:  PROSTATE  11/2/2017: Chronic dCHF (grade 1 LVDD)  No date: Deafness  No date: Diverticulitis  No date: Diverticulosis  No date: Fatigue  No date: Hepatitis      Comment:  PT NOT SURE WHAT KIND, HE HAD YEARS AGO  No date: Hyperlipidemia  No date: Hypertension  No date: Hypothyroidism  No date: Joint pain  5/11/2018: Lumbar radiculopathy  R AND L   No date: Lumbar stenosis with neurogenic claudication  severe L23 TO   L5S1 , WORSE L45   No date: Medical history reviewed with no changes  No date: Numbness and tingling      Comment:  HANDS AND FEET  7/21/2015: OA (osteoarthritis) of hip  11/3/2017: Obesity (BMI 30-39. 9)  No date:  Other

## 2020-06-01 NOTE — TELEPHONE ENCOUNTER
Pt is c/o pain or throbbing that starts in his ear and radiates up to his temple and down into his neck. Pt has not experienced anything like this before but with his age and relative discomfort the recommended disposition is to be seen by his PCP today. Connected pt to the [facility] for scheduling. Reason for Disposition   Patient wants to be seen    Answer Assessment - Initial Assessment Questions  1. ONSET: \"When did the pain start? \" (e.g., minutes, hours, days)      5/28  2. ONSET: \"Does the pain come and go, or has it been constant since it started? \" (e.g., constant, intermittent, fleeting)      constant  3. SEVERITY: \"How bad is the pain? \"   (Scale 1-10; mild, moderate or severe)    - MILD (1-3): doesn't interfere with normal activities     - MODERATE (4-7): interferes with normal activities or awakens from sleep     - SEVERE (8-10): excruciating pain, unable to do any normal activities       4, when lays on right side has to move  4. LOCATION: \"Where does it hurt? \"       Fight side ear into temple and next  5. RASH: \"Is there any redness, rash, or swelling of the face? \"      n/a  6. FEVER: \"Do you have a fever? \" If so, ask: \"What is it, how was it measured, and when did it start? \"       no  7. OTHER SYMPTOMS: \"Do you have any other symptoms? \" (e.g., fever, toothache, nasal discharge, nasal congestion, clicking sensation in jaw joint)      Has chronic nasal drip  8. PREGNANCY: \"Is there any chance you are pregnant? \" \"When was your last menstrual period? \"      n/a    Protocols used: FACE PAIN-ADULT-[place]

## 2020-06-11 ENCOUNTER — TELEPHONE (OUTPATIENT)
Dept: PULMONOLOGY | Age: 80
End: 2020-06-11

## 2020-06-25 ENCOUNTER — OFFICE VISIT (OUTPATIENT)
Dept: FAMILY MEDICINE CLINIC | Age: 80
End: 2020-06-25
Payer: MEDICARE

## 2020-06-25 VITALS
OXYGEN SATURATION: 98 % | SYSTOLIC BLOOD PRESSURE: 142 MMHG | HEIGHT: 72 IN | HEART RATE: 62 BPM | BODY MASS INDEX: 33.08 KG/M2 | WEIGHT: 244.2 LBS | DIASTOLIC BLOOD PRESSURE: 78 MMHG

## 2020-06-25 PROBLEM — K21.00 GASTROESOPHAGEAL REFLUX DISEASE WITH ESOPHAGITIS: Status: ACTIVE | Noted: 2020-06-25

## 2020-06-25 PROBLEM — I83.93 ASYMPTOMATIC VARICOSE VEINS OF BOTH LOWER EXTREMITIES: Status: ACTIVE | Noted: 2020-06-25

## 2020-06-25 PROCEDURE — 1036F TOBACCO NON-USER: CPT | Performed by: FAMILY MEDICINE

## 2020-06-25 PROCEDURE — G8417 CALC BMI ABV UP PARAM F/U: HCPCS | Performed by: FAMILY MEDICINE

## 2020-06-25 PROCEDURE — 99214 OFFICE O/P EST MOD 30 MIN: CPT | Performed by: FAMILY MEDICINE

## 2020-06-25 PROCEDURE — G8427 DOCREV CUR MEDS BY ELIG CLIN: HCPCS | Performed by: FAMILY MEDICINE

## 2020-06-25 PROCEDURE — 4040F PNEUMOC VAC/ADMIN/RCVD: CPT | Performed by: FAMILY MEDICINE

## 2020-06-25 PROCEDURE — 1123F ACP DISCUSS/DSCN MKR DOCD: CPT | Performed by: FAMILY MEDICINE

## 2020-06-25 ASSESSMENT — ENCOUNTER SYMPTOMS
BACK PAIN: 1
COUGH: 0
CONSTIPATION: 0
SHORTNESS OF BREATH: 0
BLOOD IN STOOL: 0
CHEST TIGHTNESS: 0
ABDOMINAL PAIN: 0

## 2020-06-26 LAB
A/G RATIO: 2 (ref 1.1–2.2)
ALBUMIN SERPL-MCNC: 4.4 G/DL (ref 3.4–5)
ALP BLD-CCNC: 72 U/L (ref 40–129)
ALT SERPL-CCNC: 15 U/L (ref 10–40)
ANION GAP SERPL CALCULATED.3IONS-SCNC: 13 MMOL/L (ref 3–16)
AST SERPL-CCNC: 13 U/L (ref 15–37)
BASOPHILS ABSOLUTE: 0.1 K/UL (ref 0–0.2)
BASOPHILS RELATIVE PERCENT: 1.3 %
BILIRUB SERPL-MCNC: 0.4 MG/DL (ref 0–1)
BUN BLDV-MCNC: 20 MG/DL (ref 7–20)
CALCIUM SERPL-MCNC: 9 MG/DL (ref 8.3–10.6)
CHLORIDE BLD-SCNC: 102 MMOL/L (ref 99–110)
CHOLESTEROL, TOTAL: 114 MG/DL (ref 0–199)
CO2: 26 MMOL/L (ref 21–32)
CREAT SERPL-MCNC: 1.1 MG/DL (ref 0.8–1.3)
EOSINOPHILS ABSOLUTE: 0.2 K/UL (ref 0–0.6)
EOSINOPHILS RELATIVE PERCENT: 2.3 %
GFR AFRICAN AMERICAN: >60
GFR NON-AFRICAN AMERICAN: >60
GLOBULIN: 2.2 G/DL
GLUCOSE BLD-MCNC: 120 MG/DL (ref 70–99)
HCT VFR BLD CALC: 43 % (ref 40.5–52.5)
HDLC SERPL-MCNC: 33 MG/DL (ref 40–60)
HEMOGLOBIN: 14 G/DL (ref 13.5–17.5)
LDL CHOLESTEROL CALCULATED: 41 MG/DL
LYMPHOCYTES ABSOLUTE: 1.7 K/UL (ref 1–5.1)
LYMPHOCYTES RELATIVE PERCENT: 21.6 %
MCH RBC QN AUTO: 30.4 PG (ref 26–34)
MCHC RBC AUTO-ENTMCNC: 32.6 G/DL (ref 31–36)
MCV RBC AUTO: 93.1 FL (ref 80–100)
MONOCYTES ABSOLUTE: 0.6 K/UL (ref 0–1.3)
MONOCYTES RELATIVE PERCENT: 7.3 %
NEUTROPHILS ABSOLUTE: 5.3 K/UL (ref 1.7–7.7)
NEUTROPHILS RELATIVE PERCENT: 67.5 %
PDW BLD-RTO: 15 % (ref 12.4–15.4)
PLATELET # BLD: 293 K/UL (ref 135–450)
PMV BLD AUTO: 7.9 FL (ref 5–10.5)
POTASSIUM SERPL-SCNC: 4.3 MMOL/L (ref 3.5–5.1)
PROSTATE SPECIFIC ANTIGEN: 0.03 NG/ML (ref 0–4)
RBC # BLD: 4.62 M/UL (ref 4.2–5.9)
SODIUM BLD-SCNC: 141 MMOL/L (ref 136–145)
TOTAL PROTEIN: 6.6 G/DL (ref 6.4–8.2)
TRIGL SERPL-MCNC: 200 MG/DL (ref 0–150)
TSH SERPL DL<=0.05 MIU/L-ACNC: 1.29 UIU/ML (ref 0.27–4.2)
VLDLC SERPL CALC-MCNC: 40 MG/DL
WBC # BLD: 7.9 K/UL (ref 4–11)

## 2020-09-08 RX ORDER — ATORVASTATIN CALCIUM 40 MG/1
40 TABLET, FILM COATED ORAL NIGHTLY
Qty: 90 TABLET | Refills: 3 | Status: SHIPPED | OUTPATIENT
Start: 2020-09-08 | End: 2021-08-03

## 2020-09-08 NOTE — TELEPHONE ENCOUNTER
Last office visit 6/25/2020     Last written 10/19/2019, 90 days with 3 refills      Next office visit scheduled 10/12/2020    Requested Prescriptions     Pending Prescriptions Disp Refills    atorvastatin (LIPITOR) 40 MG tablet 90 tablet 3     Sig: Take 1 tablet by mouth nightly

## 2020-09-10 RX ORDER — LISINOPRIL 20 MG/1
20 TABLET ORAL 2 TIMES DAILY
Qty: 180 TABLET | Refills: 1 | Status: SHIPPED | OUTPATIENT
Start: 2020-09-10 | End: 2021-02-25

## 2020-09-10 RX ORDER — HYDROCHLOROTHIAZIDE 25 MG/1
TABLET ORAL
Qty: 180 TABLET | Refills: 1 | Status: SHIPPED | OUTPATIENT
Start: 2020-09-10 | End: 2021-02-25

## 2020-09-10 RX ORDER — LEVOTHYROXINE SODIUM 112 UG/1
TABLET ORAL
Qty: 180 TABLET | Refills: 1 | Status: SHIPPED | OUTPATIENT
Start: 2020-09-10 | End: 2021-02-25

## 2020-09-10 NOTE — TELEPHONE ENCOUNTER
Last office visit 6/25/2020     Last written 03/27/2020, 180 tablets with 1 refill.       Next office visit scheduled 10/12/2020    Requested Prescriptions     Pending Prescriptions Disp Refills    levothyroxine (SYNTHROID) 112 MCG tablet 180 tablet 1     Sig: Take 2 tablets by mouth every day

## 2020-10-05 ASSESSMENT — PATIENT HEALTH QUESTIONNAIRE - PHQ9
SUM OF ALL RESPONSES TO PHQ9 QUESTIONS 1 & 2: 0
1. LITTLE INTEREST OR PLEASURE IN DOING THINGS: 0
2. FEELING DOWN, DEPRESSED OR HOPELESS: 0
SUM OF ALL RESPONSES TO PHQ QUESTIONS 1-9: 0
SUM OF ALL RESPONSES TO PHQ QUESTIONS 1-9: 0

## 2020-10-05 ASSESSMENT — LIFESTYLE VARIABLES
HOW OFTEN DO YOU HAVE A DRINK CONTAINING ALCOHOL: 0
AUDIT-C TOTAL SCORE: INCOMPLETE
HOW OFTEN DO YOU HAVE A DRINK CONTAINING ALCOHOL: NEVER
AUDIT TOTAL SCORE: INCOMPLETE

## 2020-10-12 ENCOUNTER — OFFICE VISIT (OUTPATIENT)
Dept: FAMILY MEDICINE CLINIC | Age: 80
End: 2020-10-12
Payer: MEDICARE

## 2020-10-12 VITALS
DIASTOLIC BLOOD PRESSURE: 70 MMHG | HEIGHT: 71 IN | SYSTOLIC BLOOD PRESSURE: 130 MMHG | OXYGEN SATURATION: 94 % | BODY MASS INDEX: 34.48 KG/M2 | WEIGHT: 246.25 LBS | TEMPERATURE: 97.6 F | HEART RATE: 59 BPM

## 2020-10-12 PROCEDURE — G0008 ADMIN INFLUENZA VIRUS VAC: HCPCS | Performed by: FAMILY MEDICINE

## 2020-10-12 PROCEDURE — G0439 PPPS, SUBSEQ VISIT: HCPCS | Performed by: FAMILY MEDICINE

## 2020-10-12 PROCEDURE — 90694 VACC AIIV4 NO PRSRV 0.5ML IM: CPT | Performed by: FAMILY MEDICINE

## 2020-10-12 PROCEDURE — 4040F PNEUMOC VAC/ADMIN/RCVD: CPT | Performed by: FAMILY MEDICINE

## 2020-10-12 PROCEDURE — G8484 FLU IMMUNIZE NO ADMIN: HCPCS | Performed by: FAMILY MEDICINE

## 2020-10-12 PROCEDURE — 1123F ACP DISCUSS/DSCN MKR DOCD: CPT | Performed by: FAMILY MEDICINE

## 2020-10-12 ASSESSMENT — PATIENT HEALTH QUESTIONNAIRE - PHQ9
SUM OF ALL RESPONSES TO PHQ QUESTIONS 1-9: 0
SUM OF ALL RESPONSES TO PHQ9 QUESTIONS 1 & 2: 0
1. LITTLE INTEREST OR PLEASURE IN DOING THINGS: 0
2. FEELING DOWN, DEPRESSED OR HOPELESS: 0
SUM OF ALL RESPONSES TO PHQ QUESTIONS 1-9: 0

## 2020-10-12 ASSESSMENT — LIFESTYLE VARIABLES: HOW OFTEN DO YOU HAVE A DRINK CONTAINING ALCOHOL: 0

## 2020-10-12 NOTE — PROGRESS NOTES
Vaccine Information Sheet, \"Influenza - Inactivated\"  given to Leveda Speaker, or parent/legal guardian of  Leveda Speaker and verbalized understanding. Patient responses:    Have you ever had a reaction to a flu vaccine? No  Do you have any current illness? No  Have you ever had Guillian Slaton Syndrome? No  Do you have a serious allergy to any of the follow: Neomycin, Polymyxin, Thimerosal, eggs or egg products? No    Flu vaccine given per order. Please see immunization tab. Risks and benefits explained. Current VIS given. Medicare Annual Wellness Visit  Name: Wendy oJnes Date: 10/12/2020   MRN: <[id number]> Sex: Male   Age: [de-identified] y.o. Ethnicity: Non-/Non    : 1940 Race: Lele Zamora is here for Medicare AWV (AWV)    Screenings for behavioral, psychosocial and functional/safety risks, and cognitive dysfunction are all negative except as indicated below. These results, as well as other patient data from the [address] form, are documented in Flowsheets linked to this Encounter. Allergies   Allergen Reactions    Penicillins Hives    Avelox [Moxifloxacin Hcl In Nacl] Itching, Swelling and Rash       Prior to Visit Medications    Medication Sig Taking?  Authorizing Provider   levothyroxine (SYNTHROID) 112 MCG tablet Take 2 tablets by mouth every day Yes Jeromy Salvador DO   lisinopril (PRINIVIL;ZESTRIL) 20 MG tablet Take 1 tablet by mouth 2 times daily Yes Jeromy Salvador DO   hydroCHLOROthiazide (HYDRODIURIL) 25 MG tablet TAKE 1 TABLET BY MOUTH TWICE A DAY Yes Jeromy Salvador DO   atorvastatin (LIPITOR) 40 MG tablet Take 1 tablet by mouth nightly Yes Jeromy Salvador DO   omeprazole (PRILOSEC) 40 MG delayed release capsule Take 1 capsule by mouth every morning (before breakfast) Yes Jeromy Salvador DO   magnesium oxide (MAG-OX) 400 MG tablet Take 400 mg by mouth daily Yes Historical Provider, MD BONIFACIO COOK by Combination route Yes Historical Provider, MD   metoprolol tartrate (LOPRESSOR) 25 MG tablet TAKE 1 TABLET BY MOUTH TWICE A DAY Yes Jeromy Salvador DO   hydroxychloroquine (PLAQUENIL) 200 MG tablet Take 200 mg by mouth 2 times daily Yes Historical Provider, MD   Ergocalciferol (VITAMIN D2 PO) Take 1.25 mg by mouth once a week Yes Historical Provider, MD   acetaminophen (TYLENOL 8 HOUR ARTHRITIS PAIN) 650 MG extended release tablet Take 650 mg by mouth every 8 hours as needed for Pain Yes Historical Provider, MD   buPROPion (WELLBUTRIN SR) 150 MG extended release tablet TAKE 1 TABLET BY MOUTH EVERY DAY  Patient not taking: Reported on 10/12/2020  Jeromy Salvador DO   clarithromycin (BIAXIN) 500 MG tablet   Historical Provider, MD   dextromethorphan-guaiFENesin ([address] DM)  MG per extended release tablet Take 1 tablet by mouth every 12 hours as needed  Historical Provider, MD   diclofenac sodium 1 % GEL Apply 2 g topically 2 times daily  Historical Provider, MD   azelastine (ASTELIN) 0.1 % nasal spray 1 SPRAY BY NASAL ROUTE 2 TIMES DAILY USE IN EACH NOSTRIL AS DIRECTED  Patient not taking: Reported on 10/12/2020  Chivo Stout MD   budesonide-formoterol (SYMBICORT) 160-4.5 MCG/ACT AERO Inhale 2 puffs into the lungs 2 times daily  Patient taking differently: Inhale 2 puffs into the lungs as needed   Arpita Koenig MD       Past Medical History:   Diagnosis Date    [facility] ([facility] 11/2/2017    CAD (coronary artery disease) 8/1/2015    Cancer ([facility]     PROSTATE    Chronic dCHF (grade 1 LVDD) 11/2/2017    Deafness     Diverticulitis     Diverticulosis     Fatigue     Hepatitis     PT NOT SURE WHAT KIND, HE HAD YEARS AGO    Hyperlipidemia     Hypertension     Hypothyroidism     Joint pain     Lumbar radiculopathy  R AND L  5/11/2018    Lumbar stenosis with neurogenic claudication  severe L23 TO L5S1 , WORSE L45      Medical history reviewed with no changes     Numbness and tingling     HANDS AND FEET    OA (osteoarthritis) of hip 7/21/2015    Obesity (BMI 30-39.9) 11/3/2017    Other disorders of kidney and ureter     Rheumatoid aortitis     Shortness of breath     Spinal stenosis, lumbar region, with neurogenic claudication     Type 2 diabetes mellitus without complication, without long-term current use of insulin ([address] 9/9/2016    Weakness        Past Surgical History:   Procedure Laterality Date    ABDOMEN SURGERY  2/7/12    abdominal wall exploration,w/ mesh;lysis of adhesions    BACK SURGERY  05/2018    LSP    COLON SURGERY      COLONOSCOPY  03/26/2015    diverticulosis    CORONARY ANGIOPLASTY WITH STENT PLACEMENT  7/31/2015    ELBOW SURGERY      bilat    HERNIA REPAIR      HIP SURGERY Left     JOINT REPLACEMENT Right 07/05/2016    Right total knee replacement    KNEE SURGERY      LUMBAR LAMINECTOMY  05/11/2018    LUMBAR LAMINECTOMIES , BILATERAL LAMINOTOMIES L23 TO L5S1, BONE GRAFTING FOR FUSION L23 TO L5S1 , INST FUSION STABILIZATION REDUCTION OF SPONDYLOLISTHESIS L34 AND L45 , AUTOGRAFT , ALLOGRAFT     OTHER SURGICAL HISTORY  01/09/2013    excision of suture granuloma on the abdomen    OTHER SURGICAL HISTORY Right     excision of cyst on thigh    NV INCIS HIP ADDUCTORS,OPEN Left 11/5/2018    LEFT HIP ABDUCTOR FASCIOTOMY, GREATER TROCHANTERIC BURSECTOMY, PIRIFORMIS RELEASE performed by Tamera Mitchell MD at [address] [place]      right    UPPER GASTROINTESTINAL ENDOSCOPY Bilateral 01/11/2020    UPPER GASTROINTESTINAL ENDOSCOPY N/A 1/11/2020    EGD BIOPSY performed by Thony Randall DO at [address]  1/11/2020    EGD CONTROL HEMORRHAGE performed by Thony Randall DO at [facility]         Family History   Problem Relation Age of Onset    Heart Disease Neg Hx        CareTeam (Including outside providers/suppliers regularly involved in providing care): Patient Care Team:  Rodolfo Bill DO as PCP - General  Rodolfo Bill DO as PCP - [facility]  Mauricio Toro MD as Consulting Physician (Orthopedic Surgery)  Ruiz Luna MD as Consulting Physician (Cardiology)  Mildred Britt MD (Orthopedic Surgery)  Spencer Long MD (Orthopedic Surgery)    Wt Readings from Last 3 Encounters:   10/12/20 246 lb 4 oz (111.7 kg)   06/25/20 244 lb 3.2 oz (110.8 kg)   06/01/20 236 lb (107 kg)     Vitals:    10/12/20 1019   BP: 130/70   Site: Left Upper Arm   Pulse: 59   Temp: 97.6 °F (36.4 °C)   SpO2: 94%   Weight: 246 lb 4 oz (111.7 kg)   Height: 5' 11\" (1.803 m)     Body mass index is 34.34 kg/m². Based upon direct observation of the patient, evaluation of cognition reveals recent and remote memory intact. Patient's complete Health Risk Assessment and screening values have been reviewed and are found in Flowsheets. The following problems were reviewed today and where indicated follow up appointments were made and/or referrals ordered.     Positive Risk Factor Screenings with Interventions:     Health Habits/Nutrition:  Health Habits/Nutrition  Do you exercise for at least 20 minutes 2-3 times per week?: Yes  Have you lost any weight without trying in the past 3 months?: No  Do you eat fewer than 2 meals per day?: No  Have you seen a dentist within the past year?: Yes  Body mass index: (!) 34.34  Health Habits/Nutrition Interventions:  · Nutritional issues:  educational materials for healthy, well-balanced diet provided    Personalized Preventive Plan   Current Health Maintenance Status  Immunization History   Administered Date(s) Administered    Influenza Vaccine, unspecified formulation 09/28/2015    Influenza Virus Vaccine 09/01/2014    Influenza Whole 10/18/2011, 09/01/2014    Influenza, High Dose (Fluzone 65 yrs and older) 11/21/2016, 10/12/2017, 10/04/2018    Influenza, Triv, inactivated, subunit, adjuvanted, IM (Fluad 65 yrs and older) 10/07/2019    Pneumococcal Conjugate 13-valent ([id number]) 10/12/2017    Pneumococcal Conjugate 7-valent (Prevnar7) 02/07/2010    Pneumococcal Polysaccharide ([id number]) 11/21/2016    Tdap (Boostrix, Adacel) 10/08/2019    Zoster Live (Zostavax) 11/21/2014        Health Maintenance   Topic Date Due    Shingles Vaccine (2 of 3) 01/16/2015    Annual Wellness Visit (AWV)  12/18/2019    Flu vaccine (1) 09/01/2020    Lipid screen  06/25/2021    Potassium monitoring  06/25/2021    Creatinine monitoring  06/25/2021    DTaP/Tdap/Td vaccine (2 - Td) 10/08/2029    Pneumococcal 65+ years Vaccine  Completed    Hepatitis A vaccine  Aged Out    Hib vaccine  Aged Out    Meningococcal (ACWY) vaccine  Aged Out     Recommendations for [employer] Due: see orders and patient instructions/AVS.  . Recommended screening schedule for the next 5-10 years is provided to the patient in written form: see Patient Instructions/AVS.    Milton Camacho LPN, [id number], performed the documented evaluation under the direct supervision of the attending physician.

## 2020-10-12 NOTE — PATIENT INSTRUCTIONS
Personalized Preventive Plan for Alexander Castillo [facility] - 10/12/2020  Medicare offers a range of preventive health benefits. Some of the tests and screenings are paid in full while other may be subject to a deductible, co-insurance, and/or copay. Some of these benefits include a comprehensive review of your medical history including lifestyle, illnesses that may run in your family, and various assessments and screenings as appropriate. After reviewing your medical record and screening and assessments performed today your provider may have ordered immunizations, labs, imaging, and/or referrals for you. A list of these orders (if applicable) as well as your Preventive Care list are included within your After Visit Summary for your review. Other Preventive Recommendations:    · A preventive eye exam performed by an eye specialist is recommended every 1-2 years to screen for glaucoma; cataracts, macular degeneration, and other eye disorders. · A preventive dental visit is recommended every 6 months. · Try to get at least 150 minutes of exercise per week or 10,000 steps per day on a pedometer . · Order or download the FREE \"Exercise & Physical Activity: Your Everyday Guide\" from [employer]. Call 8-935.768.4129 or search [employer] online. · You need 9417-6428 mg of calcium and 0332-8311 IU of vitamin D per day. It is possible to meet your calcium requirement with diet alone, but a vitamin D supplement is usually necessary to meet this goal.  · When exposed to the sun, use a sunscreen that protects against both UVA and UVB radiation with an SPF of 30 or greater. Reapply every 2 to 3 hours or after sweating, drying off with a towel, or swimming. · Always wear a seat belt when traveling in a car. Always wear a helmet when riding a bicycle or motorcycle.

## 2020-12-31 RX ORDER — BUPROPION HYDROCHLORIDE 150 MG/1
TABLET, EXTENDED RELEASE ORAL
Qty: 90 TABLET | Refills: 1 | Status: SHIPPED | OUTPATIENT
Start: 2020-12-31 | End: 2021-06-23

## 2020-12-31 RX ORDER — OMEPRAZOLE 40 MG/1
CAPSULE, DELAYED RELEASE ORAL
Qty: 90 CAPSULE | Refills: 1 | Status: SHIPPED | OUTPATIENT
Start: 2020-12-31 | End: 2021-06-23

## 2020-12-31 NOTE — TELEPHONE ENCOUNTER
Refill Request     Last Seen: 10/12/2020    Last Written: 7/20/2020    Next Appointment:   No future appointments.           Requested Prescriptions     Pending Prescriptions Disp Refills    buPROPion (WELLBUTRIN SR) 150 MG extended release tablet [Pharmacy Med Name: BUPROPION HCL  MG TABLET] 90 tablet 1     Sig: TAKE 1 TABLET BY MOUTH EVERY DAY    omeprazole (PRILOSEC) 40 MG delayed release capsule [Pharmacy Med Name: OMEPRAZOLE DR 40 MG CAPSULE] 90 capsule 1     Sig: TAKE 1 CAPSULE BY MOUTH EVERY DAY IN THE MORNING BEFORE BREAKFAST

## 2021-04-05 ENCOUNTER — OFFICE VISIT (OUTPATIENT)
Dept: FAMILY MEDICINE CLINIC | Age: 81
End: 2021-04-05
Payer: MEDICARE

## 2021-04-05 VITALS
WEIGHT: 251.6 LBS | TEMPERATURE: 97.5 F | OXYGEN SATURATION: 95 % | DIASTOLIC BLOOD PRESSURE: 75 MMHG | SYSTOLIC BLOOD PRESSURE: 140 MMHG | BODY MASS INDEX: 35.22 KG/M2 | HEIGHT: 71 IN | HEART RATE: 68 BPM

## 2021-04-05 DIAGNOSIS — K21.00 GASTROESOPHAGEAL REFLUX DISEASE WITH ESOPHAGITIS WITHOUT HEMORRHAGE: ICD-10-CM

## 2021-04-05 DIAGNOSIS — E03.9 HYPOTHYROIDISM, UNSPECIFIED TYPE: ICD-10-CM

## 2021-04-05 DIAGNOSIS — Z12.5 SPECIAL SCREENING FOR MALIGNANT NEOPLASM OF PROSTATE: ICD-10-CM

## 2021-04-05 DIAGNOSIS — R41.3 MEMORY DIFFICULTIES: ICD-10-CM

## 2021-04-05 DIAGNOSIS — Z00.00 ROUTINE GENERAL MEDICAL EXAMINATION AT A HEALTH CARE FACILITY: ICD-10-CM

## 2021-04-05 DIAGNOSIS — I10 ESSENTIAL HYPERTENSION: Primary | ICD-10-CM

## 2021-04-05 LAB
A/G RATIO: 1.6 (ref 1.1–2.2)
ALBUMIN SERPL-MCNC: 4.2 G/DL (ref 3.4–5)
ALP BLD-CCNC: 85 U/L (ref 40–129)
ALT SERPL-CCNC: 11 U/L (ref 10–40)
ANION GAP SERPL CALCULATED.3IONS-SCNC: 12 MMOL/L (ref 3–16)
AST SERPL-CCNC: 12 U/L (ref 15–37)
BASOPHILS ABSOLUTE: 0.1 K/UL (ref 0–0.2)
BASOPHILS RELATIVE PERCENT: 1.2 %
BILIRUB SERPL-MCNC: 0.4 MG/DL (ref 0–1)
BUN BLDV-MCNC: 22 MG/DL (ref 7–20)
CALCIUM SERPL-MCNC: 9.2 MG/DL (ref 8.3–10.6)
CHLORIDE BLD-SCNC: 103 MMOL/L (ref 99–110)
CHOLESTEROL, TOTAL: 134 MG/DL (ref 0–199)
CO2: 28 MMOL/L (ref 21–32)
CREAT SERPL-MCNC: 1.4 MG/DL (ref 0.8–1.3)
EOSINOPHILS ABSOLUTE: 0.2 K/UL (ref 0–0.6)
EOSINOPHILS RELATIVE PERCENT: 2.7 %
FOLATE: 10.18 NG/ML (ref 4.78–24.2)
GFR AFRICAN AMERICAN: 59
GFR NON-AFRICAN AMERICAN: 49
GLOBULIN: 2.7 G/DL
GLUCOSE BLD-MCNC: 138 MG/DL (ref 70–99)
HBA1C MFR BLD: 6.4 %
HCT VFR BLD CALC: 41.4 % (ref 40.5–52.5)
HDLC SERPL-MCNC: 30 MG/DL (ref 40–60)
HEMOGLOBIN: 13.9 G/DL (ref 13.5–17.5)
LDL CHOLESTEROL CALCULATED: 61 MG/DL
LYMPHOCYTES ABSOLUTE: 1.7 K/UL (ref 1–5.1)
LYMPHOCYTES RELATIVE PERCENT: 20.5 %
MCH RBC QN AUTO: 30.9 PG (ref 26–34)
MCHC RBC AUTO-ENTMCNC: 33.5 G/DL (ref 31–36)
MCV RBC AUTO: 92.1 FL (ref 80–100)
MONOCYTES ABSOLUTE: 0.6 K/UL (ref 0–1.3)
MONOCYTES RELATIVE PERCENT: 7.3 %
NEUTROPHILS ABSOLUTE: 5.7 K/UL (ref 1.7–7.7)
NEUTROPHILS RELATIVE PERCENT: 68.3 %
PDW BLD-RTO: 14.1 % (ref 12.4–15.4)
PLATELET # BLD: 340 K/UL (ref 135–450)
PMV BLD AUTO: 7.7 FL (ref 5–10.5)
POTASSIUM SERPL-SCNC: 4.3 MMOL/L (ref 3.5–5.1)
PROSTATE SPECIFIC ANTIGEN: 0.03 NG/ML (ref 0–4)
RBC # BLD: 4.49 M/UL (ref 4.2–5.9)
SODIUM BLD-SCNC: 143 MMOL/L (ref 136–145)
TOTAL PROTEIN: 6.9 G/DL (ref 6.4–8.2)
TRIGL SERPL-MCNC: 214 MG/DL (ref 0–150)
TSH SERPL DL<=0.05 MIU/L-ACNC: 2.85 UIU/ML (ref 0.27–4.2)
VITAMIN B-12: 396 PG/ML (ref 211–911)
VLDLC SERPL CALC-MCNC: 43 MG/DL
WBC # BLD: 8.3 K/UL (ref 4–11)

## 2021-04-05 PROCEDURE — G0009 ADMIN PNEUMOCOCCAL VACCINE: HCPCS | Performed by: FAMILY MEDICINE

## 2021-04-05 PROCEDURE — 4040F PNEUMOC VAC/ADMIN/RCVD: CPT | Performed by: FAMILY MEDICINE

## 2021-04-05 PROCEDURE — 90732 PPSV23 VACC 2 YRS+ SUBQ/IM: CPT | Performed by: FAMILY MEDICINE

## 2021-04-05 PROCEDURE — G8427 DOCREV CUR MEDS BY ELIG CLIN: HCPCS | Performed by: FAMILY MEDICINE

## 2021-04-05 PROCEDURE — G8417 CALC BMI ABV UP PARAM F/U: HCPCS | Performed by: FAMILY MEDICINE

## 2021-04-05 PROCEDURE — 99214 OFFICE O/P EST MOD 30 MIN: CPT | Performed by: FAMILY MEDICINE

## 2021-04-05 PROCEDURE — 83036 HEMOGLOBIN GLYCOSYLATED A1C: CPT | Performed by: FAMILY MEDICINE

## 2021-04-05 PROCEDURE — 1036F TOBACCO NON-USER: CPT | Performed by: FAMILY MEDICINE

## 2021-04-05 PROCEDURE — 1123F ACP DISCUSS/DSCN MKR DOCD: CPT | Performed by: FAMILY MEDICINE

## 2021-04-05 ASSESSMENT — PATIENT HEALTH QUESTIONNAIRE - PHQ9
SUM OF ALL RESPONSES TO PHQ9 QUESTIONS 1 & 2: 0
2. FEELING DOWN, DEPRESSED OR HOPELESS: 0
SUM OF ALL RESPONSES TO PHQ QUESTIONS 1-9: 0
SUM OF ALL RESPONSES TO PHQ QUESTIONS 1-9: 0

## 2021-04-05 ASSESSMENT — ENCOUNTER SYMPTOMS
COUGH: 0
SHORTNESS OF BREATH: 0
CONSTIPATION: 0
ABDOMINAL PAIN: 0
CHEST TIGHTNESS: 0
BACK PAIN: 1
BLOOD IN STOOL: 0

## 2021-04-05 NOTE — PROGRESS NOTES
Subjective:      Patient ID: Xiang Luna is a 80 y.o. male. HPI  Patient in for 6-month checkup on various medical issues. Hypertensionblood pressure 140/80 or below when he checks at home or elsewhere. Heartburnunder control with medication. Due for lab work today. His osteoarthritis is his major problem but he does stay really active during the day unfortunately since moving slower than he would like tohe does see a rheumatologist.  He and his wife are asking about Preva gin for memory. He denies any other issues to discuss. Overall he states he is doing fairly well considering his multiple joint problems. Prior to Visit Medications :  Medication Apoaequorin (PREVAGEN PO), Sig Take by mouth, Taking? Yes, Authorizing Provider Historical Provider, MD    Medication lisinopril (PRINIVIL;ZESTRIL) 20 MG tablet, Sig TAKE 1 TABLET BY MOUTH TWICE A DAY, Taking? Yes, Authorizing Provider Jeromy Salvador, DO    Medication levothyroxine (SYNTHROID) 112 MCG tablet, Sig TAKE 2 TABLETS BY MOUTH EVERY DAY, Taking? Yes, Authorizing Provider Jeromy Salvador, DO    Medication hydroCHLOROthiazide (HYDRODIURIL) 25 MG tablet, Sig TAKE 1 TABLET BY MOUTH TWICE A DAY, Taking? Yes, Authorizing Provider Jeromy Salvador, DO    Medication buPROPion (WELLBUTRIN SR) 150 MG extended release tablet, Sig TAKE 1 TABLET BY MOUTH EVERY DAY, Taking? Yes, Authorizing Provider Jeromy Salvador, DO    Medication omeprazole (PRILOSEC) 40 MG delayed release capsule, Sig TAKE 1 CAPSULE BY MOUTH EVERY DAY IN THE MORNING BEFORE BREAKFAST, Taking? Yes, Authorizing Provider Jeromy Salvador, DO    Medication metoprolol tartrate (LOPRESSOR) 25 MG tablet, Sig TAKE 1 TABLET BY MOUTH TWICE A DAY, Taking? Yes, Authorizing Provider Jeromy Salvador, DO    Medication atorvastatin (LIPITOR) 40 MG tablet, Sig Take 1 tablet by mouth nightly, Taking? Yes, Authorizing Provider Jeromy Salvador, DO    Medication clarithromycin (BIAXIN) 500 MG tablet, Sig , Taking?  Yes, Authorizing Provider Historical MD Dinah    Medication magnesium oxide (MAG-OX) 400 MG tablet, Sig Take 400 mg by mouth daily, Taking? Yes, Authorizing Provider Historical Provider, MD    Medication [place], Sig by Combination route, Taking? Yes, Authorizing Provider Historical MD Dinah    Medication dextromethorphan-guaiFENesin (MUCINEX DM)  MG per extended release tablet, Sig Take 1 tablet by mouth every 12 hours as needed, Taking? Yes, Authorizing Provider Historical MD Dinah    Medication diclofenac sodium 1 % GEL, Sig Apply 2 g topically 2 times daily, Taking? Yes, Authorizing Provider Historical MD Dinah    Medication hydroxychloroquine (PLAQUENIL) 200 MG tablet, Sig Take 200 mg by mouth 2 times daily, Taking? Yes, Authorizing Provider Historical MD Dinah    Medication Ergocalciferol (VITAMIN D2 PO), Sig Take 1.25 mg by mouth once a week, Taking? Yes, Authorizing Provider Historical MD Dinah    Medication acetaminophen (TYLENOL 8 HOUR ARTHRITIS PAIN) 650 MG extended release tablet, Sig Take 650 mg by mouth every 8 hours as needed for Pain, Taking?  Yes, Authorizing Provider Historical MD Dinah    Medication azelastine (ASTELIN) 0.1 % nasal spray, Sig 1 SPRAY BY NASAL ROUTE 2 TIMES DAILY USE IN EACH NOSTRIL AS DIRECTED  Patient not taking: Reported on 10/12/2020, Taking? , Authorizing Provider Cornelius Venegas MD    Medication budesonide-formoterol (SYMBICORT) 160-4.5 MCG/ACT AERO, Sig Inhale 2 puffs into the lungs 2 times daily  Patient taking differently: Inhale 2 puffs into the lungs as needed , Taking? , Authorizing Provider Zulay Lamar MD      Past Medical History:  11/2/2017: BRYON (acute [facility]) ([facility]  8/1/2015: CAD (coronary artery disease)  No date: Cancer [facility])      Comment:  PROSTATE  11/2/2017: Chronic dCHF (grade 1 LVDD)  No date: Deafness  No date: Diverticulitis  No date: Diverticulosis  No date: Fatigue  No date: Hepatitis      Comment:  PT NOT SURE WHAT KIND, HE HAD YEARS AGO  No date: Hyperlipidemia  No date: Hypertension  No date: Hypothyroidism  No date: Joint pain  5/11/2018: Lumbar radiculopathy  R AND L   No date: Lumbar stenosis with neurogenic claudication  severe L23 TO   L5S1 , WORSE L45   No date: Medical history reviewed with no changes  No date: Numbness and tingling      Comment:  HANDS AND FEET  7/21/2015: OA (osteoarthritis) of hip  11/3/2017: Obesity (BMI 30-39. 9)  No date: Other disorders of kidney and ureter  No date: Rheumatoid aortitis  No date: Shortness of breath  No date: Spinal stenosis, lumbar region, with neurogenic claudication  9/9/2016: Type 2 diabetes mellitus without complication, without long-  term current use of insulin (HCC)  No date: Weakness        Review of Systems    Review of Systems   Constitutional: Negative for unexpected weight change. HENT: Negative for congestion and postnasal drip. Eyes: Negative for visual disturbance. Respiratory: Negative for cough, chest tightness and shortness of breath. Cardiovascular: Negative for chest pain, palpitations and leg swelling. Gastrointestinal: Negative for abdominal pain, blood in stool and constipation. Genitourinary: Positive for frequency. Negative for dysuria and hematuria. Musculoskeletal: Positive for arthralgias, back pain, neck pain and neck stiffness. Negative for myalgias. Skin: Negative for rash. Neurological: Negative for tremors and headaches. Psychiatric/Behavioral: Negative for sleep disturbance. The patient is not nervous/anxious. Objective:   Physical Exam      Physical Exam  Constitutional:       Appearance: Normal appearance. He is well-developed. HENT:      Head: Normocephalic. Mouth/Throat:      Mouth: Mucous membranes are moist.      Pharynx: Oropharynx is clear. Eyes:      Conjunctiva/sclera: Conjunctivae normal.   Neck:      Musculoskeletal: Neck supple. Thyroid: No thyromegaly. Vascular: No carotid bruit. Cardiovascular:      Rate and Rhythm: Normal rate and regular rhythm. Pulses: Normal pulses. Heart sounds: Normal heart sounds. Pulmonary:      Effort: Pulmonary effort is normal.      Breath sounds: Normal breath sounds. Abdominal:      General: There is no distension. Palpations: Abdomen is soft. There is no mass. Tenderness: There is no abdominal tenderness. Musculoskeletal:      Right lower leg: No edema. Left lower leg: No edema. Comments: Mild to moderate decrease in range of motion of most joints especially the dorsal spine. Lymphadenopathy:      Cervical: No cervical adenopathy. Skin:     General: Skin is warm and dry. Neurological:      Mental Status: He is alert and oriented to person, place, and time. Gait: Gait abnormal.   Psychiatric:         Mood and Affect: Mood normal.         Behavior: Behavior normal.         Thought Content: Thought content normal.         Judgment: Judgment normal.         Assessment:       Diagnosis Orders   1. Essential hypertension  CBC Auto Differential    Comprehensive Metabolic Panel    Lipid Panel   2. Hypothyroidism, unspecified type  TSH without Reflex   3. Gastroesophageal reflux disease with esophagitis without hemorrhage     4. Routine general medical examination at a health care facility  POCT glycosylated hemoglobin (Hb A1C)   5. Special screening for malignant neoplasm of prostate  PSA screening   6. Memory difficulties  Vitamin B12 & Folate         Plan:      Louisa Ho was seen today for 6 month follow-up and medication refill. Diagnoses and all orders for this visit:    Essential hypertension  -     CBC Auto Differential  -     Comprehensive Metabolic Panel  -     Lipid Panel  Continue medications and no added salt dietwork on keeping weight down by reducing carbohydrates and increasing activity as tolerated. Lab today.   Hypothyroidism, unspecified type  -     TSH without Reflex  Lab today  Gastroesophageal reflux disease with esophagitis without hemorrhage  Continue medicationslimit caffeine and preferably no alcohol. Routine general medical examination at a health care facility  -     POCT glycosylated hemoglobin (Hb A1C)    Special screening for malignant neoplasm of prostate  -     PSA screening  Lab today  Memory difficulties  -     Vitamin B12 & Folate  Lab today  Other orders  -     PNEUMOVAX 23 subcutaneous/IM (Pneumococcal polysaccharide vaccine 23-valent >= 3yo)  Update on pneumonia injections. This is been a 30 to 35-minute visit with the patient and either prior to or during the visit chart was checked for colonoscopy labs and immunizations.     See me 6 months          Jeromy Salvador, DO

## 2021-04-05 NOTE — PATIENT INSTRUCTIONS
Essential hypertension  -     CBC Auto Differential  -     Comprehensive Metabolic Panel  -     Lipid Panel  Continue medications and no added salt dietwork on keeping weight down by reducing carbohydrates and increasing activity as tolerated. Lab today. Hypothyroidism, unspecified type  -     TSH without Reflex  Lab today  Gastroesophageal reflux disease with esophagitis without hemorrhage  Continue medicationslimit caffeine and preferably no alcohol. Routine general medical examination at a health care facility  -     POCT glycosylated hemoglobin (Hb A1C)    Special screening for malignant neoplasm of prostate  -     PSA screening  Lab today  Memory difficulties  -     Vitamin B12 & Folate  Lab today  Other orders  -     PNEUMOVAX 23 subcutaneous/IM (Pneumococcal polysaccharide vaccine 23-valent >= 3yo)  Update on pneumonia injections. This is been a 30 to 35-minute visit with the patient and either prior to or during the visit chart was checked for colonoscopy labs and immunizations.     See me 6 months          Jeromy Salvador, DO

## 2021-08-24 RX ORDER — CEPHALEXIN 500 MG/1
TABLET ORAL
Qty: 12 TABLET | Refills: 0 | Status: SHIPPED | OUTPATIENT
Start: 2021-08-24 | End: 2021-10-15

## 2021-09-16 NOTE — TELEPHONE ENCOUNTER
Refill Request     Last Seen: Last Seen Department: 4/5/2021  Last Seen by PCP: 4/5/2021    Last Written: 10/13/2020    Next Appointment:   No future appointments. Message to [employer] to schedule appointment.     6 months (around 10/5/2021) for HTN,         Requested Prescriptions     Pending Prescriptions Disp Refills    metoprolol tartrate (LOPRESSOR) 25 MG tablet [Pharmacy Med Name: METOPROLOL TARTRATE 25 MG TAB] 180 tablet 3     Sig: TAKE 1 TABLET BY MOUTH TWICE A DAY

## 2021-10-15 ENCOUNTER — OFFICE VISIT (OUTPATIENT)
Dept: FAMILY MEDICINE CLINIC | Age: 81
End: 2021-10-15
Payer: MEDICARE

## 2021-10-15 VITALS
BODY MASS INDEX: 34.16 KG/M2 | HEIGHT: 71 IN | WEIGHT: 244 LBS | DIASTOLIC BLOOD PRESSURE: 64 MMHG | OXYGEN SATURATION: 95 % | SYSTOLIC BLOOD PRESSURE: 130 MMHG | HEART RATE: 55 BPM

## 2021-10-15 DIAGNOSIS — E78.5 HYPERLIPIDEMIA, UNSPECIFIED HYPERLIPIDEMIA TYPE: Chronic | ICD-10-CM

## 2021-10-15 DIAGNOSIS — I50.32 CHRONIC DIASTOLIC HEART FAILURE (HCC): ICD-10-CM

## 2021-10-15 DIAGNOSIS — E03.9 HYPOTHYROIDISM, UNSPECIFIED TYPE: ICD-10-CM

## 2021-10-15 DIAGNOSIS — F32.A DEPRESSION, UNSPECIFIED DEPRESSION TYPE: ICD-10-CM

## 2021-10-15 DIAGNOSIS — I10 PRIMARY HYPERTENSION: Primary | Chronic | ICD-10-CM

## 2021-10-15 DIAGNOSIS — J41.0 SIMPLE CHRONIC BRONCHITIS (HCC): ICD-10-CM

## 2021-10-15 PROBLEM — A41.9 SEPSIS (HCC): Status: RESOLVED | Noted: 2017-11-02 | Resolved: 2021-10-15

## 2021-10-15 PROBLEM — I95.9 HYPOTENSION: Status: RESOLVED | Noted: 2017-11-03 | Resolved: 2021-10-15

## 2021-10-15 PROCEDURE — G8926 SPIRO NO PERF OR DOC: HCPCS | Performed by: PHYSICIAN ASSISTANT

## 2021-10-15 PROCEDURE — G8427 DOCREV CUR MEDS BY ELIG CLIN: HCPCS | Performed by: PHYSICIAN ASSISTANT

## 2021-10-15 PROCEDURE — 3023F SPIROM DOC REV: CPT | Performed by: PHYSICIAN ASSISTANT

## 2021-10-15 PROCEDURE — G8417 CALC BMI ABV UP PARAM F/U: HCPCS | Performed by: PHYSICIAN ASSISTANT

## 2021-10-15 PROCEDURE — G8484 FLU IMMUNIZE NO ADMIN: HCPCS | Performed by: PHYSICIAN ASSISTANT

## 2021-10-15 PROCEDURE — 99214 OFFICE O/P EST MOD 30 MIN: CPT | Performed by: PHYSICIAN ASSISTANT

## 2021-10-15 PROCEDURE — 4040F PNEUMOC VAC/ADMIN/RCVD: CPT | Performed by: PHYSICIAN ASSISTANT

## 2021-10-15 PROCEDURE — 1036F TOBACCO NON-USER: CPT | Performed by: PHYSICIAN ASSISTANT

## 2021-10-15 PROCEDURE — 1123F ACP DISCUSS/DSCN MKR DOCD: CPT | Performed by: PHYSICIAN ASSISTANT

## 2021-10-15 RX ORDER — METHOTREXATE 2.5 MG/1
TABLET ORAL
COMMUNITY
Start: 2021-09-08

## 2021-10-15 SDOH — ECONOMIC STABILITY: TRANSPORTATION INSECURITY
IN THE PAST 12 MONTHS, HAS THE LACK OF TRANSPORTATION KEPT YOU FROM MEDICAL APPOINTMENTS OR FROM GETTING MEDICATIONS?: NO

## 2021-10-15 SDOH — ECONOMIC STABILITY: TRANSPORTATION INSECURITY
IN THE PAST 12 MONTHS, HAS LACK OF TRANSPORTATION KEPT YOU FROM MEETINGS, WORK, OR FROM GETTING THINGS NEEDED FOR DAILY LIVING?: NO

## 2021-10-15 SDOH — ECONOMIC STABILITY: HOUSING INSECURITY
IN THE LAST 12 MONTHS, WAS THERE A TIME WHEN YOU DID NOT HAVE A STEADY PLACE TO SLEEP OR SLEPT IN A SHELTER (INCLUDING NOW)?: NO

## 2021-10-15 SDOH — ECONOMIC STABILITY: FOOD INSECURITY: WITHIN THE PAST 12 MONTHS, THE FOOD YOU BOUGHT JUST DIDN'T LAST AND YOU DIDN'T HAVE MONEY TO GET MORE.: NEVER TRUE

## 2021-10-15 SDOH — ECONOMIC STABILITY: FOOD INSECURITY: WITHIN THE PAST 12 MONTHS, YOU WORRIED THAT YOUR FOOD WOULD RUN OUT BEFORE YOU GOT MONEY TO BUY MORE.: NEVER TRUE

## 2021-10-15 SDOH — ECONOMIC STABILITY: INCOME INSECURITY: IN THE LAST 12 MONTHS, WAS THERE A TIME WHEN YOU WERE NOT ABLE TO PAY THE MORTGAGE OR RENT ON TIME?: NO

## 2021-10-15 ASSESSMENT — SOCIAL DETERMINANTS OF HEALTH (SDOH): HOW HARD IS IT FOR YOU TO PAY FOR THE VERY BASICS LIKE FOOD, HOUSING, MEDICAL CARE, AND HEATING?: NOT HARD AT ALL

## 2021-10-15 ASSESSMENT — ENCOUNTER SYMPTOMS
COUGH: 0
SHORTNESS OF BREATH: 0
WHEEZING: 0

## 2021-10-15 NOTE — PROGRESS NOTES
Justin Camargo (:  1940) is a 80 y.o. male,Established patient, here for evaluation of the following chief complaint(s):  Hypertension, Hypothyroidism, and Arthritis         ASSESSMENT/PLAN:  1. Primary hypertension      -   Well controlled, continue with current medications. Reviewed lab work from [facility]. Stable kidney function. Follow up with Dr. Timbo Montgomery in 6 motnhs  2. Simple chronic bronchitis (HCC)      -   Stable, follow up with Dr. Timbo Montgomery in 6 months  3. Chronic diastolic heart failure (HCC)      -    Stable, follow up with Dr. Timbo Montgomery in 6 months, continue with current medications  4. Hyperlipidemia, unspecified hyperlipidemia type       -    Reviewed lab work, stable. Labs in 6 months  5. Hypothyroidism, unspecified type      -    Reviewed lab work, stable, follow up in 6 months for lab work  6. Depression, unspecified depression type      -    Well controlled, follow up in 6 months    Return in about 6 months (around 4/15/2022). Subjective   SUBJECTIVE/OBJECTIVE:  HPI  HLD:  Current medication: lipitor  Side effects: denies  Diet: Standard American Diet, smaller portions  Exercise: nothing regular due to pain    Hypothyroidism:  Current medication: levothyroxine 112 mcg  Takes on an empty stomach in the AM  Denies any symptoms of hypothyroidism at this time    HTN:  Current medication: lisinopril, metoprolol, HCTZ  Side effects: none  Home blood pressure readings: always below 140/90  Pt denies any chest pain, shortness of breath, headache or lower extremity edema    Mood:  Current medication: wellbutrin  Per wife, mood is stable. There are no new symptoms  He denies any SI/HI or behavioral issues at this time      Review of Systems   Constitutional: Negative for diaphoresis, fatigue and unexpected weight change. Respiratory: Negative for cough, shortness of breath and wheezing. Cardiovascular: Negative for chest pain, palpitations and leg swelling.    Endocrine: Negative for cold intolerance and heat intolerance. Skin: Negative for pallor. Hematological: Negative for adenopathy. Bruises/bleeds easily. Psychiatric/Behavioral: Positive for dysphoric mood. Negative for agitation, behavioral problems and sleep disturbance. The patient is not nervous/anxious. Objective   Physical Exam  Vitals reviewed. Constitutional:       Appearance: Normal appearance. HENT:      Head: Normocephalic and atraumatic. Neck:      Vascular: No carotid bruit. Cardiovascular:      Rate and Rhythm: Normal rate and regular rhythm. Heart sounds: Normal heart sounds. Pulmonary:      Effort: Pulmonary effort is normal.      Breath sounds: Normal breath sounds. Abdominal:      General: Bowel sounds are normal.   Musculoskeletal:      Right lower leg: No edema. Left lower leg: No edema. Neurological:      Mental Status: He is alert and oriented to person, place, and time. Cranial Nerves: No cranial nerve deficit. An electronic signature was used to authenticate this note.     --TARA Phipps

## 2021-12-01 RX ORDER — OMEPRAZOLE 40 MG/1
CAPSULE, DELAYED RELEASE ORAL
Qty: 90 CAPSULE | Refills: 1 | Status: SHIPPED | OUTPATIENT
Start: 2021-12-01 | End: 2022-05-06

## 2021-12-01 RX ORDER — BUPROPION HYDROCHLORIDE 150 MG/1
TABLET, EXTENDED RELEASE ORAL
Qty: 90 TABLET | Refills: 1 | Status: SHIPPED | OUTPATIENT
Start: 2021-12-01 | End: 2022-05-06

## 2021-12-01 NOTE — TELEPHONE ENCOUNTER
Refill Request     Last Seen: Last Seen Department: 10/15/2021  Last Seen by PCP: 4/5/2021    Last Written: 6/23/2021 for both    Next Appointment:   Future Appointments   Date Time Provider Minh Valdez   4/18/2022  9:00 AM DO YAMILET Mccabe [place] - [facility]       Future appointment scheduled      Requested Prescriptions     Pending Prescriptions Disp Refills    buPROPion (WELLBUTRIN SR) 150 MG extended release tablet [Pharmacy Med Name: BUPROPION HCL  MG TABLET] 90 tablet 1     Sig: TAKE 1 TABLET BY MOUTH EVERY DAY    omeprazole (PRILOSEC) 40 MG delayed release capsule [Pharmacy Med Name: OMEPRAZOLE DR 40 MG CAPSULE] 90 capsule 1     Sig: TAKE 1 CAPSULE BY MOUTH EVERY DAY IN THE MORNING BEFORE BREAKFAST

## 2021-12-02 ENCOUNTER — E-VISIT (OUTPATIENT)
Dept: FAMILY MEDICINE CLINIC | Age: 81
End: 2021-12-02
Payer: MEDICARE

## 2021-12-02 DIAGNOSIS — J01.90 ACUTE BACTERIAL SINUSITIS: Primary | ICD-10-CM

## 2021-12-02 DIAGNOSIS — B96.89 ACUTE BACTERIAL SINUSITIS: Primary | ICD-10-CM

## 2021-12-02 PROCEDURE — 99421 OL DIG E/M SVC 5-10 MIN: CPT | Performed by: NURSE PRACTITIONER

## 2021-12-02 RX ORDER — DOXYCYCLINE HYCLATE 100 MG/1
100 CAPSULE ORAL 2 TIMES DAILY
Qty: 14 CAPSULE | Refills: 0 | Status: SHIPPED | OUTPATIENT
Start: 2021-12-02 | End: 2021-12-09

## 2021-12-02 ASSESSMENT — LIFESTYLE VARIABLES
SMOKING_STATUS: NO, I'M A FORMER SMOKER
PACKS_PER_DAY: 2
SMOKING_YEARS: 30

## 2021-12-02 NOTE — PROGRESS NOTES
Diagnoses and all orders for this visit:    Acute bacterial sinusitis  -     doxycycline hyclate (VIBRAMYCIN) 100 MG capsule; Take 1 capsule by mouth 2 times daily for 7 days      Add mucinex DM to thin secretions.      Increase fluids

## 2022-01-12 NOTE — TELEPHONE ENCOUNTER
.  Refill Request     Last Seen: Last Seen Department: 12/2/2021  Last Seen by PCP: 4/5/2021    Last Written: 8-3-21 180 with 1     Next Appointment:   Future Appointments   Date Time Provider Minh Valdez   4/18/2022  9:00 AM DO YAMILET Mccabe  [place] [facility]       Future appointment scheduled      Requested Prescriptions     Pending Prescriptions Disp Refills    hydroCHLOROthiazide (HYDRODIURIL) 25 MG tablet [Pharmacy Med Name: HYDROCHLOROTHIAZIDE 25 MG TAB] 180 tablet 1     Sig: TAKE 1 TABLET BY MOUTH TWICE A DAY    atorvastatin (LIPITOR) 40 MG tablet [Pharmacy Med Name: ATORVASTATIN 40 MG TABLET] 90 tablet 1     Sig: TAKE 1 TABLET BY MOUTH EVERY DAY AT NIGHT    lisinopril (PRINIVIL;ZESTRIL) 20 MG tablet [Pharmacy Med Name: LISINOPRIL 20 MG TABLET] 180 tablet 1     Sig: TAKE 1 TABLET BY MOUTH TWICE A DAY    levothyroxine (SYNTHROID) 112 MCG tablet [Pharmacy Med Name: LEVOTHYROXINE 112 MCG TABLET] 180 tablet 1     Sig: TAKE 2 TABLETS BY MOUTH EVERY DAY

## 2022-01-13 RX ORDER — LEVOTHYROXINE SODIUM 112 UG/1
TABLET ORAL
Qty: 180 TABLET | Refills: 1 | Status: SHIPPED | OUTPATIENT
Start: 2022-01-13 | End: 2022-06-27

## 2022-01-13 RX ORDER — LISINOPRIL 20 MG/1
TABLET ORAL
Qty: 180 TABLET | Refills: 1 | Status: SHIPPED | OUTPATIENT
Start: 2022-01-13 | End: 2022-06-27

## 2022-01-13 RX ORDER — HYDROCHLOROTHIAZIDE 25 MG/1
TABLET ORAL
Qty: 180 TABLET | Refills: 1 | Status: SHIPPED | OUTPATIENT
Start: 2022-01-13 | End: 2022-06-27

## 2022-01-13 RX ORDER — ATORVASTATIN CALCIUM 40 MG/1
TABLET, FILM COATED ORAL
Qty: 90 TABLET | Refills: 1 | Status: SHIPPED | OUTPATIENT
Start: 2022-01-13 | End: 2022-06-27

## 2022-03-03 ENCOUNTER — TELEMEDICINE (OUTPATIENT)
Dept: FAMILY MEDICINE CLINIC | Age: 82
End: 2022-03-03
Payer: MEDICARE

## 2022-03-03 DIAGNOSIS — Z00.00 MEDICARE ANNUAL WELLNESS VISIT, SUBSEQUENT: Primary | ICD-10-CM

## 2022-03-03 PROCEDURE — 1123F ACP DISCUSS/DSCN MKR DOCD: CPT | Performed by: FAMILY MEDICINE

## 2022-03-03 PROCEDURE — G0439 PPPS, SUBSEQ VISIT: HCPCS | Performed by: FAMILY MEDICINE

## 2022-03-03 PROCEDURE — 4040F PNEUMOC VAC/ADMIN/RCVD: CPT | Performed by: FAMILY MEDICINE

## 2022-03-03 SDOH — HEALTH STABILITY: PHYSICAL HEALTH: ON AVERAGE, HOW MANY DAYS PER WEEK DO YOU ENGAGE IN MODERATE TO STRENUOUS EXERCISE (LIKE A BRISK WALK)?: 2 DAYS

## 2022-03-03 ASSESSMENT — PATIENT HEALTH QUESTIONNAIRE - PHQ9
SUM OF ALL RESPONSES TO PHQ9 QUESTIONS 1 & 2: 0
SUM OF ALL RESPONSES TO PHQ QUESTIONS 1-9: 0
2. FEELING DOWN, DEPRESSED OR HOPELESS: 0
SUM OF ALL RESPONSES TO PHQ QUESTIONS 1-9: 0
1. LITTLE INTEREST OR PLEASURE IN DOING THINGS: 0

## 2022-03-03 ASSESSMENT — LIFESTYLE VARIABLES
HOW OFTEN DO YOU HAVE A DRINK CONTAINING ALCOHOL: 1
HOW OFTEN DO YOU HAVE SIX OR MORE DRINKS ON ONE OCCASION: 1
HOW OFTEN DO YOU HAVE A DRINK CONTAINING ALCOHOL: NEVER

## 2022-03-03 NOTE — PATIENT INSTRUCTIONS
Personalized Preventive Plan for [facility] - 3/3/2022  Medicare offers a range of preventive health benefits. Some of the tests and screenings are paid in full while other may be subject to a deductible, co-insurance, and/or copay. Some of these benefits include a comprehensive review of your medical history including lifestyle, illnesses that may run in your family, and various assessments and screenings as appropriate. After reviewing your medical record and screening and assessments performed today your provider may have ordered immunizations, labs, imaging, and/or referrals for you. A list of these orders (if applicable) as well as your Preventive Care list are included within your After Visit Summary for your review. Other Preventive Recommendations:    · A preventive eye exam performed by an eye specialist is recommended every 1-2 years to screen for glaucoma; cataracts, macular degeneration, and other eye disorders. · A preventive dental visit is recommended every 6 months. · Try to get at least 150 minutes of exercise per week or 10,000 steps per day on a pedometer . · Order or download the FREE \"Exercise & Physical Activity: Your Everyday Guide\" from The [employer]. Call 2-330.906.7558 or search [employer] online. · You need 4718-7473 mg of calcium and 4427-1838 IU of vitamin D per day. It is possible to meet your calcium requirement with diet alone, but a vitamin D supplement is usually necessary to meet this goal.  · When exposed to the sun, use a sunscreen that protects against both UVA and UVB radiation with an SPF of 30 or greater. Reapply every 2 to 3 hours or after sweating, drying off with a towel, or swimming. · Always wear a seat belt when traveling in a car. Always wear a helmet when riding a bicycle or motorcycle. Personalized Preventive Plan for [facility] - 3/3/2022  Medicare offers a range of preventive health benefits. Some of the tests and screenings are paid in full while other may be subject to a deductible, co-insurance, and/or copay. Some of these benefits include a comprehensive review of your medical history including lifestyle, illnesses that may run in your family, and various assessments and screenings as appropriate. After reviewing your medical record and screening and assessments performed today your provider may have ordered immunizations, labs, imaging, and/or referrals for you. A list of these orders (if applicable) as well as your Preventive Care list are included within your After Visit Summary for your review. Other Preventive Recommendations:    A preventive eye exam performed by an eye specialist is recommended every 1-2 years to screen for glaucoma; cataracts, macular degeneration, and other eye disorders. A preventive dental visit is recommended every 6 months. Try to get at least 150 minutes of exercise per week or 10,000 steps per day on a pedometer . Order or download the FREE \"Exercise & Physical Activity: Your Everyday Guide\" from The [employer]. Call 1-496.184.9456 or search [employer] online. You need 4178-0785 mg of calcium and 5404-0304 IU of vitamin D per day. It is possible to meet your calcium requirement with diet alone, but a vitamin D supplement is usually necessary to meet this goal.  When exposed to the sun, use a sunscreen that protects against both UVA and UVB radiation with an SPF of 30 or greater. Reapply every 2 to 3 hours or after sweating, drying off with a towel, or swimming. Always wear a seat belt when traveling in a car. Always wear a helmet when riding a bicycle or motorcycle.

## 2022-03-03 NOTE — PROGRESS NOTES
Medicare Annual Wellness Visit    Moshe Chinchilla is here for Medicare [address] was seen today for medicare awv. Diagnoses and all orders for this visit:    Medicare annual wellness visit, subsequent         Recommendations for Preventive Services Due: see orders and patient instructions/AVS.  Recommended screening schedule for the next 5-10 years is provided to the patient in written form: see Patient Instructions/AVS.     No follow-ups on file. Subjective       Patient's complete Health Risk Assessment and screening values have been reviewed and are found in Flowsheets. The following problems were reviewed today and where indicated follow up appointments were made and/or referrals ordered.     Positive Risk Factor Screenings with Interventions:              Health Habits/Nutrition:     Physical Activity: Unknown    Days of Exercise per Week: 2 days    Minutes of Exercise per Session: Not on file     Have you lost any weight without trying in the past 3 months?: No     Have you seen the dentist within the past year?: Yes    Health Habits/Nutrition Interventions:  · Inadequate physical activity:  patient is not ready to increase his/her physical activity level at this time    Hearing/Vision:  Do you or your family notice any trouble with your hearing that hasn't been managed with hearing aids?: (!) Yes  Do you have difficulty driving, watching TV, or doing any of your daily activities because of your eyesight?: No  Have you had an eye exam within the past year?: Yes  No exam data present    Hearing/Vision Interventions:  · Hearing concerns:  patient declines any further evaluation/treatment for hearing issues            Objective      Patient-Reported Vitals  Patient-Reported Weight: 228lb  Patient-Reported Height: 5' 11\"       Patient does not have blood pressure readings       Allergies   Allergen Reactions    Penicillins Hives    Avelox [Moxifloxacin Hcl In Nacl] Itching, Swelling and Rash     Prior to Visit Medications    Medication Sig Taking? Authorizing Provider   hydroCHLOROthiazide (HYDRODIURIL) 25 MG tablet TAKE 1 TABLET BY MOUTH TWICE A DAY  Lubna Chiu DO   atorvastatin (LIPITOR) 40 MG tablet TAKE 1 TABLET BY MOUTH EVERY DAY AT NIGHT  Lubna Chiu DO   lisinopril (PRINIVIL;ZESTRIL) 20 MG tablet TAKE 1 TABLET BY MOUTH TWICE A DAY  Lubna Chiu DO   levothyroxine (SYNTHROID) 112 MCG tablet TAKE 2 TABLETS BY MOUTH EVERY DAY  Lubna Chiu DO   buPROPion (WELLBUTRIN SR) 150 MG extended release tablet TAKE 1 TABLET BY MOUTH EVERY DAY  Lubna Chiu DO   omeprazole (PRILOSEC) 40 MG delayed release capsule TAKE 1 CAPSULE BY MOUTH EVERY DAY IN THE MORNING BEFORE BREAKFAST  Lubna Chiu DO   methotrexate (RHEUMATREX) 2.5 MG chemo tablet TAKE 8 TABLETS BY MOUTH ONCE A WEEK. Historical Provider, MD   metoprolol tartrate (LOPRESSOR) 25 MG tablet TAKE 1 TABLET BY MOUTH TWICE A DAY  MARQUES Azar - CNP   blood glucose monitor kit and supplies Dispense sufficient amount for indicated testing frequency plus additional to accommodate PRN testing needs. Dispense all needed supplies to include: monitor, strips, lancing device, lancets, control solutions, alcohol swabs. Jeromy Salvador DO   blood glucose monitor strips Test one time  a day & as needed for symptoms of irregular blood glucose. Dispense sufficient amount for indicated testing frequency plus additional to accommodate PRN testing needs.   Jeromy Salvador DO   magnesium oxide (MAG-OX) 400 MG tablet Take 400 mg by mouth daily  Historical Provider, MD [place] by Combination route  Historical Provider, MD   Ergocalciferol (VITAMIN D2 PO) Take 1.25 mg by mouth once a week  Historical Provider, MD   acetaminophen (TYLENOL 8 HOUR ARTHRITIS PAIN) 650 MG extended release tablet Take 650 mg by mouth every 8 hours as needed for Pain  Historical Provider, MD [place] (Including outside providers/suppliers regularly involved in providing care):   Patient Care Team:  Simona Magaña DO as PCP - [address] [place] [address], DO as PCP - [facility]  Lashon Silva MD as Consulting Physician (Orthopedic Surgery)  Yoli Novoa MD as Consulting Physician (Cardiology)  Bria Corona MD (Orthopedic Surgery)  Nader Way MD (Orthopedic Surgery)    Reviewed and updated this visit:  Allergies  Meds            Juwan Sleeper, was evaluated through a synchronous (real-time) audio-video encounter. The patient (or guardian if applicable) is aware that this is a billable service, which includes applicable co-pays. This Virtual Visit was conducted with patient's (and/or legal guardian's) consent. The visit was conducted pursuant to the emergency declaration under the [facility], [address] [place] [address] waiver authority and the HCDC and [employer] General Act. Patient identification was verified, and a caregiver was present when appropriate. The patient was located at home in a state where the provider was licensed to provide care. Dodie Salomon LPN, [birth year], performed the documented evaluation under the direct supervision of the attending physician. This encounter was performed under , [address] [place] [address], [facility], direct supervision, 3/3/2022.

## 2022-03-28 ENCOUNTER — OFFICE VISIT (OUTPATIENT)
Dept: FAMILY MEDICINE CLINIC | Age: 82
End: 2022-03-28
Payer: MEDICARE

## 2022-03-28 VITALS
OXYGEN SATURATION: 96 % | WEIGHT: 234.8 LBS | SYSTOLIC BLOOD PRESSURE: 132 MMHG | BODY MASS INDEX: 32.87 KG/M2 | HEART RATE: 54 BPM | DIASTOLIC BLOOD PRESSURE: 62 MMHG | HEIGHT: 71 IN

## 2022-03-28 DIAGNOSIS — M25.551 HIP PAIN, CHRONIC, RIGHT: Primary | ICD-10-CM

## 2022-03-28 DIAGNOSIS — M79.605 PAIN IN BOTH LOWER EXTREMITIES: ICD-10-CM

## 2022-03-28 DIAGNOSIS — G89.29 HIP PAIN, CHRONIC, RIGHT: Primary | ICD-10-CM

## 2022-03-28 DIAGNOSIS — M79.604 PAIN IN BOTH LOWER EXTREMITIES: ICD-10-CM

## 2022-03-28 PROCEDURE — 1036F TOBACCO NON-USER: CPT | Performed by: FAMILY MEDICINE

## 2022-03-28 PROCEDURE — 99213 OFFICE O/P EST LOW 20 MIN: CPT | Performed by: FAMILY MEDICINE

## 2022-03-28 PROCEDURE — 4040F PNEUMOC VAC/ADMIN/RCVD: CPT | Performed by: FAMILY MEDICINE

## 2022-03-28 PROCEDURE — G8484 FLU IMMUNIZE NO ADMIN: HCPCS | Performed by: FAMILY MEDICINE

## 2022-03-28 PROCEDURE — G8427 DOCREV CUR MEDS BY ELIG CLIN: HCPCS | Performed by: FAMILY MEDICINE

## 2022-03-28 PROCEDURE — G8417 CALC BMI ABV UP PARAM F/U: HCPCS | Performed by: FAMILY MEDICINE

## 2022-03-28 PROCEDURE — 1123F ACP DISCUSS/DSCN MKR DOCD: CPT | Performed by: FAMILY MEDICINE

## 2022-03-28 RX ORDER — PREDNISONE 20 MG/1
TABLET ORAL
Qty: 20 TABLET | Refills: 0 | Status: SHIPPED | OUTPATIENT
Start: 2022-03-28 | End: 2022-10-19

## 2022-03-28 ASSESSMENT — ENCOUNTER SYMPTOMS: BACK PAIN: 1

## 2022-03-28 NOTE — PROGRESS NOTES
Subjective:      Patient ID: Holger Horvath is a 80 y.o. male. HPI  For checkup on pain in the area of the right hip and right buttocksbeen there for 3 to 4 years and getting progressively worse. At times he notices that if he is out walking that he may have to stop after certain distance because his pain goes down to his knees and occasionally to his feet and is relieved or sometimes dissipates after stopping. He has had a low back surgery in the past.  He used to see a chiropractor in the past.  He was a smoker for 30 years but none the last 20 years. He did have surgery on left hip bursa sac by Dr. Cristine España a few years back. He has had MRIs of the hips and the back several years ago. Review of Systems    Review of Systems   Musculoskeletal: Positive for arthralgias, back pain, gait problem, myalgias and neck pain. See HPI       Objective:   Physical Exam      Physical Exam  Constitutional:       Appearance: Normal appearance. Cardiovascular:      Comments: Unable to palpate distal pulses posterior tibial and dorsalis pedis bilaterally. Musculoskeletal:      Right lower leg: No edema. Left lower leg: No edema. Comments: Mild to moderate tenderness in a medial and lateral right buttocks. Internal and external rotation of the right hip is negative. Neurological:      Mental Status: He is alert. Assessment:       Diagnosis Orders   1. Hip pain, chronic, right  Tello Monge MD, Orthopedic Surgery (General), [facility]   2. Pain in both lower extremities  VL DUP LOWER EXTREMITY ARTERIES BILATERAL         Plan:      Angelo Mahoney was seen today for hip pain. Diagnoses and all orders for this visit:    Primary hypertension    Pain in both lower extremities  -     VL DUP LOWER EXTREMITY ARTERIES BILATERAL; Future  Scheduled for arterial evaluation of both lower extremities.   Hip pain, chronic, right  -     Tello Monge MD, Orthopedic Surgery (General), [facility]  Refer to Dr. Candice Hale for evaluation after the arterial studies are done. Other orders  -     predniSONE (DELTASONE) 20 MG tablet; 1 tid for 4 day then 1 tab bid for 3 days then 1 daily for 2 days    This is been approximately a 20-minute visit with the patient.         Jeromy Salvador, DO

## 2022-04-12 ENCOUNTER — OFFICE VISIT (OUTPATIENT)
Dept: ORTHOPEDIC SURGERY | Age: 82
End: 2022-04-12
Payer: MEDICARE

## 2022-04-12 VITALS — BODY MASS INDEX: 31.92 KG/M2 | WEIGHT: 228 LBS | HEIGHT: 71 IN

## 2022-04-12 DIAGNOSIS — M25.551 RIGHT HIP PAIN: Primary | ICD-10-CM

## 2022-04-12 PROCEDURE — 1036F TOBACCO NON-USER: CPT | Performed by: ORTHOPAEDIC SURGERY

## 2022-04-12 PROCEDURE — 99213 OFFICE O/P EST LOW 20 MIN: CPT | Performed by: ORTHOPAEDIC SURGERY

## 2022-04-12 PROCEDURE — G8427 DOCREV CUR MEDS BY ELIG CLIN: HCPCS | Performed by: ORTHOPAEDIC SURGERY

## 2022-04-12 PROCEDURE — G8417 CALC BMI ABV UP PARAM F/U: HCPCS | Performed by: ORTHOPAEDIC SURGERY

## 2022-04-12 PROCEDURE — 1123F ACP DISCUSS/DSCN MKR DOCD: CPT | Performed by: ORTHOPAEDIC SURGERY

## 2022-04-12 PROCEDURE — 4040F PNEUMOC VAC/ADMIN/RCVD: CPT | Performed by: ORTHOPAEDIC SURGERY

## 2022-04-12 RX ORDER — PREDNISONE 1 MG/1
TABLET ORAL
Qty: 55 TABLET | Refills: 0 | Status: SHIPPED | OUTPATIENT
Start: 2022-04-12 | End: 2022-10-19

## 2022-04-12 NOTE — PROGRESS NOTES
He returns today for evaluation complaint hip pain. He actually says he is having more pain now into the groin which is aggravating his back because is making him limp. He also had a knee replaced in the right which said did not do good. He has had injections for greater trochanteric bursitis in the past but he now has more groin pain consistent with urethritis and labral tear that was seen on MRI done in the past.  At that time he was relatively asymptomatic for internal hip pain, just demonstrating lateral outside hip greater trochanteric pain. On physical exam he has negative straight leg raise negative Lasegue's but he does have pain with hip range of motion particularly internal and external rotation. He has minimal discomfort over the greater trochanter. He has some sciatic notch pain. X-rays 3 views right hip demonstrate moderately severe osteoarthritis of the right hip. 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 He has had PSAs done yearly which have all been normal.    Impressions time is right hip pain secondary to severe osteoarthritis and labral tearing/degeneration    Because this I will give him a prednisone taper to take for his pain that he is having difficulty tolerating at this time, and told him I would not recommend a cortisone injection by me today into the hip because it would potentially delay any surgery that could be potentially recommended for 3 months. Because of the nature of his pain location and x-rays including MRIs, I would recommend referral to Dr. Jane Gamez for evaluation and treatment.     I spent 25 minutes involved with this patient's care today

## 2022-04-18 ENCOUNTER — OFFICE VISIT (OUTPATIENT)
Dept: FAMILY MEDICINE CLINIC | Age: 82
End: 2022-04-18
Payer: MEDICARE

## 2022-04-18 VITALS
OXYGEN SATURATION: 98 % | WEIGHT: 233.6 LBS | SYSTOLIC BLOOD PRESSURE: 138 MMHG | HEART RATE: 55 BPM | BODY MASS INDEX: 32.7 KG/M2 | DIASTOLIC BLOOD PRESSURE: 62 MMHG | HEIGHT: 71 IN

## 2022-04-18 DIAGNOSIS — I10 PRIMARY HYPERTENSION: Primary | ICD-10-CM

## 2022-04-18 DIAGNOSIS — E78.5 HYPERLIPIDEMIA, UNSPECIFIED HYPERLIPIDEMIA TYPE: ICD-10-CM

## 2022-04-18 DIAGNOSIS — E53.8 VITAMIN B12 DEFICIENCY: ICD-10-CM

## 2022-04-18 DIAGNOSIS — E03.9 HYPOTHYROIDISM, UNSPECIFIED TYPE: ICD-10-CM

## 2022-04-18 DIAGNOSIS — Z12.5 SCREENING PSA (PROSTATE SPECIFIC ANTIGEN): ICD-10-CM

## 2022-04-18 DIAGNOSIS — E11.319 TYPE 2 DIABETES MELLITUS WITH LEFT EYE AFFECTED BY RETINOPATHY WITHOUT MACULAR EDEMA, WITHOUT LONG-TERM CURRENT USE OF INSULIN, UNSPECIFIED RETINOPATHY SEVERITY (HCC): ICD-10-CM

## 2022-04-18 DIAGNOSIS — R49.9 ABNORMAL VOICE: ICD-10-CM

## 2022-04-18 PROBLEM — I50.32 CHRONIC DIASTOLIC HEART FAILURE (HCC): Chronic | Status: RESOLVED | Noted: 2017-11-02 | Resolved: 2022-04-18

## 2022-04-18 PROBLEM — J41.1 BRONCHITIS, MUCOPURULENT RECURRENT (HCC): Status: RESOLVED | Noted: 2018-10-16 | Resolved: 2022-04-18

## 2022-04-18 LAB
BASOPHILS ABSOLUTE: 0 K/UL (ref 0–0.2)
BASOPHILS RELATIVE PERCENT: 0.3 %
CHOLESTEROL, TOTAL: 136 MG/DL (ref 0–199)
EOSINOPHILS ABSOLUTE: 0 K/UL (ref 0–0.6)
EOSINOPHILS RELATIVE PERCENT: 0.2 %
HCT VFR BLD CALC: 40.2 % (ref 40.5–52.5)
HDLC SERPL-MCNC: 42 MG/DL (ref 40–60)
HEMOGLOBIN: 13 G/DL (ref 13.5–17.5)
LDL CHOLESTEROL CALCULATED: 69 MG/DL
LYMPHOCYTES ABSOLUTE: 1 K/UL (ref 1–5.1)
LYMPHOCYTES RELATIVE PERCENT: 9.4 %
MCH RBC QN AUTO: 30.1 PG (ref 26–34)
MCHC RBC AUTO-ENTMCNC: 32.3 G/DL (ref 31–36)
MCV RBC AUTO: 93.3 FL (ref 80–100)
MONOCYTES ABSOLUTE: 0.5 K/UL (ref 0–1.3)
MONOCYTES RELATIVE PERCENT: 4.8 %
NEUTROPHILS ABSOLUTE: 8.7 K/UL (ref 1.7–7.7)
NEUTROPHILS RELATIVE PERCENT: 85.3 %
PDW BLD-RTO: 16.9 % (ref 12.4–15.4)
PLATELET # BLD: 367 K/UL (ref 135–450)
PMV BLD AUTO: 7.3 FL (ref 5–10.5)
PROSTATE SPECIFIC ANTIGEN: 0.03 NG/ML (ref 0–4)
RBC # BLD: 4.3 M/UL (ref 4.2–5.9)
TRIGL SERPL-MCNC: 124 MG/DL (ref 0–150)
TSH SERPL DL<=0.05 MIU/L-ACNC: 0.97 UIU/ML (ref 0.27–4.2)
VLDLC SERPL CALC-MCNC: 25 MG/DL
WBC # BLD: 10.2 K/UL (ref 4–11)

## 2022-04-18 PROCEDURE — 4040F PNEUMOC VAC/ADMIN/RCVD: CPT | Performed by: FAMILY MEDICINE

## 2022-04-18 PROCEDURE — 99213 OFFICE O/P EST LOW 20 MIN: CPT | Performed by: FAMILY MEDICINE

## 2022-04-18 PROCEDURE — 1036F TOBACCO NON-USER: CPT | Performed by: FAMILY MEDICINE

## 2022-04-18 PROCEDURE — 1123F ACP DISCUSS/DSCN MKR DOCD: CPT | Performed by: FAMILY MEDICINE

## 2022-04-18 PROCEDURE — G8427 DOCREV CUR MEDS BY ELIG CLIN: HCPCS | Performed by: FAMILY MEDICINE

## 2022-04-18 PROCEDURE — G8417 CALC BMI ABV UP PARAM F/U: HCPCS | Performed by: FAMILY MEDICINE

## 2022-04-18 ASSESSMENT — ENCOUNTER SYMPTOMS
BACK PAIN: 1
CONSTIPATION: 0
VOICE CHANGE: 1
ABDOMINAL PAIN: 0
SHORTNESS OF BREATH: 0
SORE THROAT: 0
BLOOD IN STOOL: 0
CHEST TIGHTNESS: 0
COUGH: 0

## 2022-04-21 ENCOUNTER — OFFICE VISIT (OUTPATIENT)
Dept: ORTHOPEDIC SURGERY | Age: 82
End: 2022-04-21
Payer: MEDICARE

## 2022-04-21 VITALS — BODY MASS INDEX: 32.62 KG/M2 | HEIGHT: 71 IN | WEIGHT: 233 LBS

## 2022-04-21 DIAGNOSIS — M25.551 RIGHT HIP PAIN: Primary | ICD-10-CM

## 2022-04-21 DIAGNOSIS — M70.71 BURSITIS OF RIGHT HIP, UNSPECIFIED BURSA: ICD-10-CM

## 2022-04-21 DIAGNOSIS — M16.11 PRIMARY OSTEOARTHRITIS OF RIGHT HIP: ICD-10-CM

## 2022-04-21 PROCEDURE — G8427 DOCREV CUR MEDS BY ELIG CLIN: HCPCS | Performed by: ORTHOPAEDIC SURGERY

## 2022-04-21 PROCEDURE — 99213 OFFICE O/P EST LOW 20 MIN: CPT | Performed by: ORTHOPAEDIC SURGERY

## 2022-04-21 PROCEDURE — G8417 CALC BMI ABV UP PARAM F/U: HCPCS | Performed by: ORTHOPAEDIC SURGERY

## 2022-04-21 PROCEDURE — 1123F ACP DISCUSS/DSCN MKR DOCD: CPT | Performed by: ORTHOPAEDIC SURGERY

## 2022-04-21 PROCEDURE — 4040F PNEUMOC VAC/ADMIN/RCVD: CPT | Performed by: ORTHOPAEDIC SURGERY

## 2022-04-21 PROCEDURE — 1036F TOBACCO NON-USER: CPT | Performed by: ORTHOPAEDIC SURGERY

## 2022-04-21 NOTE — PROGRESS NOTES
Dr Jennifer Geronimo      Date /Time 4/21/2022       8:21 AM EDT  Name Celio Bolden             1940   Location  [address]  MRN [id number]                Chief Complaint   Patient presents with    Follow-up     Right Hip         History of Present Illness    Celio oBlden is a 80 y.o. male who presents with  right hip pain. Sent in consultation by Mónica Khalil DO, . Occupation: Retired  Injury Mechanism:  none. Worker's Comp. & legal issues:   none. Previous Treatments: Ice, Heat and NSAIDs    Patient presents to the office today with a chief complaint of right hip pain. Patient's right hip was painful over the lateral hip with some extension into his groin. He has had no new injury or trauma. Patient did see Dr. Gregorio Mohr and was placed on 10 days of steroids. He is still on the steroids. Once he started the steroids his pain resolved. He has no current hip pain today. He does have history of lumbar fusion.   He denies radicular symptoms    Past History  Past Medical History:   Diagnosis Date    BRYON (acute kidney injury) ([facility] [address] 11/2/2017    CAD (coronary artery disease) 8/1/2015    Cancer ([facility] [address] [facility]     PROSTATE    Chronic dCHF (grade 1 LVDD) 11/2/2017    Deafness     Diverticulitis     Diverticulosis     Fatigue     Hepatitis     PT NOT SURE WHAT KIND, HE HAD YEARS AGO    Hyperlipidemia     Hypertension     Hypothyroidism     Joint pain     Lumbar radiculopathy  R AND L  5/11/2018    Lumbar stenosis with neurogenic claudication  severe L23 TO L5S1 , WORSE L45      Medical history reviewed with no changes     Numbness and tingling     HANDS AND FEET    OA (osteoarthritis) of hip 7/21/2015    Obesity (BMI 30-39.9) 11/3/2017    Other disorders of kidney and ureter     Rheumatoid aortitis     Shortness of breath     Spinal stenosis, lumbar region, with neurogenic claudication     Type 2 diabetes mellitus without complication, without long-term current use of insulin ([facility] [address] 2016    Weakness      Past Surgical History:   Procedure Laterality Date    ABDOMEN SURGERY  12    abdominal wall exploration,w/ mesh;lysis of adhesions    BACK SURGERY  2018    LSP    COLON SURGERY      COLONOSCOPY  2015    diverticulosis    CORONARY ANGIOPLASTY WITH STENT PLACEMENT  2015    ELBOW SURGERY      bilat    HERNIA REPAIR      HIP SURGERY Left     JOINT REPLACEMENT Right 2016    Right total knee replacement    KNEE SURGERY      LUMBAR LAMINECTOMY  2018    LUMBAR LAMINECTOMIES , BILATERAL LAMINOTOMIES L23 TO L5S1, BONE GRAFTING FOR FUSION L23 TO L5S1 , INST FUSION STABILIZATION REDUCTION OF SPONDYLOLISTHESIS L34 AND L45 , AUTOGRAFT , ALLOGRAFT     OTHER SURGICAL HISTORY  2013    excision of suture granuloma on the abdomen    OTHER SURGICAL HISTORY Right     excision of cyst on thigh    [place] INCIS HIP ADDUCTORS,OPEN Left 2018    LEFT HIP ABDUCTOR FASCIOTOMY, GREATER TROCHANTERIC BURSECTOMY, PIRIFORMIS RELEASE performed by Tamera Mitchell MD at [address] [place]      right    UPPER GASTROINTESTINAL ENDOSCOPY Bilateral 2020    UPPER GASTROINTESTINAL ENDOSCOPY N/A 2020    EGD BIOPSY performed by Thony Randall DO at [place] [address]  2020    EGD CONTROL HEMORRHAGE performed by Thony Randall DO at [address] [place] [address],[address] [facility]       Family History   Problem Relation Age of Onset    Arthritis Mother     Cancer Father     Heart Disease Neg Hx      Social History     Tobacco Use    Smoking status: Former Smoker     Packs/day: 9.00     Years: 24.00     Pack years: 216.00     Types: Cigarettes, Cigars     Quit date: 1982     Years since quittin.2    Smokeless tobacco: Former User     Types: Snuff, Chew     Quit date: 1982   Substance Use Topics    Alcohol use: No     Alcohol/week: 0.0 standard drinks      Current Outpatient Medications on File Prior to Visit   Medication Sig Dispense Refill    predniSONE (DELTASONE) 5 MG tablet TAKE 10 TABLETS THE FIRST DAY THEN DECREASED BY 1 TABLET EACH DAY UNTIL FINISHED (Patient not taking: Reported on 4/18/2022) 55 tablet 0    predniSONE (DELTASONE) 20 MG tablet 1 tid for 4 day then 1 tab bid for 3 days then 1 daily for 2 days (Patient not taking: Reported on 4/18/2022) 20 tablet 0    hydroCHLOROthiazide (HYDRODIURIL) 25 MG tablet TAKE 1 TABLET BY MOUTH TWICE A  tablet 1    atorvastatin (LIPITOR) 40 MG tablet TAKE 1 TABLET BY MOUTH EVERY DAY AT NIGHT 90 tablet 1    lisinopril (PRINIVIL;ZESTRIL) 20 MG tablet TAKE 1 TABLET BY MOUTH TWICE A  tablet 1    levothyroxine (SYNTHROID) 112 MCG tablet TAKE 2 TABLETS BY MOUTH EVERY  tablet 1    buPROPion (WELLBUTRIN SR) 150 MG extended release tablet TAKE 1 TABLET BY MOUTH EVERY DAY 90 tablet 1    omeprazole (PRILOSEC) 40 MG delayed release capsule TAKE 1 CAPSULE BY MOUTH EVERY DAY IN THE MORNING BEFORE BREAKFAST 90 capsule 1    methotrexate (RHEUMATREX) 2.5 MG chemo tablet TAKE 8 TABLETS BY MOUTH ONCE A WEEK.  metoprolol tartrate (LOPRESSOR) 25 MG tablet TAKE 1 TABLET BY MOUTH TWICE A  tablet 3    blood glucose monitor kit and supplies Dispense sufficient amount for indicated testing frequency plus additional to accommodate PRN testing needs. Dispense all needed supplies to include: monitor, strips, lancing device, lancets, control solutions, alcohol swabs. 1 kit 0    blood glucose monitor strips Test one time  a day & as needed for symptoms of irregular blood glucose. Dispense sufficient amount for indicated testing frequency plus additional to accommodate PRN testing needs.  100 strip 3    magnesium oxide (MAG-OX) 400 MG tablet Take 400 mg by mouth daily      SENNA CO by Combination route      Ergocalciferol (VITAMIN D2 PO) Take 1.25 mg by mouth once a week      acetaminophen (TYLENOL 8 HOUR ARTHRITIS PAIN) 650 MG extended release tablet Take 650 mg by mouth every 8 hours as needed for Pain       No current facility-administered medications on file prior to visit. ASCVD 10-YEAR RISK SCORE  The ASCVD Risk score (Beverly Escalante., et al., 2013) failed to calculate for the following reasons: The 2013 ASCVD risk score is only valid for ages 36 to 78   . Review of Systems  10-point ROS is negative other than HPI. Physical Exam  Based off 1997 Exam Criteria  Ht 5' 11\" (1.803 m)   Wt 233 lb (105.7 kg)   BMI 32.50 kg/m²      Constitutional:       General: He is not in acute distress. Appearance: Normal appearance. Cardiovascular:      Rate and Rhythm: Normal rate and regular rhythm. Pulses: Normal pulses. Pulmonary:      Effort: Pulmonary effort is normal. No respiratory distress. Neurological:      Mental Status: He is alert and oriented to person, place, and time. Mental status is at baseline.      Musculoskeletal:  Gait:  antalgic    Spine / Hip Exam:      RIGHT  LEFT    Lumbar Spine Exam  [x] All Neg    [x] All Neg     Straight leg raise  []  []Not tested   []  []Not tested    Clonus  []  []Not tested   []  []Not tested    Pain with motion  []  []Not tested   []  []Not tested    Radiculopathy  []  []Not tested   []  []Not tested    Paraspinal muscle tenderness  [] Paraspinal  []Midline   [] Paraspinal  []Midline   Sensation RIGHT  LEFT    L3  [x] Normal []Decreased    [x] Normal []Decreased   L4  [x] Normal  []Decreased   [x] Normal []Decreased   L5  [x] Normal []Decreased   [x] Normal []Decreased   S1  [x] Normal  []Decreased   [x] Normal []Decreased   Pelvis       Scoliosis  [x] Nml  [] Present     Leg-length discrepency  [x] Equal  [] Right longer   [] Left longer   Range of Motion Active Passive Active Passive   Hip Flexion 110  120    Abduction 40  50    External Rotation @ 90 flex 45  65    Internal Rotation @ 90 flex 10  20           Hip Impingement / Dysplasia  [x] All Neg  [] Not tested   [x] All Neg  [] Not tested    Hip impingement test  []  []Not tested   []  []Not tested    C-sign  []  []Not tested   []  []Not tested    Anterior instability apprehension  []  []Not tested   []  []Not tested    Posterior instability apprehension  []  []Not tested   []  []Not tested    Uncontained Internal rotation  []  []Not tested  []  []Not tested          Abductors  [x] All Neg  [] Not tested   [x] All Neg  [] Not tested    Medius strength  []  []Not tested   []  []Not tested    Minimum strength  []  []Not tested   []  []Not tested    IT band tendonitis  []  []Not tested   []  []Not tested    Trochanteric tenderness  []  []Not tested  []  []Not tested   Sciatic neuropathic pain  []  []Not tested   []  []Not tested           Post-arthroplasty  [] All Neg  [] Not tested   [] All Neg  [] Not tested    Rectus tendonitis  []  []Not tested   []  []Not tested    Iliopsoas tendonitis       Start-up pain  []  []Not tested   []  []Not tested          Imaging    Right Hip: [facility]  Radiographs: X-rays were ordered today and reviewed of the right hip.  2 views. AP pelvis and false profile. Previous lateral films also reviewed. No evidence of fractures or dislocations. Mild arthritic changes present. Review of patient's previous MRI does demonstrate mild to moderate arthritic changes with degenerative labral tearing. Procedure:  Orders Placed This Encounter   Procedures    XR HIP RIGHT (2-3 VIEWS)     Standing Status:   Future     Number of Occurrences:   1     Standing Expiration Date:   4/20/2023     Order Specific Question:   Reason for exam:     Answer:   PAIN       Assessment and Adriane Councilman was seen today for follow-up. Diagnoses and all orders for this visit:    Right hip pain  -     XR HIP RIGHT (2-3 VIEWS);  Future  -     [facility] ([facility]R    Primary osteoarthritis of right hip  -     [facility] ([facility])-[facility]R    Bursitis of right hip, unspecified bursa  -     [facility] George Shafer (Ortho [facility])-[facility]R        Patient is suffering from mild right hip osteoarthritis and bursitis. Currently he is asymptomatic due to being placed on prednisone. I suspect that his pain will return once the prednisone is discontinued. He will finish his current course of prednisone and we will have him follow-up in 4 weeks. MRI dated 2 years ago did show arthritic changes in his acetabulum and femoral head. I am suspecting this is coming more from intra-articular pathology and osteoarthritis but it may be bursitis. I discussed with Barbara Tomas that his history, symptoms, signs and imaging are most consistent with hip arthritis    We reviewed the natural history of these conditions and treatment options ranging from conservative measures (rest, icing, activity modification, physical therapy, pain meds, cortisone injection) to surgical options. In terms of treatment, I recommended continuing with rest, icing, avoidance of painful activities, NSAIDs or pain meds as tolerated, and physical therapy. If these are not effective, cortisone injection can be considered. We discussed surgical options as well, should conservative measures fail. Electronically signed by Debbie Can MD on 4/21/2022 at 8:21 AM  This dictation was generated by voice recognition computer software. Although all attempts are made to edit the dictation for accuracy, there may be errors in the transcription that are not intended.

## 2022-04-27 ENCOUNTER — HOSPITAL ENCOUNTER (OUTPATIENT)
Dept: VASCULAR LAB | Age: 82
Discharge: HOME | End: 2022-04-27

## 2022-04-27 DIAGNOSIS — M79.605 PAIN IN BOTH LOWER EXTREMITIES: ICD-10-CM

## 2022-04-27 DIAGNOSIS — M79.604 PAIN IN BOTH LOWER EXTREMITIES: ICD-10-CM

## 2022-05-03 ENCOUNTER — PATIENT MESSAGE (OUTPATIENT)
Dept: FAMILY MEDICINE CLINIC | Age: 82
End: 2022-05-03

## 2022-05-03 DIAGNOSIS — R09.89 ABSENCE OF LEFT DORSALIS PEDIS ARTERY PULSE: Primary | ICD-10-CM

## 2022-05-03 DIAGNOSIS — I73.9 INTERMITTENT CLAUDICATION (HCC): ICD-10-CM

## 2022-05-03 DIAGNOSIS — R09.89 ABSENCE OF RIGHT DORSALIS PEDIS ARTERY PULSE: ICD-10-CM

## 2022-05-03 DIAGNOSIS — M79.605 LOWER EXTREMITY PAIN, BILATERAL: ICD-10-CM

## 2022-05-03 DIAGNOSIS — R09.89: ICD-10-CM

## 2022-05-03 DIAGNOSIS — M79.604 LOWER EXTREMITY PAIN, BILATERAL: ICD-10-CM

## 2022-05-04 NOTE — TELEPHONE ENCOUNTER
From: Ana aMría Jackson  To: Dr. Kvng Moreira: 5/3/2022 3:38 PM EDT  Subject: VL Duplex Lower Extremity Arteries Bilateral    Tried to get this test done and was told it was coded wrong so insurance would not pay. Told by registration at [facility] to contact doctor for new order and a different code. Called Scheduling to assist and they told me the test was cancelled.  The test ordered:  VL Duplex Lower Extremity Arteries Bilateral    [facility]

## 2022-05-05 ENCOUNTER — OFFICE VISIT (OUTPATIENT)
Dept: ORTHOPEDIC SURGERY | Age: 82
End: 2022-05-05
Payer: MEDICARE

## 2022-05-05 ENCOUNTER — OFFICE VISIT (OUTPATIENT)
Dept: ENT CLINIC | Age: 82
End: 2022-05-05
Payer: MEDICARE

## 2022-05-05 VITALS
HEIGHT: 71 IN | TEMPERATURE: 97.2 F | SYSTOLIC BLOOD PRESSURE: 116 MMHG | HEART RATE: 62 BPM | DIASTOLIC BLOOD PRESSURE: 60 MMHG | WEIGHT: 233 LBS | BODY MASS INDEX: 32.62 KG/M2

## 2022-05-05 VITALS — WEIGHT: 233 LBS | HEIGHT: 71 IN | BODY MASS INDEX: 32.62 KG/M2

## 2022-05-05 DIAGNOSIS — R49.0 DYSPHONIA: ICD-10-CM

## 2022-05-05 DIAGNOSIS — M46.1 SACROILIITIS (HCC): ICD-10-CM

## 2022-05-05 DIAGNOSIS — M79.18 MYOFASCIAL PAIN SYNDROME OF LUMBAR SPINE: ICD-10-CM

## 2022-05-05 DIAGNOSIS — M16.11 PRIMARY OSTEOARTHRITIS OF RIGHT HIP: Primary | ICD-10-CM

## 2022-05-05 DIAGNOSIS — J30.0 VASOMOTOR RHINITIS: Primary | ICD-10-CM

## 2022-05-05 PROCEDURE — 99204 OFFICE O/P NEW MOD 45 MIN: CPT | Performed by: OTOLARYNGOLOGY

## 2022-05-05 PROCEDURE — G8427 DOCREV CUR MEDS BY ELIG CLIN: HCPCS | Performed by: PHYSICIAN ASSISTANT

## 2022-05-05 PROCEDURE — 1036F TOBACCO NON-USER: CPT | Performed by: OTOLARYNGOLOGY

## 2022-05-05 PROCEDURE — 1036F TOBACCO NON-USER: CPT | Performed by: PHYSICIAN ASSISTANT

## 2022-05-05 PROCEDURE — L0621 SIO FLEX PELVIC/SACR PRE OTS: HCPCS | Performed by: PHYSICIAN ASSISTANT

## 2022-05-05 PROCEDURE — 99214 OFFICE O/P EST MOD 30 MIN: CPT | Performed by: PHYSICIAN ASSISTANT

## 2022-05-05 PROCEDURE — 1123F ACP DISCUSS/DSCN MKR DOCD: CPT | Performed by: PHYSICIAN ASSISTANT

## 2022-05-05 PROCEDURE — G8427 DOCREV CUR MEDS BY ELIG CLIN: HCPCS | Performed by: OTOLARYNGOLOGY

## 2022-05-05 PROCEDURE — 4040F PNEUMOC VAC/ADMIN/RCVD: CPT | Performed by: PHYSICIAN ASSISTANT

## 2022-05-05 PROCEDURE — 1123F ACP DISCUSS/DSCN MKR DOCD: CPT | Performed by: OTOLARYNGOLOGY

## 2022-05-05 PROCEDURE — G8417 CALC BMI ABV UP PARAM F/U: HCPCS | Performed by: OTOLARYNGOLOGY

## 2022-05-05 PROCEDURE — G8417 CALC BMI ABV UP PARAM F/U: HCPCS | Performed by: PHYSICIAN ASSISTANT

## 2022-05-05 PROCEDURE — 31575 DIAGNOSTIC LARYNGOSCOPY: CPT | Performed by: OTOLARYNGOLOGY

## 2022-05-05 PROCEDURE — 4040F PNEUMOC VAC/ADMIN/RCVD: CPT | Performed by: OTOLARYNGOLOGY

## 2022-05-05 RX ORDER — IPRATROPIUM BROMIDE 21 UG/1
2 SPRAY, METERED NASAL 4 TIMES DAILY
Qty: 1 EACH | Refills: 1 | Status: SHIPPED | OUTPATIENT
Start: 2022-05-05 | End: 2022-06-28

## 2022-05-05 RX ORDER — GABAPENTIN 100 MG/1
CAPSULE ORAL
Qty: 41 CAPSULE | Refills: 0 | Status: SHIPPED | OUTPATIENT
Start: 2022-05-05 | End: 2022-10-19

## 2022-05-05 ASSESSMENT — ENCOUNTER SYMPTOMS
APNEA: 0
TROUBLE SWALLOWING: 0
SORE THROAT: 0
COUGH: 0
EYE ITCHING: 0
RHINORRHEA: 1
FACIAL SWELLING: 0
SHORTNESS OF BREATH: 0
VOICE CHANGE: 1
SINUS PRESSURE: 0

## 2022-05-05 NOTE — PROGRESS NOTES
Dr Malgorzata Landa      Date /Time 5/5/2022       8:21 AM EDT  Name Celio Bolden             1940   Location  [facility]  MRN [id number]                Chief Complaint   Patient presents with    Hip Pain     CK RIGHT HIP         History of Present Illness    Celio Bolden is a 80 y.o. male who presents with  right hip pain. Occupation: Retired  Injury Mechanism:  none. Worker's Comp. & legal issues:   none. Previous Treatments: Ice, Heat and NSAIDs    Patient presents to the office today for a follow-up visit. He is here with a chief complaint of right hip pain. At his last visit he was asymptomatic due to taking oral prednisone. Today his pain has returned. He points to his lower left lumbar spine. He denies any groin or lateral hip pain bilaterally. He does have a remote history of lumbar fusion. Previous history: Patient presents to the office today with a chief complaint of right hip pain. Patient's right hip was painful over the lateral hip with some extension into his groin. He has had no new injury or trauma. Patient did see Dr. Sancho Mayorga and was placed on 10 days of steroids. He is still on the steroids. Once he started the steroids his pain resolved. He has no current hip pain today. He does have history of lumbar fusion.   He denies radicular symptoms    Past History  Past Medical History:   Diagnosis Date    BRYON (acute kidney injury) ([facility] [address] 11/2/2017    CAD (coronary artery disease) 8/1/2015    Cancer ([facility] [address]     PROSTATE    Chronic dCHF (grade 1 LVDD) 11/2/2017    Deafness     Diverticulitis     Diverticulosis     Fatigue     Hepatitis     PT NOT SURE WHAT KIND, HE HAD YEARS AGO    Hyperlipidemia     Hypertension     Hypothyroidism     Joint pain     Lumbar radiculopathy  R AND L  5/11/2018    Lumbar stenosis with neurogenic claudication  severe L23 TO L5S1 , WORSE L45      Medical history reviewed with no changes     Numbness and tingling HANDS AND FEET    OA (osteoarthritis) of hip 7/21/2015    Obesity (BMI 30-39.9) 11/3/2017    Other disorders of kidney and ureter     Rheumatoid aortitis     Shortness of breath     Spinal stenosis, lumbar region, with neurogenic claudication     Type 2 diabetes mellitus without complication, without long-term current use of insulin ([facility] [address] 9/9/2016    Weakness      Past Surgical History:   Procedure Laterality Date    ABDOMEN SURGERY  2/7/12    abdominal wall exploration,w/ mesh;lysis of adhesions    BACK SURGERY  05/2018    LSP    COLON SURGERY      COLONOSCOPY  03/26/2015    diverticulosis    CORONARY ANGIOPLASTY WITH STENT PLACEMENT  7/31/2015    ELBOW SURGERY      bilat    HERNIA REPAIR      HIP SURGERY Left     JOINT REPLACEMENT Right 07/05/2016    Right total knee replacement    KNEE SURGERY      LUMBAR LAMINECTOMY  05/11/2018    LUMBAR LAMINECTOMIES , BILATERAL LAMINOTOMIES L23 TO L5S1, BONE GRAFTING FOR FUSION L23 TO L5S1 , INST FUSION STABILIZATION REDUCTION OF SPONDYLOLISTHESIS L34 AND L45 , AUTOGRAFT , ALLOGRAFT     OTHER SURGICAL HISTORY  01/09/2013    excision of suture granuloma on the abdomen    OTHER SURGICAL HISTORY Right     excision of cyst on thigh    MD INCIS HIP ADDUCTORS,OPEN Left 11/5/2018    LEFT HIP ABDUCTOR FASCIOTOMY, GREATER TROCHANTERIC BURSECTOMY, PIRIFORMIS RELEASE performed by Chantale Ortega MD at [facility]      right    UPPER GASTROINTESTINAL ENDOSCOPY Bilateral 01/11/2020    UPPER GASTROINTESTINAL ENDOSCOPY N/A 1/11/2020    EGD BIOPSY performed by Gurpreet Benavides DO at [place] [address]  1/11/2020    EGD CONTROL HEMORRHAGE performed by Gurpreet Benavides DO at [address] [place] [address],[address] [facility]       Family History   Problem Relation Age of Onset    Arthritis Mother     Cancer Father     Heart Disease Neg Hx      Social History     Tobacco Use    Smoking status: Former Smoker     Packs/day: 9.00     Years: 24.00     Pack years: 216.00     Types: Cigarettes, Cigars     Quit date: 1982     Years since quittin.2    Smokeless tobacco: Former User     Types: Snuff, Chew     Quit date: 1982   Substance Use Topics    Alcohol use: No     Alcohol/week: 0.0 standard drinks      Current Outpatient Medications on File Prior to Visit   Medication Sig Dispense Refill    predniSONE (DELTASONE) 5 MG tablet TAKE 10 TABLETS THE FIRST DAY THEN DECREASED BY 1 TABLET EACH DAY UNTIL FINISHED (Patient not taking: Reported on 2022) 55 tablet 0    predniSONE (DELTASONE) 20 MG tablet 1 tid for 4 day then 1 tab bid for 3 days then 1 daily for 2 days (Patient not taking: Reported on 2022) 20 tablet 0    hydroCHLOROthiazide (HYDRODIURIL) 25 MG tablet TAKE 1 TABLET BY MOUTH TWICE A  tablet 1    atorvastatin (LIPITOR) 40 MG tablet TAKE 1 TABLET BY MOUTH EVERY DAY AT NIGHT 90 tablet 1    lisinopril (PRINIVIL;ZESTRIL) 20 MG tablet TAKE 1 TABLET BY MOUTH TWICE A  tablet 1    levothyroxine (SYNTHROID) 112 MCG tablet TAKE 2 TABLETS BY MOUTH EVERY  tablet 1    buPROPion (WELLBUTRIN SR) 150 MG extended release tablet TAKE 1 TABLET BY MOUTH EVERY DAY 90 tablet 1    omeprazole (PRILOSEC) 40 MG delayed release capsule TAKE 1 CAPSULE BY MOUTH EVERY DAY IN THE MORNING BEFORE BREAKFAST 90 capsule 1    methotrexate (RHEUMATREX) 2.5 MG chemo tablet TAKE 8 TABLETS BY MOUTH ONCE A WEEK.  metoprolol tartrate (LOPRESSOR) 25 MG tablet TAKE 1 TABLET BY MOUTH TWICE A  tablet 3    blood glucose monitor kit and supplies Dispense sufficient amount for indicated testing frequency plus additional to accommodate PRN testing needs. Dispense all needed supplies to include: monitor, strips, lancing device, lancets, control solutions, alcohol swabs.  1 kit 0    blood glucose monitor strips Test one time  a day & as needed for symptoms of irregular blood glucose. Dispense sufficient amount for indicated testing frequency plus additional to accommodate PRN testing needs. 100 strip 3    magnesium oxide (MAG-OX) 400 MG tablet Take 400 mg by mouth daily      SENNA CO by Combination route      Ergocalciferol (VITAMIN D2 PO) Take 1.25 mg by mouth once a week      acetaminophen (TYLENOL 8 HOUR ARTHRITIS PAIN) 650 MG extended release tablet Take 650 mg by mouth every 8 hours as needed for Pain       No current facility-administered medications on file prior to visit. ASCVD 10-YEAR RISK SCORE  The ASCVD Risk score (Matt Orellana, et al., 2013) failed to calculate for the following reasons: The 2013 ASCVD risk score is only valid for ages 36 to 78   . Review of Systems  10-point ROS is negative other than HPI. Physical Exam  Based off 1997 Exam Criteria  Ht 5' 11\" (1.803 m)   Wt 233 lb (105.7 kg)   BMI 32.50 kg/m²      Constitutional:       General: He is not in acute distress. Appearance: Normal appearance. Cardiovascular:      Rate and Rhythm: Normal rate and regular rhythm. Pulses: Normal pulses. Pulmonary:      Effort: Pulmonary effort is normal. No respiratory distress. Neurological:      Mental Status: He is alert and oriented to person, place, and time. Mental status is at baseline.      Musculoskeletal:  Gait:  antalgic    Spine / Hip Exam:      RIGHT  LEFT    Lumbar Spine Exam  [] All Neg    [] All Neg     Straight leg raise  []  []Not tested   []  []Not tested    Clonus  []  []Not tested   []  []Not tested    Pain with motion  []  []Not tested   []  []Not tested    Radiculopathy  []  []Not tested   []  []Not tested    Paraspinal muscle tenderness  [x] Paraspinal  []Midline   [x] Paraspinal  []Midline   Sensation RIGHT  LEFT    L3  [x] Normal []Decreased    [x] Normal []Decreased   L4  [x] Normal  []Decreased   [x] Normal []Decreased   L5  [x] Normal []Decreased   [x] Normal []Decreased   S1  [x] Normal  []Decreased   [x] Normal []Decreased   Pelvis       Scoliosis  [x] Nml  [] Present     Leg-length discrepency  [x] Equal  [] Right longer   [] Left longer   Range of Motion Active Passive Active Passive   Hip Flexion 110  120    Abduction 40  50    External Rotation @ 90 flex 45  65    Internal Rotation @ 90 flex 10  20           Hip Impingement / Dysplasia  [x] All Neg  [] Not tested   [x] All Neg  [] Not tested    Hip impingement test  []  []Not tested   []  []Not tested    C-sign  []  []Not tested   []  []Not tested    Anterior instability apprehension  []  []Not tested   []  []Not tested    Posterior instability apprehension  []  []Not tested   []  []Not tested    Uncontained Internal rotation  []  []Not tested  []  []Not tested          Abductors  [x] All Neg  [] Not tested   [x] All Neg  [] Not tested    Medius strength  []  []Not tested   []  []Not tested    Minimum strength  []  []Not tested   []  []Not tested    IT band tendonitis  []  []Not tested   []  []Not tested    Trochanteric tenderness  []  []Not tested  []  []Not tested   Sciatic neuropathic pain  []  []Not tested   []  []Not tested           Post-arthroplasty  [] All Neg  [] Not tested   [] All Neg  [] Not tested    Rectus tendonitis  []  []Not tested   []  []Not tested    Iliopsoas tendonitis       Start-up pain  []  []Not tested   []  []Not tested      Further physical exam produces tenderness over the left SI joint. Imaging    Right Hip: [facility]  Radiographs: X-rays were ordered today and reviewed of the right hip.  2 views. AP pelvis and false profile. Previous lateral films also reviewed. No evidence of fractures or dislocations. Mild arthritic changes present. Review of patient's previous MRI does demonstrate mild to moderate arthritic changes with degenerative labral tearing. Procedure:  No orders of the defined types were placed in this encounter. Assessment and Plan  Shaunna Prieto was seen today for hip pain.     Diagnoses and all orders for this visit:    Primary osteoarthritis of right hip  -     [place] SI Belt    Sacroiliitis (HCC)    Myofascial pain syndrome of lumbar spine    Other orders  -     gabapentin (NEURONTIN) 100 MG capsule; 1 Q HS x 7 days/  Then BID for 7 days/ then TID        On previous x-rays patient does have mild arthritis. Today his symptoms are more coming from his lumbar spine and SI joint. I have placed the patient on in an SI belt. I have placed him on Neurontin. We will stay away from NSAIDs. Patient's creatinine level is teetering between normal and slightly abnormal.  I do not wish to send him into kidney failure. He has refused physical therapy. I will have him follow-up in 1 month with spine team.    I discussed with Mick Weller that his history, symptoms, signs and imaging are most consistent with hip arthritis and Sacroiliitis    We reviewed the natural history of these conditions and treatment options ranging from conservative measures (rest, icing, activity modification, physical therapy, pain meds, cortisone injection) to surgical options. In terms of treatment, I recommended continuing with rest, icing, avoidance of painful activities, NSAIDs or pain meds as tolerated, and physical therapy. If these are not effective, cortisone injection can be considered. We discussed surgical options as well, should conservative measures fail. Electronically signed by Pablito Rangel PA-C on 5/5/2022 at 8:32 AM  This dictation was generated by voice recognition computer software. Although all attempts are made to edit the dictation for accuracy, there may be errors in the transcription that are not intended.

## 2022-05-05 NOTE — PROGRESS NOTES
Pablito Granger [address], [address], [address] [place] [address]  P: [phone]       Patient     Æg[address]u 65  1940    ChiefComplaint     Chief Complaint   Patient presents with    Pharyngitis     Patient has trouble talking, and his voice is low and rhaspy. This has been going on for about 3 months.  Dysphagia     Patient states sometimes he has trouble swallowing. This has been going on for about 3 months now. History of Present Illness     Manuel Archer is an 80-year-old male here today for evaluation of dysphonia. Ongoing for the last several months. Reports voice has returned to normal today. States his voice is intermittently below and raspy. No associated symptoms. Eating and drinking without difficulty. States once in a blue moon he will need to use water to rinse down food. Remote history of smoking and drinking. Notes clear rhinorrhea worse with eating.     Past Medical History     Past Medical History:   Diagnosis Date    BRYON (acute kidney injury) ([address] 11/2/2017    CAD (coronary artery disease) 8/1/2015    Cancer ([address]     PROSTATE    Chronic dCHF (grade 1 LVDD) 11/2/2017    Deafness     Diverticulitis     Diverticulosis     Fatigue     Hepatitis     PT NOT SURE WHAT KIND, HE HAD YEARS AGO    Hyperlipidemia     Hypertension     Hypothyroidism     Joint pain     Lumbar radiculopathy  R AND L  5/11/2018    Lumbar stenosis with neurogenic claudication  severe L23 TO L5S1 , WORSE L45      Medical history reviewed with no changes     Numbness and tingling     HANDS AND FEET    OA (osteoarthritis) of hip 7/21/2015    Obesity (BMI 30-39.9) 11/3/2017    Other disorders of kidney and ureter     Rheumatoid aortitis     Shortness of breath     Spinal stenosis, lumbar region, with neurogenic claudication     Type 2 diabetes mellitus without complication, without long-term current use of insulin ([address] 9/9/2016    Weakness        Past Surgical History Past Surgical History:   Procedure Laterality Date    ABDOMEN SURGERY  12    abdominal wall exploration,w/ mesh;lysis of adhesions    BACK SURGERY  2018    LSP    COLON SURGERY      COLONOSCOPY  2015    diverticulosis    CORONARY ANGIOPLASTY WITH STENT PLACEMENT  2015    ELBOW SURGERY      bilat    HERNIA REPAIR      HIP SURGERY Left     JOINT REPLACEMENT Right 2016    Right total knee replacement    KNEE SURGERY      LUMBAR LAMINECTOMY  2018    LUMBAR LAMINECTOMIES , BILATERAL LAMINOTOMIES L23 TO L5S1, BONE GRAFTING FOR FUSION L23 TO L5S1 , INST FUSION STABILIZATION REDUCTION OF SPONDYLOLISTHESIS L34 AND L45 , AUTOGRAFT , ALLOGRAFT     OTHER SURGICAL HISTORY  2013    excision of suture granuloma on the abdomen    OTHER SURGICAL HISTORY Right     excision of cyst on thigh    [place] INCIS HIP ADDUCTORS,OPEN Left 2018    LEFT HIP ABDUCTOR FASCIOTOMY, GREATER TROCHANTERIC BURSECTOMY, PIRIFORMIS RELEASE performed by Jenni Remy MD at [facility]      right    UPPER GASTROINTESTINAL ENDOSCOPY Bilateral 2020    UPPER GASTROINTESTINAL ENDOSCOPY N/A 2020    EGD BIOPSY performed by Myrna Valdez DO at [facility]  2020    EGD CONTROL HEMORRHAGE performed by Myrna Valdez DO at [facility],[address] [facility]         Family History     Family History   Problem Relation Age of Onset    Arthritis Mother     Cancer Father     Heart Disease Neg Hx        Social History     Social History     Tobacco Use    Smoking status: Former Smoker     Packs/day: 9.00     Years: 24.00     Pack years: 216.00     Types: Cigarettes, Cigars     Quit date: 1982     Years since quittin.2    Smokeless tobacco: Former User     Types: Snuff, Chew     Quit date: 1982   Vaping Use    Vaping Use: Never used   Substance Use Topics    Alcohol use: No     Alcohol/week: 0.0 standard drinks    Drug use: No        Allergies     Allergies   Allergen Reactions    Penicillins Hives    Avelox [Moxifloxacin Hcl In Nacl] Itching, Swelling and Rash       Medications     Current Outpatient Medications   Medication Sig Dispense Refill    gabapentin (NEURONTIN) 100 MG capsule 1 Q HS x 7 days/  Then BID for 7 days/ then TID 41 capsule 0    ipratropium (ATROVENT) 0.03 % nasal spray 2 sprays by Each Nostril route 4 times daily 1 each 1    predniSONE (DELTASONE) 5 MG tablet TAKE 10 TABLETS THE FIRST DAY THEN DECREASED BY 1 TABLET EACH DAY UNTIL FINISHED 55 tablet 0    predniSONE (DELTASONE) 20 MG tablet 1 tid for 4 day then 1 tab bid for 3 days then 1 daily for 2 days 20 tablet 0    hydroCHLOROthiazide (HYDRODIURIL) 25 MG tablet TAKE 1 TABLET BY MOUTH TWICE A  tablet 1    atorvastatin (LIPITOR) 40 MG tablet TAKE 1 TABLET BY MOUTH EVERY DAY AT NIGHT 90 tablet 1    lisinopril (PRINIVIL;ZESTRIL) 20 MG tablet TAKE 1 TABLET BY MOUTH TWICE A  tablet 1    levothyroxine (SYNTHROID) 112 MCG tablet TAKE 2 TABLETS BY MOUTH EVERY  tablet 1    buPROPion (WELLBUTRIN SR) 150 MG extended release tablet TAKE 1 TABLET BY MOUTH EVERY DAY 90 tablet 1    omeprazole (PRILOSEC) 40 MG delayed release capsule TAKE 1 CAPSULE BY MOUTH EVERY DAY IN THE MORNING BEFORE BREAKFAST 90 capsule 1    methotrexate (RHEUMATREX) 2.5 MG chemo tablet TAKE 8 TABLETS BY MOUTH ONCE A WEEK.  metoprolol tartrate (LOPRESSOR) 25 MG tablet TAKE 1 TABLET BY MOUTH TWICE A  tablet 3    blood glucose monitor kit and supplies Dispense sufficient amount for indicated testing frequency plus additional to accommodate PRN testing needs. Dispense all needed supplies to include: monitor, strips, lancing device, lancets, control solutions, alcohol swabs. 1 kit 0    blood glucose monitor strips Test one time  a day & as needed for symptoms of irregular blood glucose.  Dispense sufficient amount for indicated testing frequency plus additional to accommodate PRN testing needs. 100 strip 3    magnesium oxide (MAG-OX) 400 MG tablet Take 400 mg by mouth daily      SENNA CO by Combination route      Ergocalciferol (VITAMIN D2 PO) Take 1.25 mg by mouth once a week      acetaminophen (TYLENOL 8 HOUR ARTHRITIS PAIN) 650 MG extended release tablet Take 650 mg by mouth every 8 hours as needed for Pain       No current facility-administered medications for this visit. Review of Systems     Review of Systems   Constitutional: Negative for appetite change, chills, fatigue, fever and unexpected weight change. HENT: Positive for rhinorrhea and voice change. Negative for congestion, ear discharge, ear pain, facial swelling, hearing loss, nosebleeds, postnasal drip, sinus pressure, sneezing, sore throat, tinnitus and trouble swallowing. Eyes: Negative for itching. Respiratory: Negative for apnea, cough and shortness of breath. Endocrine: Negative for cold intolerance and heat intolerance. Musculoskeletal: Negative for myalgias and neck pain. Skin: Negative for rash. Allergic/Immunologic: Negative for environmental allergies. Neurological: Negative for dizziness and headaches. Psychiatric/Behavioral: Negative for confusion, decreased concentration and sleep disturbance. PhysicalExam     Vitals:    05/05/22 1021   BP: 116/60   Site: Left Upper Arm   Position: Sitting   Pulse: 62   Temp: 97.2 °F (36.2 °C)   TempSrc: Infrared   Weight: 233 lb (105.7 kg)   Height: 5' 11\" (1.803 m)       Physical Exam  Constitutional:       General: He is not in acute distress. Appearance: He is well-developed. HENT:      Head: Normocephalic and atraumatic. Right Ear: Tympanic membrane, ear canal and external ear normal. No drainage. No middle ear effusion. Tympanic membrane is not bulging. Tympanic membrane has normal mobility.       Left Ear: Tympanic membrane, ear canal and external ear normal. No drainage. No middle ear effusion. Tympanic membrane is not bulging. Tympanic membrane has normal mobility. Nose: No mucosal edema or rhinorrhea. Mouth/Throat:      Lips: Pink. Mouth: Mucous membranes are moist.      Tongue: No lesions. Palate: No mass. Pharynx: Uvula midline. Eyes:      Pupils: Pupils are equal, round, and reactive to light. Neck:      Thyroid: No thyroid mass or thyromegaly. Trachea: Trachea and phonation normal.   Cardiovascular:      Pulses: Normal pulses. Pulmonary:      Effort: Pulmonary effort is normal. No accessory muscle usage or respiratory distress. Breath sounds: No stridor. Musculoskeletal:      Cervical back: Full passive range of motion without pain. Lymphadenopathy:      Head:      Right side of head: No submental or submandibular adenopathy. Left side of head: No submental or submandibular adenopathy. Cervical: No cervical adenopathy. Right cervical: No superficial, deep or posterior cervical adenopathy. Left cervical: No superficial, deep or posterior cervical adenopathy. Skin:     General: Skin is warm and dry. Neurological:      Mental Status: He is alert and oriented to person, place, and time. Cranial Nerves: No cranial nerve deficit. Coordination: Coordination normal.      Gait: Gait normal.   Psychiatric:         Thought Content: Thought content normal.           Procedure     Flexible Laryngoscopy    Pre op: dysphonia   Post op: same  Procedure : Flexible Laryngoscopy  Surgeon: Yasemin Olivier DO  Anesthesia: Afrin with 4% lidocaine  Indication: Laryngeal mirror examination was not tolerated due to gag reflex  Description:  The scope was passed along the floor of the right naris to the level of the larynx. The base of tongue, vallecula, epiglottis, aryepiglottic folds, arytenoids, false vocal folds, true vocal folds, or pyriform sinuses were all evaluated.  True vocal folds exhibited symmetric motion bilaterally without evidence of paralysis or paresis. The scope was removed. The patient tolerated the procedure without difficulty. There were no complications. Pertinent findings: normal      Assessment and Plan     1. Vasomotor rhinitis  - ipratropium (ATROVENT) 0.03 % nasal spray; 2 sprays by Each Nostril route 4 times daily  Dispense: 1 each; Refill: 1    2. Dysphonia  -resolved  -Discussed observation for speech therapy  -Wishes to proceed with observation        Christopher Urena DO  5/5/22      Portions of this note were dictated using Dragon.  There may be linguistic errors secondary to the use of this program.

## 2022-05-06 RX ORDER — BUPROPION HYDROCHLORIDE 150 MG/1
TABLET, EXTENDED RELEASE ORAL
Qty: 90 TABLET | Refills: 1 | Status: SHIPPED | OUTPATIENT
Start: 2022-05-06 | End: 2022-10-19

## 2022-05-06 RX ORDER — OMEPRAZOLE 40 MG/1
CAPSULE, DELAYED RELEASE ORAL
Qty: 90 CAPSULE | Refills: 1 | Status: SHIPPED | OUTPATIENT
Start: 2022-05-06 | End: 2022-10-19

## 2022-05-06 NOTE — TELEPHONE ENCOUNTER
Refill Request     CONFIRM preferrred pharmacy with the patient. If Mail Order Rx - Pend for 90 day refill.       Last Seen: Last Seen Department: 4/18/2022  Last Seen by PCP: 4/18/22     Last Written: 12/1/21 90 tablet  1 refill                            12/1/21 90 capsule  1 refill     Next Appointment: 10/19/22     Future Appointments   Date Time Provider Minh Valdez   5/26/2022  9:20 AM Errol Weiner MD [facility]   10/19/2022  8:30 AM DO YAMILET Mccabe  [place] [facility]       Future appointment scheduled      Requested Prescriptions     Pending Prescriptions Disp Refills    buPROPion (WELLBUTRIN SR) 150 MG extended release tablet [Pharmacy Med Name: BUPROPION HCL  MG TABLET] 90 tablet 1     Sig: TAKE 1 TABLET BY MOUTH EVERY DAY    omeprazole (PRILOSEC) 40 MG delayed release capsule [Pharmacy Med Name: OMEPRAZOLE DR 40 MG CAPSULE] 90 capsule 1     Sig: TAKE 1 CAPSULE BY MOUTH EVERY DAY IN THE MORNING BEFORE BREAKFAST

## 2022-05-09 NOTE — TELEPHONE ENCOUNTER
I have placed a new order with diagnosis based on Dr. Jimenez Can note from 3/28/22. Any questions, please let me know.  Thanks, John Marin

## 2022-05-09 NOTE — TELEPHONE ENCOUNTER
Please follow up with registration to see why this code is not appropriate. This is what is documented in Dr. Shaunna Cordoba note.  Thanks, Cheyenne Garza

## 2022-05-09 NOTE — TELEPHONE ENCOUNTER
Please let patient know new order has been placed please call to schedule.  Thanks, [facility]

## 2022-05-13 ENCOUNTER — HOSPITAL ENCOUNTER (OUTPATIENT)
Dept: VASCULAR LAB | Age: 82
Discharge: HOME | End: 2022-05-13
Payer: MEDICARE

## 2022-05-13 DIAGNOSIS — R09.89 ABSENCE OF LEFT DORSALIS PEDIS ARTERY PULSE: ICD-10-CM

## 2022-05-13 DIAGNOSIS — R09.89: ICD-10-CM

## 2022-05-13 DIAGNOSIS — I73.9 INTERMITTENT CLAUDICATION (HCC): ICD-10-CM

## 2022-05-13 DIAGNOSIS — R09.89 ABSENCE OF RIGHT DORSALIS PEDIS ARTERY PULSE: ICD-10-CM

## 2022-05-13 DIAGNOSIS — M79.605 LOWER EXTREMITY PAIN, BILATERAL: ICD-10-CM

## 2022-05-13 DIAGNOSIS — M79.604 LOWER EXTREMITY PAIN, BILATERAL: ICD-10-CM

## 2022-05-13 PROCEDURE — 93925 LOWER EXTREMITY STUDY: CPT

## 2022-05-26 ENCOUNTER — OFFICE VISIT (OUTPATIENT)
Dept: ORTHOPEDIC SURGERY | Age: 82
End: 2022-05-26
Payer: MEDICARE

## 2022-05-26 VITALS — BODY MASS INDEX: 32.62 KG/M2 | HEIGHT: 71 IN | WEIGHT: 233 LBS

## 2022-05-26 DIAGNOSIS — M48.062 SPINAL STENOSIS OF LUMBAR REGION WITH NEUROGENIC CLAUDICATION: ICD-10-CM

## 2022-05-26 DIAGNOSIS — M54.50 LUMBAR PAIN: Primary | ICD-10-CM

## 2022-05-26 PROCEDURE — 1036F TOBACCO NON-USER: CPT | Performed by: ORTHOPAEDIC SURGERY

## 2022-05-26 PROCEDURE — 99214 OFFICE O/P EST MOD 30 MIN: CPT | Performed by: ORTHOPAEDIC SURGERY

## 2022-05-26 PROCEDURE — G8427 DOCREV CUR MEDS BY ELIG CLIN: HCPCS | Performed by: ORTHOPAEDIC SURGERY

## 2022-05-26 PROCEDURE — G8417 CALC BMI ABV UP PARAM F/U: HCPCS | Performed by: ORTHOPAEDIC SURGERY

## 2022-05-26 PROCEDURE — 1123F ACP DISCUSS/DSCN MKR DOCD: CPT | Performed by: ORTHOPAEDIC SURGERY

## 2022-05-26 NOTE — PROGRESS NOTES
New Patient: LUMBAR SPINE    Referring Provider:  No ref. provider found    CHIEF COMPLAINT:    Chief Complaint   Patient presents with    Back Pain     pt feels his nerve is being pinched. medication isn't working. HISTORY OF PRESENT ILLNESS:    Mr. Angella Tiwari  is a pleasant 80 y.o. male currently referred by Dr. Gerardo Lau for the evaluation of low back and left SI pain. He is status post lumbar laminectomy and fusion. He notes his symptoms began insidiously about 3 months ago. They have increased since that time. He rates his pain 8/10. He notes numbness and tingling over his SI area but denies such symptoms of his lower extremities. He denies saddle anesthesia or bowel bladder abnormality. He does report increasing problems with his balance.     Current/Past Treatment:   · Physical Therapy: Did not help  · Chiropractic: Helpful  · Injection: Distant past not willing to try additional JEROME's  · Medications: Oral steroids and gabapentin    Past Medical History:   Past Medical History:   Diagnosis Date    BR[facility]ON (acute kidney [facility]) ([facility] 11/2/2017    CAD (coronary artery disease) 8/1/2015    Cancer ([facility]     PROSTATE    Chronic dCHF (grade 1 LVDD) 11/2/2017    Deafness     Diverticulitis     Diverticulosis     Fatigue     Hepatitis     PT NOT SURE WHAT KIND, HE HAD YEARS AGO    Hyperlipidemia     Hypertension     Hypothyroidism     Joint pain     Lumbar radiculopathy  R AND L  5/11/2018    Lumbar stenosis with neurogenic claudication  severe L23 TO L5S1 , WORSE L45      Medical history reviewed with no changes     Numbness and tingling     HANDS AND FEET    OA (osteoarthritis) of hip 7/21/2015    Obesity (BMI 30-39.9) 11/3/2017    Other disorders of kidney and ureter     Rheumatoid aortitis     Shortness of breath     Spinal stenosis, lumbar region, with neurogenic claudication     Type 2 diabetes mellitus without complication, without long-term current use of insulin ([facility] 9/9/2016    Weakness       Past Surgical History:     Past Surgical History:   Procedure Laterality Date    ABDOMEN SURGERY  2/7/12    abdominal wall exploration,w/ mesh;lysis of adhesions    BACK SURGERY  05/2018    LSP    COLON SURGERY      COLONOSCOPY  03/26/2015    diverticulosis    CORONARY ANGIOPLASTY WITH STENT PLACEMENT  7/31/2015    ELBOW SURGERY      bilat    HERNIA REPAIR      HIP SURGERY Left     JOINT REPLACEMENT Right 07/05/2016    Right total knee replacement    KNEE SURGERY      LUMBAR LAMINECTOMY  05/11/2018    LUMBAR LAMINECTOMIES , BILATERAL LAMINOTOMIES L23 TO L5S1, BONE GRAFTING FOR FUSION L23 TO L5S1 , INST FUSION STABILIZATION REDUCTION OF SPONDYLOLISTHESIS L34 AND L45 , AUTOGRAFT , ALLOGRAFT     OTHER SURGICAL HISTORY  01/09/2013    excision of suture granuloma on the abdomen    OTHER SURGICAL HISTORY Right     excision of cyst on thigh    [place] INCIS HIP ADDUCTORS,OPEN Left 11/5/2018    LEFT HIP ABDUCTOR FASCIOTOMY, GREATER TROCHANTERIC BURSECTOMY, PIRIFORMIS RELEASE performed by Renay Dawson MD at [facility]      right    UPPER GASTROINTESTINAL ENDOSCOPY Bilateral 01/11/2020    UPPER GASTROINTESTINAL ENDOSCOPY N/A 1/11/2020    EGD BIOPSY performed by Jo nAn Gandhi DO at [place] [address]  1/11/2020    EGD CONTROL HEMORRHAGE performed by Jo Ann Gandhi DO at [address] [place] [address],[address] [facility]       Current Medications:     Current Outpatient Medications:     buPROPion (WELLBUTRIN SR) 150 MG extended release tablet, TAKE 1 TABLET BY MOUTH EVERY DAY, Disp: 90 tablet, Rfl: 1    omeprazole (PRILOSEC) 40 MG delayed release capsule, TAKE 1 CAPSULE BY MOUTH EVERY DAY IN THE MORNING BEFORE BREAKFAST, Disp: 90 capsule, Rfl: 1    gabapentin (NEURONTIN) 100 MG capsule, 1 Q HS x 7 days/  Then BID for 7 days/ then TID, Disp: 41 capsule, Rfl: 0    ipratropium (ATROVENT) 0.03 % nasal spray, 2 sprays by Each Nostril route 4 times daily, Disp: 1 each, Rfl: 1    predniSONE (DELTASONE) 5 MG tablet, TAKE 10 TABLETS THE FIRST DAY THEN DECREASED BY 1 TABLET EACH DAY UNTIL FINISHED, Disp: 55 tablet, Rfl: 0    predniSONE (DELTASONE) 20 MG tablet, 1 tid for 4 day then 1 tab bid for 3 days then 1 daily for 2 days, Disp: 20 tablet, Rfl: 0    hydroCHLOROthiazide (HYDRODIURIL) 25 MG tablet, TAKE 1 TABLET BY MOUTH TWICE A DAY, Disp: 180 tablet, Rfl: 1    atorvastatin (LIPITOR) 40 MG tablet, TAKE 1 TABLET BY MOUTH EVERY DAY AT NIGHT, Disp: 90 tablet, Rfl: 1    lisinopril (PRINIVIL;ZESTRIL) 20 MG tablet, TAKE 1 TABLET BY MOUTH TWICE A DAY, Disp: 180 tablet, Rfl: 1    levothyroxine (SYNTHROID) 112 MCG tablet, TAKE 2 TABLETS BY MOUTH EVERY DAY, Disp: 180 tablet, Rfl: 1    methotrexate (RHEUMATREX) 2.5 MG chemo tablet, TAKE 8 TABLETS BY MOUTH ONCE A WEEK., Disp: , Rfl:     metoprolol tartrate (LOPRESSOR) 25 MG tablet, TAKE 1 TABLET BY MOUTH TWICE A DAY, Disp: 180 tablet, Rfl: 3    blood glucose monitor kit and supplies, Dispense sufficient amount for indicated testing frequency plus additional to accommodate PRN testing needs. Dispense all needed supplies to include: monitor, strips, lancing device, lancets, control solutions, alcohol swabs., Disp: 1 kit, Rfl: 0    blood glucose monitor strips, Test one time  a day & as needed for symptoms of irregular blood glucose. Dispense sufficient amount for indicated testing frequency plus additional to accommodate PRN testing needs. , Disp: 100 strip, Rfl: 3    magnesium oxide (MAG-OX) 400 MG tablet, Take 400 mg by mouth daily, Disp: , Rfl:     SENNA CO, by Combination route, Disp: , Rfl:     Ergocalciferol (VITAMIN D2 PO), Take 1.25 mg by mouth once a week, Disp: , Rfl:     acetaminophen (TYLENOL 8 HOUR ARTHRITIS PAIN) 650 MG extended release tablet, Take 650 mg by mouth every 8 hours as needed for Pain, Disp: , Rfl:   Allergies:  Penicillins and Avelox [moxifloxacin hcl in nacl]  Social History:    reports that he quit smoking about 40 years ago. His smoking use included cigarettes and cigars. He has a 216.00 pack-year smoking history. He quit smokeless tobacco use about 39 years ago. His smokeless tobacco use included snuff and chew. He reports that he does not drink alcohol and does not use drugs. Family History:   Family History   Problem Relation Age of Onset    Arthritis Mother     Cancer Father     Heart Disease Neg Hx        REVIEW OF SYSTEMS: Full ROS noted & scanned   CONSTITUTIONAL: Denies unexplained weight loss, fevers, chills or fatigue  NEUROLOGICAL: Denies unsteady gait or progressive weakness  MUSCULOSKELETAL: Denies joint swelling or redness  PSYCHOLOGICAL: Denies anxiety, depression   SKIN: Denies skin changes, delayed healing, rash, itching   HEMATOLOGIC: Denies easy bleeding or bruising  ENDOCRINE: Denies excessive thirst, urination, heat/cold  RESPIRATORY: Denies current dyspnea, cough  GI: Denies nausea, vomiting, diarrhea   : Denies bowel or bladder issues      PHYSICAL EXAM:    Vitals: Height 5' 11\" (1.803 m), weight 233 lb (105.7 kg). GENERAL EXAM:  · General Apparence: Patient is adequately groomed with no evidence of malnutrition. · Orientation: The patient is oriented to time, place and person. · Mood & Affect:The patient's mood and affect are appropriate. · Vascular: Examination reveals no swelling tenderness in upper or lower extremities. Good capillary refill. · Lymphatic: The lymphatic examination bilaterally reveals all areas to be without enlargement or induration  · Sensation: Sensation is intact without deficit  · Coordination/Balance: Good coordination     LUMBAR/SACRAL EXAMINATION:  · Inspection: Local inspection shows no step-off or bruising. Lumbar alignment is normal.  Sagittal and Coronal balance is neutral.      · Palpation:   No evidence of tenderness at the midline.   No tenderness bilaterally at the paraspinal or trochanters. There is no step-off or paraspinal spasm. · Range of Motion: Lumbar flexion, extension and rotation are mildly limited due to pain. · Strength:   Strength testing is 5/5 in all muscle groups tested. · Special Tests:   Straight leg raise and crossed SLR negative. Leg length and pelvis level. · Skin: There are no rashes, ulcerations or lesions. · Reflexes: Reflexes are symmetrically 2+ at the patellar and ankle tendons. Clonus absent bilaterally at the feet. · Gait & station: normal, patient ambulates without assistance    · Additional Examinations:   · RIGHT LOWER EXTREMITY: Inspection/examination of the right lower extremity does not show any tenderness, deformity or injury. Range of motion is unremarkable. There is no gross instability. There are no rashes, ulcerations or lesions. Strength and tone are normal.    · LEFT LOWER EXTREMITY:  Inspection/examination of the left lower extremity does not show any tenderness, deformity or injury. Range of motion is unremarkable. There is no gross instability. There are no rashes, ulcerations or lesions. Strength and tone are normal.    Diagnostic Testing:    I reviewed MRI images of his lumbar spine from 3/13/2020 in the office today those show previous fusion L3-L5 with moderate central and multilevel foraminal stenosis. I reviewed AP and lateral x-rays of his lumbar spine that were obtained in the office today. Those show pedicle instrumentation L3-L5 and multilevel arthritis    Impression:   Lumbar degenerative disc disease    Plan:    Discussed treatment options including observation, physical therapy, epidural injection, and additional imaging. He would like to proceed with lumbar MRI.   He is potentially candidate for medial branch blocks

## 2022-05-27 NOTE — RESULT ENCOUNTER NOTE
I talked to the patient today-he saw Dr. Miguelito Potts yesterday and he has an updated MRI ordered for him.

## 2022-05-31 ENCOUNTER — HOSPITAL ENCOUNTER (OUTPATIENT)
Dept: MRI IMAGING | Age: 82
Discharge: HOME | End: 2022-05-31
Payer: MEDICARE

## 2022-05-31 DIAGNOSIS — M48.062 SPINAL STENOSIS OF LUMBAR REGION WITH NEUROGENIC CLAUDICATION: ICD-10-CM

## 2022-05-31 PROCEDURE — 6360000004 HC RX CONTRAST MEDICATION: Performed by: ORTHOPAEDIC SURGERY

## 2022-05-31 PROCEDURE — A9579 GAD-BASE MR CONTRAST NOS,1ML: HCPCS | Performed by: ORTHOPAEDIC SURGERY

## 2022-05-31 PROCEDURE — 72158 MRI LUMBAR SPINE W/O & W/DYE: CPT

## 2022-05-31 RX ADMIN — GADOTERIDOL 20 ML: 279.3 INJECTION, SOLUTION INTRAVENOUS at 16:06

## 2022-06-03 ENCOUNTER — TELEPHONE (OUTPATIENT)
Dept: ORTHOPEDIC SURGERY | Age: 82
End: 2022-06-03

## 2022-06-03 NOTE — TELEPHONE ENCOUNTER
----- Message from Inderjit Marino MD sent at 6/1/2022  8:55 AM EDT -----  MRI shows arthritis and mild to moderate nerve compression adjacent to his fusion  Would recommend SI injection, which is different than his past injections

## 2022-06-07 DIAGNOSIS — M48.062 SPINAL STENOSIS OF LUMBAR REGION WITH NEUROGENIC CLAUDICATION: ICD-10-CM

## 2022-06-07 DIAGNOSIS — M54.50 LUMBAR PAIN: Primary | ICD-10-CM

## 2022-06-27 DIAGNOSIS — J30.0 VASOMOTOR RHINITIS: ICD-10-CM

## 2022-06-27 RX ORDER — LEVOTHYROXINE SODIUM 112 UG/1
TABLET ORAL
Qty: 180 TABLET | Refills: 1 | Status: SHIPPED | OUTPATIENT
Start: 2022-06-27

## 2022-06-27 RX ORDER — HYDROCHLOROTHIAZIDE 25 MG/1
TABLET ORAL
Qty: 180 TABLET | Refills: 1 | Status: SHIPPED | OUTPATIENT
Start: 2022-06-27

## 2022-06-27 RX ORDER — LISINOPRIL 20 MG/1
TABLET ORAL
Qty: 180 TABLET | Refills: 1 | Status: SHIPPED | OUTPATIENT
Start: 2022-06-27

## 2022-06-27 RX ORDER — ATORVASTATIN CALCIUM 40 MG/1
TABLET, FILM COATED ORAL
Qty: 90 TABLET | Refills: 1 | Status: SHIPPED | OUTPATIENT
Start: 2022-06-27

## 2022-06-28 RX ORDER — IPRATROPIUM BROMIDE 21 UG/1
SPRAY, METERED NASAL
Qty: 1 EACH | Refills: 9 | Status: SHIPPED | OUTPATIENT
Start: 2022-06-28 | End: 2022-07-27

## 2022-07-27 ENCOUNTER — PATIENT MESSAGE (OUTPATIENT)
Dept: ENT CLINIC | Age: 82
End: 2022-07-27

## 2022-07-27 DIAGNOSIS — J30.0 VASOMOTOR RHINITIS: Primary | ICD-10-CM

## 2022-07-27 RX ORDER — IPRATROPIUM BROMIDE 42 UG/1
2 SPRAY, METERED NASAL 4 TIMES DAILY
Qty: 1 EACH | Refills: 1 | Status: SHIPPED | OUTPATIENT
Start: 2022-07-27 | End: 2022-08-27 | Stop reason: SDUPTHER

## 2022-07-29 NOTE — TELEPHONE ENCOUNTER
.Refill Request     CONFIRM preferrred pharmacy with the patient. If Mail Order Rx - Pend for 90 day refill.       Last Seen: Last Seen Department: 4/18/2022  Last Seen by PCP: 4/18/2022    Last Written: 9-16-21 180 with 3     Next Appointment:   Future Appointments   Date Time Provider Minh Valdez   8/17/2022  8:50 AM Lizette Laws MD [facility] [place]   10/19/2022  8:30 AM DO YAMILET Mccabe  [place] [facility] [place]       Future appointment scheduled      Requested Prescriptions     Pending Prescriptions Disp Refills    metoprolol tartrate (LOPRESSOR) 25 MG tablet 180 tablet 1     Sig: Take one tablet by mouth twice a day

## 2022-08-27 DIAGNOSIS — J30.0 VASOMOTOR RHINITIS: ICD-10-CM

## 2022-08-29 RX ORDER — IPRATROPIUM BROMIDE 42 UG/1
2 SPRAY, METERED NASAL 4 TIMES DAILY
Qty: 1 EACH | Refills: 10 | Status: SHIPPED | OUTPATIENT
Start: 2022-08-29

## 2022-09-08 ENCOUNTER — HOSPITAL ENCOUNTER (OUTPATIENT)
Age: 82
Setting detail: OUTPATIENT SURGERY
Discharge: HOME | End: 2022-09-08
Attending: PAIN MEDICINE | Admitting: PAIN MEDICINE
Payer: MEDICARE

## 2022-09-08 VITALS
TEMPERATURE: 97.8 F | HEART RATE: 57 BPM | OXYGEN SATURATION: 98 % | HEIGHT: 71 IN | SYSTOLIC BLOOD PRESSURE: 156 MMHG | DIASTOLIC BLOOD PRESSURE: 74 MMHG | WEIGHT: 223 LBS | RESPIRATION RATE: 18 BRPM | BODY MASS INDEX: 31.22 KG/M2

## 2022-09-08 PROBLEM — M47.816 LUMBAR SPONDYLOSIS: Status: ACTIVE | Noted: 2022-09-08

## 2022-09-08 LAB
GLUCOSE BLD-MCNC: 71 MG/DL (ref 70–99)
PERFORMED ON: NORMAL

## 2022-09-08 PROCEDURE — 2500000003 HC RX 250 WO HCPCS: Performed by: PAIN MEDICINE

## 2022-09-08 PROCEDURE — 64493 INJ PARAVERT F JNT L/S 1 LEV: CPT | Performed by: PAIN MEDICINE

## 2022-09-08 PROCEDURE — 2709999900 HC NON-CHARGEABLE SUPPLY: Performed by: PAIN MEDICINE

## 2022-09-08 PROCEDURE — 3600000012 HC SURGERY LEVEL 2 ADDTL 15MIN: Performed by: PAIN MEDICINE

## 2022-09-08 PROCEDURE — 64494 INJ PARAVERT F JNT L/S 2 LEV: CPT | Performed by: PAIN MEDICINE

## 2022-09-08 PROCEDURE — 3600000002 HC SURGERY LEVEL 2 BASE: Performed by: PAIN MEDICINE

## 2022-09-08 PROCEDURE — 7100000010 HC PHASE II RECOVERY - FIRST 15 MIN: Performed by: PAIN MEDICINE

## 2022-09-08 RX ORDER — BUPIVACAINE HYDROCHLORIDE 7.5 MG/ML
INJECTION, SOLUTION EPIDURAL; RETROBULBAR PRN
Status: DISCONTINUED | OUTPATIENT
Start: 2022-09-08 | End: 2022-09-08 | Stop reason: ALTCHOICE

## 2022-09-08 RX ORDER — BUPIVACAINE HYDROCHLORIDE 7.5 MG/ML
INJECTION, SOLUTION EPIDURAL; RETROBULBAR
Status: DISCONTINUED
Start: 2022-09-08 | End: 2022-09-08 | Stop reason: HOSPADM

## 2022-09-08 RX ORDER — FOLIC ACID 1 MG/1
1 TABLET ORAL DAILY
COMMUNITY

## 2022-09-08 RX ORDER — BACITRACIN ZINC AND POLYMYXIN B SULFATE 500; 10000 [USP'U]/G; [USP'U]/G
OINTMENT OPHTHALMIC 2 TIMES DAILY
COMMUNITY

## 2022-09-08 ASSESSMENT — PAIN DESCRIPTION - DESCRIPTORS: DESCRIPTORS: THROBBING

## 2022-09-08 ASSESSMENT — PAIN - FUNCTIONAL ASSESSMENT: PAIN_FUNCTIONAL_ASSESSMENT: 0-10

## 2022-09-08 NOTE — PROCEDURES
Vita Harrington is a 80 y.o. male patient. No diagnosis found. Past Medical History:   Diagnosis Date    BRYON (acute kidney injury) ([facility] [address] 11/2/2017    CAD (coronary artery disease) 8/1/2015    Cancer ([facility] [address]     PROSTATE    Chronic dCHF (grade 1 LVDD) 11/2/2017    Deafness     Diverticulitis     Diverticulosis     Fatigue     Hepatitis     PT NOT SURE WHAT KIND, HE HAD YEARS AGO    Hyperlipidemia     Hypertension     Hypothyroidism     Joint pain     Lumbar radiculopathy  R AND L  5/11/2018    Lumbar stenosis with neurogenic claudication  severe L23 TO L5S1 , WORSE L45      Medical history reviewed with no changes     Numbness and tingling     HANDS AND FEET    OA (osteoarthritis) of hip 7/21/2015    Obesity (BMI 30-39.9) 11/3/2017    Other disorders of kidney and ureter     Rheumatoid aortitis     Shortness of breath     Spinal stenosis, lumbar region, with neurogenic claudication     Type 2 diabetes mellitus without complication, without long-term current use of insulin ([facility] [address] 9/9/2016    Weakness      Blood pressure (!) 142/53, pulse 57, temperature 97.1 °F (36.2 °C), temperature source Temporal, resp. rate 16, height 5' 11\" (1.803 m), weight 223 lb (101.2 kg), SpO2 98 %. Procedures    Eduardo Doll MD  9/8/2022    INDICATIONS:  Lumbar Spondylosis 721.3, M47.816,M47.817  OPERATIVE PROCEDURE:  B L3,L4,L5 MBB TO BLOCK THE L45 AND L5S1 FACET JOINT  79925 and 44641 WITH 96127,   Consent signed by patient after risks and benefits explained. Patient was in prone position. Back was prepped with Prevail and draped. Aseptic technique used at every stage of the procedure. Skin wheal with 1% Lidocaine with 30-gauge needle. A _22__ -gauge, _5__ -inch spinal needle was placed under fluoroscopic guidance using AP/oblique views at the junction of superior articular process and sacral ala on the _B__ sides to access the __L5__ medial branches.  Then, 0.5 cc of 0.75% MARCAINE  was injected after negative aspiration for blood or CSF. Needle pulled out intact. Exact similar procedure was performed at the junction of superior articular process and transverse process at     L4,L5__ vertebral levels to numb the  L3,L4__ medial branches. Patient tolerated procedure well and discharge instructions were given.   90% RELIEF WITH SIG INCREASE IN SPINAL ROM       ESTIMATED BLOOD LOSS:  Less than 1 cc       Pulse Ox Monitoring was done.            ________________________________                   Gerardo Lam M.D.

## 2022-09-08 NOTE — DISCHARGE INSTRUCTIONS
[address] [facility]    [phone]    Post Pain Management Injection    PATIENT INSTRUCTIONS:     -Resume Normal Diet  -Other    ACTIVITY:    -No driving or operating machinery for 8 hours post procedure without sedation and 24 hours with sedation. If you are seen driving during this time the proper authorities will be notified.  -Do not stay alone for 4-6 hours after the procedure.  -If you have had IV sedation, do not sign legal documents, make any major decisions, or be involved in work decisions for the remainder of the day. -May shower or bathe.  -Resume normal activity when full movement/sensation has returned in extremities. 3)  SITE CARE:    -Observe puncture site for signs of infection (redness, warmth swelling, drainage with a foul odor, fever or increased tenderness). 4)  EXPECTED SIDE EFFECTS:    -Numbness/tingling/weakness in extremities, if this lasts more than 6 hours notify Dr. Danielle Hawthorne. -Muscle stiffness, soreness at puncture site (soreness may last 2-4 days). -pre procedure pain           5)  TO REACH DR. Salinas So: Call [phone]    6)  ADDITIONAL INSTRUCTIONS:    Follow-up as scheduled or call for appointment if not already done. Patients taking Coumadin may resume taking as before the procedure.

## 2022-09-08 NOTE — PROGRESS NOTES
Discharge  instructions reviewed. Pt and family verbalize understanding with no further questions. VSS. Pt discharged via [address] to car. Assessment unchanged. Dr. Facundo Light notified that pt states his pain and range of motion is 100% better.

## 2022-10-19 ENCOUNTER — OFFICE VISIT (OUTPATIENT)
Dept: FAMILY MEDICINE CLINIC | Age: 82
End: 2022-10-19
Payer: MEDICARE

## 2022-10-19 VITALS
DIASTOLIC BLOOD PRESSURE: 60 MMHG | OXYGEN SATURATION: 93 % | WEIGHT: 230.6 LBS | SYSTOLIC BLOOD PRESSURE: 124 MMHG | HEART RATE: 69 BPM | HEIGHT: 71 IN | BODY MASS INDEX: 32.28 KG/M2

## 2022-10-19 DIAGNOSIS — E03.9 HYPOTHYROIDISM, UNSPECIFIED TYPE: ICD-10-CM

## 2022-10-19 DIAGNOSIS — I10 PRIMARY HYPERTENSION: ICD-10-CM

## 2022-10-19 DIAGNOSIS — I25.10 CORONARY ARTERY DISEASE INVOLVING NATIVE CORONARY ARTERY OF NATIVE HEART WITHOUT ANGINA PECTORIS: ICD-10-CM

## 2022-10-19 DIAGNOSIS — E11.319 TYPE 2 DIABETES MELLITUS WITH LEFT EYE AFFECTED BY RETINOPATHY WITHOUT MACULAR EDEMA, WITHOUT LONG-TERM CURRENT USE OF INSULIN, UNSPECIFIED RETINOPATHY SEVERITY (HCC): Primary | ICD-10-CM

## 2022-10-19 LAB — HBA1C MFR BLD: 5.7 %

## 2022-10-19 PROCEDURE — G8484 FLU IMMUNIZE NO ADMIN: HCPCS | Performed by: FAMILY MEDICINE

## 2022-10-19 PROCEDURE — 3044F HG A1C LEVEL LT 7.0%: CPT | Performed by: FAMILY MEDICINE

## 2022-10-19 PROCEDURE — 1036F TOBACCO NON-USER: CPT | Performed by: FAMILY MEDICINE

## 2022-10-19 PROCEDURE — G8427 DOCREV CUR MEDS BY ELIG CLIN: HCPCS | Performed by: FAMILY MEDICINE

## 2022-10-19 PROCEDURE — 99214 OFFICE O/P EST MOD 30 MIN: CPT | Performed by: FAMILY MEDICINE

## 2022-10-19 PROCEDURE — G8417 CALC BMI ABV UP PARAM F/U: HCPCS | Performed by: FAMILY MEDICINE

## 2022-10-19 PROCEDURE — 83036 HEMOGLOBIN GLYCOSYLATED A1C: CPT | Performed by: FAMILY MEDICINE

## 2022-10-19 PROCEDURE — 1123F ACP DISCUSS/DSCN MKR DOCD: CPT | Performed by: FAMILY MEDICINE

## 2022-10-19 RX ORDER — BUPROPION HYDROCHLORIDE 150 MG/1
TABLET, EXTENDED RELEASE ORAL
Qty: 90 TABLET | Refills: 3 | Status: SHIPPED | OUTPATIENT
Start: 2022-10-19

## 2022-10-19 RX ORDER — OMEPRAZOLE 40 MG/1
CAPSULE, DELAYED RELEASE ORAL
Qty: 90 CAPSULE | Refills: 3 | Status: SHIPPED | OUTPATIENT
Start: 2022-10-19

## 2022-10-19 ASSESSMENT — ENCOUNTER SYMPTOMS
CHEST TIGHTNESS: 0
BACK PAIN: 1
SORE THROAT: 0
SHORTNESS OF BREATH: 0
RHINORRHEA: 0
ABDOMINAL PAIN: 0
CONSTIPATION: 0
COUGH: 0
BLOOD IN STOOL: 0

## 2022-10-19 ASSESSMENT — PATIENT HEALTH QUESTIONNAIRE - PHQ9
SUM OF ALL RESPONSES TO PHQ QUESTIONS 1-9: 10
7. TROUBLE CONCENTRATING ON THINGS, SUCH AS READING THE NEWSPAPER OR WATCHING TELEVISION: 3
8. MOVING OR SPEAKING SO SLOWLY THAT OTHER PEOPLE COULD HAVE NOTICED. OR THE OPPOSITE, BEING SO FIGETY OR RESTLESS THAT YOU HAVE BEEN MOVING AROUND A LOT MORE THAN USUAL: 1
SUM OF ALL RESPONSES TO PHQ QUESTIONS 1-9: 10
3. TROUBLE FALLING OR STAYING ASLEEP: 3
10. IF YOU CHECKED OFF ANY PROBLEMS, HOW DIFFICULT HAVE THESE PROBLEMS MADE IT FOR YOU TO DO YOUR WORK, TAKE CARE OF THINGS AT HOME, OR GET ALONG WITH OTHER PEOPLE: 0
4. FEELING TIRED OR HAVING LITTLE ENERGY: 3
1. LITTLE INTEREST OR PLEASURE IN DOING THINGS: 0
SUM OF ALL RESPONSES TO PHQ9 QUESTIONS 1 & 2: 0
6. FEELING BAD ABOUT YOURSELF - OR THAT YOU ARE A FAILURE OR HAVE LET YOURSELF OR YOUR FAMILY DOWN: 0
9. THOUGHTS THAT YOU WOULD BE BETTER OFF DEAD, OR OF HURTING YOURSELF: 0
2. FEELING DOWN, DEPRESSED OR HOPELESS: 0
SUM OF ALL RESPONSES TO PHQ QUESTIONS 1-9: 10
5. POOR APPETITE OR OVEREATING: 0
SUM OF ALL RESPONSES TO PHQ QUESTIONS 1-9: 10

## 2022-10-19 NOTE — TELEPHONE ENCOUNTER
.Refill Request     CONFIRM preferrred pharmacy with the patient. If Mail Order Rx - Pend for 90 day refill. Last Seen: Last Seen Department: 10/19/2022  Last Seen by PCP: 10/19/2022    Last Written: 5-6-22 90 with 1     If no future appointment scheduled, route STAFF MESSAGE with patient name to the [facility] for scheduling. Next Appointment:   Future Appointments   Date Time Provider Minh Valdez   10/20/2022  2:00 PM Everett Leach MD [facility] [place]   11/21/2022  1:30 PM Everett Leach MD [facility] [place]   4/19/2023  8:00 AM DO YAMILET Mccabe  [place] [facility]       Message sent to [address] [place] [address] to schedule appt with patient?   N/A      Requested Prescriptions     Pending Prescriptions Disp Refills    omeprazole (PRILOSEC) 40 MG delayed release capsule [Pharmacy Med Name: OMEPRAZOLE DR 40 MG CAPSULE] 90 capsule 1     Sig: TAKE 1 CAPSULE BY MOUTH EVERY DAY IN THE MORNING BEFORE BREAKFAST    buPROPion (WELLBUTRIN SR) 150 MG extended release tablet [Pharmacy Med Name: BUPROPION HCL  MG TABLET] 90 tablet 1     Sig: TAKE 1 TABLET BY MOUTH EVERY DAY

## 2022-10-19 NOTE — PROGRESS NOTES
Subjective:      Patient ID: Cody Carbajal is a 80 y.o. male. HPI  Sent in for 6-month checkup on several medical issues. Diabetes-he is not on any medication and his highest A1c about 2 or 3 years ago was 7.3. His last A1c was 6.4. Hypertension-blood pressure 140/80 or below when he checks at home or elsewhere. Hypothyroidism-labs have been fine for the last 2 to 3 years. He states that he only has 1 testicle and that 1 in the last few years seems to be getting softer and tender when he palpates. Colonoscopy-he was told after the last one that he does not need anymore exams. He denies any other issues to discuss. Review of Systems    Review of Systems   Constitutional:  Negative for unexpected weight change. HENT:  Negative for congestion, postnasal drip, rhinorrhea and sore throat. Eyes:  Negative for visual disturbance. Respiratory:  Negative for cough, chest tightness and shortness of breath. Cardiovascular:  Negative for chest pain, palpitations and leg swelling. Gastrointestinal:  Negative for abdominal pain, blood in stool and constipation. No gerd   Genitourinary:  Positive for frequency. Negative for dysuria and hematuria. See HPI. Musculoskeletal:  Positive for arthralgias and back pain. Negative for myalgias. Skin:  Negative for pallor and rash. Neurological:  Negative for tremors, syncope and headaches. Psychiatric/Behavioral:  Positive for dysphoric mood. Negative for sleep disturbance. The patient is not nervous/anxious. Objective:   Physical Exam      Physical Exam  Constitutional:       General: He is not in acute distress. Appearance: Normal appearance. He is well-developed. He is not ill-appearing. HENT:      Head: Normocephalic. Mouth/Throat:      Mouth: Mucous membranes are moist.      Pharynx: Oropharynx is clear. Eyes:      Conjunctiva/sclera: Conjunctivae normal.   Neck:      Thyroid: No thyromegaly.       Vascular: No carotid bruit. Cardiovascular:      Rate and Rhythm: Normal rate and regular rhythm. Heart sounds: Normal heart sounds. Pulmonary:      Effort: Pulmonary effort is normal.      Breath sounds: Normal breath sounds. Abdominal:      General: There is no distension. Palpations: Abdomen is soft. There is no mass. Tenderness: There is no abdominal tenderness. Genitourinary:     Comments: Atrophy of the left testicle and the right testicle is definitely soft and upon moderate palpation is tender. Musculoskeletal:      Cervical back: Neck supple. Right lower leg: No edema. Left lower leg: No edema. Lymphadenopathy:      Cervical: No cervical adenopathy. Skin:     General: Skin is warm and dry. Neurological:      Mental Status: He is alert and oriented to person, place, and time. Gait: Gait abnormal.      Comments: Does use a cane for balance purposes and for his low back pain. He is going for nerve ablation in the lower back tomorrow. Psychiatric:         Mood and Affect: Mood normal.         Behavior: Behavior normal.         Thought Content: Thought content normal.         Judgment: Judgment normal.       Assessment:       Diagnosis Orders   1. 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 Primary hypertension        3. Hypothyroidism, unspecified type        4. Coronary artery disease involving native coronary artery of native heart without angina pectoris              Plan:      Josselin Siu was seen today for 6 month follow-up.     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 Notify me of any persistent elevation of blood sugars. Primary hypertension  Continue medications and no added salt diet-limit caffeine and preferably no alcohol. Notify me of any persistent elevation of blood pressure  Hypothyroidism, unspecified type  Continue medications and we will do lab at the next visit    Coronary artery disease-he has 1 stent and apparently is not seeing the cardiologist at this time. Call Dr. Tad Horton concerning the soft tender testicle and see if he needs to evaluate. This is been a 30-minute visit with the patient.     See me 6 months     Jeromy Salvador, DO

## 2022-10-20 ENCOUNTER — HOSPITAL ENCOUNTER (OUTPATIENT)
Age: 82
Setting detail: OUTPATIENT SURGERY
Discharge: HOME | End: 2022-10-20
Attending: PAIN MEDICINE | Admitting: PAIN MEDICINE
Payer: MEDICARE

## 2022-10-20 VITALS
WEIGHT: 225 LBS | SYSTOLIC BLOOD PRESSURE: 121 MMHG | HEART RATE: 65 BPM | OXYGEN SATURATION: 98 % | BODY MASS INDEX: 31.5 KG/M2 | HEIGHT: 71 IN | DIASTOLIC BLOOD PRESSURE: 63 MMHG | TEMPERATURE: 98.2 F | RESPIRATION RATE: 16 BRPM

## 2022-10-20 LAB
GLUCOSE BLD-MCNC: 91 MG/DL (ref 70–99)
PERFORMED ON: NORMAL

## 2022-10-20 PROCEDURE — 7100000010 HC PHASE II RECOVERY - FIRST 15 MIN: Performed by: PAIN MEDICINE

## 2022-10-20 PROCEDURE — 3600000002 HC SURGERY LEVEL 2 BASE: Performed by: PAIN MEDICINE

## 2022-10-20 PROCEDURE — 2500000003 HC RX 250 WO HCPCS: Performed by: PAIN MEDICINE

## 2022-10-20 PROCEDURE — 6360000002 HC RX W HCPCS: Performed by: PAIN MEDICINE

## 2022-10-20 PROCEDURE — 64635 DESTROY LUMB/SAC FACET JNT: CPT | Performed by: PAIN MEDICINE

## 2022-10-20 PROCEDURE — 99152 MOD SED SAME PHYS/QHP 5/>YRS: CPT | Performed by: PAIN MEDICINE

## 2022-10-20 PROCEDURE — 77003 FLUOROGUIDE FOR SPINE INJECT: CPT | Performed by: PAIN MEDICINE

## 2022-10-20 PROCEDURE — 7100000011 HC PHASE II RECOVERY - ADDTL 15 MIN: Performed by: PAIN MEDICINE

## 2022-10-20 PROCEDURE — 3600000012 HC SURGERY LEVEL 2 ADDTL 15MIN: Performed by: PAIN MEDICINE

## 2022-10-20 PROCEDURE — 2580000003 HC RX 258: Performed by: PAIN MEDICINE

## 2022-10-20 PROCEDURE — 99153 MOD SED SAME PHYS/QHP EA: CPT | Performed by: PAIN MEDICINE

## 2022-10-20 PROCEDURE — 64636 DESTROY L/S FACET JNT ADDL: CPT | Performed by: PAIN MEDICINE

## 2022-10-20 PROCEDURE — 2709999900 HC NON-CHARGEABLE SUPPLY: Performed by: PAIN MEDICINE

## 2022-10-20 RX ORDER — LIDOCAINE HYDROCHLORIDE 10 MG/ML
INJECTION, SOLUTION EPIDURAL; INFILTRATION; INTRACAUDAL; PERINEURAL PRN
Status: DISCONTINUED | OUTPATIENT
Start: 2022-10-20 | End: 2022-10-20 | Stop reason: ALTCHOICE

## 2022-10-20 RX ORDER — MIDAZOLAM HYDROCHLORIDE 1 MG/ML
INJECTION INTRAMUSCULAR; INTRAVENOUS PRN
Status: DISCONTINUED | OUTPATIENT
Start: 2022-10-20 | End: 2022-10-20 | Stop reason: ALTCHOICE

## 2022-10-20 RX ORDER — LIDOCAINE HYDROCHLORIDE 20 MG/ML
INJECTION, SOLUTION EPIDURAL; INFILTRATION; INTRACAUDAL; PERINEURAL PRN
Status: DISCONTINUED | OUTPATIENT
Start: 2022-10-20 | End: 2022-10-20 | Stop reason: ALTCHOICE

## 2022-10-20 RX ORDER — SODIUM CHLORIDE, SODIUM LACTATE, POTASSIUM CHLORIDE, CALCIUM CHLORIDE 600; 310; 30; 20 MG/100ML; MG/100ML; MG/100ML; MG/100ML
INJECTION, SOLUTION INTRAVENOUS CONTINUOUS
Status: DISCONTINUED | OUTPATIENT
Start: 2022-10-20 | End: 2022-10-20 | Stop reason: HOSPADM

## 2022-10-20 RX ORDER — LIDOCAINE HYDROCHLORIDE 20 MG/ML
INJECTION, SOLUTION EPIDURAL; INFILTRATION; INTRACAUDAL; PERINEURAL
Status: DISCONTINUED
Start: 2022-10-20 | End: 2022-10-20 | Stop reason: HOSPADM

## 2022-10-20 RX ORDER — MIDAZOLAM HYDROCHLORIDE 1 MG/ML
INJECTION INTRAMUSCULAR; INTRAVENOUS
Status: DISCONTINUED
Start: 2022-10-20 | End: 2022-10-20 | Stop reason: HOSPADM

## 2022-10-20 RX ADMIN — SODIUM CHLORIDE, SODIUM LACTATE, POTASSIUM CHLORIDE, AND CALCIUM CHLORIDE: 600; 310; 30; 20 INJECTION, SOLUTION INTRAVENOUS at 13:50

## 2022-10-20 ASSESSMENT — PAIN - FUNCTIONAL ASSESSMENT
PAIN_FUNCTIONAL_ASSESSMENT: PREVENTS OR INTERFERES SOME ACTIVE ACTIVITIES AND ADLS
PAIN_FUNCTIONAL_ASSESSMENT: 0-10

## 2022-10-20 ASSESSMENT — PAIN DESCRIPTION - DESCRIPTORS: DESCRIPTORS: ACHING;SHARP

## 2022-10-20 NOTE — PROCEDURES
Deyanira Tipton is a 80 y.o. male patient. No diagnosis found. Past Medical History:   Diagnosis Date    BRYON (acute kidney injury) ([facility] [address] 11/2/2017    CAD (coronary artery disease) 8/1/2015    Cancer ([facility] [address]     PROSTATE    Chronic dCHF (grade 1 LVDD) 11/2/2017    Deafness     Diverticulitis     Diverticulosis     Fatigue     Hepatitis     PT NOT SURE WHAT KIND, HE HAD YEARS AGO    Hyperlipidemia     Hypertension     Hypothyroidism     Joint pain     Lumbar radiculopathy  R AND L  5/11/2018    Lumbar stenosis with neurogenic claudication  severe L23 TO L5S1 , WORSE L45      Medical history reviewed with no changes     Numbness and tingling     HANDS AND FEET    OA (osteoarthritis) of hip 7/21/2015    Obesity (BMI 30-39.9) 11/3/2017    Other disorders of kidney and ureter     Rheumatoid aortitis     Shortness of breath     Spinal stenosis, lumbar region, with neurogenic claudication     Type 2 diabetes mellitus without complication, without long-term current use of insulin ([facility] [address] 9/9/2016    Weakness      Blood pressure (!) 154/72, pulse 62, temperature 98.2 °F (36.8 °C), temperature source Temporal, resp. rate 20, height 5' 11\" (1.803 m), weight 225 lb (102.1 kg), SpO2 98 %. Casey Browning MD  10/20/2022  DICTATING PHYSICIAN: Joanna Rojas M.D        PREOPERATIVE DIAGNOSES: Lumbar Spondylosis 721.3, M47.816, M47.817       POSTOPERATIVE DIAGNOSES: Lumbar Spondylosis       OPERATIVE PROCEDURES:  Radiofrequency Neurotomy of medial branches at _B L4,L5__ levels to block the L45 and L5S1 facet joints. 17300 and 64636 X 1 WITH 32046,  BILATERAL   SURGEON: Joanna Rojas M.D   INDICATIONS:  Lumbar Spondylosis  OPERATIVE PROCEDURE:  Consent signed by patient after risks and benefits explained. Patient was in prone position. Back was prepped with Prevail and draped. Aseptic technique used at every stage of the procedure. Skin wheal with 1% Lidocaine with 30-gauge needle.  A _18__ -gauge, _100__ -mm, curved tip radiofrequency needle with _10__ -mm active tip was placed under fluoroscopic guidance using A.P. views at the junction of superior articular process and sacral ala on the _B__ sides to access the __L5__ medial branches. The needle was walked anteriorly and superiorly by couple of millimeters. Then the lateral view was checked to make sure the tip of the needle is at the posterior part of the spine, clearly posterior to the posterior margin of the neural foramen at that level by at least couple of millimeters. Sensory stimulation at 50 hertz at 0.5 v did not create any sensation in the legs. Motor stimulation at 2 hertz at 2 volts did not produce any muscle contraction in the _B__ legs. Sensory stimulation at 50hz at 0.5v did not create any pain down the leg. 0.3 cc of 1% lidocaine was injected and radiofrequency lesioning was done for 90 seconds at 80 degree centigrade. Needle pulled out intact. Exact similar procedure was performed at the junction of superior articular process and transverse process at _B L5__ vertebral body levels to burn the __B L4__ medial branches. Paraspinal muscle twitching was seen at _NO __ vertebral levels. Patient did not feel radicular pain either during needle placement or during lesioning. Patient tolerated the procedure well. Intravenous sedation was with _2__ mg of Versed. ESTIMATED BLOOD LOSS:  Less than 1 cc      Patient was monitored and stable. Discharge instructions were given.

## 2022-10-20 NOTE — DISCHARGE INSTRUCTIONS
[facility]          [phone]          Post Radiofrequency for Dr. Amalia Magaña        Pain may be worse 1-2 weeks post procedure. Take pain medicine as needed. This includes pain at needle puncture site(s), mild increased neck or back stiffness, or deep back or neck pain. Treatments for mild post procedure surgery include relative rest for 3-4 days; avoid movements which aggravate pain, and applying cold compresses. Contact Dr. Amalia Magaña if you develop a fever >102, new severe unremitting lower back pain, new persistent weakness, or new bowel or bladder incontinence. You will need to follow up with Dr. Amalia Magaña within 4 weeks after the procedure. No driving day of procedure. If you are seen driving during this time the proper authorities will be notified. Do not sign legal documents, make any major decisions, or be involved in work decisions for the remainder of the day. Do not stay alone for 4-6 hours after the procedure.     TO [facility] [phone] TO MAKE A FOLLOW UP APPOINTMENT

## 2022-11-25 ENCOUNTER — APPOINTMENT (OUTPATIENT)
Dept: CT IMAGING | Age: 82
End: 2022-11-25
Payer: MEDICARE

## 2022-11-25 ENCOUNTER — APPOINTMENT (OUTPATIENT)
Dept: GENERAL RADIOLOGY | Age: 82
End: 2022-11-25
Payer: MEDICARE

## 2022-11-25 ENCOUNTER — HOSPITAL ENCOUNTER (EMERGENCY)
Age: 82
Discharge: HOME | End: 2022-11-25
Payer: MEDICARE

## 2022-11-25 VITALS
OXYGEN SATURATION: 96 % | HEIGHT: 71 IN | BODY MASS INDEX: 31.36 KG/M2 | HEART RATE: 68 BPM | DIASTOLIC BLOOD PRESSURE: 73 MMHG | TEMPERATURE: 97.5 F | WEIGHT: 224 LBS | SYSTOLIC BLOOD PRESSURE: 166 MMHG | RESPIRATION RATE: 14 BRPM

## 2022-11-25 DIAGNOSIS — S60.211A CONTUSION OF RIGHT WRIST, INITIAL ENCOUNTER: ICD-10-CM

## 2022-11-25 DIAGNOSIS — S00.83XA FOREHEAD CONTUSION, INITIAL ENCOUNTER: ICD-10-CM

## 2022-11-25 DIAGNOSIS — R03.0 ELEVATED BLOOD PRESSURE READING: ICD-10-CM

## 2022-11-25 DIAGNOSIS — S09.90XA CLOSED HEAD INJURY, INITIAL ENCOUNTER: Primary | ICD-10-CM

## 2022-11-25 DIAGNOSIS — S16.1XXA CERVICAL STRAIN, ACUTE, INITIAL ENCOUNTER: ICD-10-CM

## 2022-11-25 DIAGNOSIS — M50.30 DDD (DEGENERATIVE DISC DISEASE), CERVICAL: ICD-10-CM

## 2022-11-25 DIAGNOSIS — S01.01XA SCALP LACERATION, INITIAL ENCOUNTER: ICD-10-CM

## 2022-11-25 PROCEDURE — 12013 RPR F/E/E/N/L/M 2.6-5.0 CM: CPT

## 2022-11-25 PROCEDURE — 99284 EMERGENCY DEPT VISIT MOD MDM: CPT

## 2022-11-25 PROCEDURE — 72125 CT NECK SPINE W/O DYE: CPT

## 2022-11-25 PROCEDURE — 73110 X-RAY EXAM OF WRIST: CPT

## 2022-11-25 PROCEDURE — 70450 CT HEAD/BRAIN W/O DYE: CPT

## 2022-11-25 PROCEDURE — 6370000000 HC RX 637 (ALT 250 FOR IP): Performed by: PHYSICIAN ASSISTANT

## 2022-11-25 RX ORDER — ACETAMINOPHEN 325 MG/1
650 TABLET ORAL ONCE
Status: COMPLETED | OUTPATIENT
Start: 2022-11-25 | End: 2022-11-25

## 2022-11-25 RX ADMIN — ACETAMINOPHEN 650 MG: 325 TABLET ORAL at 19:35

## 2022-11-25 ASSESSMENT — ENCOUNTER SYMPTOMS
ABDOMINAL PAIN: 0
SHORTNESS OF BREATH: 0
DIFFICULTY BREATHING: 0
VOMITING: 0
BACK PAIN: 0

## 2022-11-25 ASSESSMENT — PAIN SCALES - GENERAL: PAINLEVEL_OUTOF10: 8

## 2022-11-25 ASSESSMENT — PAIN DESCRIPTION - PAIN TYPE: TYPE: ACUTE PAIN

## 2022-11-25 ASSESSMENT — PAIN DESCRIPTION - FREQUENCY: FREQUENCY: CONTINUOUS

## 2022-11-25 ASSESSMENT — PAIN DESCRIPTION - ORIENTATION: ORIENTATION: ANTERIOR

## 2022-11-25 ASSESSMENT — PAIN DESCRIPTION - LOCATION: LOCATION: HEAD

## 2022-11-25 ASSESSMENT — PAIN - FUNCTIONAL ASSESSMENT: PAIN_FUNCTIONAL_ASSESSMENT: 0-10

## 2022-11-25 NOTE — ED TRIAGE NOTES
Patient reports that he tripped over a rug and fell forward, hitting his head on a metal tool. He denies loss of consciousness. lert and oriented x 4, but does state that he feels dizzy. Has laceration to forehead at the hairline. Also complains of neck pain with palpation of cervical spine. Placed in cervical collar for immobilization. Denies numbness/tingling to extremities.

## 2022-11-25 NOTE — ED PROVIDER NOTES
[address] ED  EMERGENCY DEPARTMENT ENCOUNTER        Pt Name: Juan Jose Tavera  MRN: [id number]  [place] 1940  Date of evaluation: 11/25/2022  Provider: Joya Jones PA-C  PCP: Lise Sutton,     Shared Visit or Autonomous Visit: YADY. I have evaluated this patient. My supervising physician was available for consultation. CHIEF COMPLAINT       Chief Complaint   Patient presents with    Fall    Head Laceration       HISTORY OF PRESENT ILLNESS   (Location/Symptom, Timing/Onset, Context/Setting, Quality, Duration, Modifying Factors, Severity)  Note limiting factors. Juan Jose Tavera is a 80 y.o. male presenting to the emergency department for evaluation after fall. This was a mechanical fall. He tripped over a rug fell forward hitting his head on the concrete floor in his workshop. Has a headache and neck pain. No LOC. Alert and oriented x4. Laceration to frontal scalp bleeding controlled. Tetanus is up-to-date last was 2019. Also has pain and swelling of the right wrist.  He has been ambulatory. No chest pain shortness of breath no abdominal pain no vomiting or numbness or tingling in extremities. Not on blood thinners. The history is provided by the patient. Head Injury  Location:  Frontal  Time since incident: minutes. Mechanism of injury: fall    Fall:     Point of impact:  Head  Associated symptoms: headache and neck pain    Associated symptoms: no difficulty breathing, no disorientation, no focal weakness, no loss of consciousness, no numbness and no vomiting    Risk factors: no aspirin use        Nursing Notes were reviewed    REVIEW OF SYSTEMS    (2-9 systems for level 4, 10 or more for level 5)     Review of Systems   Constitutional:  Negative for fever. Respiratory:  Negative for shortness of breath. Cardiovascular:  Negative for chest pain. Gastrointestinal:  Negative for abdominal pain and vomiting. Musculoskeletal:  Positive for neck pain. Negative for back pain. Skin:  Positive for wound. Neurological:  Positive for headaches. Negative for dizziness, focal weakness, loss of consciousness, syncope and numbness. All other systems reviewed and are negative. Positives and Pertinent negatives as per HPI.        PAST MEDICAL HISTORY     Past Medical History:   Diagnosis Date    BRYON (acute kidney injury) ([facility] [address] 11/2/2017    CAD (coronary artery disease) 8/1/2015    Cancer (HCC)     PROSTATE    Chronic dCHF (grade 1 LVDD) 11/2/2017    Deafness     Diverticulitis     Diverticulosis     Fatigue     Hepatitis     PT NOT SURE WHAT KIND, HE HAD YEARS AGO    Hyperlipidemia     Hypertension     Hypothyroidism     Joint pain     Lumbar radiculopathy  R AND L  5/11/2018    Lumbar stenosis with neurogenic claudication  severe L23 TO L5S1 , WORSE L45      Medical history reviewed with no changes     Numbness and tingling     HANDS AND FEET    OA (osteoarthritis) of hip 7/21/2015    Obesity (BMI 30-39.9) 11/3/2017    Other disorders of kidney and ureter     Rheumatoid aortitis     Shortness of breath     Spinal stenosis, lumbar region, with neurogenic claudication     Type 2 diabetes mellitus without complication, without long-term current use of insulin ([address] 9/9/2016    Weakness          SURGICAL HISTORY     Past Surgical History:   Procedure Laterality Date    ABDOMEN SURGERY  2/7/12    abdominal wall exploration,w/ mesh;lysis of adhesions    BACK SURGERY  05/2018    LSP    COLON SURGERY      COLONOSCOPY  03/26/2015    diverticulosis    CORONARY ANGIOPLASTY WITH STENT PLACEMENT  7/31/2015    ELBOW SURGERY      bilat    HERNIA REPAIR      HIP SURGERY Left     JOINT REPLACEMENT Right 07/05/2016    Right total knee replacement    KNEE SURGERY      LUMBAR LAMINECTOMY  05/11/2018    LUMBAR LAMINECTOMIES , BILATERAL LAMINOTOMIES L23 TO L5S1, BONE GRAFTING FOR FUSION L23 TO L5S1 , INST FUSION STABILIZATION REDUCTION OF SPONDYLOLISTHESIS L34 AND L45 , AUTOGRAFT , ALLOGRAFT     OTHER SURGICAL HISTORY  01/09/2013    excision of suture granuloma on the abdomen    OTHER SURGICAL HISTORY Right     excision of cyst on thigh    PAIN MANAGEMENT PROCEDURE Bilateral 9/8/2022    BILATERAL LUMBAR THREE LUMBER FOUR LUMBAR FIVE MEDIAL BRANCH BLOCK SITE CONFIRMED BY FLUOROSCOPY performed by Hong Oglesby MD at [facility]    PAIN MANAGEMENT PROCEDURE Bilateral 10/20/2022    BILATERAL LUMBAR THREE LUMBAR FOUR LUMBAR FIVE RADIOFREQUENCY ABLATION SITE CONFIRMED BY FLUOROSCOPY performed by Hong Oglesby MD at [facility]    [place] INCIS HIP ADDUCTORS,OPEN Left 11/5/2018    LEFT HIP ABDUCTOR FASCIOTOMY, GREATER TROCHANTERIC BURSECTOMY, PIRIFORMIS RELEASE performed by Xiao Coles MD at [address]      right    UPPER GASTROINTESTINAL ENDOSCOPY Bilateral 01/11/2020    UPPER GASTROINTESTINAL ENDOSCOPY N/A 1/11/2020    EGD BIOPSY performed by Montse Gregg DO at [facility]  1/11/2020    EGD CONTROL HEMORRHAGE performed by Montse Gregg DO at [address] [place] [address]       Previous Medications    ACETAMINOPHEN (TYLENOL) 650 MG EXTENDED RELEASE TABLET    Take 650 mg by mouth every 8 hours as needed for Pain    ATORVASTATIN (LIPITOR) 40 MG TABLET    TAKE 1 TABLET BY MOUTH EVERY DAY AT NIGHT    BACITRACIN-POLYMYXIN B (POLYSPORIN) 500-21118 UNIT/GM OPHTHALMIC OINTMENT    Place into the right eye 2 times daily Every 12 hours. BLOOD GLUCOSE MONITOR KIT AND SUPPLIES    Dispense sufficient amount for indicated testing frequency plus additional to accommodate PRN testing needs. Dispense all needed supplies to include: monitor, strips, lancing device, lancets, control solutions, alcohol swabs. BLOOD GLUCOSE MONITOR STRIPS    Test one time  a day & as needed for symptoms of irregular blood glucose.  Dispense sufficient amount for indicated testing frequency plus additional to accommodate PRN testing needs. BUPROPION (WELLBUTRIN SR) 150 MG EXTENDED RELEASE TABLET    TAKE 1 TABLET BY MOUTH EVERY DAY    ERGOCALCIFEROL (VITAMIN D2 PO)    Take 1.25 mg by mouth once a week    FOLIC ACID (FOLVITE) 1 MG TABLET    Take 1 mg by mouth daily    HYDROCHLOROTHIAZIDE (HYDRODIURIL) 25 MG TABLET    TAKE 1 TABLET BY MOUTH TWICE A DAY    HYDROCODONE-ACETAMINOPHEN (NORCO) 5-325 MG PER TABLET    Take 1 tablet by mouth 2 times daily as needed for Pain for up to 30 days. HYDROCODONE-ACETAMINOPHEN (NORCO) 5-325 MG PER TABLET    Take 1 tablet by mouth 2 times daily as needed for Pain for up to 30 days. IPRATROPIUM (ATROVENT) 0.06 % NASAL SPRAY    2 sprays by Each Nostril route 4 times daily    LEVOTHYROXINE (SYNTHROID) 112 MCG TABLET    TAKE 2 TABLETS BY MOUTH EVERY DAY    LISINOPRIL (PRINIVIL;ZESTRIL) 20 MG TABLET    TAKE 1 TABLET BY MOUTH TWICE A DAY    MAGNESIUM OXIDE (MAG-OX) 400 MG TABLET    Take 400 mg by mouth daily    METHOTREXATE (RHEUMATREX) 2.5 MG CHEMO TABLET    TAKE 8 TABLETS BY MOUTH ONCE A WEEK.     METOPROLOL TARTRATE (LOPRESSOR) 25 MG TABLET    Take one tablet by mouth twice a day    OMEPRAZOLE (PRILOSEC) 40 MG DELAYED RELEASE CAPSULE    TAKE 1 CAPSULE BY MOUTH EVERY DAY IN THE MORNING BEFORE BREAKFAST    POLYETHYLENE GLYCOL (MIRALAX) 17 GM/SCOOP POWDER    Take 17 g by mouth daily as needed (CONSTIPATION)    SENNA CO    by Combination route         ALLERGIES     Penicillins and Avelox [moxifloxacin hcl in nacl]    FAMILYHISTORY       Family History   Problem Relation Age of Onset    Arthritis Mother     Cancer Father     Heart Disease Neg Hx           SOCIAL HISTORY       Social History     Socioeconomic History    Marital status:      Spouse name: None    Number of children: None    Years of education: None    Highest education level: None   Tobacco Use    Smoking status: Former     Packs/day: 9.00     Years: 24.00     Pack years: 216.00     Types: Cigarettes, Cigars     Quit date: 1982     Years since quittin.8    Smokeless tobacco: Former     Types: Snuff, Dormohinder Larve     Quit date: 1982   Vaping Use    Vaping Use: Never used   Substance and Sexual Activity    Alcohol use: No     Alcohol/week: 0.0 standard drinks    Drug use: No    Sexual activity: Yes     Partners: Female     Social Determinants of Health     Physical Activity: Unknown    Days of Exercise per Week: 2 days       SCREENINGS    [place] Coma Scale  Eye Opening: Spontaneous  Best Verbal Response: Oriented  Best Motor Response: Obeys commands  [place] Coma Scale Score: 15        PHYSICAL EXAM    (up to 7 for level 4, 8 or more for level 5)     ED Triage Vitals [22 1723]   BP Temp Temp Source Heart Rate Resp SpO2 Height Weight   (!) 166/73 97.5 °F (36.4 °C) Oral 68 14 96 % 5' 11\" (1.803 m) 224 lb (101.6 kg)       Physical Exam  Vitals and nursing note reviewed. Constitutional:       Appearance: He is well-developed. Interventions: Cervical collar in place. HENT:      Head: Normocephalic. Contusion and laceration present. No raccoon eyes or Beth's sign. Comments: 4 cm linear laceration frontal scalp at hairline. Bleeding controlled. Right Ear: Tympanic membrane normal. No hemotympanum. Left Ear: Tympanic membrane normal. No hemotympanum. Mouth/Throat:      Mouth: Mucous membranes are moist.      Pharynx: Oropharynx is clear. No pharyngeal swelling or posterior oropharyngeal erythema. Eyes:      Extraocular Movements: Extraocular movements intact. Conjunctiva/sclera: Conjunctivae normal.      Pupils: Pupils are equal, round, and reactive to light. Cardiovascular:      Rate and Rhythm: Normal rate and regular rhythm. Pulses:           Radial pulses are 2+ on the right side and 2+ on the left side. Heart sounds: Normal heart sounds. Pulmonary:      Effort: Pulmonary effort is normal. No respiratory distress.       Breath sounds: Normal breath sounds. No wheezing or rales. Abdominal:      General: Bowel sounds are normal.      Palpations: Abdomen is soft. Abdomen is not rigid. Tenderness: There is no abdominal tenderness. There is no guarding or rebound. Musculoskeletal:         General: Normal range of motion. Right wrist: Swelling and tenderness present. No deformity or lacerations. Normal pulse. Cervical back: Normal range of motion and neck supple. Tenderness present. Skin:     General: Skin is warm and dry. Neurological:      Mental Status: He is alert and oriented to person, place, and time. GCS: GCS eye subscore is 4. GCS verbal subscore is 5. GCS motor subscore is 6. Cranial Nerves: No cranial nerve deficit. Sensory: Sensation is intact. No sensory deficit. Motor: Motor function is intact. No abnormal muscle tone. Coordination: Coordination normal.   Psychiatric:         Speech: Speech normal.         Behavior: Behavior normal.         Thought Content: Thought content normal.       DIAGNOSTIC RESULTS   LABS:    Labs Reviewed - No data to display    No results found for this visit on 11/25/22. All other labs were not returned as of this dictation. EKG: All EKG's are interpreted by the Emergency Department Physician in the absence of a cardiologist.  Please see their note for interpretation of EKG. RADIOLOGY:   Non-plain film images such as CT, Ultrasound and MRI are read by the radiologist. Plain radiographic images are visualized andpreliminarily interpreted by the  ED Provider with the below findings:        Interpretation perthe Radiologist below, if available at the time of this note:    CT HEAD WO CONTRAST   Final Result   Stable appearance of the brain without acute intracranial process identified         CT CERVICAL SPINE WO CONTRAST   Preliminary Result   1. No acute fracture of the cervical spine evident.    2. Multilevel degenerative changes, most pronounced at C4-5, where there is   moderate spinal canal stenosis and severe right neural foraminal narrowing. XR WRIST RIGHT (MIN 3 VIEWS)   Final Result   No evidence of acute process. Osteoarthrosis at wrist and hand sites. XR WRIST RIGHT (MIN 3 VIEWS)    Result Date: 11/25/2022  EXAMINATION: XRAY VIEWS OF THE RIGHT WRIST 11/25/2022 6:29 pm COMPARISON: None. HISTORY: ORDERING SYSTEM PROVIDED HISTORY: trauma TECHNOLOGIST PROVIDED HISTORY: Reason for exam:->trauma Reason for Exam: fall FINDINGS: There is no evidence of a fracture nor subluxation. There are degenerative changes at the distal radioulnar joint, lateral wrist, and 2nd and 3rd MCP joints. There is slight degenerative change at the 1st IP joint. There is an old healed fracture deformity at the neck of the 5th metacarpal.  There is slight calcification of the radial and ulnar arteries. No evidence of acute process. Osteoarthrosis at wrist and hand sites. CT HEAD WO CONTRAST    Result Date: 11/25/2022  EXAMINATION: CT OF THE HEAD WITHOUT CONTRAST  11/25/2022 7:21 pm TECHNIQUE: CT of the head was performed without the administration of intravenous contrast. Automated exposure control, iterative reconstruction, and/or weight based adjustment of the mA/kV was utilized to reduce the radiation dose to as low as reasonably achievable. COMPARISON: 10/08/2019 HISTORY: ORDERING SYSTEM PROVIDED HISTORY: trauma TECHNOLOGIST PROVIDED HISTORY: Has a \"code stroke\" or \"stroke alert\" been called? ->No Reason for exam:->trauma Decision Support Exception - unselect if not a suspected or confirmed emergency medical condition->Emergency Medical Condition (MA) Reason for Exam: fall, lac to forehead, denies loc FINDINGS: BRAIN/VENTRICLES: There is no acute infarct or acute intracranial hemorrhage present. There is no mass effect or midline shift present. There is no ventriculomegaly or abnormal extra-axial fluid collection present.   There is mild diffuse cerebral volume loss. ORBITS: Limited evaluation of the orbits is unremarkable. SINUSES: The paranasal sinuses and mastoid air cells are clear. SOFT TISSUES/SKULL:  No skull fracture is identified. Stable appearance of the brain without acute intracranial process identified     CT CERVICAL SPINE WO CONTRAST    Result Date: 11/25/2022  EXAMINATION: CT OF THE CERVICAL SPINE WITHOUT CONTRAST 11/25/2022 7:20 pm TECHNIQUE: CT of the cervical spine was performed without the administration of intravenous contrast. Multiplanar reformatted images are provided for review. Automated exposure control, iterative reconstruction, and/or weight based adjustment of the mA/kV was utilized to reduce the radiation dose to as low as reasonably achievable. COMPARISON: None. HISTORY: ORDERING SYSTEM PROVIDED HISTORY: trauma TECHNOLOGIST PROVIDED HISTORY: Reason for exam:->trauma Decision Support Exception - unselect if not a suspected or confirmed emergency medical condition->Emergency Medical Condition (MA) Reason for Exam: neck inj, fall, denies loc FINDINGS: BONES/ALIGNMENT: There is grade 1 anterolisthesis of C6 relative to C7 measuring 2 mm. Alignment is otherwise normal.  There is no acute fracture. DEGENERATIVE CHANGES: There is multilevel disc space narrowing. There is a posterior disc osteophyte complex at C3-4 resulting in moderate spinal canal stenosis. There is uncovertebral overgrowth and facet arthropathy resulting in multilevel neural foraminal narrowing, severe at multiple levels. SOFT TISSUES: There is no prevertebral soft tissue swelling. 1. No acute fracture of the cervical spine evident. 2. Multilevel degenerative changes, most pronounced at C4-5, where there is moderate spinal canal stenosis and severe right neural foraminal narrowing.             PROCEDURES   Unless otherwise noted below, none     Lac Repair    Date/Time: 11/25/2022 8:32 PM  Performed by: Dena Nicholson PA-C  Authorized by: Dena Nicholson PA-C Consent:     Consent obtained:  Verbal    Consent given by:  Patient    Risks, benefits, and alternatives were discussed: yes      Risks discussed:  Infection, pain, poor wound healing and poor cosmetic result  Universal protocol:     Procedure explained and questions answered to patient or proxy's satisfaction: yes      Patient identity confirmed:  Verbally with patient  Anesthesia:     Anesthesia method:  Local infiltration    Local anesthetic:  Lidocaine 2% WITH epi (4cc)  Laceration details:     Location:  Face    Face location:  Forehead    Length (cm):  3    Depth (mm):  4  Pre-procedure details:     Preparation:  Patient was prepped and draped in usual sterile fashion and imaging obtained to evaluate for foreign bodies  Exploration:     Wound exploration: entire depth of wound visualized      Wound extent: no fascia violation noted, no foreign bodies/material noted, no underlying fracture noted and no vascular damage noted      Contaminated: no    Treatment:     Area cleansed with:  Chlorhexidine and saline    Amount of cleaning:  Standard  Skin repair:     Repair method:  Staples    Number of staples:  6  Approximation:     Approximation:  Close  Repair type:     Repair type:  Simple  Post-procedure details:     Dressing:  Antibiotic ointment    Procedure completion:  Tolerated well, no immediate complications    CRITICAL CARE TIME   Critical care provided for this patient of which 0 min were spend on critical care and decision making. 0 min spent on procedures. There was imminent failure of an organ system which required critical intervention to prevent clinically significant progression of life threatening deterioration of the patient's condition to the point of disability or death.       CONSULTS:  None      EMERGENCY DEPARTMENT COURSE and DIFFERENTIAL DIAGNOSIS/MDM:   Vitals:    Vitals:    11/25/22 1723   BP: (!) 166/73   Pulse: 68   Resp: 14   Temp: 97.5 °F (36.4 °C)   TempSrc: Oral   SpO2: 96% Weight: 224 lb (101.6 kg)   Height: 5' 11\" (1.803 m)       Patient was given thefollowing medications:  Medications   acetaminophen (TYLENOL) tablet 650 mg (650 mg Oral Given 11/25/22 1935)       Is this patient to be included in the SEP-1 Core Measure due to severe sepsis or septic shock? No   Exclusion criteria - the patient is NOT to be included for SEP-1 Core Measure due to: Infection is not suspected       ED course  Patient presented to the ER for evaluation of head injury. He tripped over a rug fell forward hitting his head on the concrete in his shop. Has a linear laceration to frontal scalp bleeding controlled with contusion at site. Alert oriented x3. GCS 15. CT brain no acute intracranial normality no hemorrhage no fracture. Cervical spine no fracture showing degenerative changes. C-collar was removed. Headache has improved with Tylenol. Wound was cleaned and irrigated and staples placed, staple removal in 7 days. Head injury instructions given. Discussed with him and family in the room is follow-up with his doctor and to return to the ER for any worsening symptoms specifically worsening headache or any vomiting they understand and agree. I estimate there is LOW risk for SKULL FRACTURE, SUBARACHNOID HEMORRHAGE, INTRACRANIAL HEMORRHAGE, CERVICAL SPINE INJURY, SUBDURAL OR EPIDURAL HEMATOMA,  thus I consider the discharge disposition reasonable. FINAL IMPRESSION      1. Closed head injury, initial encounter    2. Scalp laceration, initial encounter    3. Forehead contusion, initial encounter    4. Cervical strain, acute, initial encounter    5. DDD (degenerative disc disease), cervical    6. Elevated blood pressure reading    7.  Contusion of right wrist, initial encounter          DISPOSITION/PLAN   DISPOSITION Decision to Discharge    PATIENT REFERREDTO:  DO Juan José Zuluaga [address]  [address] [facility] [address]    In 3 days      [facility] ED  [address] [facility]  [phone]    7 days for staple removal, return to ER sooner for any worsening    DISCHARGE MEDICATIONS:  New Prescriptions    No medications on file       DISCONTINUED MEDICATIONS:  Discontinued Medications    No medications on file              (Please note that portions ofthis note were completed with a voice recognition program.  Efforts were made to edit the dictations but occasionally words are mis-transcribed.)    Sarah Coronado PA-C (electronically signed)            Mala Sheridan PA-C  11/25/22 2036

## 2022-11-26 NOTE — ED NOTES
1950  Laceration to patient's forehead at the hairline was cleaned using sterile saline, Madison Hex 4, and sterile 4x4s.      Mary Nur  11/25/22 1952

## 2022-12-02 ENCOUNTER — TELEPHONE (OUTPATIENT)
Dept: FAMILY MEDICINE CLINIC | Age: 82
End: 2022-12-02

## 2022-12-02 NOTE — TELEPHONE ENCOUNTER
Patient's wife called the office to see if the patient can come in to have staples removed from his forehead that were placed on 11/25/22. Not sure if he can some in for nurse visit to remove or if you would like to see him on Monday. Please advise, thanks.

## 2022-12-06 RX ORDER — LEVOTHYROXINE SODIUM 112 UG/1
TABLET ORAL
Qty: 180 TABLET | Refills: 1 | Status: SHIPPED | OUTPATIENT
Start: 2022-12-06

## 2022-12-06 RX ORDER — ATORVASTATIN CALCIUM 40 MG/1
TABLET, FILM COATED ORAL
Qty: 90 TABLET | Refills: 1 | Status: SHIPPED | OUTPATIENT
Start: 2022-12-06

## 2022-12-06 RX ORDER — HYDROCHLOROTHIAZIDE 25 MG/1
TABLET ORAL
Qty: 180 TABLET | Refills: 1 | Status: SHIPPED | OUTPATIENT
Start: 2022-12-06

## 2022-12-06 RX ORDER — LISINOPRIL 20 MG/1
TABLET ORAL
Qty: 180 TABLET | Refills: 1 | Status: SHIPPED | OUTPATIENT
Start: 2022-12-06

## 2022-12-06 NOTE — TELEPHONE ENCOUNTER
.Refill Request     CONFIRM preferrred pharmacy with the patient. If Mail Order Rx - Pend for 90 day refill. Last Seen: Last Seen Department: 10/19/2022  Last Seen by PCP: 10/19/2022    Last Written: 6/27/22 180 with 1     If no future appointment scheduled, route STAFF MESSAGE with patient name to the [facility] for scheduling. Next Appointment:   Future Appointments   Date Time Provider Minh Valdez   1/19/2023  9:40 AM MARQUES Gongora CNP [facility] [place]   4/19/2023  8:00 AM DO YAMILET Mccabe  [place] [facility]       Message sent to [address] [place] [address] to schedule appt with patient?   YES  YES    Requested Prescriptions     Pending Prescriptions Disp Refills    atorvastatin (LIPITOR) 40 MG tablet [Pharmacy Med Name: ATORVASTATIN 40 MG TABLET] 90 tablet 1     Sig: TAKE 1 TABLET BY MOUTH EVERY DAY AT NIGHT    levothyroxine (SYNTHROID) 112 MCG tablet [Pharmacy Med Name: LEVOTHYROXINE 112 MCG TABLET] 180 tablet 1     Sig: TAKE 2 TABLETS BY MOUTH EVERY DAY    hydroCHLOROthiazide (HYDRODIURIL) 25 MG tablet [Pharmacy Med Name: HYDROCHLOROTHIAZIDE 25 MG TAB] 180 tablet 1     Sig: TAKE 1 TABLET BY MOUTH TWICE A DAY    lisinopril (PRINIVIL;ZESTRIL) 20 MG tablet [Pharmacy Med Name: LISINOPRIL 20 MG TABLET] 180 tablet 1     Sig: TAKE 1 TABLET BY MOUTH TWICE A DAY

## 2022-12-21 RX ORDER — METHYLPREDNISOLONE 4 MG/1
TABLET ORAL
Qty: 21 TABLET | Refills: 0 | Status: SHIPPED | OUTPATIENT
Start: 2022-12-21 | End: 2022-12-27

## 2023-01-12 ENCOUNTER — ANESTHESIA EVENT (OUTPATIENT)
Dept: OPERATING ROOM | Age: 83
End: 2023-01-12
Payer: MEDICARE

## 2023-01-12 NOTE — PROGRESS NOTES
PRE OP INSTRUCTION SHEET   1. Do not eat or drink anything after 12 midnight  prior to surgery. This includes no water, chewing gum or mints. 2. Take the following pills will a small sip of water (see MAR)                                        3. Aspirin, Ibuprofen, Advil, Naproxen, Vitamin E, fish oil and other Anti-inflammatory products should be stopped for 5 days before surgery or as directed by your physician. 4. Check with your Doctor regarding stopping Plavix, Coumadin, Lovenox, Fragmin or other blood thinners   5. Do not smoke, and do not drink any alcoholic beverages 24 hours prior to surgery. This includes NA Beer. 6. You may brush your teeth and gargle the morning of surgery. DO NOT SWALLOW WATER   7. You MUST make arrangements for a responsible adult to take you home after your surgery. You will not be allowed to leave alone or drive yourself home. It is strongly suggested someone stay with you the first 24 hrs. Your surgery will be cancelled if you do not have a ride home. 8. A parent/legal guardian must accompany a child scheduled for surgery and plan to stay at the hospital until the child is discharged. Please do not bring other children with you. 9. Please wear simple, loose fitting clothing to the hospital.  Ny Raders not bring valuables (money, credit cards, checkbooks, etc.) Do not wear any makeup (including no eye makeup) or nail polish on your fingers or toes. 10. DO NOT wear any jewelry or piercings on day of surgery. All body piercing jewelry must be removed. 11. If you have dentures,glasses, or contacts they will be removed before going to the OR; we will provide you a container. 12. Please see your family doctor/and cardiologist for a history & physical and/or concerning medications. Bring any test results/reports from your physician's office. Have history and labs faxed to [phone].  Remember to bring Blood Bank bracelet on the day of surgery. 14. If you have a Living Will and Durable Power of  for Healthcare, please bring in a copy. 13. Notify your Surgeon if you develop any illness between now and surgery  time, cough, cold, fever, sore throat, nausea, vomiting, etc.  Please notify your surgeon if you experience dizziness, shortness of breath or blurred vision between now & the time of your surgery   16. DO NOT shave your operative site 96 hours prior to surgery. For face & neck surgery, men may use an electric razor 48 hours prior to surgery. 17. Shower with _x__Antibacterial soap (x_chlorhexidine for total joint  Pt's) shower two times before surgery.(the morning of and the night before. 18. To provide excellent care visitors will be limited to one in the room at any given time.   Please call pre admission testing if you any further questions [phone] or [phone]

## 2023-01-13 ENCOUNTER — OFFICE VISIT (OUTPATIENT)
Dept: FAMILY MEDICINE CLINIC | Age: 83
End: 2023-01-13

## 2023-01-13 VITALS
SYSTOLIC BLOOD PRESSURE: 130 MMHG | TEMPERATURE: 97.6 F | HEART RATE: 85 BPM | DIASTOLIC BLOOD PRESSURE: 70 MMHG | BODY MASS INDEX: 35.43 KG/M2 | HEIGHT: 69 IN | OXYGEN SATURATION: 97 % | WEIGHT: 239.2 LBS | RESPIRATION RATE: 18 BRPM

## 2023-01-13 DIAGNOSIS — Z01.818 PRE-OP EXAM: ICD-10-CM

## 2023-01-13 DIAGNOSIS — M15.9 PRIMARY OSTEOARTHRITIS INVOLVING MULTIPLE JOINTS: ICD-10-CM

## 2023-01-13 DIAGNOSIS — R39.198 ABNORMAL URINARY STREAM: Primary | ICD-10-CM

## 2023-01-13 DIAGNOSIS — E11.319 TYPE 2 DIABETES MELLITUS WITH LEFT EYE AFFECTED BY RETINOPATHY WITHOUT MACULAR EDEMA, WITHOUT LONG-TERM CURRENT USE OF INSULIN, UNSPECIFIED RETINOPATHY SEVERITY (HCC): ICD-10-CM

## 2023-01-13 DIAGNOSIS — R35.1 NOCTURIA: ICD-10-CM

## 2023-01-13 DIAGNOSIS — M46.1 SACROILIITIS (HCC): ICD-10-CM

## 2023-01-13 DIAGNOSIS — D63.8 ANEMIA OF CHRONIC DISEASE: ICD-10-CM

## 2023-01-13 DIAGNOSIS — E03.9 ACQUIRED HYPOTHYROIDISM: ICD-10-CM

## 2023-01-13 DIAGNOSIS — E66.01 SEVERE OBESITY (BMI 35.0-39.9) WITH COMORBIDITY (HCC): ICD-10-CM

## 2023-01-13 PROBLEM — M54.16 LUMBAR RADICULOPATHY: Status: RESOLVED | Noted: 2018-05-11 | Resolved: 2023-01-13

## 2023-01-13 PROBLEM — M15.0 PRIMARY OSTEOARTHRITIS INVOLVING MULTIPLE JOINTS: Status: ACTIVE | Noted: 2023-01-13

## 2023-01-13 PROBLEM — E87.8 HYPOCHLOREMIA: Status: RESOLVED | Noted: 2017-11-02 | Resolved: 2023-01-13

## 2023-01-13 PROBLEM — R26.2 AMBULATORY DYSFUNCTION: Chronic | Status: RESOLVED | Noted: 2017-11-02 | Resolved: 2023-01-13

## 2023-01-13 PROBLEM — E78.5 HYPERLIPIDEMIA DUE TO TYPE 2 DIABETES MELLITUS (HCC): Status: ACTIVE | Noted: 2023-01-13

## 2023-01-13 PROBLEM — M70.51 PES ANSERINUS BURSITIS OF RIGHT KNEE: Status: RESOLVED | Noted: 2018-08-16 | Resolved: 2023-01-13

## 2023-01-13 PROBLEM — M70.62 TROCHANTERIC BURSITIS OF BOTH HIPS: Status: RESOLVED | Noted: 2018-10-09 | Resolved: 2023-01-13

## 2023-01-13 PROBLEM — J18.9 RML PNEUMONIA: Status: RESOLVED | Noted: 2017-11-02 | Resolved: 2023-01-13

## 2023-01-13 PROBLEM — G57.03 PIRIFORMIS SYNDROME OF BOTH SIDES: Status: RESOLVED | Noted: 2018-10-23 | Resolved: 2023-01-13

## 2023-01-13 PROBLEM — G89.29 CHRONIC BILATERAL LOW BACK PAIN: Status: RESOLVED | Noted: 2020-01-14 | Resolved: 2023-01-13

## 2023-01-13 PROBLEM — D64.9 NORMOCYTIC ANEMIA: Status: RESOLVED | Noted: 2017-11-02 | Resolved: 2023-01-13

## 2023-01-13 PROBLEM — J96.01 ACUTE HYPOXEMIC RESPIRATORY FAILURE (HCC): Status: RESOLVED | Noted: 2017-11-02 | Resolved: 2023-01-13

## 2023-01-13 PROBLEM — E66.9 OBESITY (BMI 30-39.9): Chronic | Status: RESOLVED | Noted: 2017-11-03 | Resolved: 2023-01-13

## 2023-01-13 PROBLEM — E66.813 CLASS 3 OBESITY: Status: ACTIVE | Noted: 2023-01-13

## 2023-01-13 PROBLEM — M54.50 CHRONIC BILATERAL LOW BACK PAIN: Status: RESOLVED | Noted: 2020-01-14 | Resolved: 2023-01-13

## 2023-01-13 PROBLEM — M70.61 TROCHANTERIC BURSITIS OF BOTH HIPS: Status: RESOLVED | Noted: 2018-10-09 | Resolved: 2023-01-13

## 2023-01-13 PROBLEM — Z96.651 HISTORY OF TOTAL KNEE REPLACEMENT, RIGHT: Status: RESOLVED | Noted: 2018-07-30 | Resolved: 2023-01-13

## 2023-01-13 PROBLEM — M06.09 RHEUMATOID ARTHRITIS OF MULTIPLE SITES WITH NEGATIVE RHEUMATOID FACTOR (HCC): Status: ACTIVE | Noted: 2023-01-13

## 2023-01-13 PROBLEM — E87.1 HYPONATREMIA: Status: RESOLVED | Noted: 2017-11-02 | Resolved: 2023-01-13

## 2023-01-13 PROBLEM — M25.551 PAIN OF RIGHT HIP JOINT: Status: RESOLVED | Noted: 2020-01-14 | Resolved: 2023-01-13

## 2023-01-13 PROBLEM — E11.69 HYPERLIPIDEMIA DUE TO TYPE 2 DIABETES MELLITUS (HCC): Status: ACTIVE | Noted: 2023-01-13

## 2023-01-13 PROBLEM — M70.61 TROCHANTERIC BURSITIS OF RIGHT HIP: Status: RESOLVED | Noted: 2020-01-14 | Resolved: 2023-01-13

## 2023-01-13 PROBLEM — E13.319 RETINOPATHY DUE TO SECONDARY DM (HCC): Status: ACTIVE | Noted: 2023-01-13

## 2023-01-13 LAB
A/G RATIO: 2.2 (ref 1.1–2.2)
ALBUMIN SERPL-MCNC: 4.3 G/DL (ref 3.4–5)
ALP BLD-CCNC: 94 U/L (ref 40–129)
ALT SERPL-CCNC: 19 U/L (ref 10–40)
ANION GAP SERPL CALCULATED.3IONS-SCNC: 14 MMOL/L (ref 3–16)
AST SERPL-CCNC: 17 U/L (ref 15–37)
BASOPHILS ABSOLUTE: 0.1 K/UL (ref 0–0.2)
BASOPHILS RELATIVE PERCENT: 0.9 %
BILIRUB SERPL-MCNC: 0.5 MG/DL (ref 0–1)
BUN BLDV-MCNC: 22 MG/DL (ref 7–20)
CALCIUM SERPL-MCNC: 9.4 MG/DL (ref 8.3–10.6)
CHLORIDE BLD-SCNC: 104 MMOL/L (ref 99–110)
CO2: 26 MMOL/L (ref 21–32)
CREAT SERPL-MCNC: 1.4 MG/DL (ref 0.8–1.3)
EOSINOPHILS ABSOLUTE: 0.3 K/UL (ref 0–0.6)
EOSINOPHILS RELATIVE PERCENT: 3.4 %
GFR SERPL CREATININE-BSD FRML MDRD: 50 ML/MIN/{1.73_M2}
GLUCOSE BLD-MCNC: 98 MG/DL (ref 70–99)
HCT VFR BLD CALC: 37.7 % (ref 40.5–52.5)
HEMOGLOBIN: 12.3 G/DL (ref 13.5–17.5)
LYMPHOCYTES ABSOLUTE: 1.5 K/UL (ref 1–5.1)
LYMPHOCYTES RELATIVE PERCENT: 16.8 %
MCH RBC QN AUTO: 29.8 PG (ref 26–34)
MCHC RBC AUTO-ENTMCNC: 32.6 G/DL (ref 31–36)
MCV RBC AUTO: 91.3 FL (ref 80–100)
MONOCYTES ABSOLUTE: 0.6 K/UL (ref 0–1.3)
MONOCYTES RELATIVE PERCENT: 6.6 %
NEUTROPHILS ABSOLUTE: 6.4 K/UL (ref 1.7–7.7)
NEUTROPHILS RELATIVE PERCENT: 72.3 %
PDW BLD-RTO: 16.3 % (ref 12.4–15.4)
PLATELET # BLD: 387 K/UL (ref 135–450)
PMV BLD AUTO: 7.4 FL (ref 5–10.5)
POTASSIUM REFLEX MAGNESIUM: 5.2 MMOL/L (ref 3.5–5.1)
RBC # BLD: 4.12 M/UL (ref 4.2–5.9)
SODIUM BLD-SCNC: 144 MMOL/L (ref 136–145)
TOTAL PROTEIN: 6.3 G/DL (ref 6.4–8.2)
TSH SERPL DL<=0.05 MIU/L-ACNC: 0.88 UIU/ML (ref 0.27–4.2)
WBC # BLD: 8.8 K/UL (ref 4–11)

## 2023-01-13 RX ORDER — GLUCOSAMINE HCL/CHONDROITIN SU 500-400 MG
1 CAPSULE ORAL DAILY
Qty: 100 STRIP | Refills: 5
Start: 2023-01-13

## 2023-01-13 ASSESSMENT — ENCOUNTER SYMPTOMS
BLOOD IN STOOL: 0
BACK PAIN: 1
DIARRHEA: 0
NAUSEA: 0
SHORTNESS OF BREATH: 0
TROUBLE SWALLOWING: 0
CONSTIPATION: 1
CHEST TIGHTNESS: 0
SHORTNESS OF BREATH: 1

## 2023-01-13 ASSESSMENT — PATIENT HEALTH QUESTIONNAIRE - PHQ9
SUM OF ALL RESPONSES TO PHQ QUESTIONS 1-9: 0
4. FEELING TIRED OR HAVING LITTLE ENERGY: 0
1. LITTLE INTEREST OR PLEASURE IN DOING THINGS: 0
8. MOVING OR SPEAKING SO SLOWLY THAT OTHER PEOPLE COULD HAVE NOTICED. OR THE OPPOSITE, BEING SO FIGETY OR RESTLESS THAT YOU HAVE BEEN MOVING AROUND A LOT MORE THAN USUAL: 0
2. FEELING DOWN, DEPRESSED OR HOPELESS: 0
SUM OF ALL RESPONSES TO PHQ9 QUESTIONS 1 & 2: 0
SUM OF ALL RESPONSES TO PHQ QUESTIONS 1-9: 0
10. IF YOU CHECKED OFF ANY PROBLEMS, HOW DIFFICULT HAVE THESE PROBLEMS MADE IT FOR YOU TO DO YOUR WORK, TAKE CARE OF THINGS AT HOME, OR GET ALONG WITH OTHER PEOPLE: 0
7. TROUBLE CONCENTRATING ON THINGS, SUCH AS READING THE NEWSPAPER OR WATCHING TELEVISION: 0
5. POOR APPETITE OR OVEREATING: 0
6. FEELING BAD ABOUT YOURSELF - OR THAT YOU ARE A FAILURE OR HAVE LET YOURSELF OR YOUR FAMILY DOWN: 0
SUM OF ALL RESPONSES TO PHQ QUESTIONS 1-9: 0
9. THOUGHTS THAT YOU WOULD BE BETTER OFF DEAD, OR OF HURTING YOURSELF: 0
SUM OF ALL RESPONSES TO PHQ QUESTIONS 1-9: 0
3. TROUBLE FALLING OR STAYING ASLEEP: 0

## 2023-01-13 NOTE — PROGRESS NOTES
2023    Roxana Aceves (:  1940) is a 80 y.o. male, here for pre operative history and physical in preparation for Cystoscopy, possible urethral dilation, possible bladder biopsy per Dr. Bj Tiwari 2023 at [facility].     For pre operative history and physical in preparation for  Left Total Knee Arthroplasty per Dr. Wesley Santoyo 2/3/2023 at [facility]        Fax: [phone]    Following with Dr. Lucia Gaxiola for chronic back pain, hx of spinal stenosis and sacrolitis. Following with Dr Marli Graf for nonserogenic rheumatoid arthritis. Patient Active Problem List   Diagnosis    Acquired hypothyroidism    History of prostate cancer    Osteopenia    Spinal stenosis, lumbar region, with neurogenic claudication    Coronary artery disease of native artery of native heart with stable angina pectoris (HCC)    HTN (hypertension)    DM2 (diabetes mellitus, type 2) (HCC)    LEIA (obstructive sleep apnea)    Lumbar degenerative disc disease  severe L23 TO L5S1 , WORSE L45     Lumbar stenosis with neurogenic claudication  severe L23 TO L5S1 , WORSE L45     Spondylolisthesis of lumbar region    Lumbar spondylosis with myelopathy    Spinal stenosis of lumbar region with radiculopathy    Asymptomatic varicose veins of both lower extremities    Gastroesophageal reflux disease with esophagitis    Lumbar spondylosis    Sacroiliitis (HCC)    Hyperlipidemia due to type 2 diabetes mellitus (HCC)    Rheumatoid arthritis of multiple sites with negative rheumatoid factor (HCC)    Primary osteoarthritis involving multiple joints    Class 3 obesity (HCC)    Anemia of chronic disease    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 [address]       Review of Systems   Constitutional:  Negative for chills, fever and unexpected weight change. HENT:  Negative for trouble swallowing. Respiratory:  Negative for chest tightness and shortness of breath.     Cardiovascular:  Negative for chest pain, palpitations and leg swelling. Gastrointestinal:  Positive for constipation. Negative for blood in stool, diarrhea and nausea. Genitourinary:  Positive for difficulty urinating. Musculoskeletal:  Positive for arthralgias, back pain and gait problem. Neurological:  Negative for dizziness and headaches. Psychiatric/Behavioral: Negative. Prior to Visit Medications    Medication Sig Taking? Authorizing Provider   blood glucose monitor kit and supplies 1 kit daily FSBS daily Yes MARQUES Tracy - CNP   blood glucose monitor strips 1 strip by Other route daily Yes MARQUES Leslie CNP   atorvastatin (LIPITOR) 40 MG tablet TAKE 1 TABLET BY MOUTH EVERY DAY AT NIGHT Yes Anel Salvador, DO   levothyroxine (SYNTHROID) 112 MCG tablet TAKE 2 TABLETS BY MOUTH EVERY DAY Yes Jeromy Salvador DO   hydroCHLOROthiazide (HYDRODIURIL) 25 MG tablet TAKE 1 TABLET BY MOUTH TWICE A DAY Yes Jeromy Salvador DO   lisinopril (PRINIVIL;ZESTRIL) 20 MG tablet TAKE 1 TABLET BY MOUTH TWICE A DAY Yes Jeromy Salvador DO   HYDROcodone-acetaminophen (NORCO) 5-325 MG per tablet Take 1 tablet by mouth 2 times daily as needed for Pain for up to 30 days. Yes Ramses Mcleod MD   omeprazole (PRILOSEC) 40 MG delayed release capsule TAKE 1 CAPSULE BY MOUTH EVERY DAY IN THE MORNING BEFORE BREAKFAST Yes Jeromy Salvador DO   buPROPion (WELLBUTRIN SR) 150 MG extended release tablet TAKE 1 TABLET BY MOUTH EVERY DAY Yes Jeromy Salvador DO   folic acid (FOLVITE) 1 MG tablet Take 1 mg by mouth daily Yes Historical Provider, MD   bacitracin-polymyxin b (POLYSPORIN) 500-30402 UNIT/GM ophthalmic ointment Place into the right eye 2 times daily Every 12 hours.  Yes Historical Provider, MD   ipratropium (ATROVENT) 0.06 % nasal spray 2 sprays by Each Nostril route 4 times daily Yes Fatuma Correa, DO   polyethylene glycol (MIRALAX) 17 GM/SCOOP powder Take 17 g by mouth daily as needed (CONSTIPATION) Yes Ramses Mcleod MD   metoprolol tartrate (LOPRESSOR) 25 MG tablet Take one tablet by mouth twice a day Yes Jeromy Salvador,    methotrexate (RHEUMATREX) 2.5 MG chemo tablet TAKE 8 TABLETS BY MOUTH ONCE A WEEK.  Yes Historical Provider, MD   magnesium oxide (MAG-OX) 400 MG tablet Take 400 mg by mouth daily Yes Historical Provider, MD VALDOVIN[place] CO by Combination route Yes Historical Provider, MD   Ergocalciferol (VITAMIN D2 PO) Take 1.25 mg by mouth once a week Yes Historical Provider, MD   acetaminophen (TYLENOL) 650 MG extended release tablet Take 650 mg by mouth every 8 hours as needed for Pain Yes Historical Provider, MD        Allergies   Allergen Reactions    Penicillins Hives    Avelox [Moxifloxacin Hcl In Nacl] Itching, Swelling and Rash       Past Medical History:   Diagnosis Date    BRYON (acute kidney injury) ([facility] [address] 11/02/2017    CAD (coronary artery disease) 08/01/2015    Cancer ([facility] [address]     PROSTATE    Chronic dCHF (grade 1 LVDD) 11/02/2017    Diverticulitis     Hepatitis     PT NOT SURE WHAT KIND, HE HAD YEARS AGO    Hyperlipidemia     Hypertension     Hypothyroidism     Lumbar radiculopathy  R AND L  05/11/2018    Lumbar stenosis with neurogenic claudication  severe L23 TO L5S1 , WORSE L45      Numbness and tingling     HANDS AND FEET    OA (osteoarthritis) of hip 07/21/2015    Obesity (BMI 30-39.9) 11/03/2017    Other disorders of kidney and ureter     Rheumatoid aortitis     Type 2 diabetes mellitus without complication, without long-term current use of insulin ([facility] [address] 09/09/2016       Past Surgical History:   Procedure Laterality Date    ABDOMEN SURGERY  02/07/2012    abdominal wall exploration,w/ mesh;lysis of adhesions    CARPAL TUNNEL RELEASE Left 03/01/2002    CARPAL TUNNEL RELEASE Right 02/01/2005    COLON SURGERY  07/01/2002    resection    COLONOSCOPY  03/26/2015    diverticulosis    COLONOSCOPY  11/01/2001    COLONOSCOPY  11/01/2009    CORONARY ANGIOPLASTY WITH STENT PLACEMENT  07/31/2015    ELBOW SURGERY Right 03/01/1987    tennis elbow    EYE SURGERY Left 10/01/1985    cataract with lens implant    EYE SURGERY Right 02/01/1987    cataract with lens implant    HERNIA REPAIR  11/01/2005    HERNIA REPAIR  04/01/2006    HERNIA REPAIR  02/12/2012    JOINT REPLACEMENT Right 07/05/2016    Right total knee replacement    KNEE SURGERY Right 07/16/2016    Dr. Jadiel Hall  05/11/2018    LUMBAR LAMINECTOMIES , BILATERAL LAMINOTOMIES L23 TO L5S1, BONE GRAFTING FOR FUSION L23 TO L5S1 , INST FUSION STABILIZATION REDUCTION OF SPONDYLOLISTHESIS L34 AND L45 , AUTOGRAFT , ALLOGRAFT     OTHER SURGICAL HISTORY  01/09/2013    excision of suture granuloma on the abdomen    OTHER SURGICAL HISTORY Right     excision of cyst on thigh    PAIN MANAGEMENT PROCEDURE Bilateral 09/08/2022    BILATERAL LUMBAR THREE LUMBER FOUR LUMBAR FIVE MEDIAL BRANCH BLOCK SITE CONFIRMED BY FLUOROSCOPY performed by Effie Cerna MD at [facility] OR    PAIN MANAGEMENT PROCEDURE Bilateral 10/20/2022    BILATERAL LUMBAR THREE LUMBAR FOUR LUMBAR FIVE RADIOFREQUENCY ABLATION SITE CONFIRMED BY FLUOROSCOPY performed by Effie Cerna MD at [facility] ABDUCTORS&/EXTENSOR HIP OPEN [facility] Left 11/05/2018    LEFT HIP ABDUCTOR FASCIOTOMY, GREATER TROCHANTERIC BURSECTOMY, PIRIFORMIS RELEASE performed by Latosha Bermudez MD at [facility]  04/01/2008    cancer--seeds implanted    SHOULDER SURGERY Right 12/01/1996    torn ligament    UPPER GASTROINTESTINAL ENDOSCOPY Bilateral 01/11/2020    UPPER GASTROINTESTINAL ENDOSCOPY N/A 01/11/2020    EGD BIOPSY performed by Lio Polk DO at [address] ENDOSCOPY  01/11/2020    EGD CONTROL HEMORRHAGE performed by Lio Polk DO at [address] History     Socioeconomic History    Marital status:      Spouse name: Not on file    Number of children: Not on file    Years of education: Not on file    Highest education level: Not on file Occupational History    Not on file   Tobacco Use    Smoking status: Former     Packs/day: 9.00     Years: 24.00     Pack years: 216.00     Types: Cigarettes, Cigars     Quit date: 1982     Years since quittin.9    Smokeless tobacco: Former     Types: Snuff, Chew     Quit date: 1982   Vaping Use    Vaping Use: Never used   Substance and Sexual Activity    Alcohol use: No     Alcohol/week: 0.0 standard drinks    Drug use: No    Sexual activity: Yes     Partners: Female   Other Topics Concern    Not on file   Social History Narrative    Not on file     Social Determinants of Health     Financial Resource Strain: Not on file   Food Insecurity: Not on file   Transportation Needs: Not on file   Physical Activity: Unknown    Days of Exercise per Week: 2 days    Minutes of Exercise per Session: Not on file   Stress: Not on file   Social Connections: Not on file   Intimate Partner Violence: Not on file   Housing Stability: Not on file        Family History   Problem Relation Age of Onset    Arthritis Mother     Cancer Father     Heart Disease Neg Hx        ADVANCE DIRECTIVE: N, Not Received    Vitals:    23 1024   BP: 130/70   Site: Right Upper Arm   Position: Sitting   Cuff Size: Large Adult   Pulse: 85   Resp: 18   Temp: 97.6 °F (36.4 °C)   TempSrc: Oral   SpO2: 97%   Weight: 239 lb 3.2 oz (108.5 kg)   Height: 5' 9\" (1.753 m)     Estimated body mass index is 35.32 kg/m² as calculated from the following:    Height as of this encounter: 5' 9\" (1.753 m). Weight as of this encounter: 239 lb 3.2 oz (108.5 kg). Physical Exam  Constitutional:       Appearance: Normal appearance. Neck:      Vascular: No carotid bruit. Cardiovascular:      Rate and Rhythm: Normal rate. Pulmonary:      Effort: Pulmonary effort is normal.      Breath sounds: Normal breath sounds. Abdominal:      General: Bowel sounds are normal. There is no distension. Palpations: Abdomen is soft.    Musculoskeletal: General: No swelling. Comments: Very limited ROM generalized. Ambulatory with use of cane. Raises from seated independent slowly. Ext neg for redness edema. Mild varicosities bilateral.    Skin:     General: Skin is warm and dry. Neurological:      Mental Status: He is alert and oriented to person, place, and time. Psychiatric:         Behavior: Behavior normal.       No flowsheet data found. Lab Results   Component Value Date/Time    CHOL 136 04/18/2022 09:54 AM    CHOL 134 04/05/2021 09:19 AM    CHOL 114 06/25/2020 08:46 AM    TRIG 124 04/18/2022 09:54 AM    TRIG 214 04/05/2021 09:19 AM    TRIG 200 06/25/2020 08:46 AM    HDL 42 04/18/2022 09:54 AM    HDL 30 04/05/2021 09:19 AM    HDL 33 06/25/2020 08:46 AM    HDL 39 12/19/2011 09:06 AM    HDL 41 10/28/2010 08:54 AM    LDLCALC 69 04/18/2022 09:54 AM    LDLCALC 61 04/05/2021 09:19 AM    LDLCALC 41 06/25/2020 08:46 AM    GLUCOSE 138 04/05/2021 09:19 AM    LABA1C 5.7 10/19/2022 08:47 AM    LABA1C 6.4 04/05/2021 08:40 AM    LABA1C 5.5 12/18/2019 09:30 AM       The ASCVD Risk score (Jose J STOLL, et al., 2019) failed to calculate for the following reasons:     The 2019 ASCVD risk score is only valid for ages 36 to 78    Immunization History   Administered Date(s) Administered    COVID-19, MODERNA BLUE border, Primary or Immunocompromised, (age 12y+), IM, 100 mcg/0.5mL 01/15/2021, 02/12/2021, 09/16/2021, 04/20/2022    COVID-19, MODERNA Bivalent BOOSTER, (age 12y+), IM, 50 mcg/0.5 mL 10/05/2022    Influenza Vaccine, unspecified formulation 09/28/2015    Influenza Virus Vaccine 09/01/2014, 11/01/2019, 10/01/2021    Influenza Whole 10/18/2011, 09/01/2014    Influenza, FLUAD, (age 72 y+), Adjuvanted, 0.5mL 10/12/2020    Influenza, FLUZONE (age 72 y+), High Dose, 0.7mL 09/16/2021, 10/05/2022    Influenza, High Dose (Fluzone 65 yrs and older) 11/21/2016, 10/12/2017, 10/04/2018    Influenza, Triv, inactivated, subunit, adjuvanted, IM (Fluad 65 yrs and older) 10/07/2019 Pneumococcal Conjugate 13-valent ([id number]) 10/12/2017    Pneumococcal Conjugate 7-valent (Prevnar7) 02/07/2010    Pneumococcal Polysaccharide (Sonuvicsh73) 11/21/2016, 04/05/2021    Tdap (Boostrix, Adacel) 10/08/2019    Zoster Live (Zostavax) 11/21/2014    Zoster Recombinant (Shingrix) 02/05/2020, 05/05/2020, 06/25/2020       Health Maintenance   Topic Date Due    Annual Wellness Visit (AWV)  03/04/2023    Lipids  04/18/2023    Depression Monitoring  01/13/2024    DTaP/Tdap/Td vaccine (2 - Td or Tdap) 10/08/2029    Flu vaccine  Completed    Shingles vaccine  Completed    Pneumococcal 65+ years Vaccine  Completed    COVID-19 Vaccine  Completed    Hepatitis A vaccine  Aged Out    Hib vaccine  Aged Out    Meningococcal (ACWY) vaccine  Aged Out       Assessment & Plan   Abnormal urinary stream  -     CBC with Auto Differential  -     Comprehensive Metabolic Panel w/ Reflex to MG  -     EKG 12 Lead - Clinic Performed  Nocturia  -     CBC with Auto Differential  -     Comprehensive Metabolic Panel w/ Reflex to MG  -     EKG 12 Lead - Clinic Performed  Primary osteoarthritis involving multiple joints  -     CBC with Auto Differential  -     Comprehensive Metabolic Panel w/ Reflex to MG  -     EKG 12 Lead - Clinic Performed  -     TSH  -     Hemoglobin A1C  Sacroiliitis (HCC)  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 9) with comorbidity (HCC)  Acquired hypothyroidism  -     TSH  Anemia of chronic disease  -     CBC with Auto Differential  Pre-op exam       Assessment:       80 y.o. patient with planned surgery as above.     Known risk factors for perioperative complications: Anemia, Coronary artery disease, Diabetes mellitus, Hypertension, Renal dysfunction     Plan:     1. Preoperative workup as follows: ECG, hemoglobin, hematocrit, electrolytes, creatinine, glucose, liver function studies  2. Change in medication regimen before surgery: Take levothyroxine, metoprolol, omeprazole on morning of surgery with sip of water, and hold all other medications until after surgery, Discontinue ASA 7 days before surgery, Discontinue NSAIDs 7 days before surgery  3. Prophylaxis for cardiac events with perioperative beta-blockers: {PROPHYLAXIS   4. Deep vein thrombosis prophylaxis: regimen to be chosen by surgical team  5. No contraindications to planned surgery    No follow-ups on file.          --Thelma Suarez, APRN - CNP

## 2023-01-13 NOTE — ANESTHESIA PRE PROCEDURE
Department of Anesthesiology  Preprocedure Note       Name:  Susannah Morales   Age:  80 y.o.  :  1940                                          MRN:  [id number]         Date:  2023      Surgeon: Talia Mendoza):  Ana Lorenzana MD    Procedure: Procedure(s):  CYSTOSCOPY, POSSIBLE URETHRAL DILATATION, POSSIBLE BLADDER BIOPSY    Medications prior to admission:   Prior to Admission medications    Medication Sig Start Date End Date Taking? Authorizing Provider   atorvastatin (LIPITOR) 40 MG tablet TAKE 1 TABLET BY MOUTH EVERY DAY AT NIGHT 22   Jeromy Salvador DO   levothyroxine (SYNTHROID) 112 MCG tablet TAKE 2 TABLETS BY MOUTH EVERY DAY 22   Jeromy Salvador DO   hydroCHLOROthiazide (HYDRODIURIL) 25 MG tablet TAKE 1 TABLET BY MOUTH TWICE A DAY 22   Jeromy Salvador DO   lisinopril (PRINIVIL;ZESTRIL) 20 MG tablet TAKE 1 TABLET BY MOUTH TWICE A DAY 22   Jeromy Salvador DO   HYDROcodone-acetaminophen (NORCO) 5-325 MG per tablet Take 1 tablet by mouth 2 times daily as needed for Pain for up to 30 days. 23  Kelsi Little MD   omeprazole (PRILOSEC) 40 MG delayed release capsule TAKE 1 CAPSULE BY MOUTH EVERY DAY IN THE MORNING BEFORE BREAKFAST 10/19/22   Jeromy Salvador DO   buPROPion (WELLBUTRIN SR) 150 MG extended release tablet TAKE 1 TABLET BY MOUTH EVERY DAY 10/19/22   Jeromy Salvador DO   folic acid (FOLVITE) 1 MG tablet Take 1 mg by mouth daily    Historical Provider, MD   bacitracin-polymyxin b (POLYSPORIN) 500-50399 UNIT/GM ophthalmic ointment Place into the right eye 2 times daily Every 12 hours.     Historical Provider, MD   ipratropium (ATROVENT) 0.06 % nasal spray 2 sprays by Each Nostril route 4 times daily 22   Talha Toussaint DO   polyethylene glycol (MIRALAX) 17 GM/SCOOP powder Take 17 g by mouth daily as needed (CONSTIPATION) 22   Kelsi Little MD   metoprolol tartrate (LOPRESSOR) 25 MG tablet Take one tablet by mouth twice a day 22   Nikki Bosch DO Madeleine   methotrexate (RHEUMATREX) 2.5 MG chemo tablet TAKE 8 TABLETS BY MOUTH ONCE A WEEK. 9/8/21   Historical Provider, MD   blood glucose monitor kit and supplies Dispense sufficient amount for indicated testing frequency plus additional to accommodate PRN testing needs. Dispense all needed supplies to include: monitor, strips, lancing device, lancets, control solutions, alcohol swabs. 5/8/21   Jeromy Salvador DO   blood glucose monitor strips Test one time  a day & as needed for symptoms of irregular blood glucose. Dispense sufficient amount for indicated testing frequency plus additional to accommodate PRN testing needs. 5/8/21   Jeromy Salvador DO   magnesium oxide (MAG-OX) 400 MG tablet Take 400 mg by mouth daily    Historical Provider, MD VALDOVINOS CO by Combination route    Historical Provider, MD   Ergocalciferol (VITAMIN D2 PO) Take 1.25 mg by mouth once a week    Historical Provider, MD   acetaminophen (TYLENOL) 650 MG extended release tablet Take 650 mg by mouth every 8 hours as needed for Pain    Historical Provider, MD       Current medications:    No current facility-administered medications for this encounter. Current Outpatient Medications   Medication Sig Dispense Refill    atorvastatin (LIPITOR) 40 MG tablet TAKE 1 TABLET BY MOUTH EVERY DAY AT NIGHT 90 tablet 1    levothyroxine (SYNTHROID) 112 MCG tablet TAKE 2 TABLETS BY MOUTH EVERY  tablet 1    hydroCHLOROthiazide (HYDRODIURIL) 25 MG tablet TAKE 1 TABLET BY MOUTH TWICE A  tablet 1    lisinopril (PRINIVIL;ZESTRIL) 20 MG tablet TAKE 1 TABLET BY MOUTH TWICE A  tablet 1    [START ON 1/19/2023] HYDROcodone-acetaminophen (NORCO) 5-325 MG per tablet Take 1 tablet by mouth 2 times daily as needed for Pain for up to 30 days.  60 tablet 0    omeprazole (PRILOSEC) 40 MG delayed release capsule TAKE 1 CAPSULE BY MOUTH EVERY DAY IN THE MORNING BEFORE BREAKFAST 90 capsule 3    buPROPion (WELLBUTRIN SR) 150 MG extended release tablet TAKE 1 TABLET BY MOUTH EVERY DAY 90 tablet 3    folic acid (FOLVITE) 1 MG tablet Take 1 mg by mouth daily      bacitracin-polymyxin b (POLYSPORIN) 500-27818 UNIT/GM ophthalmic ointment Place into the right eye 2 times daily Every 12 hours.  ipratropium (ATROVENT) 0.06 % nasal spray 2 sprays by Each Nostril route 4 times daily 1 each 10    polyethylene glycol (MIRALAX) 17 GM/SCOOP powder Take 17 g by mouth daily as needed (CONSTIPATION) 510 g 5    metoprolol tartrate (LOPRESSOR) 25 MG tablet Take one tablet by mouth twice a day 180 tablet 1    methotrexate (RHEUMATREX) 2.5 MG chemo tablet TAKE 8 TABLETS BY MOUTH ONCE A WEEK.  blood glucose monitor kit and supplies Dispense sufficient amount for indicated testing frequency plus additional to accommodate PRN testing needs. Dispense all needed supplies to include: monitor, strips, lancing device, lancets, control solutions, alcohol swabs. 1 kit 0    blood glucose monitor strips Test one time  a day & as needed for symptoms of irregular blood glucose. Dispense sufficient amount for indicated testing frequency plus additional to accommodate PRN testing needs. 100 strip 3    magnesium oxide (MAG-OX) 400 MG tablet Take 400 mg by mouth daily      SENNA CO by Combination route      Ergocalciferol (VITAMIN D2 PO) Take 1.25 mg by mouth once a week      acetaminophen (TYLENOL) 650 MG extended release tablet Take 650 mg by mouth every 8 hours as needed for Pain         Allergies:     Allergies   Allergen Reactions    Penicillins Hives    Avelox [Moxifloxacin Hcl In Nacl] Itching, Swelling and Rash       Problem List:    Patient Active Problem List   Diagnosis Code    Diffuse osteoarthritis M19.90    History of prostate cancer Z85.46    Osteopenia M85.80    Spinal stenosis, lumbar region, with neurogenic claudication M48.062    CAD s/p stent I25.10    HTN (hypertension) I10    HLD (hyperlipidemia) E78.5    [facility] mellitus[facility] type 2) [facility] E11.9    LEIA (obstructive sleep apnea) G47.33    RML pneumonia (CAP) J18.9    Acute hypoxemic respiratory failure (HCC) J96.01    Hyponatremia E87.1    Hypochloremia E87.8    Normocytic anemia D64.9    Ambulatory dysfunction (uses cane) R26.2    Obesity (BMI 30-39. 9) E66.9    Lumbar degenerative disc disease  severe L23 TO L5S1 , WORSE L45  M51.36    Lumbar stenosis with neurogenic claudication  severe L23 TO L5S1 , WORSE L45  M48.062    Lumbar radiculopathy  R AND L  M54.16    Spondylolisthesis of lumbar region M43.16    Lumbar spondylosis with myelopathy M47.16    Spinal stenosis of lumbar region with radiculopathy M48.061, M54.16    History of total knee replacement, right Z96.651    Pes anserinus bursitis of right knee M70.51    Trochanteric bursitis of both hips M70.61, M70.62    Piriformis syndrome of both sides G57.03    Trochanteric bursitis of right hip M70.61    Pain of right hip joint M25.551    Chronic bilateral low back pain M54.50, G89.29    Asymptomatic varicose veins of both lower extremities I83.93    Gastroesophageal reflux disease with esophagitis K21.00    Lumbar spondylosis M47.816       Past Medical History:        Diagnosis Date    BRYON [facility]acute kidney injury) ([facility] 11/2/2017    CAD (coronary artery disease) 8/1/2015    Cancer ([facility]     PROSTATE    Chronic dCHF (grade 1 LVDD) 11/2/2017    Deafness     Diverticulitis     Diverticulosis     Fatigue     Hepatitis     PT NOT SURE WHAT KIND, HE HAD YEARS AGO    Hyperlipidemia     Hypertension     Hypothyroidism     Joint pain     Lumbar radiculopathy  R AND L  5/11/2018    Lumbar stenosis with neurogenic claudication  severe L23 TO L5S1 , WORSE L45      Medical history reviewed with no changes     Numbness and tingling     HANDS AND FEET    OA (osteoarthritis) of hip 7/21/2015    Obesity (BMI 30-39.9) 11/3/2017    Other disorders of kidney and ureter     Rheumatoid aortitis     Shortness of breath     Spinal stenosis, lumbar region, with neurogenic claudication     Type 2 diabetes mellitus without complication, without long-term current use of insulin ([facility] 9/9/2016    Weakness        Past Surgical History:        Procedure Laterality Date    ABDOMEN SURGERY  2/7/12    abdominal wall exploration,w/ mesh;lysis of adhesions    BACK SURGERY  05/2018    LSP    COLON SURGERY      COLONOSCOPY  03/26/2015    diverticulosis    CORONARY ANGIOPLASTY WITH STENT PLACEMENT  7/31/2015    ELBOW SURGERY      bilat    HERNIA REPAIR      HIP SURGERY Left     JOINT REPLACEMENT Right 07/05/2016    Right total knee replacement    KNEE SURGERY      LUMBAR LAMINECTOMY  05/11/2018    LUMBAR LAMINECTOMIES , BILATERAL LAMINOTOMIES L23 TO L5S1, BONE GRAFTING FOR FUSION L23 TO L5S1 , INST FUSION STABILIZATION REDUCTION OF SPONDYLOLISTHESIS L34 AND L45 , AUTOGRAFT , ALLOGRAFT     OTHER SURGICAL HISTORY  01/09/2013    excision of suture granuloma on the abdomen    OTHER SURGICAL HISTORY Right     excision of cyst on thigh    PAIN MANAGEMENT PROCEDURE Bilateral 9/8/2022    BILATERAL LUMBAR THREE LUMBER FOUR LUMBAR FIVE MEDIAL BRANCH BLOCK SITE CONFIRMED BY FLUOROSCOPY performed by Joy Hernandez MD at [facility]    PAIN MANAGEMENT PROCEDURE Bilateral 10/20/2022    BILATERAL LUMBAR THREE LUMBAR FOUR LUMBAR FIVE RADIOFREQUENCY ABLATION SITE CONFIRMED BY FLUOROSCOPY performed by Joy Hernandez MD at [facility]    SD TENOTOMY ABDUCTORS&/EXTENSOR HIP OPEN [address] Left 11/5/2018    LEFT HIP ABDUCTOR FASCIOTOMY, GREATER TROCHANTERIC BURSECTOMY, PIRIFORMIS RELEASE performed by vIan Xavier MD at [address] [place] [address]      right    UPPER GASTROINTESTINAL ENDOSCOPY Bilateral 01/11/2020    UPPER GASTROINTESTINAL ENDOSCOPY N/A 1/11/2020    EGD BIOPSY performed by Bryce Wick DO at [place] [address]  1/11/2020    EGD CONTROL HEMORRHAGE performed by Nasreen Gregg DO at [address] [facility] [facility]         Social History:    Social History     Tobacco Use    Smoking status: Former     Packs/day: 9.00     Years: 24.00     Pack years: 216.00     Types: Cigarettes, Cigars     Quit date: 1982     Years since quittin.9    Smokeless tobacco: Former     Types: Snuff, Chew     Quit date: 1982   Substance Use Topics    Alcohol use: No     Alcohol/week: 0.0 standard drinks                                Counseling given: Not Answered      Vital Signs (Current):   Vitals:    23 1020   Weight: 224 lb (101.6 kg)   Height: 5' 11\" (1.803 m)                                              BP Readings from Last 3 Encounters:   22 (!) 166/73   10/20/22 121/63   10/19/22 124/60       NPO Status:                                                                                 BMI:   Wt Readings from Last 3 Encounters:   22 224 lb (101.6 kg)   10/20/22 225 lb (102.1 kg)   10/19/22 230 lb 9.6 oz (104.6 kg)     Body mass index is 31.24 kg/m².     CBC:   Lab Results   Component Value Date/Time    WBC 10.2 2022 09:54 AM    RBC 4.30 2022 09:54 AM    HGB 13.0 2022 09:54 AM    HCT 40.2 2022 09:54 AM    MCV 93.3 2022 09:54 AM    RDW 16.9 2022 09:54 AM     2022 09:54 AM       CMP:   Lab Results   Component Value Date/Time     2021 09:19 AM    K 4.3 2021 09:19 AM    K 4.1 2020 06:04 AM     2021 09:19 AM    CO2 28 2021 09:19 AM    BUN 22 2021 09:19 AM    CREATININE 1.4 2021 09:19 AM    GFRAA 59 2021 09:19 AM    GFRAA >60 2012 04:10 AM    AGRATIO 1.6 2021 09:19 AM    LABGLOM 49 2021 09:19 AM    GLUCOSE 138 2021 09:19 AM    PROT 6.9 2021 09:19 AM    PROT 7.0 2011 09:06 AM    CALCIUM 9.2 2021 09:19 AM    BILITOT 0.4 2021 09:19 AM    ALKPHOS 85 2021 09:19 AM    AST 12 2021 09:19 AM    ALT 11 04/05/2021 09:19 AM       POC Tests: No results for input(s): POCGLU, POCNA, POCK, POCCL, POCBUN, POCHEMO, POCHCT in the last 72 hours. Coags:   Lab Results   Component Value Date/Time    PROTIME 13.9 11/03/2017 05:38 AM    INR 1.23 11/03/2017 05:38 AM    APTT 28.3 11/03/2017 05:38 AM       HCG (If Applicable): No results found for: PREGTESTUR, PREGSERUM, HCG, HCGQUANT     ABGs: No results found for: PHART, PO2ART, FIS6ZNW, WQC0QWI, BEART, J5YICWLD     Type & Screen (If Applicable):  No results found for: LABABO, LABRH    Drug/Infectious Status (If Applicable):  No results found for: HIV, HEPCAB    COVID-19 Screening (If Applicable): No results found for: COVID19        Anesthesia Evaluation  Patient summary reviewed and Nursing notes reviewed no history of anesthetic complications:   Airway: Mallampati: II  TM distance: >3 FB   Neck ROM: full  Mouth opening: > = 3 FB   Dental:    (+) upper dentures      Pulmonary:   (+) pneumonia:  shortness of breath: no interval change,  sleep apnea:                             Cardiovascular:    (+) hypertension: mild, angina:, CAD: no interval change, CHF: diastolic,                   Neuro/Psych:   (+) neuromuscular disease:,             GI/Hepatic/Renal: Neg GI/Hepatic/Renal ROS  (+) hepatitis:,      (-) GERD, liver disease and no renal disease       Endo/Other:    (+) DiabetesType II DM, , hypothyroidism: arthritis: rheumatoid. , malignancy/cancer (prostate). Abdominal:             Vascular: negative vascular ROS. Other Findings:           Anesthesia Plan      general     ASA 3     (I discussed with the patient the risks and benefits of PIV, general anesthesia, IV Narcotics, PACU. All questions were answered the patient agrees with the plan)  Induction: intravenous. MIPS: Prophylactic antiemetics administered. Anesthetic plan and risks discussed with patient. Plan discussed with CRNA.                     Damien Velasco MD 1/13/2023

## 2023-01-14 LAB
ESTIMATED AVERAGE GLUCOSE: 111.2 MG/DL
HBA1C MFR BLD: 5.5 %

## 2023-01-16 ENCOUNTER — HOSPITAL ENCOUNTER (OUTPATIENT)
Age: 83
Setting detail: OUTPATIENT SURGERY
Discharge: HOME | End: 2023-01-16
Attending: UROLOGY | Admitting: UROLOGY
Payer: MEDICARE

## 2023-01-16 ENCOUNTER — ANESTHESIA (OUTPATIENT)
Dept: OPERATING ROOM | Age: 83
End: 2023-01-16
Payer: MEDICARE

## 2023-01-16 VITALS
WEIGHT: 227 LBS | DIASTOLIC BLOOD PRESSURE: 59 MMHG | HEART RATE: 70 BPM | OXYGEN SATURATION: 97 % | SYSTOLIC BLOOD PRESSURE: 123 MMHG | HEIGHT: 71 IN | BODY MASS INDEX: 31.78 KG/M2 | RESPIRATION RATE: 16 BRPM | TEMPERATURE: 97.3 F

## 2023-01-16 LAB
ANION GAP SERPL CALCULATED.3IONS-SCNC: 9 MMOL/L (ref 3–16)
BUN BLDV-MCNC: 21 MG/DL (ref 7–20)
CALCIUM SERPL-MCNC: 8.7 MG/DL (ref 8.3–10.6)
CHLORIDE BLD-SCNC: 103 MMOL/L (ref 99–110)
CO2: 28 MMOL/L (ref 21–32)
CREAT SERPL-MCNC: 1.1 MG/DL (ref 0.8–1.3)
GFR SERPL CREATININE-BSD FRML MDRD: >60 ML/MIN/{1.73_M2}
GLUCOSE BLD-MCNC: 96 MG/DL (ref 70–99)
HCT VFR BLD CALC: 37.1 % (ref 40.5–52.5)
HEMOGLOBIN: 12.2 G/DL (ref 13.5–17.5)
MCH RBC QN AUTO: 30.1 PG (ref 26–34)
MCHC RBC AUTO-ENTMCNC: 32.8 G/DL (ref 31–36)
MCV RBC AUTO: 92 FL (ref 80–100)
PDW BLD-RTO: 16.4 % (ref 12.4–15.4)
PLATELET # BLD: 350 K/UL (ref 135–450)
PMV BLD AUTO: 7 FL (ref 5–10.5)
POTASSIUM SERPL-SCNC: 4.1 MMOL/L (ref 3.5–5.1)
RBC # BLD: 4.04 M/UL (ref 4.2–5.9)
SODIUM BLD-SCNC: 140 MMOL/L (ref 136–145)
WBC # BLD: 8.5 K/UL (ref 4–11)

## 2023-01-16 PROCEDURE — 7100000011 HC PHASE II RECOVERY - ADDTL 15 MIN: Performed by: UROLOGY

## 2023-01-16 PROCEDURE — 6370000000 HC RX 637 (ALT 250 FOR IP): Performed by: UROLOGY

## 2023-01-16 PROCEDURE — 2580000003 HC RX 258: Performed by: ANESTHESIOLOGY

## 2023-01-16 PROCEDURE — 3700000000 HC ANESTHESIA ATTENDED CARE: Performed by: UROLOGY

## 2023-01-16 PROCEDURE — 6360000002 HC RX W HCPCS: Performed by: NURSE ANESTHETIST, CERTIFIED REGISTERED

## 2023-01-16 PROCEDURE — 3700000001 HC ADD 15 MINUTES (ANESTHESIA): Performed by: UROLOGY

## 2023-01-16 PROCEDURE — 2500000003 HC RX 250 WO HCPCS: Performed by: ANESTHESIOLOGY

## 2023-01-16 PROCEDURE — 36415 COLL VENOUS BLD VENIPUNCTURE: CPT

## 2023-01-16 PROCEDURE — 80048 BASIC METABOLIC PNL TOTAL CA: CPT

## 2023-01-16 PROCEDURE — 85027 COMPLETE CBC AUTOMATED: CPT

## 2023-01-16 PROCEDURE — 2709999900 HC NON-CHARGEABLE SUPPLY: Performed by: UROLOGY

## 2023-01-16 PROCEDURE — 2580000003 HC RX 258: Performed by: NURSE ANESTHETIST, CERTIFIED REGISTERED

## 2023-01-16 PROCEDURE — 7100000010 HC PHASE II RECOVERY - FIRST 15 MIN: Performed by: UROLOGY

## 2023-01-16 PROCEDURE — 3600000004 HC SURGERY LEVEL 4 BASE: Performed by: UROLOGY

## 2023-01-16 PROCEDURE — 2580000003 HC RX 258: Performed by: UROLOGY

## 2023-01-16 PROCEDURE — 3600000014 HC SURGERY LEVEL 4 ADDTL 15MIN: Performed by: UROLOGY

## 2023-01-16 RX ORDER — MAGNESIUM HYDROXIDE 1200 MG/15ML
LIQUID ORAL PRN
Status: DISCONTINUED | OUTPATIENT
Start: 2023-01-16 | End: 2023-01-16 | Stop reason: ALTCHOICE

## 2023-01-16 RX ORDER — SODIUM CHLORIDE 9 MG/ML
25 INJECTION, SOLUTION INTRAVENOUS PRN
Status: DISCONTINUED | OUTPATIENT
Start: 2023-01-16 | End: 2023-01-16 | Stop reason: HOSPADM

## 2023-01-16 RX ORDER — FAMOTIDINE 10 MG/ML
20 INJECTION, SOLUTION INTRAVENOUS ONCE
Status: COMPLETED | OUTPATIENT
Start: 2023-01-16 | End: 2023-01-16

## 2023-01-16 RX ORDER — MIDAZOLAM HYDROCHLORIDE 1 MG/ML
1 INJECTION INTRAMUSCULAR; INTRAVENOUS EVERY 5 MIN PRN
Status: DISCONTINUED | OUTPATIENT
Start: 2023-01-16 | End: 2023-01-16 | Stop reason: HOSPADM

## 2023-01-16 RX ORDER — LIDOCAINE HYDROCHLORIDE 20 MG/ML
JELLY TOPICAL PRN
Status: DISCONTINUED | OUTPATIENT
Start: 2023-01-16 | End: 2023-01-16 | Stop reason: ALTCHOICE

## 2023-01-16 RX ORDER — PHENAZOPYRIDINE HYDROCHLORIDE 200 MG/1
200 TABLET, FILM COATED ORAL 3 TIMES DAILY PRN
Qty: 15 TABLET | Refills: 0 | Status: SHIPPED | OUTPATIENT
Start: 2023-01-16 | End: 2023-01-21

## 2023-01-16 RX ORDER — HYDRALAZINE HYDROCHLORIDE 20 MG/ML
5 INJECTION INTRAMUSCULAR; INTRAVENOUS
Status: DISCONTINUED | OUTPATIENT
Start: 2023-01-16 | End: 2023-01-16 | Stop reason: HOSPADM

## 2023-01-16 RX ORDER — SODIUM CHLORIDE 0.9 % (FLUSH) 0.9 %
5-40 SYRINGE (ML) INJECTION EVERY 12 HOURS SCHEDULED
Status: DISCONTINUED | OUTPATIENT
Start: 2023-01-16 | End: 2023-01-16 | Stop reason: HOSPADM

## 2023-01-16 RX ORDER — SODIUM CHLORIDE, SODIUM LACTATE, POTASSIUM CHLORIDE, CALCIUM CHLORIDE 600; 310; 30; 20 MG/100ML; MG/100ML; MG/100ML; MG/100ML
INJECTION, SOLUTION INTRAVENOUS CONTINUOUS PRN
Status: DISCONTINUED | OUTPATIENT
Start: 2023-01-16 | End: 2023-01-16 | Stop reason: SDUPTHER

## 2023-01-16 RX ORDER — SULFAMETHOXAZOLE AND TRIMETHOPRIM 400; 80 MG/1; MG/1
1 TABLET ORAL 2 TIMES DAILY
Qty: 8 TABLET | Refills: 0 | Status: SHIPPED | OUTPATIENT
Start: 2023-01-16 | End: 2023-01-20

## 2023-01-16 RX ORDER — SODIUM CHLORIDE 0.9 % (FLUSH) 0.9 %
5-40 SYRINGE (ML) INJECTION PRN
Status: DISCONTINUED | OUTPATIENT
Start: 2023-01-16 | End: 2023-01-16 | Stop reason: HOSPADM

## 2023-01-16 RX ORDER — DIPHENHYDRAMINE HYDROCHLORIDE 50 MG/ML
12.5 INJECTION INTRAMUSCULAR; INTRAVENOUS
Status: DISCONTINUED | OUTPATIENT
Start: 2023-01-16 | End: 2023-01-16 | Stop reason: HOSPADM

## 2023-01-16 RX ORDER — SODIUM CHLORIDE 9 MG/ML
INJECTION, SOLUTION INTRAVENOUS PRN
Status: DISCONTINUED | OUTPATIENT
Start: 2023-01-16 | End: 2023-01-16 | Stop reason: HOSPADM

## 2023-01-16 RX ORDER — SODIUM CHLORIDE, SODIUM LACTATE, POTASSIUM CHLORIDE, CALCIUM CHLORIDE 600; 310; 30; 20 MG/100ML; MG/100ML; MG/100ML; MG/100ML
INJECTION, SOLUTION INTRAVENOUS CONTINUOUS
Status: DISCONTINUED | OUTPATIENT
Start: 2023-01-16 | End: 2023-01-16 | Stop reason: HOSPADM

## 2023-01-16 RX ORDER — PROPOFOL 10 MG/ML
INJECTION, EMULSION INTRAVENOUS PRN
Status: DISCONTINUED | OUTPATIENT
Start: 2023-01-16 | End: 2023-01-16 | Stop reason: SDUPTHER

## 2023-01-16 RX ORDER — ONDANSETRON 2 MG/ML
4 INJECTION INTRAMUSCULAR; INTRAVENOUS EVERY 10 MIN PRN
Status: DISCONTINUED | OUTPATIENT
Start: 2023-01-16 | End: 2023-01-16 | Stop reason: HOSPADM

## 2023-01-16 RX ADMIN — PROPOFOL 100 MG: 10 INJECTION, EMULSION INTRAVENOUS at 13:35

## 2023-01-16 RX ADMIN — SODIUM CHLORIDE, POTASSIUM CHLORIDE, SODIUM LACTATE AND CALCIUM CHLORIDE: 600; 310; 30; 20 INJECTION, SOLUTION INTRAVENOUS at 12:01

## 2023-01-16 RX ADMIN — PROPOFOL 50 MG: 10 INJECTION, EMULSION INTRAVENOUS at 13:39

## 2023-01-16 RX ADMIN — PROPOFOL 50 MG: 10 INJECTION, EMULSION INTRAVENOUS at 13:49

## 2023-01-16 RX ADMIN — FAMOTIDINE 20 MG: 10 INJECTION, SOLUTION INTRAVENOUS at 12:01

## 2023-01-16 RX ADMIN — SODIUM CHLORIDE, POTASSIUM CHLORIDE, SODIUM LACTATE AND CALCIUM CHLORIDE: 600; 310; 30; 20 INJECTION, SOLUTION INTRAVENOUS at 13:30

## 2023-01-16 RX ADMIN — PROPOFOL 50 MG: 10 INJECTION, EMULSION INTRAVENOUS at 13:44

## 2023-01-16 ASSESSMENT — PAIN DESCRIPTION - DESCRIPTORS: DESCRIPTORS: ACHING

## 2023-01-16 ASSESSMENT — PAIN - FUNCTIONAL ASSESSMENT: PAIN_FUNCTIONAL_ASSESSMENT: 0-10

## 2023-01-16 NOTE — ANESTHESIA POSTPROCEDURE EVALUATION
Department of Anesthesiology  Postprocedure Note    Patient: Kim Tello  MRN: [id number]  YOB: 1940  Date of evaluation: 1/16/2023      Procedure Summary     Date: 01/16/23 Room / Location: [facility]    Anesthesia Start: 1330 Anesthesia Stop: 3112    Procedure: CYSTOSCOPY, URETHRAL DILATATION (Bladder) Diagnosis:       Benign prostatic hyperplasia with lower urinary tract symptoms, symptom details unspecified      (Benign prostatic hyperplasia with lower urinary tract symptoms, symptom details unspecified [N40.1])    Surgeons: Mark Schuler MD Responsible Provider: Brian Damico MD    Anesthesia Type: general ASA Status: 3          Anesthesia Type: No value filed.     Luis Phase I: Luis Score: 10    Luis Phase II: Luis Score: 10      Anesthesia Post Evaluation    Patient location during evaluation: PACU  Level of consciousness: awake  Airway patency: patent  Nausea & Vomiting: no nausea  Complications: no  Cardiovascular status: blood pressure returned to baseline  Respiratory status: acceptable  Hydration status: euvolemic

## 2023-01-16 NOTE — PROGRESS NOTES
Pt transported to private car via wheel chair. Vitals per doc flow, wife Susen Galeazzi assumes responsibility.

## 2023-01-16 NOTE — PROGRESS NOTES
Pt arrived to PACU from [facility] in stable condition. Report received. Pt immediately placed on bedside monitor for cont pulse ox and BP. Pt arrived with oral airway in place. CRNA removed oral airway at bedside at this time. SPO2 96% on RA.

## 2023-01-16 NOTE — DISCHARGE INSTRUCTIONS
Patient to call Dr. [address] office for a follow up appointment in 3-4 weeks. Office number to call is [phone]. ANESTHESIA DISCHARGE INSTRUCTIONS    You are under the influence of drugs- do not drink alcohol, drive a car, operate machinery(such as power tools, kitchen appliances, etc), sign legal documents, or make any important decisions for 24 hours (or while on pain medications). Children should not ride bikes or [place] or play on gym sets  for 24 hours after surgery. A responsible adult should be with you for 24 hours. Rest at home today- increase activity as tolerated. Progress slowly to a regular diet unless your physician has instructed you otherwise. Drink plenty of water. CALL YOUR DOCTOR IF YOU:  Have moderate to severe nausea or vomiting AND are unable to hold down fluids or prescribed medications. Have bright red bloody drainage from your dressing that won't stop oozing. Do not get relief with your pain medication    NORMAL (POSSIBLE) SIDE EFFECTS FROM ANESTHESIA:     Confusion, temporary memory loss, delayed reaction times in the first 24 hours  Lightheadedness, dizziness, difficulty focusing, blurred vision  Nausea/vomiting can happen  Shivering, feeling cold, sore throat, cough and muscle aches should stop within 24-48 hours  Trouble urinating - call your surgeon if it has been more than 8 hrs  Bruising or soreness at the IV site - call if it remains red, firm or there is drainage           The following instructions are to be followed if you have a known history or diagnosis of sleep apnea: For all sleep apnea patients:   Sleep on your side or sitting up in a chair whenever possible, especially the first 24 hours after surgery. Use only medicines prescribed by your doctor. Do not drink alcohol. If you have a dental device to assist you while at rest, use it at all times for the first 24 hours.   For patients using CPAP machines:   Use your CPAP machine during all periods of sleep as usual.   Use your CPAP machine during all periods of daytime rest while on pain medicines. ** Follow up with your primary care doctor for continued care. IF YOU DO NOT TAKE ALL OF YOUR NARCOTIC PAIN MEDICATION, please dispose of them responsibly. There are drop off boxes in the Emergency Departments 24/7 at both [facility] and [place]. If these locations are not convenient, other options for discarding them can be found at:  [email or web address]    Hospital or office staff may NOT accept any medications to drop off in the cabinet for you.

## 2023-01-16 NOTE — BRIEF OP NOTE
Brief Postoperative Note      Patient: Abe Goode  YOB: 1940  MRN: [id number]    Date of Procedure: 1/16/2023    Pre-Op Diagnosis: Benign prostatic hyperplasia with lower urinary tract symptoms, symptom details unspecified [N40.1]    Post-Op Diagnosis: Same       Procedure(s):  CYSTOSCOPY, URETHRAL DILATATION    Surgeon(s):  Josemanuel Mclean MD    Assistant:  Surgical Assistant: Waqar Khan    Anesthesia: General    Estimated Blood Loss (mL): Minimal    Complications: None    Specimens:   * No specimens in log *    Implants:  * No implants in log *      Drains: * No LDAs found *    Findings: BPH, tissue changes with radiation (?), no TCC tumors or stones.     Electronically signed by Nicki Mosqueda MD on 1/16/2023 at 1:55 PM

## 2023-01-17 NOTE — TELEPHONE ENCOUNTER
Refill Request     CONFIRM preferrred pharmacy with the patient. If Mail Order Rx - Pend for 90 day refill. Last Seen: Last Seen Department: 1/13/2023  Last Seen by PCP: 10/19/2022    Last Written: 07/29/2022 180 tablet 1 refills     If no future appointment scheduled, route STAFF MESSAGE with patient name to the [facility] for scheduling. Next Appointment:   Future Appointments   Date Time Provider Minh Valdez   1/19/2023  9:40 AM MARQUES Toro CNP [facility]   4/19/2023  8:00 AM DO YAMILET Mccabe [place] - [facility]       Message sent to [address] [place] [address] to schedule appt with patient?   NO      Requested Prescriptions     Pending Prescriptions Disp Refills    metoprolol tartrate (LOPRESSOR) 25 MG tablet [Pharmacy Med Name: METOPROLOL TARTRATE 25 MG TAB] 180 tablet 1     Sig: TAKE 1 TABLET BY MOUTH TWICE A DAY

## 2023-01-19 ENCOUNTER — TELEPHONE (OUTPATIENT)
Dept: FAMILY MEDICINE CLINIC | Age: 83
End: 2023-01-19

## 2023-01-19 PROBLEM — M47.816 LUMBAR FACET ARTHROPATHY: Status: ACTIVE | Noted: 2023-01-19

## 2023-01-19 PROBLEM — M51.26 DISC DISPLACEMENT, LUMBAR: Status: ACTIVE | Noted: 2023-01-19

## 2023-01-19 PROBLEM — M48.061 SPINAL STENOSIS OF LUMBAR REGION: Status: ACTIVE | Noted: 2023-01-19

## 2023-01-19 NOTE — TELEPHONE ENCOUNTER
Called patient and he states that he went to [facility] today to have one done. I call [facility] to confirm, and he did go. They will fax us the copy of their tracing for our records.

## 2023-01-19 NOTE — TELEPHONE ENCOUNTER
Received phone call from the pt in regards that he is tired of getting the run around and that he just had an EKG completed at [place]. Kelly Go verified this and she stated that he did get the EKG redone and that they will be faxing this over to us.will await to receive this.

## 2023-01-19 NOTE — TELEPHONE ENCOUNTER
They said you signed the EKG even though it says A fib, they want you to jose it out and change it to the right interpretation for your safety and then refax

## 2023-01-19 NOTE — TELEPHONE ENCOUNTER
Araceli Clements calling in regards to the EKG that they have received she stated that it stated that the pt has A-FIB and that Araceli Clements stated that she called the pt and the spouse and they stated that he has never had past history of A-Fib in the past and pt and spouse are asking why this was not brought to there attention at the visit and they will be calling in regards to this as well.  and Araceli Clements wants to verify as to if this is truly an A-Fib detection or if pt was moving etc. Please advise    Good call back # for Araceli Clements: [phone]

## 2023-01-19 NOTE — TELEPHONE ENCOUNTER
Nessa Mei called with [facility] pre surgery center asking for the doctor to sign the ekg and reinterept it as they state \"that is not a fib and looks like the patient is moving\" [facility] is also asking for the ekg to be mentioned and address in the pre op appointment notes.  Please refax note and ekg to [phone] please advise

## 2023-01-19 NOTE — TELEPHONE ENCOUNTER
This is felt to be movement. He had surgery at [facility] the next Monday and no abnormality was noted at that time.

## 2023-01-19 NOTE — OP NOTE
[address]                 [address] [facility]                                OPERATIVE REPORT    PATIENT NAME: Bettina Chapin                  :        1940  MED REC NO:   [id number]                          ROOM:  ACCOUNT NO:   [de-identified]                           ADMIT DATE: 2023  PROVIDER:     Dwaine Reyez MD    DATE OF PROCEDURE:  2023    PREOPERATIVE DIAGNOSES:  1.  Voiding difficulty. 2.  History of BPH. 3.  History of prostate cancer, treated with radiation seed implant. POSTOPERATIVE DIAGNOSES:  1. Urethral stenosis. 2.  Outlet obstruction from prostate enlargement. 3.  No evidence for bladder cancer, tumors, or stones. OPERATION PERFORMED:  Cystoscopy with urethral dilation. PRIMARY SURGEON:  Dwaine Reyez MD    ANESTHESIA:  General.    ESTIMATED BLOOD LOSS:  5 mL. HISTORY AND INDICATIONS:  An 77-year-old white male with a history of  obstructive and irritative voiding symptoms. He has a history of BPH as  well as prostate cancer, treated with radiation seed implantation. He  was having worsening obstructive and irritative flows; and to rule out  other bladder pathology and outlet obstruction, he was scheduled for  today's procedure. The risks, benefits, and expected outcomes of the  procedure had been discussed. DETAILS OF THE PROCEDURE:  After obtaining informed consent, the patient  was taken to the operative suite. He was given a general anesthetic and  placed on the operative table in a modified dorsal lithotomy position. Prepping and draping was done in a sterile fashion. Cystourethroscopy  was then performed with both 30- and 70-degree lenses. The pendulous  urethra was intact. There was narrowing at the bulbous urethra. Upon  entering the prostatic fossa, obstructing tissue was identified. There  were also radiation-induced changes to this in the bladder neck region. Neovascularity was identified. Upon entering the bladder, diffuse  trabeculation was seen, but no evidence for any bladder cancer, tumors,  or stones. The bladder itself was carefully inspected and no biopsy had  to be done at this time. Ureteral orifices were identified and had  clear efflux of urine. The patient's bladder was drained. Further dilation was done of the  stenotic urethra with Vanice Boyers sounds to 28-[place]ian. Lidocaine jelly  was applied. He was then taken back to the postop recovery room in  stable condition.         Delfina Benavides MD    D: 01/18/2023 19:14:34       T: 01/18/2023 19:17:00     [email or web address]  Job#: [id number]     Doc#: [id number]    CC:

## 2023-02-08 ENCOUNTER — HOSPITAL ENCOUNTER (OUTPATIENT)
Dept: VASCULAR LAB | Age: 83
Discharge: HOME | End: 2023-02-08
Payer: MEDICARE

## 2023-02-08 DIAGNOSIS — R22.42 LOCALIZED SWELLING, MASS, OR LUMP OF LOWER EXTREMITY, LEFT: ICD-10-CM

## 2023-02-08 PROCEDURE — 93971 EXTREMITY STUDY: CPT

## 2023-02-17 ENCOUNTER — TELEPHONE (OUTPATIENT)
Dept: FAMILY MEDICINE CLINIC | Age: 83
End: 2023-02-17

## 2023-03-02 SDOH — HEALTH STABILITY: PHYSICAL HEALTH: ON AVERAGE, HOW MANY DAYS PER WEEK DO YOU ENGAGE IN MODERATE TO STRENUOUS EXERCISE (LIKE A BRISK WALK)?: 0 DAYS

## 2023-03-02 ASSESSMENT — PATIENT HEALTH QUESTIONNAIRE - PHQ9
9. THOUGHTS THAT YOU WOULD BE BETTER OFF DEAD, OR OF HURTING YOURSELF: 0
SUM OF ALL RESPONSES TO PHQ QUESTIONS 1-9: 1
7. TROUBLE CONCENTRATING ON THINGS, SUCH AS READING THE NEWSPAPER OR WATCHING TELEVISION: 0
6. FEELING BAD ABOUT YOURSELF - OR THAT YOU ARE A FAILURE OR HAVE LET YOURSELF OR YOUR FAMILY DOWN: 0
SUM OF ALL RESPONSES TO PHQ QUESTIONS 1-9: 1
SUM OF ALL RESPONSES TO PHQ9 QUESTIONS 1 & 2: 0
2. FEELING DOWN, DEPRESSED OR HOPELESS: 0
1. LITTLE INTEREST OR PLEASURE IN DOING THINGS: 0
4. FEELING TIRED OR HAVING LITTLE ENERGY: 1
SUM OF ALL RESPONSES TO PHQ QUESTIONS 1-9: 1
10. IF YOU CHECKED OFF ANY PROBLEMS, HOW DIFFICULT HAVE THESE PROBLEMS MADE IT FOR YOU TO DO YOUR WORK, TAKE CARE OF THINGS AT HOME, OR GET ALONG WITH OTHER PEOPLE: 0
3. TROUBLE FALLING OR STAYING ASLEEP: 0
8. MOVING OR SPEAKING SO SLOWLY THAT OTHER PEOPLE COULD HAVE NOTICED. OR THE OPPOSITE, BEING SO FIGETY OR RESTLESS THAT YOU HAVE BEEN MOVING AROUND A LOT MORE THAN USUAL: 0
SUM OF ALL RESPONSES TO PHQ QUESTIONS 1-9: 1
5. POOR APPETITE OR OVEREATING: 0

## 2023-03-02 ASSESSMENT — LIFESTYLE VARIABLES
HOW OFTEN DO YOU HAVE A DRINK CONTAINING ALCOHOL: NEVER
HOW MANY STANDARD DRINKS CONTAINING ALCOHOL DO YOU HAVE ON A TYPICAL DAY: PATIENT DOES NOT DRINK
HOW OFTEN DO YOU HAVE A DRINK CONTAINING ALCOHOL: 1
HOW MANY STANDARD DRINKS CONTAINING ALCOHOL DO YOU HAVE ON A TYPICAL DAY: 0
HOW OFTEN DO YOU HAVE SIX OR MORE DRINKS ON ONE OCCASION: 1

## 2023-03-08 ENCOUNTER — OFFICE VISIT (OUTPATIENT)
Dept: FAMILY MEDICINE CLINIC | Age: 83
End: 2023-03-08
Payer: MEDICARE

## 2023-03-08 VITALS
DIASTOLIC BLOOD PRESSURE: 56 MMHG | HEART RATE: 56 BPM | WEIGHT: 229 LBS | BODY MASS INDEX: 32.06 KG/M2 | HEIGHT: 71 IN | OXYGEN SATURATION: 92 % | SYSTOLIC BLOOD PRESSURE: 122 MMHG | RESPIRATION RATE: 18 BRPM

## 2023-03-08 DIAGNOSIS — Z00.00 MEDICARE ANNUAL WELLNESS VISIT, SUBSEQUENT: Primary | ICD-10-CM

## 2023-03-08 PROCEDURE — 3078F DIAST BP <80 MM HG: CPT | Performed by: FAMILY MEDICINE

## 2023-03-08 PROCEDURE — G0439 PPPS, SUBSEQ VISIT: HCPCS | Performed by: FAMILY MEDICINE

## 2023-03-08 PROCEDURE — 1123F ACP DISCUSS/DSCN MKR DOCD: CPT | Performed by: FAMILY MEDICINE

## 2023-03-08 PROCEDURE — G8484 FLU IMMUNIZE NO ADMIN: HCPCS | Performed by: FAMILY MEDICINE

## 2023-03-08 PROCEDURE — 3074F SYST BP LT 130 MM HG: CPT | Performed by: FAMILY MEDICINE

## 2023-03-08 RX ORDER — LORATADINE 10 MG/1
10 CAPSULE, LIQUID FILLED ORAL DAILY
COMMUNITY

## 2023-03-08 RX ORDER — ASPIRIN 81 MG/1
81 TABLET ORAL 2 TIMES DAILY
COMMUNITY

## 2023-03-08 SDOH — ECONOMIC STABILITY: INCOME INSECURITY: HOW HARD IS IT FOR YOU TO PAY FOR THE VERY BASICS LIKE FOOD, HOUSING, MEDICAL CARE, AND HEATING?: NOT HARD AT ALL

## 2023-03-08 SDOH — ECONOMIC STABILITY: FOOD INSECURITY: WITHIN THE PAST 12 MONTHS, YOU WORRIED THAT YOUR FOOD WOULD RUN OUT BEFORE YOU GOT MONEY TO BUY MORE.: NEVER TRUE

## 2023-03-08 SDOH — ECONOMIC STABILITY: FOOD INSECURITY: WITHIN THE PAST 12 MONTHS, THE FOOD YOU BOUGHT JUST DIDN'T LAST AND YOU DIDN'T HAVE MONEY TO GET MORE.: NEVER TRUE

## 2023-03-08 NOTE — PATIENT INSTRUCTIONS
Personalized Preventive Plan for [facility] - 3/8/2023  Medicare offers a range of preventive health benefits. Some of the tests and screenings are paid in full while other may be subject to a deductible, co-insurance, and/or copay. Some of these benefits include a comprehensive review of your medical history including lifestyle, illnesses that may run in your family, and various assessments and screenings as appropriate. After reviewing your medical record and screening and assessments performed today your provider may have ordered immunizations, labs, imaging, and/or referrals for you. A list of these orders (if applicable) as well as your Preventive Care list are included within your After Visit Summary for your review. Other Preventive Recommendations:    A preventive eye exam performed by an eye specialist is recommended every 1-2 years to screen for glaucoma; cataracts, macular degeneration, and other eye disorders. A preventive dental visit is recommended every 6 months. Try to get at least 150 minutes of exercise per week or 10,000 steps per day on a pedometer . Order or download the FREE \"Exercise & Physical Activity: Your Everyday Guide\" from [employer]. Call 5-230.628.9878 or search [employer] online. You need 5598-7825 mg of calcium and 3464-2555 IU of vitamin D per day. It is possible to meet your calcium requirement with diet alone, but a vitamin D supplement is usually necessary to meet this goal.  When exposed to the sun, use a sunscreen that protects against both UVA and UVB radiation with an SPF of 30 or greater. Reapply every 2 to 3 hours or after sweating, drying off with a towel, or swimming. Always wear a seat belt when traveling in a car. Always wear a helmet when riding a bicycle or motorcycle.

## 2023-03-08 NOTE — PROGRESS NOTES
Medicare Annual Wellness Visit    Loulou Gutierrez is here for Medicare AWV    Assessment & Plan   Medicare annual wellness visit, subsequent      Recommendations for Preventive Services Due: see orders and patient instructions/AVS.  Recommended screening schedule for the next 5-10 years is provided to the patient in written form: see Patient Instructions/AVS.     No follow-ups on file. Subjective       Patient's complete Health Risk Assessment and screening values have been reviewed and are found in Flowsheets. The following problems were reviewed today and where indicated follow up appointments were made and/or referrals ordered. Positive Risk Factor Screenings with Interventions:    Fall Risk:  Do you feel unsteady or are you worried about falling? : no  2 or more falls in past year?: no  Fall with injury in past year?: (!) yes     Interventions:    See AVS for additional education material             Opioid Risk: (Low risk score <55) Opioid risk score: 27    Patient is low risk for opioid use disorder or overdose. Last PDMP Oswald Parnell as Reviewed:  Review User Review Instant Review Result   Severo Heather 1/19/2023  7:15 AM     Reviewed PDMP [1]     Last Controlled Substance Monitoring Documentation      [address] [facility] Office Visit from 1/19/2023 in [place] [address]. Pain [facility]   Periodic Controlled Substance Monitoring Possible medication side effects, risk of tolerance/dependence & alternative treatments discussed., No signs of potential drug abuse or diversion identified. , Assessed functional status., Obtaining appropriate analgesic effect of treatment. filed at 01/19/2023 0715            Opioid Risk: (Low risk score ? 55)  Opioid risk score: 27    Any opioid medication usage will be addressed by their licensed provider at a future visit.          Weight and Activity:  Physical Activity: Unknown    Days of Exercise per Week: 0 days    Minutes of Exercise per Session: Not on file     On average, how many days per week do you engage in moderate to strenuous exercise (like a brisk walk)?: 0 days  Have you lost any weight without trying in the past 3 months?: No  Body mass index: (!) 31.94    Obesity Interventions:  See AVS for additional education material                               Objective   Vitals:    03/08/23 0922   BP: (!) 122/56   Pulse: 56   Resp: 18   SpO2: 92%   Weight: 229 lb (103.9 kg)   Height: 5' 11\" (1.803 m)      Body mass index is 31.94 kg/m². Allergies   Allergen Reactions    Penicillins Hives    Avelox [Moxifloxacin Hcl In Nacl] Itching, Swelling and Rash     Prior to Visit Medications    Medication Sig Taking? Authorizing Provider   aspirin 81 MG EC tablet Take 81 mg by mouth in the morning and at bedtime Yes Historical Provider, MD   loratadine (CLARITIN) 10 MG capsule Take 10 mg by mouth daily Yes Historical Provider, MD   HYDROcodone-acetaminophen (NORCO) 5-325 MG per tablet Take 1 tablet by mouth 2 times daily for 30 days.  Max Daily Amount: 2 tablets Yes Nicki Florian APRN - [facility]   metoprolol tartrate (LOPRESSOR) 25 MG tablet TAKE 1 TABLET BY MOUTH TWICE A DAY Yes Jeromy Salvador DO   atorvastatin (LIPITOR) 40 MG tablet TAKE 1 TABLET BY MOUTH EVERY DAY AT NIGHT Yes Jeromy Salvador DO   levothyroxine (SYNTHROID) 112 MCG tablet TAKE 2 TABLETS BY MOUTH EVERY DAY Yes Jeromy Salvador DO   hydroCHLOROthiazide (HYDRODIURIL) 25 MG tablet TAKE 1 TABLET BY MOUTH TWICE A DAY Yes Jeromy Salvador DO   lisinopril (PRINIVIL;ZESTRIL) 20 MG tablet TAKE 1 TABLET BY MOUTH TWICE A DAY Yes Javad Salvador, DO   omeprazole (PRILOSEC) 40 MG delayed release capsule TAKE 1 CAPSULE BY MOUTH EVERY DAY IN THE MORNING BEFORE BREAKFAST Yes Jeromy Salvador DO   buPROPion (WELLBUTRIN SR) 150 MG extended release tablet TAKE 1 TABLET BY MOUTH EVERY DAY Yes Jeromy Salvador DO   folic acid (FOLVITE) 1 MG tablet Take 1 mg by mouth daily Yes Historical Provider, MD bacitracin-polymyxin b (POLYSPORIN) 500-41413 UNIT/GM ophthalmic ointment Place into the right eye 2 times daily Every 12 hours. Yes Historical Provider, MD   polyethylene glycol (MIRALAX) 17 GM/SCOOP powder Take 17 g by mouth daily as needed (CONSTIPATION) Yes Bin Garcia MD   methotrexate (RHEUMATREX) 2.5 MG chemo tablet TAKE 8 TABLETS BY MOUTH ONCE A WEEK. Yes Historical Provider, MD   magnesium oxide (MAG-OX) 400 MG tablet Take 400 mg by mouth daily Yes Historical Provider, MD   SENNA CO by Combination route Yes Historical Provider, MD   Ergocalciferol (VITAMIN D2 PO) Take 1.25 mg by mouth once a week Yes Historical Provider, MD   acetaminophen (TYLENOL) 650 MG extended release tablet Take 650 mg by mouth every 8 hours as needed for Pain Yes Historical Provider, MD   HYDROcodone-acetaminophen (NORCO)  MG per tablet Take 1 tablet by mouth daily as needed for Pain for up to 25 days. Max Daily Amount: 1 tablet  Emily Ochoa MD   blood glucose monitor kit and supplies 1 kit daily FSBS daily  MARQUES Gloria CNP   blood glucose monitor strips 1 strip by Other route daily  MARQUES Gloria CNP   ipratropium (ATROVENT) 0.06 % nasal spray 2 sprays by Each Nostril route 4 times daily  Patient not taking: Reported on 3/8/2023  Eileen Silva DO       [facility] (Including outside providers/suppliers regularly involved in providing care):   Patient Care Team:  Corinne Gar DO as PCP - [address] [place] [address]  as PCP - [facility] Provider  Anthony Mccartney MD as Consulting Physician (Orthopedic Surgery)  Iesha Henson MD as Consulting Physician (Cardiology)  Yokasta Moore MD (Orthopedic Surgery)  Kevin Garcia MD (Orthopedic Surgery)     Reviewed and updated this visit:  Meds  Med Hx  Surg Hx  Soc Hx  Fam Hx        I, Teresa Lu LPN, [birth year], performed the documented evaluation under the direct supervision of the attending physician.     This encounter was performed under my, Ashley Markham, direct supervision, 3/8/2023.       Kenny Jacques LPN

## 2023-04-19 ENCOUNTER — OFFICE VISIT (OUTPATIENT)
Dept: FAMILY MEDICINE CLINIC | Age: 83
End: 2023-04-19

## 2023-04-19 VITALS
BODY MASS INDEX: 32.47 KG/M2 | SYSTOLIC BLOOD PRESSURE: 130 MMHG | WEIGHT: 232.8 LBS | DIASTOLIC BLOOD PRESSURE: 62 MMHG | OXYGEN SATURATION: 92 % | HEART RATE: 70 BPM

## 2023-04-19 DIAGNOSIS — I25.118 CORONARY ARTERY DISEASE OF NATIVE ARTERY OF NATIVE HEART WITH STABLE ANGINA PECTORIS (HCC): ICD-10-CM

## 2023-04-19 DIAGNOSIS — D50.9 IRON DEFICIENCY ANEMIA, UNSPECIFIED IRON DEFICIENCY ANEMIA TYPE: ICD-10-CM

## 2023-04-19 DIAGNOSIS — I10 PRIMARY HYPERTENSION: ICD-10-CM

## 2023-04-19 DIAGNOSIS — E11.319 TYPE 2 DIABETES MELLITUS WITH LEFT EYE AFFECTED BY RETINOPATHY WITHOUT MACULAR EDEMA, WITHOUT LONG-TERM CURRENT USE OF INSULIN, UNSPECIFIED RETINOPATHY SEVERITY (HCC): Primary | ICD-10-CM

## 2023-04-19 DIAGNOSIS — Z87.19 HISTORY OF UPPER GASTROINTESTINAL BLEEDING: ICD-10-CM

## 2023-04-19 DIAGNOSIS — E03.9 ACQUIRED HYPOTHYROIDISM: ICD-10-CM

## 2023-04-19 LAB
BASOPHILS # BLD: 0.1 K/UL (ref 0–0.2)
BASOPHILS NFR BLD: 1.1 %
DEPRECATED RDW RBC AUTO: 17.5 % (ref 12.4–15.4)
EOSINOPHIL # BLD: 0.4 K/UL (ref 0–0.6)
EOSINOPHIL NFR BLD: 3.6 %
HCT VFR BLD AUTO: 33 % (ref 40.5–52.5)
HGB BLD-MCNC: 10.6 G/DL (ref 13.5–17.5)
LYMPHOCYTES # BLD: 1.1 K/UL (ref 1–5.1)
LYMPHOCYTES NFR BLD: 10.6 %
MCH RBC QN AUTO: 29.8 PG (ref 26–34)
MCHC RBC AUTO-ENTMCNC: 32.1 G/DL (ref 31–36)
MCV RBC AUTO: 92.6 FL (ref 80–100)
MONOCYTES # BLD: 0.8 K/UL (ref 0–1.3)
MONOCYTES NFR BLD: 8.5 %
NEUTROPHILS # BLD: 7.6 K/UL (ref 1.7–7.7)
NEUTROPHILS NFR BLD: 76.2 %
PLATELET # BLD AUTO: 416 K/UL (ref 135–450)
PMV BLD AUTO: 7.1 FL (ref 5–10.5)
RBC # BLD AUTO: 3.56 M/UL (ref 4.2–5.9)
WBC # BLD AUTO: 9.9 K/UL (ref 4–11)

## 2023-04-19 RX ORDER — ERGOCALCIFEROL 1.25 MG/1
CAPSULE ORAL
COMMUNITY
Start: 2023-04-14

## 2023-04-19 ASSESSMENT — PATIENT HEALTH QUESTIONNAIRE - PHQ9
10. IF YOU CHECKED OFF ANY PROBLEMS, HOW DIFFICULT HAVE THESE PROBLEMS MADE IT FOR YOU TO DO YOUR WORK, TAKE CARE OF THINGS AT HOME, OR GET ALONG WITH OTHER PEOPLE: 0
1. LITTLE INTEREST OR PLEASURE IN DOING THINGS: 0
7. TROUBLE CONCENTRATING ON THINGS, SUCH AS READING THE NEWSPAPER OR WATCHING TELEVISION: 0
8. MOVING OR SPEAKING SO SLOWLY THAT OTHER PEOPLE COULD HAVE NOTICED. OR THE OPPOSITE, BEING SO FIGETY OR RESTLESS THAT YOU HAVE BEEN MOVING AROUND A LOT MORE THAN USUAL: 0
SUM OF ALL RESPONSES TO PHQ QUESTIONS 1-9: 1
SUM OF ALL RESPONSES TO PHQ9 QUESTIONS 1 & 2: 0
2. FEELING DOWN, DEPRESSED OR HOPELESS: 0
SUM OF ALL RESPONSES TO PHQ QUESTIONS 1-9: 1
6. FEELING BAD ABOUT YOURSELF - OR THAT YOU ARE A FAILURE OR HAVE LET YOURSELF OR YOUR FAMILY DOWN: 0
DEPRESSION UNABLE TO ASSESS: FUNCTIONAL CAPACITY MOTIVATION LIMITS ACCURACY
SUM OF ALL RESPONSES TO PHQ QUESTIONS 1-9: 1
3. TROUBLE FALLING OR STAYING ASLEEP: 0
9. THOUGHTS THAT YOU WOULD BE BETTER OFF DEAD, OR OF HURTING YOURSELF: 0
SUM OF ALL RESPONSES TO PHQ QUESTIONS 1-9: 1
5. POOR APPETITE OR OVEREATING: 0
4. FEELING TIRED OR HAVING LITTLE ENERGY: 1

## 2023-04-19 ASSESSMENT — ENCOUNTER SYMPTOMS
SORE THROAT: 0
CONSTIPATION: 1
BACK PAIN: 1
COUGH: 0
SHORTNESS OF BREATH: 1
RHINORRHEA: 0
CHEST TIGHTNESS: 0
BLOOD IN STOOL: 0
ABDOMINAL PAIN: 0

## 2023-04-19 NOTE — PROGRESS NOTES
unspecified retinopathy severity ([facility]  Last 2 A1c's were within normal limits-continue medications and notify me of any persistent elevation of blood sugars-work on slow weight loss by reducing carbs.   Coronary artery disease of native artery of native heart with stable angina pectoris (HCC)  Continue medications and reduce baby aspirin to 1 daily  Primary hypertension  Continue medications and no added salt diet-limit caffeine and preferably no alcohol-notify me of any persistent elevation of blood pressure  Acquired hypothyroidism  Continue medications  History of upper gastrointestinal bleeding  Reduce aspirin as mentioned above and avoid over-the-counter anti-inflammatory medications  Iron deficiency anemia, unspecified iron deficiency anemia type  -     CBC with Auto Differential  Repeat CBC today and depending on the results we will go from there    Chart reviewed for medications labs colonoscopy consults    See me 6 months          Jeromy Salvador DO

## 2023-04-29 DIAGNOSIS — D63.8 ANEMIA OF CHRONIC DISEASE: Primary | ICD-10-CM

## 2023-05-01 ENCOUNTER — TELEPHONE (OUTPATIENT)
Dept: FAMILY MEDICINE CLINIC | Age: 83
End: 2023-05-01

## 2023-05-01 NOTE — TELEPHONE ENCOUNTER
Patients wife and patient called in stating Dr. Mario rBown called them Saturday about lab results.  Please advise as there is nothing documented

## 2023-05-02 DIAGNOSIS — D63.8 ANEMIA OF CHRONIC DISEASE: ICD-10-CM

## 2023-05-02 LAB
FERRITIN SERPL IA-MCNC: 56.6 NG/ML (ref 30–400)
HCT VFR BLD AUTO: 34.1 % (ref 40.5–52.5)
IMMATURE RETIC FRACT: 0.57 (ref 0.21–0.37)
IRON SERPL-MCNC: 71 UG/DL (ref 59–158)
RETICS # AUTO: 0.11 M/UL
RETICS/RBC NFR AUTO: 3.03 % (ref 0.5–2.18)
TIBC SERPL-MCNC: 293 UG/DL (ref 260–445)

## 2023-05-10 DIAGNOSIS — D50.9 IRON DEFICIENCY ANEMIA, UNSPECIFIED IRON DEFICIENCY ANEMIA TYPE: Primary | ICD-10-CM

## 2023-05-17 RX ORDER — LISINOPRIL 20 MG/1
TABLET ORAL
Qty: 180 TABLET | Refills: 1 | Status: SHIPPED | OUTPATIENT
Start: 2023-05-17

## 2023-05-17 RX ORDER — LEVOTHYROXINE SODIUM 112 UG/1
TABLET ORAL
Qty: 180 TABLET | Refills: 1 | Status: SHIPPED | OUTPATIENT
Start: 2023-05-17

## 2023-05-17 RX ORDER — ATORVASTATIN CALCIUM 40 MG/1
TABLET, FILM COATED ORAL
Qty: 90 TABLET | Refills: 1 | Status: SHIPPED | OUTPATIENT
Start: 2023-05-17

## 2023-05-17 RX ORDER — HYDROCHLOROTHIAZIDE 25 MG/1
TABLET ORAL
Qty: 180 TABLET | Refills: 1 | Status: SHIPPED | OUTPATIENT
Start: 2023-05-17

## 2023-05-25 ENCOUNTER — HOSPITAL ENCOUNTER (OUTPATIENT)
Age: 83
Setting detail: OUTPATIENT SURGERY
Discharge: HOME | End: 2023-05-25
Attending: PAIN MEDICINE | Admitting: PAIN MEDICINE
Payer: MEDICARE

## 2023-05-25 VITALS
OXYGEN SATURATION: 98 % | WEIGHT: 227 LBS | TEMPERATURE: 97.8 F | HEART RATE: 51 BPM | BODY MASS INDEX: 31.78 KG/M2 | SYSTOLIC BLOOD PRESSURE: 152 MMHG | RESPIRATION RATE: 16 BRPM | HEIGHT: 71 IN | DIASTOLIC BLOOD PRESSURE: 59 MMHG

## 2023-05-25 PROCEDURE — 7100000010 HC PHASE II RECOVERY - FIRST 15 MIN: Performed by: PAIN MEDICINE

## 2023-05-25 PROCEDURE — 3600000002 HC SURGERY LEVEL 2 BASE: Performed by: PAIN MEDICINE

## 2023-05-25 PROCEDURE — 6360000002 HC RX W HCPCS: Performed by: PAIN MEDICINE

## 2023-05-25 PROCEDURE — 20610 DRAIN/INJ JOINT/BURSA W/O US: CPT | Performed by: PAIN MEDICINE

## 2023-05-25 PROCEDURE — 2500000003 HC RX 250 WO HCPCS: Performed by: PAIN MEDICINE

## 2023-05-25 RX ORDER — BETAMETHASONE SODIUM PHOSPHATE AND BETAMETHASONE ACETATE 3; 3 MG/ML; MG/ML
INJECTION, SUSPENSION INTRA-ARTICULAR; INTRALESIONAL; INTRAMUSCULAR; SOFT TISSUE
Status: DISCONTINUED
Start: 2023-05-25 | End: 2023-05-25 | Stop reason: HOSPADM

## 2023-05-25 RX ORDER — LIDOCAINE HYDROCHLORIDE 10 MG/ML
INJECTION, SOLUTION INFILTRATION; PERINEURAL PRN
Status: DISCONTINUED | OUTPATIENT
Start: 2023-05-25 | End: 2023-05-25 | Stop reason: ALTCHOICE

## 2023-05-25 RX ORDER — CETIRIZINE HYDROCHLORIDE 10 MG/1
10 TABLET ORAL DAILY
COMMUNITY

## 2023-05-25 ASSESSMENT — PAIN - FUNCTIONAL ASSESSMENT
PAIN_FUNCTIONAL_ASSESSMENT: 0-10
PAIN_FUNCTIONAL_ASSESSMENT: PREVENTS OR INTERFERES WITH MANY ACTIVE NOT PASSIVE ACTIVITIES

## 2023-05-25 ASSESSMENT — PAIN DESCRIPTION - DESCRIPTORS: DESCRIPTORS: SHARP;OTHER (COMMENT)

## 2023-05-25 NOTE — DISCHARGE INSTRUCTIONS
[address] [facility]    [phone]    Post Pain Management Injection    PATIENT INSTRUCTIONS:     -Resume Normal Diet  -Other    ACTIVITY:    -No driving or operating machinery for 8 hours post procedure without sedation and 24 hours with sedation. If you are seen driving during this time the proper authorities will be notified.  -Do not stay alone for 4-6 hours after the procedure.  -If you have had IV sedation, do not sign legal documents, make any major decisions, or be involved in work decisions for the remainder of the day. -May shower or bathe.  -Resume normal activity when full movement/sensation has returned in extremities. 3)  SITE CARE:    -Observe puncture site for signs of infection (redness, warmth swelling, drainage with a foul odor, fever or increased tenderness). 4)  EXPECTED SIDE EFFECTS:    -Numbness/tingling/weakness in extremities, if this lasts more than 6 hours notify Dr. Phuong Henderson. -Muscle stiffness, soreness at puncture site (soreness may last 2-4 days). DIABETIC PATIENTS ONLY:    -Increased glucose levels in all diabetic patients who have received a steroid injection. -Monitor blood sugars frequently for the first 5 days following procedure.      -Adjust medication accordingly. 6)  TO REACH DR. Roshan Espitia: Call [phone]    ADDITIONAL INSTRUCTIONS:    Follow-up as scheduled or call for appointment if not already done. Patients taking Coumadin may resume taking as before the procedure.

## 2023-05-25 NOTE — PROGRESS NOTES
Discharge  instructions reviewed. Pt and family verbalize understanding with no further questions. VSS. Pt discharged via [address] to car. Assessment unchanged.

## 2023-05-25 NOTE — PROCEDURES
Kaya Dave is a 80 y.o. male patient. No diagnosis found. Past Medical History:   Diagnosis Date    BRYON (acute kidney injury) ([facility] 11/02/2017    CAD (coronary artery disease) 08/01/2015    Cancer ([facility]     PROSTATE    Chronic dCHF (grade 1 LVDD) 11/02/2017    Diverticulitis     Hepatitis     PT NOT SURE WHAT KIND, HE HAD YEARS AGO    Hyperlipidemia     Hypertension     Hypothyroidism     Lumbar radiculopathy  R AND L  05/11/2018    Lumbar stenosis with neurogenic claudication  severe L23 TO L5S1 , WORSE L45      Numbness and tingling     HANDS AND FEET    OA (osteoarthritis) of hip 07/21/2015    Obesity (BMI 30-39.9) 11/03/2017    Other disorders of kidney and ureter     Rheumatoid aortitis     Type 2 diabetes mellitus without complication, without long-term current use of insulin ([facility] 09/09/2016     Blood pressure (!) 146/67, pulse 61, temperature 97.7 °F (36.5 °C), temperature source Temporal, resp. rate 16, height 5' 11\" (1.803 m), weight 227 lb (103 kg), SpO2 100 %. Procedures    Sandi Good MD  5/25/2023    R Trochanteric bursa injection. DX: R HIP PAIN    Patient in L lateral position. Trochanteric bursa was identified with US and 2 cc of 1% lidocaine mixed with 80 mg of depomedrol and injected under sterile conditions with 22g 5 inch quinke needle under ultrasound. Pt tolerated procedure well.      ESTIMATED BLOOD LOSS:  Less than 1 cc

## 2023-06-30 DIAGNOSIS — D50.9 IRON DEFICIENCY ANEMIA, UNSPECIFIED IRON DEFICIENCY ANEMIA TYPE: ICD-10-CM

## 2023-06-30 LAB
BASOPHILS # BLD: 0.1 K/UL (ref 0–0.2)
BASOPHILS NFR BLD: 0.6 %
DEPRECATED RDW RBC AUTO: 17 % (ref 12.4–15.4)
EOSINOPHIL # BLD: 0.2 K/UL (ref 0–0.6)
EOSINOPHIL NFR BLD: 1.7 %
HCT VFR BLD AUTO: 37.7 % (ref 40.5–52.5)
HGB BLD-MCNC: 12.6 G/DL (ref 13.5–17.5)
LYMPHOCYTES # BLD: 1.5 K/UL (ref 1–5.1)
LYMPHOCYTES NFR BLD: 15.3 %
MCH RBC QN AUTO: 31 PG (ref 26–34)
MCHC RBC AUTO-ENTMCNC: 33.4 G/DL (ref 31–36)
MCV RBC AUTO: 92.7 FL (ref 80–100)
MONOCYTES # BLD: 0.8 K/UL (ref 0–1.3)
MONOCYTES NFR BLD: 7.6 %
NEUTROPHILS # BLD: 7.4 K/UL (ref 1.7–7.7)
NEUTROPHILS NFR BLD: 74.8 %
PLATELET # BLD AUTO: 450 K/UL (ref 135–450)
PMV BLD AUTO: 7.4 FL (ref 5–10.5)
RBC # BLD AUTO: 4.07 M/UL (ref 4.2–5.9)
WBC # BLD AUTO: 9.9 K/UL (ref 4–11)

## 2023-07-17 ENCOUNTER — OFFICE VISIT (OUTPATIENT)
Dept: FAMILY MEDICINE CLINIC | Age: 83
End: 2023-07-17

## 2023-07-17 ENCOUNTER — TELEPHONE (OUTPATIENT)
Dept: FAMILY MEDICINE CLINIC | Age: 83
End: 2023-07-17

## 2023-07-17 ENCOUNTER — HOSPITAL ENCOUNTER (OUTPATIENT)
Dept: GENERAL RADIOLOGY | Age: 83
Discharge: HOME | End: 2023-07-17
Payer: MEDICARE

## 2023-07-17 VITALS
OXYGEN SATURATION: 92 % | BODY MASS INDEX: 32.34 KG/M2 | WEIGHT: 231 LBS | TEMPERATURE: 98.5 F | SYSTOLIC BLOOD PRESSURE: 130 MMHG | HEART RATE: 62 BPM | DIASTOLIC BLOOD PRESSURE: 60 MMHG | HEIGHT: 71 IN

## 2023-07-17 DIAGNOSIS — J22 LOWER RESPIRATORY INFECTION: ICD-10-CM

## 2023-07-17 DIAGNOSIS — J22 LOWER RESPIRATORY INFECTION: Primary | ICD-10-CM

## 2023-07-17 DIAGNOSIS — J01.40 ACUTE NON-RECURRENT PANSINUSITIS: ICD-10-CM

## 2023-07-17 PROCEDURE — 71046 X-RAY EXAM CHEST 2 VIEWS: CPT

## 2023-07-17 RX ORDER — DOXYCYCLINE HYCLATE 100 MG
100 TABLET ORAL 2 TIMES DAILY
Qty: 20 TABLET | Refills: 0 | Status: SHIPPED | OUTPATIENT
Start: 2023-07-17 | End: 2023-07-27

## 2023-07-17 RX ORDER — FERROUS SULFATE 325(65) MG
325 TABLET ORAL
COMMUNITY

## 2023-07-17 RX ORDER — ALBUTEROL SULFATE 90 UG/1
2 AEROSOL, METERED RESPIRATORY (INHALATION) 4 TIMES DAILY PRN
Qty: 18 G | Refills: 5 | Status: SHIPPED | OUTPATIENT
Start: 2023-07-17

## 2023-07-17 RX ORDER — BENZONATATE 200 MG/1
200 CAPSULE ORAL 3 TIMES DAILY PRN
Qty: 21 CAPSULE | Refills: 0 | Status: SHIPPED | OUTPATIENT
Start: 2023-07-17 | End: 2023-07-24

## 2023-07-17 ASSESSMENT — ENCOUNTER SYMPTOMS
DIARRHEA: 0
NAUSEA: 0
COUGH: 1
SORE THROAT: 0
SINUS PAIN: 0
RHINORRHEA: 1
VOMITING: 0
SINUS PRESSURE: 0
SHORTNESS OF BREATH: 1

## 2023-07-17 NOTE — PROGRESS NOTES
past and does have LEIA   Did not do a home Covid test           Review of Systems   Constitutional:  Positive for fatigue. Negative for chills and fever. HENT:  Positive for postnasal drip and rhinorrhea. Negative for congestion, ear pain, sinus pressure, sinus pain, sneezing and sore throat. Respiratory:  Positive for cough and shortness of breath. Occasionally productive for \"thick gray\" sputum      Gastrointestinal:  Negative for diarrhea, nausea and vomiting. Musculoskeletal:  Negative for myalgias. Neurological:  Negative for headaches. Objective   Physical Exam  Vitals reviewed. HENT:      Head: Normocephalic. Right Ear: Tympanic membrane, ear canal and external ear normal.      Left Ear: Tympanic membrane, ear canal and external ear normal.      Nose: Rhinorrhea present. Rhinorrhea is purulent. Right Sinus: No maxillary sinus tenderness or frontal sinus tenderness. Left Sinus: No maxillary sinus tenderness or frontal sinus tenderness. Mouth/Throat:      Lips: Pink. Mouth: Mucous membranes are moist.      Pharynx: Oropharynx is clear. No pharyngeal swelling, oropharyngeal exudate or posterior oropharyngeal erythema. Eyes:      Pupils: Pupils are equal, round, and reactive to light. Cardiovascular:      Rate and Rhythm: Normal rate and regular rhythm. Pulmonary:      Effort: Pulmonary effort is normal.      Breath sounds: Examination of the left-lower field reveals decreased breath sounds. Decreased breath sounds present. No wheezing, rhonchi or rales. Lymphadenopathy:      Cervical: No cervical adenopathy. Skin:     General: Skin is warm and dry. Capillary Refill: Capillary refill takes less than 2 seconds. Neurological:      General: No focal deficit present. Mental Status: He is alert and oriented to person, place, and time.               This note was generated completely or in part utilizing Dragon dictation speech recognition

## 2023-07-17 NOTE — TELEPHONE ENCOUNTER
Message/Telephone call regarding - New or worsening symptoms    Patient called the office stating that he is has a dry cough that just won't go away. He is concern with his history of pneumonia and HTN. Patient's wife states that his breathing looks labored but the patient states that his breathing is fine. Spoke with: Patient  Patient call back number- [phone]    Confirm Pharmacy- [facility]    Symptoms reported - Dry cough x5 days   Is patient having pain? No   Location of the pain- No  Describe the pain (sharp, stabbing, aching, burning, dull, etc)-- none  Constant or intermittent- constant  Rate pain on a scale of 1 to 10: 0  Does is it radiate to other areas or just in one spot?- nonradiating    Symptom onset has been constant for a time period of 5 day(s). Severity is described as moderate. Course of his symptoms over time is worsening. Does anything make the symptom(s) better or worse?- no      Have you taken anything (prescriptions or OTC) to help with symptom(s)?- no   - If YES, did it help?- No    Call Outcome/Determination of next steps for patient- Appointment Scheduled with Yuly Mendenhall NP  as advised by provider    Last Appointment at [facility] - 4/19/2023  Next Appointment -        [id number] [facility] after scheduling appointment.     Future Appointments   Date Time Provider [address]  [facility]   7/17/2023  9:40 AM MARQUES Zuleta - [facility]   10/23/2023  9:00 AM DO YAMILET Mccabe  D[place]

## 2023-07-17 NOTE — PATIENT INSTRUCTIONS
Drink plenty of fluids   Get plenty of rest  Finish course of antibiotics   Take medications as prescribed   Go to nearest ER if develop shortness of breath, chest pain or significant worsening of symptoms   Steamy shower or humidifier   Warm liquids   8.  Notify office if symptoms worsen or do not improve

## 2023-07-18 LAB — SARS-COV-2 N GENE RESP QL NAA+PROBE: NOT DETECTED

## 2023-08-08 ENCOUNTER — OFFICE VISIT (OUTPATIENT)
Dept: FAMILY MEDICINE CLINIC | Age: 83
End: 2023-08-08

## 2023-08-08 VITALS
HEART RATE: 73 BPM | WEIGHT: 235 LBS | BODY MASS INDEX: 32.78 KG/M2 | SYSTOLIC BLOOD PRESSURE: 136 MMHG | OXYGEN SATURATION: 97 % | DIASTOLIC BLOOD PRESSURE: 70 MMHG

## 2023-08-08 DIAGNOSIS — J44.9 MODERATE COPD (CHRONIC OBSTRUCTIVE PULMONARY DISEASE) (HCC): ICD-10-CM

## 2023-08-08 DIAGNOSIS — J40 BRONCHITIS: Primary | ICD-10-CM

## 2023-08-08 RX ORDER — BUDESONIDE AND FORMOTEROL FUMARATE DIHYDRATE 160; 4.5 UG/1; UG/1
2 AEROSOL RESPIRATORY (INHALATION) 2 TIMES DAILY
Qty: 10.2 G | Refills: 5 | Status: SHIPPED | OUTPATIENT
Start: 2023-08-08

## 2023-08-08 ASSESSMENT — ENCOUNTER SYMPTOMS
COUGH: 1
SORE THROAT: 1

## 2023-10-23 ENCOUNTER — OFFICE VISIT (OUTPATIENT)
Dept: FAMILY MEDICINE CLINIC | Age: 83
End: 2023-10-23

## 2023-10-23 VITALS
SYSTOLIC BLOOD PRESSURE: 130 MMHG | OXYGEN SATURATION: 98 % | WEIGHT: 223 LBS | BODY MASS INDEX: 31.1 KG/M2 | DIASTOLIC BLOOD PRESSURE: 78 MMHG | HEART RATE: 74 BPM

## 2023-10-23 DIAGNOSIS — I25.118 CORONARY ARTERY DISEASE OF NATIVE ARTERY OF NATIVE HEART WITH STABLE ANGINA PECTORIS (HCC): ICD-10-CM

## 2023-10-23 DIAGNOSIS — I10 PRIMARY HYPERTENSION: ICD-10-CM

## 2023-10-23 DIAGNOSIS — D50.9 IRON DEFICIENCY ANEMIA, UNSPECIFIED IRON DEFICIENCY ANEMIA TYPE: ICD-10-CM

## 2023-10-23 DIAGNOSIS — J06.9 VIRAL URI: ICD-10-CM

## 2023-10-23 DIAGNOSIS — E03.9 ACQUIRED HYPOTHYROIDISM: ICD-10-CM

## 2023-10-23 DIAGNOSIS — E11.319 TYPE 2 DIABETES MELLITUS WITH LEFT EYE AFFECTED BY RETINOPATHY WITHOUT MACULAR EDEMA, WITHOUT LONG-TERM CURRENT USE OF INSULIN, UNSPECIFIED RETINOPATHY SEVERITY (HCC): Primary | ICD-10-CM

## 2023-10-23 LAB — HBA1C MFR BLD: 5.3 %

## 2023-10-23 RX ORDER — AZELASTINE 1 MG/ML
1 SPRAY, METERED NASAL 2 TIMES DAILY
Qty: 60 ML | Refills: 1 | Status: SHIPPED | OUTPATIENT
Start: 2023-10-23

## 2023-10-23 RX ORDER — TAMSULOSIN HYDROCHLORIDE 0.4 MG/1
0.4 CAPSULE ORAL DAILY
Qty: 30 CAPSULE | Refills: 0 | Status: SHIPPED | OUTPATIENT
Start: 2023-10-23

## 2023-10-23 ASSESSMENT — ENCOUNTER SYMPTOMS
BACK PAIN: 1
BLOOD IN STOOL: 0
COUGH: 0
SORE THROAT: 0
ABDOMINAL PAIN: 0
RHINORRHEA: 1
CONSTIPATION: 1
CHEST TIGHTNESS: 0
SHORTNESS OF BREATH: 0

## 2023-10-23 NOTE — PROGRESS NOTES
Subjective:      Patient ID: Anastasia Crespo is a 80 y.o. male. HPI  Patient in for 6-month checkup on several medical issues. Diabetes-blood sugars typically below 130 when he checks at home. Hypertension-blood pressure 140/80 or below when he checks at home or elsewhere. Coronary artery disease has not seen cardiologist in a while he thinks he remembers the cardiologist saying do not need to come back unless you need me. Hypothyroid-on medication and labs typically within normal limits. He had knee surgery about 8 months ago and apparently developed some anemia postop and he has been just slightly below normal and he has been on iron here for the last 3 to 4 months and his hemoglobin has improved from 10.6-12.6 and we will be checking today. Do not feel like he needs needs any other blood work today. He has complained of a 3-week history of yellow sinus, postnasal drip, runny nose and sneezing-he is currently taking Allegra. He also states that he sometimes has a little urinary leakage when he is on the way to the bathroom. Review of Systems    Review of Systems   Constitutional:  Positive for fatigue. Negative for unexpected weight change. HENT:  Positive for postnasal drip, rhinorrhea and sneezing. Negative for congestion and sore throat. Eyes:  Negative for visual disturbance. Respiratory:  Negative for cough, chest tightness and shortness of breath. Cardiovascular:  Negative for chest pain, palpitations and leg swelling. Gastrointestinal:  Positive for constipation. Negative for abdominal pain and blood in stool. Takes MiraLAX once or twice a week if he is gone a day or 2 without a bowel movement. Genitourinary:  Positive for frequency. Negative for dysuria and hematuria. No nocturia   Musculoskeletal:  Positive for arthralgias, back pain, myalgias and neck stiffness. Skin:  Negative for pallor and rash.    Neurological:  Negative for tremors, syncope and

## 2023-11-02 RX ORDER — HYDROCHLOROTHIAZIDE 25 MG/1
TABLET ORAL
Qty: 180 TABLET | Refills: 1 | Status: SHIPPED | OUTPATIENT
Start: 2023-11-02

## 2023-11-02 RX ORDER — LEVOTHYROXINE SODIUM 112 UG/1
TABLET ORAL
Qty: 180 TABLET | Refills: 1 | Status: SHIPPED | OUTPATIENT
Start: 2023-11-02

## 2023-11-02 RX ORDER — LISINOPRIL 20 MG/1
TABLET ORAL
Qty: 180 TABLET | Refills: 1 | Status: SHIPPED | OUTPATIENT
Start: 2023-11-02

## 2023-11-02 RX ORDER — ATORVASTATIN CALCIUM 40 MG/1
TABLET, FILM COATED ORAL
Qty: 90 TABLET | Refills: 1 | Status: SHIPPED | OUTPATIENT
Start: 2023-11-02

## 2023-11-02 NOTE — TELEPHONE ENCOUNTER
Refill Request     CONFIRM preferred pharmacy with the patient. If Mail Order Rx - Pend for 90 day refill. Last Seen: Last Seen Department: 10/23/2023    Last Seen by PCP: 10/23/2023    Last Written:     If no future appointment scheduled:  Review the last OV with PCP and review information for follow-up visit,  Route STAFF MESSAGE with patient name to the [facility] for scheduling with the following information:            -  Timing of next visit           -  Visit type ie Physical, OV, etc           -  Diagnoses/Reason ie. COPD, HTN - Do not use MEDICATION, Follow-up or CHECK UP - Give reason for visit      Next Appointment: 4/24/24  Future Appointments   Date Time Provider [address]  [facility]   4/24/2024  8:30 AM Heindl, Kyla Primrose, DO [place]  [place] - [place]       Message sent to [facility] to schedule appt with patient?   NO      Requested Prescriptions     Pending Prescriptions Disp Refills    atorvastatin (LIPITOR) 40 MG tablet [Pharmacy Med Name: ATORVASTATIN 40 MG TABLET] 90 tablet 1     Sig: TAKE 1 TABLET BY MOUTH EVERY DAY AT NIGHT    hydroCHLOROthiazide (HYDRODIURIL) 25 MG tablet [Pharmacy Med Name: HYDROCHLOROTHIAZIDE 25 MG TAB] 180 tablet 1     Sig: TAKE 1 TABLET BY MOUTH TWICE A DAY    levothyroxine (SYNTHROID) 112 MCG tablet [Pharmacy Med Name: LEVOTHYROXINE 112 MCG TABLET] 180 tablet 1     Sig: TAKE 2 TABLETS BY MOUTH EVERY DAY    lisinopril (PRINIVIL;ZESTRIL) 20 MG tablet [Pharmacy Med Name: LISINOPRIL 20 MG TABLET] 180 tablet 1     Sig: TAKE 1 TABLET BY MOUTH TWICE A DAY

## 2023-11-12 RX ORDER — TAMSULOSIN HYDROCHLORIDE 0.4 MG/1
0.4 CAPSULE ORAL DAILY
Qty: 90 CAPSULE | Refills: 3 | Status: SHIPPED | OUTPATIENT
Start: 2023-11-12

## 2023-11-12 NOTE — TELEPHONE ENCOUNTER
Refill Request     CONFIRM preferred pharmacy with the patient.    If Mail Order Rx - Pend for 90 day refill.      Last Seen: Last Seen Department: 10/23/2023  Last Seen by PCP: 10/23/2023    Last Written: 10/23/2023    If no future appointment scheduled:  Review the last OV with PCP and review information for follow-up visit,  Route STAFF MESSAGE with patient name to the  Pool for scheduling with the following information:            -  Timing of next visit           -  Visit type ie Physical, OV, etc           -  Diagnoses/Reason ie. COPD, HTN - Do not use MEDICATION, Follow-up or CHECK UP - Give reason for visit      Next Appointment:   Future Appointments   Date Time Provider Department Center   4/24/2024  8:30 AM Jeromy Salvador, DO YAMILET Mccoy - [facility]       Message sent to  to schedule appt with patient?  NO      Requested Prescriptions     Pending Prescriptions Disp Refills    tamsulosin (FLOMAX) 0.4 MG capsule 30 capsule 0     Sig: Take 1 capsule by mouth daily

## 2024-01-02 ENCOUNTER — OFFICE VISIT (OUTPATIENT)
Dept: FAMILY MEDICINE CLINIC | Age: 84
End: 2024-01-02
Payer: MEDICARE

## 2024-01-02 VITALS
BODY MASS INDEX: 31.1 KG/M2 | DIASTOLIC BLOOD PRESSURE: 76 MMHG | HEART RATE: 67 BPM | WEIGHT: 223 LBS | SYSTOLIC BLOOD PRESSURE: 134 MMHG | OXYGEN SATURATION: 94 %

## 2024-01-02 DIAGNOSIS — J44.1 COPD EXACERBATION (HCC): Primary | ICD-10-CM

## 2024-01-02 PROCEDURE — 3023F SPIROM DOC REV: CPT | Performed by: FAMILY MEDICINE

## 2024-01-02 PROCEDURE — G8484 FLU IMMUNIZE NO ADMIN: HCPCS | Performed by: FAMILY MEDICINE

## 2024-01-02 PROCEDURE — 3075F SYST BP GE 130 - 139MM HG: CPT | Performed by: FAMILY MEDICINE

## 2024-01-02 PROCEDURE — 1036F TOBACCO NON-USER: CPT | Performed by: FAMILY MEDICINE

## 2024-01-02 PROCEDURE — 1123F ACP DISCUSS/DSCN MKR DOCD: CPT | Performed by: FAMILY MEDICINE

## 2024-01-02 PROCEDURE — G8417 CALC BMI ABV UP PARAM F/U: HCPCS | Performed by: FAMILY MEDICINE

## 2024-01-02 PROCEDURE — 3078F DIAST BP <80 MM HG: CPT | Performed by: FAMILY MEDICINE

## 2024-01-02 PROCEDURE — 99213 OFFICE O/P EST LOW 20 MIN: CPT | Performed by: FAMILY MEDICINE

## 2024-01-02 PROCEDURE — G8427 DOCREV CUR MEDS BY ELIG CLIN: HCPCS | Performed by: FAMILY MEDICINE

## 2024-01-02 RX ORDER — DOXYCYCLINE HYCLATE 100 MG
100 TABLET ORAL 2 TIMES DAILY
Qty: 14 TABLET | Refills: 0 | Status: ON HOLD | OUTPATIENT
Start: 2024-01-02 | End: 2024-01-09

## 2024-01-02 ASSESSMENT — ANXIETY QUESTIONNAIRES
4. TROUBLE RELAXING: 0
5. BEING SO RESTLESS THAT IT IS HARD TO SIT STILL: 0
GAD7 TOTAL SCORE: 0
3. WORRYING TOO MUCH ABOUT DIFFERENT THINGS: 0
6. BECOMING EASILY ANNOYED OR IRRITABLE: 0
7. FEELING AFRAID AS IF SOMETHING AWFUL MIGHT HAPPEN: 0
2. NOT BEING ABLE TO STOP OR CONTROL WORRYING: 0
1. FEELING NERVOUS, ANXIOUS, OR ON EDGE: 0
IF YOU CHECKED OFF ANY PROBLEMS ON THIS QUESTIONNAIRE, HOW DIFFICULT HAVE THESE PROBLEMS MADE IT FOR YOU TO DO YOUR WORK, TAKE CARE OF THINGS AT HOME, OR GET ALONG WITH OTHER PEOPLE: NOT DIFFICULT AT ALL

## 2024-01-02 ASSESSMENT — PATIENT HEALTH QUESTIONNAIRE - PHQ9
5. POOR APPETITE OR OVEREATING: 0
6. FEELING BAD ABOUT YOURSELF - OR THAT YOU ARE A FAILURE OR HAVE LET YOURSELF OR YOUR FAMILY DOWN: 0
SUM OF ALL RESPONSES TO PHQ QUESTIONS 1-9: 1
3. TROUBLE FALLING OR STAYING ASLEEP: 1
8. MOVING OR SPEAKING SO SLOWLY THAT OTHER PEOPLE COULD HAVE NOTICED. OR THE OPPOSITE, BEING SO FIGETY OR RESTLESS THAT YOU HAVE BEEN MOVING AROUND A LOT MORE THAN USUAL: 0
7. TROUBLE CONCENTRATING ON THINGS, SUCH AS READING THE NEWSPAPER OR WATCHING TELEVISION: 0
SUM OF ALL RESPONSES TO PHQ QUESTIONS 1-9: 1
4. FEELING TIRED OR HAVING LITTLE ENERGY: 0
SUM OF ALL RESPONSES TO PHQ QUESTIONS 1-9: 1
1. LITTLE INTEREST OR PLEASURE IN DOING THINGS: 0
SUM OF ALL RESPONSES TO PHQ9 QUESTIONS 1 & 2: 0
9. THOUGHTS THAT YOU WOULD BE BETTER OFF DEAD, OR OF HURTING YOURSELF: 0
10. IF YOU CHECKED OFF ANY PROBLEMS, HOW DIFFICULT HAVE THESE PROBLEMS MADE IT FOR YOU TO DO YOUR WORK, TAKE CARE OF THINGS AT HOME, OR GET ALONG WITH OTHER PEOPLE: 0
SUM OF ALL RESPONSES TO PHQ QUESTIONS 1-9: 1
2. FEELING DOWN, DEPRESSED OR HOPELESS: 0

## 2024-01-02 ASSESSMENT — ENCOUNTER SYMPTOMS
WHEEZING: 0
COUGH: 1

## 2024-01-02 NOTE — PROGRESS NOTES
Art Gaffney is a 83 y.o. male    Chief Complaint   Patient presents with    Congestion     Since Friday night    Cough     Since Friday night     Fatigue    Poor appetite    Diarrhea       HPI:    Cough  This is a new problem. The current episode started in the past 7 days. The problem has been gradually worsening. The cough is Productive of sputum. Pertinent negatives include no fever or wheezing. The symptoms are aggravated by lying down. He has tried OTC cough suppressant for the symptoms. The treatment provided mild relief. His past medical history is significant for COPD.       ROS:    Review of Systems   Constitutional:  Negative for fever.   Respiratory:  Positive for cough. Negative for wheezing.        /76   Pulse 67   Wt 101.2 kg (223 lb)   SpO2 94%   BMI 31.10 kg/m²     Physical Exam:    Physical Exam  Constitutional:       General: He is not in acute distress.     Appearance: Normal appearance. He is well-developed. He is obese. He is not ill-appearing, toxic-appearing or diaphoretic.   HENT:      Head: Normocephalic.   Cardiovascular:      Rate and Rhythm: Normal rate and regular rhythm.      Pulses: Normal pulses.      Heart sounds: No murmur heard.  Pulmonary:      Effort: Pulmonary effort is normal. No respiratory distress.      Breath sounds: Normal breath sounds. No wheezing.   Musculoskeletal:      Cervical back: Normal range of motion. No rigidity.   Lymphadenopathy:      Cervical: No cervical adenopathy.   Neurological:      Mental Status: He is alert.   Psychiatric:         Mood and Affect: Mood normal.         Behavior: Behavior normal.         Thought Content: Thought content normal.         Current Outpatient Medications   Medication Sig Dispense Refill    doxycycline hyclate (VIBRA-TABS) 100 MG tablet Take 1 tablet by mouth 2 times daily for 7 days 14 tablet 0    tamsulosin (FLOMAX) 0.4 MG capsule Take 1 capsule by mouth daily 90 capsule 3    atorvastatin (LIPITOR) 40 MG

## 2024-01-06 ENCOUNTER — APPOINTMENT (OUTPATIENT)
Dept: CT IMAGING | Age: 84
End: 2024-01-06
Payer: MEDICARE

## 2024-01-06 ENCOUNTER — HOSPITAL ENCOUNTER (INPATIENT)
Age: 84
LOS: 7 days | Discharge: HOME | End: 2024-01-13
Attending: EMERGENCY MEDICINE | Admitting: STUDENT IN AN ORGANIZED HEALTH CARE EDUCATION/TRAINING PROGRAM
Payer: MEDICARE

## 2024-01-06 ENCOUNTER — APPOINTMENT (OUTPATIENT)
Dept: GENERAL RADIOLOGY | Age: 84
End: 2024-01-06
Payer: MEDICARE

## 2024-01-06 DIAGNOSIS — U07.1 PNEUMONIA DUE TO COVID-19 VIRUS: ICD-10-CM

## 2024-01-06 DIAGNOSIS — J96.01 ACUTE HYPOXEMIC RESPIRATORY FAILURE (HCC): Primary | ICD-10-CM

## 2024-01-06 DIAGNOSIS — J12.82 PNEUMONIA DUE TO COVID-19 VIRUS: ICD-10-CM

## 2024-01-06 LAB
ALBUMIN SERPL-MCNC: 3.6 G/DL (ref 3.4–5)
ALBUMIN/GLOB SERPL: 1.3 {RATIO} (ref 1.1–2.2)
ALP SERPL-CCNC: 76 U/L (ref 40–129)
ALT SERPL-CCNC: 23 U/L (ref 10–40)
ANION GAP SERPL CALCULATED.3IONS-SCNC: 13 MMOL/L (ref 3–16)
AST SERPL-CCNC: 35 U/L (ref 15–37)
BASE EXCESS BLDA CALC-SCNC: 0.9 MMOL/L (ref -3–3)
BASOPHILS # BLD: 0.1 K/UL (ref 0–0.2)
BASOPHILS NFR BLD: 0.5 %
BILIRUB SERPL-MCNC: 0.6 MG/DL (ref 0–1)
BILIRUB UR QL STRIP.AUTO: NEGATIVE
BUN SERPL-MCNC: 39 MG/DL (ref 7–20)
CALCIUM SERPL-MCNC: 8.8 MG/DL (ref 8.3–10.6)
CHLORIDE SERPL-SCNC: 95 MMOL/L (ref 99–110)
CLARITY UR: CLEAR
CO2 BLDA-SCNC: 23.8 MMOL/L
CO2 SERPL-SCNC: 24 MMOL/L (ref 21–32)
COHGB MFR BLDA: 0.3 % (ref 0–1.5)
COLOR UR: YELLOW
CREAT SERPL-MCNC: 1.4 MG/DL (ref 0.8–1.3)
D DIMER: 1.5 UG/ML FEU (ref 0–0.6)
DEPRECATED RDW RBC AUTO: 15.1 % (ref 12.4–15.4)
EOSINOPHIL # BLD: 0 K/UL (ref 0–0.6)
EOSINOPHIL NFR BLD: 0.5 %
FLUAV RNA RESP QL NAA+PROBE: NOT DETECTED
FLUBV RNA RESP QL NAA+PROBE: NOT DETECTED
GFR SERPLBLD CREATININE-BSD FMLA CKD-EPI: 50 ML/MIN/{1.73_M2}
GLUCOSE SERPL-MCNC: 130 MG/DL (ref 70–99)
GLUCOSE UR STRIP.AUTO-MCNC: NEGATIVE MG/DL
HCO3 BLDA-SCNC: 22.9 MMOL/L (ref 21–29)
HCT VFR BLD AUTO: 32 % (ref 40.5–52.5)
HGB BLD-MCNC: 11.1 G/DL (ref 13.5–17.5)
HGB BLDA-MCNC: 12.1 G/DL (ref 13.5–17.5)
HGB UR QL STRIP.AUTO: ABNORMAL
KETONES UR STRIP.AUTO-MCNC: NEGATIVE MG/DL
LACTATE BLDV-SCNC: 1.2 MMOL/L (ref 0.4–1.9)
LEUKOCYTE ESTERASE UR QL STRIP.AUTO: NEGATIVE
LYMPHOCYTES # BLD: 0.3 K/UL (ref 1–5.1)
LYMPHOCYTES NFR BLD: 3.1 %
MAGNESIUM SERPL-MCNC: 2 MG/DL (ref 1.8–2.4)
MCH RBC QN AUTO: 32.3 PG (ref 26–34)
MCHC RBC AUTO-ENTMCNC: 34.8 G/DL (ref 31–36)
MCV RBC AUTO: 93 FL (ref 80–100)
METHGB MFR BLDA: 0.8 %
MONOCYTES # BLD: 0.8 K/UL (ref 0–1.3)
MONOCYTES NFR BLD: 7.8 %
NEUTROPHILS # BLD: 8.8 K/UL (ref 1.7–7.7)
NEUTROPHILS NFR BLD: 88.1 %
NITRITE UR QL STRIP.AUTO: NEGATIVE
NT-PROBNP SERPL-MCNC: 899 PG/ML (ref 0–449)
O2 THERAPY: ABNORMAL
PCO2 BLDA: 28.6 MMHG (ref 35–45)
PH BLDA: 7.52 [PH] (ref 7.35–7.45)
PH UR STRIP.AUTO: 6 [PH] (ref 5–8)
PLATELET # BLD AUTO: 304 K/UL (ref 135–450)
PMV BLD AUTO: 7.2 FL (ref 5–10.5)
PO2 BLDA: 64.2 MMHG (ref 75–108)
POTASSIUM SERPL-SCNC: 3.2 MMOL/L (ref 3.5–5.1)
PROT SERPL-MCNC: 6.3 G/DL (ref 6.4–8.2)
PROT UR STRIP.AUTO-MCNC: 30 MG/DL
RBC # BLD AUTO: 3.44 M/UL (ref 4.2–5.9)
RBC #/AREA URNS HPF: NORMAL /HPF (ref 0–4)
SAO2 % BLDA: 93 %
SARS-COV-2 RNA RESP QL NAA+PROBE: DETECTED
SODIUM SERPL-SCNC: 132 MMOL/L (ref 136–145)
SP GR UR STRIP.AUTO: 1.01 (ref 1–1.03)
TROPONIN, HIGH SENSITIVITY: 32 NG/L (ref 0–22)
UA COMPLETE W REFLEX CULTURE PNL UR: ABNORMAL
UA DIPSTICK W REFLEX MICRO PNL UR: YES
URN SPEC COLLECT METH UR: ABNORMAL
UROBILINOGEN UR STRIP-ACNC: 0.2 E.U./DL
WBC # BLD AUTO: 10 K/UL (ref 4–11)
WBC #/AREA URNS HPF: NORMAL /HPF (ref 0–5)

## 2024-01-06 PROCEDURE — 84484 ASSAY OF TROPONIN QUANT: CPT

## 2024-01-06 PROCEDURE — 96374 THER/PROPH/DIAG INJ IV PUSH: CPT

## 2024-01-06 PROCEDURE — 99285 EMERGENCY DEPT VISIT HI MDM: CPT

## 2024-01-06 PROCEDURE — 70450 CT HEAD/BRAIN W/O DYE: CPT

## 2024-01-06 PROCEDURE — 6370000000 HC RX 637 (ALT 250 FOR IP): Performed by: EMERGENCY MEDICINE

## 2024-01-06 PROCEDURE — 85379 FIBRIN DEGRADATION QUANT: CPT

## 2024-01-06 PROCEDURE — 2580000003 HC RX 258: Performed by: EMERGENCY MEDICINE

## 2024-01-06 PROCEDURE — 82803 BLOOD GASES ANY COMBINATION: CPT

## 2024-01-06 PROCEDURE — 85025 COMPLETE CBC W/AUTO DIFF WBC: CPT

## 2024-01-06 PROCEDURE — 81001 URINALYSIS AUTO W/SCOPE: CPT

## 2024-01-06 PROCEDURE — 83880 ASSAY OF NATRIURETIC PEPTIDE: CPT

## 2024-01-06 PROCEDURE — 87636 SARSCOV2 & INF A&B AMP PRB: CPT

## 2024-01-06 PROCEDURE — 72125 CT NECK SPINE W/O DYE: CPT

## 2024-01-06 PROCEDURE — 80053 COMPREHEN METABOLIC PANEL: CPT

## 2024-01-06 PROCEDURE — 1200000000 HC SEMI PRIVATE

## 2024-01-06 PROCEDURE — 6360000002 HC RX W HCPCS: Performed by: EMERGENCY MEDICINE

## 2024-01-06 PROCEDURE — 93005 ELECTROCARDIOGRAM TRACING: CPT | Performed by: STUDENT IN AN ORGANIZED HEALTH CARE EDUCATION/TRAINING PROGRAM

## 2024-01-06 PROCEDURE — 83605 ASSAY OF LACTIC ACID: CPT

## 2024-01-06 PROCEDURE — 6360000004 HC RX CONTRAST MEDICATION: Performed by: EMERGENCY MEDICINE

## 2024-01-06 PROCEDURE — 71260 CT THORAX DX C+: CPT

## 2024-01-06 PROCEDURE — 83735 ASSAY OF MAGNESIUM: CPT

## 2024-01-06 PROCEDURE — 71045 X-RAY EXAM CHEST 1 VIEW: CPT

## 2024-01-06 RX ORDER — IPRATROPIUM BROMIDE AND ALBUTEROL SULFATE 2.5; .5 MG/3ML; MG/3ML
1 SOLUTION RESPIRATORY (INHALATION) ONCE
Status: DISCONTINUED | OUTPATIENT
Start: 2024-01-06 | End: 2024-01-07

## 2024-01-06 RX ORDER — DEXAMETHASONE SODIUM PHOSPHATE 4 MG/ML
10 INJECTION, SOLUTION INTRA-ARTICULAR; INTRALESIONAL; INTRAMUSCULAR; INTRAVENOUS; SOFT TISSUE ONCE
Status: COMPLETED | OUTPATIENT
Start: 2024-01-06 | End: 2024-01-06

## 2024-01-06 RX ORDER — HYDROXYCHLOROQUINE SULFATE 200 MG/1
200 TABLET, FILM COATED ORAL 2 TIMES DAILY
COMMUNITY
Start: 2023-11-12

## 2024-01-06 RX ORDER — POLYMYXIN B SULFATE AND TRIMETHOPRIM 1; 10000 MG/ML; [USP'U]/ML
1 SOLUTION OPHTHALMIC 3 TIMES DAILY
COMMUNITY
Start: 2023-11-20

## 2024-01-06 RX ORDER — 0.9 % SODIUM CHLORIDE 0.9 %
1000 INTRAVENOUS SOLUTION INTRAVENOUS ONCE
Status: COMPLETED | OUTPATIENT
Start: 2024-01-06 | End: 2024-01-06

## 2024-01-06 RX ORDER — BROMFENAC SODIUM 0.7 MG/ML
1 SOLUTION/ DROPS OPHTHALMIC DAILY
COMMUNITY
Start: 2023-11-20

## 2024-01-06 RX ORDER — LOTEPREDNOL ETABONATE 3.8 MG/G
1 GEL OPHTHALMIC 3 TIMES DAILY
COMMUNITY
Start: 2023-11-20

## 2024-01-06 RX ADMIN — POTASSIUM BICARBONATE 40 MEQ: 782 TABLET, EFFERVESCENT ORAL at 23:02

## 2024-01-06 RX ADMIN — IOPAMIDOL 75 ML: 755 INJECTION, SOLUTION INTRAVENOUS at 22:15

## 2024-01-06 RX ADMIN — SODIUM CHLORIDE 1000 ML: 9 INJECTION, SOLUTION INTRAVENOUS at 21:58

## 2024-01-06 RX ADMIN — DEXAMETHASONE SODIUM PHOSPHATE 10 MG: 4 INJECTION, SOLUTION INTRAMUSCULAR; INTRAVENOUS at 23:03

## 2024-01-06 ASSESSMENT — PAIN SCALES - GENERAL: PAINLEVEL_OUTOF10: 0

## 2024-01-06 ASSESSMENT — PAIN - FUNCTIONAL ASSESSMENT: PAIN_FUNCTIONAL_ASSESSMENT: 0-10

## 2024-01-07 LAB
ALBUMIN SERPL-MCNC: 3.5 G/DL (ref 3.4–5)
ALBUMIN/GLOB SERPL: 1.3 {RATIO} (ref 1.1–2.2)
ALP SERPL-CCNC: 73 U/L (ref 40–129)
ALT SERPL-CCNC: 26 U/L (ref 10–40)
ANION GAP SERPL CALCULATED.3IONS-SCNC: 13 MMOL/L (ref 3–16)
AST SERPL-CCNC: 42 U/L (ref 15–37)
BASOPHILS # BLD: 0 K/UL (ref 0–0.2)
BASOPHILS NFR BLD: 0.2 %
BILIRUB SERPL-MCNC: 0.4 MG/DL (ref 0–1)
BUN SERPL-MCNC: 35 MG/DL (ref 7–20)
CALCIUM SERPL-MCNC: 8.5 MG/DL (ref 8.3–10.6)
CHLORIDE SERPL-SCNC: 98 MMOL/L (ref 99–110)
CO2 SERPL-SCNC: 23 MMOL/L (ref 21–32)
CREAT SERPL-MCNC: 1.1 MG/DL (ref 0.8–1.3)
DEPRECATED RDW RBC AUTO: 14.9 % (ref 12.4–15.4)
EKG ATRIAL RATE: 79 BPM
EKG DIAGNOSIS: NORMAL
EKG P AXIS: -12 DEGREES
EKG P-R INTERVAL: 172 MS
EKG Q-T INTERVAL: 388 MS
EKG QRS DURATION: 100 MS
EKG QTC CALCULATION (BAZETT): 444 MS
EKG R AXIS: -25 DEGREES
EKG T AXIS: 28 DEGREES
EKG VENTRICULAR RATE: 79 BPM
EOSINOPHIL # BLD: 0 K/UL (ref 0–0.6)
EOSINOPHIL NFR BLD: 0 %
GFR SERPLBLD CREATININE-BSD FMLA CKD-EPI: >60 ML/MIN/{1.73_M2}
GLUCOSE BLD-MCNC: 156 MG/DL (ref 70–99)
GLUCOSE BLD-MCNC: 165 MG/DL (ref 70–99)
GLUCOSE BLD-MCNC: 187 MG/DL (ref 70–99)
GLUCOSE BLD-MCNC: 189 MG/DL (ref 70–99)
GLUCOSE SERPL-MCNC: 160 MG/DL (ref 70–99)
HCT VFR BLD AUTO: 32.7 % (ref 40.5–52.5)
HGB BLD-MCNC: 11.2 G/DL (ref 13.5–17.5)
LYMPHOCYTES # BLD: 0.4 K/UL (ref 1–5.1)
LYMPHOCYTES NFR BLD: 4.9 %
MCH RBC QN AUTO: 32.3 PG (ref 26–34)
MCHC RBC AUTO-ENTMCNC: 34.3 G/DL (ref 31–36)
MCV RBC AUTO: 94.3 FL (ref 80–100)
MONOCYTES # BLD: 0.4 K/UL (ref 0–1.3)
MONOCYTES NFR BLD: 4.5 %
NEUTROPHILS # BLD: 7.6 K/UL (ref 1.7–7.7)
NEUTROPHILS NFR BLD: 90.4 %
PERFORMED ON: ABNORMAL
PLATELET # BLD AUTO: 282 K/UL (ref 135–450)
PMV BLD AUTO: 7.6 FL (ref 5–10.5)
POTASSIUM SERPL-SCNC: 3.9 MMOL/L (ref 3.5–5.1)
PROCALCITONIN SERPL IA-MCNC: 0.82 NG/ML (ref 0–0.15)
PROT SERPL-MCNC: 6.2 G/DL (ref 6.4–8.2)
RBC # BLD AUTO: 3.47 M/UL (ref 4.2–5.9)
SODIUM SERPL-SCNC: 134 MMOL/L (ref 136–145)
TROPONIN, HIGH SENSITIVITY: 25 NG/L (ref 0–22)
WBC # BLD AUTO: 8.4 K/UL (ref 4–11)

## 2024-01-07 PROCEDURE — 94761 N-INVAS EAR/PLS OXIMETRY MLT: CPT

## 2024-01-07 PROCEDURE — 97166 OT EVAL MOD COMPLEX 45 MIN: CPT

## 2024-01-07 PROCEDURE — 1200000000 HC SEMI PRIVATE

## 2024-01-07 PROCEDURE — 94640 AIRWAY INHALATION TREATMENT: CPT

## 2024-01-07 PROCEDURE — 97530 THERAPEUTIC ACTIVITIES: CPT

## 2024-01-07 PROCEDURE — 85025 COMPLETE CBC W/AUTO DIFF WBC: CPT

## 2024-01-07 PROCEDURE — 2580000003 HC RX 258: Performed by: STUDENT IN AN ORGANIZED HEALTH CARE EDUCATION/TRAINING PROGRAM

## 2024-01-07 PROCEDURE — 2580000003 HC RX 258: Performed by: INTERNAL MEDICINE

## 2024-01-07 PROCEDURE — 84145 PROCALCITONIN (PCT): CPT

## 2024-01-07 PROCEDURE — 80053 COMPREHEN METABOLIC PANEL: CPT

## 2024-01-07 PROCEDURE — 93010 ELECTROCARDIOGRAM REPORT: CPT | Performed by: INTERNAL MEDICINE

## 2024-01-07 PROCEDURE — 97535 SELF CARE MNGMENT TRAINING: CPT

## 2024-01-07 PROCEDURE — 6370000000 HC RX 637 (ALT 250 FOR IP): Performed by: STUDENT IN AN ORGANIZED HEALTH CARE EDUCATION/TRAINING PROGRAM

## 2024-01-07 PROCEDURE — 6370000000 HC RX 637 (ALT 250 FOR IP): Performed by: INTERNAL MEDICINE

## 2024-01-07 PROCEDURE — 87449 NOS EACH ORGANISM AG IA: CPT

## 2024-01-07 PROCEDURE — 6360000002 HC RX W HCPCS: Performed by: STUDENT IN AN ORGANIZED HEALTH CARE EDUCATION/TRAINING PROGRAM

## 2024-01-07 PROCEDURE — 97161 PT EVAL LOW COMPLEX 20 MIN: CPT

## 2024-01-07 PROCEDURE — 83036 HEMOGLOBIN GLYCOSYLATED A1C: CPT

## 2024-01-07 PROCEDURE — 36415 COLL VENOUS BLD VENIPUNCTURE: CPT

## 2024-01-07 PROCEDURE — 2700000000 HC OXYGEN THERAPY PER DAY

## 2024-01-07 RX ORDER — ACETAMINOPHEN 650 MG/1
650 SUPPOSITORY RECTAL EVERY 6 HOURS PRN
Status: DISCONTINUED | OUTPATIENT
Start: 2024-01-07 | End: 2024-01-09 | Stop reason: SDUPTHER

## 2024-01-07 RX ORDER — ACETAMINOPHEN 325 MG/1
650 TABLET ORAL EVERY 6 HOURS PRN
Status: DISCONTINUED | OUTPATIENT
Start: 2024-01-07 | End: 2024-01-09 | Stop reason: SDUPTHER

## 2024-01-07 RX ORDER — INSULIN LISPRO 100 [IU]/ML
0-4 INJECTION, SOLUTION INTRAVENOUS; SUBCUTANEOUS NIGHTLY
Status: DISCONTINUED | OUTPATIENT
Start: 2024-01-07 | End: 2024-01-11

## 2024-01-07 RX ORDER — GUAIFENESIN/DEXTROMETHORPHAN 100-10MG/5
10 SYRUP ORAL 4 TIMES DAILY
Status: DISCONTINUED | OUTPATIENT
Start: 2024-01-07 | End: 2024-01-13 | Stop reason: HOSPADM

## 2024-01-07 RX ORDER — SODIUM CHLORIDE 9 MG/ML
INJECTION, SOLUTION INTRAVENOUS PRN
Status: DISCONTINUED | OUTPATIENT
Start: 2024-01-07 | End: 2024-01-13 | Stop reason: HOSPADM

## 2024-01-07 RX ORDER — LEVOTHYROXINE SODIUM 112 UG/1
224 TABLET ORAL DAILY
Status: DISCONTINUED | OUTPATIENT
Start: 2024-01-07 | End: 2024-01-13 | Stop reason: HOSPADM

## 2024-01-07 RX ORDER — ENOXAPARIN SODIUM 100 MG/ML
30 INJECTION SUBCUTANEOUS 2 TIMES DAILY
Status: DISCONTINUED | OUTPATIENT
Start: 2024-01-07 | End: 2024-01-13 | Stop reason: HOSPADM

## 2024-01-07 RX ORDER — SODIUM CHLORIDE 0.9 % (FLUSH) 0.9 %
5-40 SYRINGE (ML) INJECTION PRN
Status: DISCONTINUED | OUTPATIENT
Start: 2024-01-07 | End: 2024-01-13 | Stop reason: HOSPADM

## 2024-01-07 RX ORDER — ONDANSETRON 4 MG/1
4 TABLET, ORALLY DISINTEGRATING ORAL EVERY 8 HOURS PRN
Status: DISCONTINUED | OUTPATIENT
Start: 2024-01-07 | End: 2024-01-13 | Stop reason: HOSPADM

## 2024-01-07 RX ORDER — SODIUM CHLORIDE 0.9 % (FLUSH) 0.9 %
5-40 SYRINGE (ML) INJECTION EVERY 12 HOURS SCHEDULED
Status: DISCONTINUED | OUTPATIENT
Start: 2024-01-07 | End: 2024-01-13 | Stop reason: HOSPADM

## 2024-01-07 RX ORDER — INSULIN LISPRO 100 [IU]/ML
0-4 INJECTION, SOLUTION INTRAVENOUS; SUBCUTANEOUS
Status: DISCONTINUED | OUTPATIENT
Start: 2024-01-07 | End: 2024-01-11

## 2024-01-07 RX ORDER — FOLIC ACID 1 MG/1
1 TABLET ORAL DAILY
Status: DISCONTINUED | OUTPATIENT
Start: 2024-01-07 | End: 2024-01-13 | Stop reason: HOSPADM

## 2024-01-07 RX ORDER — GUAIFENESIN/DEXTROMETHORPHAN 100-10MG/5
5 SYRUP ORAL EVERY 4 HOURS PRN
Status: DISCONTINUED | OUTPATIENT
Start: 2024-01-07 | End: 2024-01-07

## 2024-01-07 RX ORDER — FERROUS SULFATE 325(65) MG
325 TABLET ORAL
Status: DISCONTINUED | OUTPATIENT
Start: 2024-01-07 | End: 2024-01-13 | Stop reason: HOSPADM

## 2024-01-07 RX ORDER — TAMSULOSIN HYDROCHLORIDE 0.4 MG/1
0.4 CAPSULE ORAL DAILY
Status: DISCONTINUED | OUTPATIENT
Start: 2024-01-07 | End: 2024-01-13 | Stop reason: HOSPADM

## 2024-01-07 RX ORDER — ATORVASTATIN CALCIUM 40 MG/1
40 TABLET, FILM COATED ORAL NIGHTLY
Status: DISCONTINUED | OUTPATIENT
Start: 2024-01-07 | End: 2024-01-13 | Stop reason: HOSPADM

## 2024-01-07 RX ORDER — SODIUM CHLORIDE, SODIUM LACTATE, POTASSIUM CHLORIDE, CALCIUM CHLORIDE 600; 310; 30; 20 MG/100ML; MG/100ML; MG/100ML; MG/100ML
INJECTION, SOLUTION INTRAVENOUS CONTINUOUS
Status: DISCONTINUED | OUTPATIENT
Start: 2024-01-07 | End: 2024-01-07

## 2024-01-07 RX ORDER — DEXTROSE MONOHYDRATE 100 MG/ML
INJECTION, SOLUTION INTRAVENOUS CONTINUOUS PRN
Status: DISCONTINUED | OUTPATIENT
Start: 2024-01-07 | End: 2024-01-13 | Stop reason: HOSPADM

## 2024-01-07 RX ORDER — DEXAMETHASONE 4 MG/1
6 TABLET ORAL DAILY
Status: DISCONTINUED | OUTPATIENT
Start: 2024-01-07 | End: 2024-01-09

## 2024-01-07 RX ORDER — ONDANSETRON 2 MG/ML
4 INJECTION INTRAMUSCULAR; INTRAVENOUS EVERY 6 HOURS PRN
Status: DISCONTINUED | OUTPATIENT
Start: 2024-01-07 | End: 2024-01-13 | Stop reason: HOSPADM

## 2024-01-07 RX ORDER — SODIUM CHLORIDE, SODIUM LACTATE, POTASSIUM CHLORIDE, CALCIUM CHLORIDE 600; 310; 30; 20 MG/100ML; MG/100ML; MG/100ML; MG/100ML
INJECTION, SOLUTION INTRAVENOUS CONTINUOUS
Status: DISCONTINUED | OUTPATIENT
Start: 2024-01-07 | End: 2024-01-08

## 2024-01-07 RX ORDER — ALBUTEROL SULFATE 90 UG/1
2 AEROSOL, METERED RESPIRATORY (INHALATION) EVERY 6 HOURS PRN
Status: DISCONTINUED | OUTPATIENT
Start: 2024-01-07 | End: 2024-01-13 | Stop reason: HOSPADM

## 2024-01-07 RX ADMIN — Medication 2 PUFF: at 19:42

## 2024-01-07 RX ADMIN — AZITHROMYCIN MONOHYDRATE 500 MG: 500 INJECTION, POWDER, LYOPHILIZED, FOR SOLUTION INTRAVENOUS at 05:14

## 2024-01-07 RX ADMIN — GUAIFENESIN AND DEXTROMETHORPHAN 10 ML: 100; 10 SYRUP ORAL at 20:36

## 2024-01-07 RX ADMIN — DEXAMETHASONE 6 MG: 4 TABLET ORAL at 10:46

## 2024-01-07 RX ADMIN — ENOXAPARIN SODIUM 30 MG: 100 INJECTION SUBCUTANEOUS at 20:35

## 2024-01-07 RX ADMIN — GUAIFENESIN AND DEXTROMETHORPHAN 10 ML: 100; 10 SYRUP ORAL at 15:58

## 2024-01-07 RX ADMIN — ENOXAPARIN SODIUM 30 MG: 100 INJECTION SUBCUTANEOUS at 10:47

## 2024-01-07 RX ADMIN — TAMSULOSIN HYDROCHLORIDE 0.4 MG: 0.4 CAPSULE ORAL at 10:46

## 2024-01-07 RX ADMIN — FOLIC ACID 1 MG: 1 TABLET ORAL at 10:47

## 2024-01-07 RX ADMIN — ACETAMINOPHEN 650 MG: 325 TABLET ORAL at 21:17

## 2024-01-07 RX ADMIN — ENOXAPARIN SODIUM 30 MG: 100 INJECTION SUBCUTANEOUS at 01:16

## 2024-01-07 RX ADMIN — GUAIFENESIN AND DEXTROMETHORPHAN 5 ML: 100; 10 SYRUP ORAL at 06:18

## 2024-01-07 RX ADMIN — SODIUM CHLORIDE, POTASSIUM CHLORIDE, SODIUM LACTATE AND CALCIUM CHLORIDE: 600; 310; 30; 20 INJECTION, SOLUTION INTRAVENOUS at 01:13

## 2024-01-07 RX ADMIN — SODIUM CHLORIDE, POTASSIUM CHLORIDE, SODIUM LACTATE AND CALCIUM CHLORIDE: 600; 310; 30; 20 INJECTION, SOLUTION INTRAVENOUS at 15:59

## 2024-01-07 RX ADMIN — FERROUS SULFATE TAB 325 MG (65 MG ELEMENTAL FE) 325 MG: 325 (65 FE) TAB at 10:47

## 2024-01-07 RX ADMIN — LEVOTHYROXINE SODIUM 224 MCG: 112 TABLET ORAL at 06:14

## 2024-01-07 RX ADMIN — GUAIFENESIN AND DEXTROMETHORPHAN 5 ML: 100; 10 SYRUP ORAL at 10:56

## 2024-01-07 RX ADMIN — ACETAMINOPHEN 650 MG: 325 TABLET ORAL at 10:57

## 2024-01-07 RX ADMIN — METOPROLOL TARTRATE 25 MG: 25 TABLET, FILM COATED ORAL at 10:46

## 2024-01-07 RX ADMIN — Medication 2 PUFF: at 10:41

## 2024-01-07 RX ADMIN — ATORVASTATIN CALCIUM 40 MG: 40 TABLET, FILM COATED ORAL at 20:34

## 2024-01-07 RX ADMIN — CEFTRIAXONE SODIUM 1000 MG: 1 INJECTION, POWDER, FOR SOLUTION INTRAMUSCULAR; INTRAVENOUS at 03:45

## 2024-01-07 ASSESSMENT — PAIN SCALES - GENERAL
PAINLEVEL_OUTOF10: 4
PAINLEVEL_OUTOF10: 5
PAINLEVEL_OUTOF10: 4

## 2024-01-07 ASSESSMENT — PAIN DESCRIPTION - LOCATION
LOCATION: EYE

## 2024-01-07 ASSESSMENT — PAIN - FUNCTIONAL ASSESSMENT: PAIN_FUNCTIONAL_ASSESSMENT: ACTIVITIES ARE NOT PREVENTED

## 2024-01-07 ASSESSMENT — PAIN DESCRIPTION - ORIENTATION
ORIENTATION: RIGHT
ORIENTATION: RIGHT

## 2024-01-07 ASSESSMENT — PAIN DESCRIPTION - PAIN TYPE: TYPE: ACUTE PAIN

## 2024-01-07 ASSESSMENT — PAIN DESCRIPTION - DESCRIPTORS
DESCRIPTORS: OTHER (COMMENT)
DESCRIPTORS: OTHER (COMMENT)

## 2024-01-07 NOTE — PROGRESS NOTES
4 Eyes Skin Assessment     NAME:  Art Gaffney  YOB: 1940  MEDICAL RECORD NUMBER:  [id number]    The patient is being assessed for  Admission    I agree that at least one RN has performed a thorough Head to Toe Skin Assessment on the patient. ALL assessment sites listed below have been assessed.      Areas assessed by both nurses:    Head, Face, Ears, Shoulders, Back, Chest, Arms, Elbows, Hands, Sacrum. Buttock, Coccyx, Ischium, and Legs. Feet and Heels        Does the Patient have a Wound? No noted wound(s)       Trae Prevention initiated by RN: Yes  Wound Care Orders initiated by RN: No    Pressure Injury (Stage 3,4, Unstageable, DTI, NWPT, and Complex wounds) if present, place Wound referral order by RN under : No    New Ostomies, if present place, Ostomy referral order under : No     Nurse 1 eSignature: Electronically signed by Adelina Acosta RN on 1/7/24 at 12:47 AM EST    **SHARE this note so that the co-signing nurse can place an eSignature**    Nurse 2 eSignature: Electronically signed by Miguelito Storey RN on 1/7/24 at 1:08 AM EST

## 2024-01-07 NOTE — PROGRESS NOTES
Physical Therapy  Facility/Department: [place] [address] - MED SURG/ORTHO  Physical Therapy Initial Assessment    Name: Art Gaffney  : 1940  MRN: [id number]  Date of Service: 2024    Discharge Recommendations:  24 hour supervision or assist   PT Equipment Recommendations  Equipment Needed: No  Other: per pt he has adequate DME at home.     Patient Diagnosis(es): The primary encounter diagnosis was Acute hypoxemic respiratory failure (HCC). A diagnosis of Pneumonia due to COVID-19 virus was also pertinent to this visit.  Past Medical History:  has a past medical history of BRYON (acute kidney injury) (HCC), CAD (coronary artery disease), Cancer (HCC), Chronic dCHF (grade 1 LVDD), Diverticulitis, Hepatitis, Hyperlipidemia, Hypertension, Hypothyroidism, Lumbar radiculopathy  R AND L , Lumbar stenosis with neurogenic claudication  severe L23 TO L5S1 , WORSE L45 , Numbness and tingling, OA (osteoarthritis) of hip, Obesity (BMI 30-39.9), Other disorders of kidney and ureter, Rheumatoid aortitis, and Type 2 diabetes mellitus without complication, without long-term current use of insulin (HCC).  Past Surgical History:  has a past surgical history that includes Colon surgery (2002); hernia repair (2005); Vasectomy; Prostate surgery (2008); Abdomen surgery (2012); other surgical history (2013); Elbow surgery (Right, 1987); shoulder surgery (Right, 1996); Colonoscopy (2015); Coronary angioplasty with stent (2015); other surgical history (Right); joint replacement (Right, 2016); knee surgery (Right, 2016); lumbar laminectomy (2018); pr tenotomy abductors&/extensor hip open spx (Left, 2018); Upper gastrointestinal endoscopy (Bilateral, 2020); Upper gastrointestinal endoscopy (N/A, 2020); Upper gastrointestinal endoscopy (2020); Pain management procedure (Bilateral, 2022); Pain management procedure (Bilateral, 10/20/2022);  Term Goals  Time Frame for Short Term Goals: 7 days (1/14) unless otherwise noted.  Short Term Goal 1: Pt will demonstrate IND with bed mobility.  Short Term Goal 2: Pt will demonstrate MOD IND with functional transfers with LRAD.  Short Term Goal 3: Pt will demonstrate SUP for ambulation up to 50' with LRAD.  Short Term Goal 4: Pt will ascend and descend 3 steps with LRAD and SBA  Short Term Goal 5: Pt will participate in 4 different BLE exercises of 12-15 reps each to improve strength and endurance by 1/12.  Patient Goals   Patient Goals : \"I want to feel better and go home.\"     Education  Patient Education  Education Given To: Patient  Education Provided: Role of Therapy;Plan of Care;Home Exercise Program;Transfer Training;Fall Prevention Strategies;Equipment  Education Method: Demonstration;Verbal  Barriers to Learning: None  Education Outcome: Verbalized understanding;Demonstrated understanding      Therapy Time   Individual Concurrent Group Co-treatment   Time In       0757   Time Out       0835   Minutes       38   Timed Code Treatment Minutes: 28 Minutes, 10 minutes for initial evaluation.       Rafal Bae, PT, DPT  If pt is unable to be seen after this session, please let this note serve as discharge summary.  Please see case management note for discharge disposition.  Thank you.

## 2024-01-07 NOTE — PROGRESS NOTES
Occupational Therapy  Facility/Department: [place] [address] - MED SURG/ORTHO  Occupational Therapy Initial Assessment    Name: Art Gaffney  : 1940  MRN: [id number]  Date of Service: 2024    Discharge Recommendations:  24 hour supervision or assist  OT Equipment Recommendations  Equipment Needed: No       Patient Diagnosis(es): The primary encounter diagnosis was Acute hypoxemic respiratory failure (HCC). A diagnosis of Pneumonia due to COVID-19 virus was also pertinent to this visit.  Past Medical History:  has a past medical history of BRYON (acute kidney injury) (HCC), CAD (coronary artery disease), Cancer (HCC), Chronic dCHF (grade 1 LVDD), Diverticulitis, Hepatitis, Hyperlipidemia, Hypertension, Hypothyroidism, Lumbar radiculopathy  R AND L , Lumbar stenosis with neurogenic claudication  severe L23 TO L5S1 , WORSE L45 , Numbness and tingling, OA (osteoarthritis) of hip, Obesity (BMI 30-39.9), Other disorders of kidney and ureter, Rheumatoid aortitis, and Type 2 diabetes mellitus without complication, without long-term current use of insulin (HCC).  Past Surgical History:  has a past surgical history that includes Colon surgery (2002); hernia repair (2005); Vasectomy; Prostate surgery (2008); Abdomen surgery (2012); other surgical history (2013); Elbow surgery (Right, 1987); shoulder surgery (Right, 1996); Colonoscopy (2015); Coronary angioplasty with stent (2015); other surgical history (Right); joint replacement (Right, 2016); knee surgery (Right, 2016); lumbar laminectomy (2018); pr tenotomy abductors&/extensor hip open spx (Left, 2018); Upper gastrointestinal endoscopy (Bilateral, 2020); Upper gastrointestinal endoscopy (N/A, 2020); Upper gastrointestinal endoscopy (2020); Pain management procedure (Bilateral, 2022); Pain management procedure (Bilateral, 10/20/2022); eye surgery (Left, 10/01/1985); Eye  5-Fu Pregnancy And Lactation Text: This medication is Pregnancy Category X and contraindicated in pregnancy and in women who may become pregnant. It is unknown if this medication is excreted in breast milk.

## 2024-01-07 NOTE — ED PROVIDER NOTES
[facility]  ED     EMERGENCY DEPARTMENT ENCOUNTER            Pt Name: Art Gaffney   MRN: [id number]   Birthdate 1940   Date of evaluation: 1/6/2024   Provider: Yoly Lopez MD   PCP: Jeromy Salvador DO   Note Started: 8:13 PM EST 1/6/24          CHIEF COMPLAINT     Chief Complaint   Patient presents with    Fall     Squad called for fall, unresponsive.  Pt found on all fours on the ground.  Eye surgery yesterday.  Pt denies CP but squad EKG shows possible STEMI              HISTORY OF PRESENT ILLNESS:           Art Gaffney is a 84 y.o. male who presents to the emergency room with a chief complaint of weakness in the legs R>L. Had knee replacement on right knee on Sunday and he has been like this ever since. Had cataract surgery yesterday. NO chest pain, he has been SOB. Has had a headache since his eye surgery yesterday. He has had a productive cough, no fever. He is not normally on oxygen. Tonight he was trying to move from the couch to the chair and he was too weak to make it so he ended up falling and could not get up. He denies any injury from the fall. He did not hit his head, he was able to protect his head as he was falling.    Nursing Notes were all reviewed and agreed with, or any disagreements were addressed in the HPI.     REVIEW OF SYSTEMS :    Positives and Pertinent negatives as per HPI.      MEDICAL HISTORY   has a past medical history of BRYON (acute kidney injury) (HCC) (11/02/2017), CAD (coronary artery disease) (08/01/2015), Cancer (HCC), Chronic dCHF (grade 1 LVDD) (11/02/2017), Diverticulitis, Hepatitis, Hyperlipidemia, Hypertension, Hypothyroidism, Lumbar radiculopathy  R AND L  (05/11/2018), Lumbar stenosis with neurogenic claudication  severe L23 TO L5S1 , WORSE L45 , Numbness and tingling, OA (osteoarthritis) of hip (07/21/2015), Obesity (BMI 30-39.9) (11/03/2017), Other disorders of kidney and ureter, Rheumatoid aortitis, and Type 2 diabetes mellitus

## 2024-01-07 NOTE — PROGRESS NOTES
In-Patient Progress Note    Patient:  Art Gaffney 84 y.o. male MRN: [id number]     Date of Service: 1/7/2024    Hospital Day: 2      Chief complaint: had concerns including Fall (Squad called for fall, unresponsive.  Pt found on all fours on the ground.  Eye surgery yesterday.  Pt denies CP but squad EKG shows possible STEMI ).      Assessment and Plan   Art Gaffney, a 84 y.o. male, was admitted on 1/6/2024 with complaints of had concerns including Fall (Squad called for fall, unresponsive.  Pt found on all fours on the ground.  Eye surgery yesterday.  Pt denies CP but squad EKG shows possible STEMI ).    Assessment and plan    Acute hypoxic respiratory failure secondary to COVID-19  Multilobar pneumonia  Acute encephalopathy  COPD exacerbation  Hypertension  Diabetes mellitus type 2  Hypothyroidism  CKD 3  History of rheumatoid arthritis  Right eye cataract status post recent surgical repair  - Patient presenting with confusion, shortness of breath, fatigue.  Noted to have new oxygen requirements.  COVID-19 positive, CT chest showing multilobar pneumonia, small pleural effusions  -  Get sputum culture, MRSA PCR, procalcitonin.  Placed on daily Decadron, empiric IV ceftriaxone and azithromycin added for possible superimposed bacterial pneumonia.  As needed albuterol nebulizer, guaifenesin, incentive spirometry, Continuous pulse oximetry, wean O2 nasal cannula tolerated.  -dextromethorphan-guaifenesin liquid for cough  - Hold home antihypertensives, gentle IV fluid hydration, encourage p.o. intake  - Low sliding scale insulin 3 times daily ACHS  - Continue rest of chronic home meds          Diet ADULT DIET; Regular; 4 carb choices (60 gm/meal)   DVT Prophylaxis [] Lovenox, []  Heparin, [] SCDs, [] Ambulation,  [] Eliquis, [] Xarelto  [] Coumadin   Peptic ulcer prophylaxis -   Code Status Full Code   Disposition From / Current living situation : home  Expected Disposition: home  Estimated Date of  136 - 145 mmol/L    Potassium reflex Magnesium 3.9 3.5 - 5.1 mmol/L    Chloride 98 (L) 99 - 110 mmol/L    CO2 23 21 - 32 mmol/L    Anion Gap 13 3 - 16    Glucose 160 (H) 70 - 99 mg/dL    BUN 35 (H) 7 - 20 mg/dL    Creatinine 1.1 0.8 - 1.3 mg/dL    Est, Glom Filt Rate >60 >60    Calcium 8.5 8.3 - 10.6 mg/dL    Total Protein 6.2 (L) 6.4 - 8.2 g/dL    Albumin 3.5 3.4 - 5.0 g/dL    Albumin/Globulin Ratio 1.3 1.1 - 2.2    Total Bilirubin 0.4 0.0 - 1.0 mg/dL    Alkaline Phosphatase 73 40 - 129 U/L    ALT 26 10 - 40 U/L    AST 42 (H) 15 - 37 U/L   Procalcitonin    Collection Time: 01/07/24  6:17 AM   Result Value Ref Range    Procalcitonin 0.82 (H) 0.00 - 0.15 ng/mL   POCT Glucose    Collection Time: 01/07/24  8:13 AM   Result Value Ref Range    POC Glucose 165 (H) 70 - 99 mg/dl    Performed on ACCU-CHEK    POCT Glucose    Collection Time: 01/07/24 11:54 AM   Result Value Ref Range    POC Glucose 156 (H) 70 - 99 mg/dl    Performed on ACCU-CHEK            Electronically signed by Janet Sales MD on 1/7/2024 at 3:30 PM      Comment: Please note this report has been produced using speech recognition software and may contain errors related to that system including errors in grammar, punctuation, and spelling, as well as words and phrases that may be inappropriate. If there are any questions or concerns please feel free to contact the dictating provider for clarification.

## 2024-01-07 NOTE — PROGRESS NOTES
Patient coughing alot and expelling yellow/green mucus. Wife at bedside said he was taking mucinex dm at home. currently he has been taking cough syrup while he is here, provider notified if want to also add mucinex for patient?

## 2024-01-07 NOTE — RT PROTOCOL NOTE
RT Inhaler-Nebulizer Bronchodilator Protocol Note    There is a bronchodilator order in the chart from a provider indicating to follow the RT Bronchodilator Protocol and there is an “Initiate RT Inhaler-Nebulizer Bronchodilator Protocol” order as well (see protocol at bottom of note).    CXR Findings:  XR CHEST PORTABLE    Result Date: 1/6/2024  Patchy opacities in the left lung base, atelectasis versus pneumonia. Correlate clinically.       The findings from the last RT Protocol Assessment were as follows:   History Pulmonary Disease: Chronic pulmonary disease  Respiratory Pattern: Regular pattern and RR 12-20 bpm  Breath Sounds: Clear breath sounds  Cough: Strong, spontaneous, non-productive  Indication for Bronchodilator Therapy: On home bronchodilators  Bronchodilator Assessment Score: 2    Aerosolized bronchodilator medication orders have been revised according to the RT Inhaler-Nebulizer Bronchodilator Protocol below.    Respiratory Therapist to perform RT Therapy Protocol Assessment initially then follow the protocol.  Repeat RT Therapy Protocol Assessment PRN for score 0-3 or on second treatment, BID, and PRN for scores above 3.    No Indications - adjust the frequency to every 6 hours PRN wheezing or bronchospasm, if no treatments needed after 48 hours then discontinue using Per Protocol order mode.     If indication present, adjust the RT bronchodilator orders based on the Bronchodilator Assessment Score as indicated below.  Use Inhaler orders unless patient has one or more of the following: on home nebulizer, not able to hold breath for 10 seconds, is not alert and oriented, cannot activate and use MDI correctly, or respiratory rate 25 breaths per minute or more, then use the equivalent nebulizer order(s) with same Frequency and PRN reasons based on the score.  If a patient is on this medication at home then do not decrease Frequency below that used at home.    0-3 - enter or revise RT bronchodilator

## 2024-01-07 NOTE — H&P
V2.0  History and Physical      Name:  Art Gaffney /Age/Sex: 1940  (84 y.o. male)   MRN & CSN:  [id number] & [id number] Encounter Date/Time: 2024 3:07 AM EST   Location:  [id number] PCP: Jeromy Salvador DO       Hospital Day: 2    Assessment and Plan:   Art Gaffney is a 84 y.o. male with pmh of COPD, rheumatoid arthritis, hypertension, diabetes mellitus, hypothyroidism, CKD 3 who presented to ED with complaints of confusion, fatigue, shortness of breath.  Hospital Problems             Last Modified POA    * (Principal) Acute COVID-19 2024 Yes       Acute hypoxic respiratory failure secondary to COVID-19  Multilobar pneumonia  Acute encephalopathy  COPD exacerbation  Hypertension  Diabetes mellitus type 2  Hypothyroidism  CKD 3  History of rheumatoid arthritis  Right eye cataract status post recent surgical repair  - Patient presenting with confusion, shortness of breath, fatigue.  Noted to have new oxygen requirements.  COVID-19 positive, CT chest showing multilobar pneumonia, small pleural effusions  -  Get sputum culture, MRSA PCR, procalcitonin.  Placed on daily Decadron, empiric IV ceftriaxone and azithromycin added for possible superimposed bacterial pneumonia.  As needed albuterol nebulizer, guaifenesin, incentive spirometry, Continuous pulse oximetry, wean O2 nasal cannula tolerated.  - Hold home antihypertensives, gentle IV fluid hydration, encourage p.o. intake  - Low sliding scale insulin 3 times daily ACHS  - Continue rest of chronic home meds        Disposition:   Current Living situation: Home  Expected Disposition: Home  Estimated D/C: 3 days    Diet ADULT DIET; Regular; 4 carb choices (60 gm/meal)   DVT Prophylaxis [x] Lovenox, []  Heparin, [] SCDs, [] Ambulation,  [] Eliquis, [] Xarelto, [] Coumadin   Code Status Full Code   Surrogate Decision Maker/ POA Wife     Personally reviewed Lab Studies and Imaging     Discussed management of the case with patient, wife, ED

## 2024-01-07 NOTE — PROGRESS NOTES
Patient admitted to room 526 from ED.  Patient oriented to room, call light, bed rails, phone, lights and bathroom.  Patient instructed about the schedule of the day including: vital sign frequency, lab draws, possible tests, frequency of MD and staff rounds, including RN/MD rounding together at bedside, daily weights, and I &O's.  Patient instructed about prescribed diet, how to use 8MENU, and television.   bed alarm in place, patient aware of placement and reason.   T Bed locked, in lowest position, side rails up 2/4, call light within reach.  Will continue to monitor.

## 2024-01-08 ENCOUNTER — APPOINTMENT (OUTPATIENT)
Dept: GENERAL RADIOLOGY | Age: 84
End: 2024-01-08
Payer: MEDICARE

## 2024-01-08 LAB
AMORPH SED URNS QL MICRO: ABNORMAL /HPF
BACTERIA URNS QL MICRO: ABNORMAL /HPF
BASE EXCESS BLDA CALC-SCNC: 0.8 MMOL/L (ref -3–3)
BILIRUB UR QL STRIP.AUTO: NEGATIVE
CLARITY UR: CLEAR
CO2 BLDA-SCNC: 23.7 MMOL/L
COARSE GRAN CASTS #/AREA URNS LPF: ABNORMAL /LPF (ref 0–2)
COHGB MFR BLDA: 0.3 % (ref 0–1.5)
COLOR UR: YELLOW
CRP SERPL-MCNC: 169.9 MG/L (ref 0–5.1)
D DIMER: 1.22 UG/ML FEU (ref 0–0.6)
ERYTHROCYTE [SEDIMENTATION RATE] IN BLOOD BY WESTERGREN METHOD: 32 MM/HR (ref 0–20)
EST. AVERAGE GLUCOSE BLD GHB EST-MCNC: 111.2 MG/DL
FERRITIN SERPL IA-MCNC: 1412 NG/ML (ref 30–400)
GLUCOSE BLD-MCNC: 132 MG/DL (ref 70–99)
GLUCOSE BLD-MCNC: 137 MG/DL (ref 70–99)
GLUCOSE BLD-MCNC: 142 MG/DL (ref 70–99)
GLUCOSE BLD-MCNC: 175 MG/DL (ref 70–99)
GLUCOSE UR STRIP.AUTO-MCNC: NEGATIVE MG/DL
HBA1C MFR BLD: 5.5 %
HCO3 BLDA-SCNC: 22.9 MMOL/L (ref 21–29)
HGB BLDA-MCNC: 11.6 G/DL (ref 13.5–17.5)
HGB UR QL STRIP.AUTO: ABNORMAL
KETONES UR STRIP.AUTO-MCNC: NEGATIVE MG/DL
LEGIONELLA AG UR QL: NORMAL
LEUKOCYTE ESTERASE UR QL STRIP.AUTO: NEGATIVE
METHGB MFR BLDA: 0.9 %
MUCOUS THREADS #/AREA URNS LPF: ABNORMAL /LPF
NITRITE UR QL STRIP.AUTO: NEGATIVE
O2 THERAPY: ABNORMAL
PCO2 BLDA: 28.7 MMHG (ref 35–45)
PERFORMED ON: ABNORMAL
PH BLDA: 7.52 [PH] (ref 7.35–7.45)
PH UR STRIP.AUTO: 6 [PH] (ref 5–8)
PO2 BLDA: 73 MMHG (ref 75–108)
PROCALCITONIN SERPL IA-MCNC: 0.69 NG/ML (ref 0–0.15)
PROT UR STRIP.AUTO-MCNC: 30 MG/DL
RBC #/AREA URNS HPF: ABNORMAL /HPF (ref 0–4)
S PNEUM AG UR QL: NORMAL
SAO2 % BLDA: 94.8 %
SP GR UR STRIP.AUTO: 1.02 (ref 1–1.03)
UA DIPSTICK W REFLEX MICRO PNL UR: YES
URN SPEC COLLECT METH UR: ABNORMAL
UROBILINOGEN UR STRIP-ACNC: 0.2 E.U./DL
WBC #/AREA URNS HPF: ABNORMAL /HPF (ref 0–5)

## 2024-01-08 PROCEDURE — 6370000000 HC RX 637 (ALT 250 FOR IP): Performed by: STUDENT IN AN ORGANIZED HEALTH CARE EDUCATION/TRAINING PROGRAM

## 2024-01-08 PROCEDURE — 1200000000 HC SEMI PRIVATE

## 2024-01-08 PROCEDURE — 85379 FIBRIN DEGRADATION QUANT: CPT

## 2024-01-08 PROCEDURE — 6360000002 HC RX W HCPCS: Performed by: INTERNAL MEDICINE

## 2024-01-08 PROCEDURE — 84145 PROCALCITONIN (PCT): CPT

## 2024-01-08 PROCEDURE — 6360000002 HC RX W HCPCS: Performed by: STUDENT IN AN ORGANIZED HEALTH CARE EDUCATION/TRAINING PROGRAM

## 2024-01-08 PROCEDURE — 6370000000 HC RX 637 (ALT 250 FOR IP): Performed by: INTERNAL MEDICINE

## 2024-01-08 PROCEDURE — 87081 CULTURE SCREEN ONLY: CPT

## 2024-01-08 PROCEDURE — 82728 ASSAY OF FERRITIN: CPT

## 2024-01-08 PROCEDURE — 94761 N-INVAS EAR/PLS OXIMETRY MLT: CPT

## 2024-01-08 PROCEDURE — 71045 X-RAY EXAM CHEST 1 VIEW: CPT

## 2024-01-08 PROCEDURE — 36600 WITHDRAWAL OF ARTERIAL BLOOD: CPT

## 2024-01-08 PROCEDURE — 82803 BLOOD GASES ANY COMBINATION: CPT

## 2024-01-08 PROCEDURE — XW0DXM6 INTRODUCTION OF BARICITINIB INTO MOUTH AND PHARYNX, EXTERNAL APPROACH, NEW TECHNOLOGY GROUP 6: ICD-10-PCS | Performed by: INTERNAL MEDICINE

## 2024-01-08 PROCEDURE — 87449 NOS EACH ORGANISM AG IA: CPT

## 2024-01-08 PROCEDURE — 2700000000 HC OXYGEN THERAPY PER DAY

## 2024-01-08 PROCEDURE — 2580000003 HC RX 258: Performed by: INTERNAL MEDICINE

## 2024-01-08 PROCEDURE — 85652 RBC SED RATE AUTOMATED: CPT

## 2024-01-08 PROCEDURE — 36415 COLL VENOUS BLD VENIPUNCTURE: CPT

## 2024-01-08 PROCEDURE — 86140 C-REACTIVE PROTEIN: CPT

## 2024-01-08 PROCEDURE — 81001 URINALYSIS AUTO W/SCOPE: CPT

## 2024-01-08 PROCEDURE — 87040 BLOOD CULTURE FOR BACTERIA: CPT

## 2024-01-08 PROCEDURE — 2580000003 HC RX 258: Performed by: STUDENT IN AN ORGANIZED HEALTH CARE EDUCATION/TRAINING PROGRAM

## 2024-01-08 RX ORDER — IPRATROPIUM BROMIDE AND ALBUTEROL SULFATE 2.5; .5 MG/3ML; MG/3ML
1 SOLUTION RESPIRATORY (INHALATION)
Status: DISCONTINUED | OUTPATIENT
Start: 2024-01-08 | End: 2024-01-08

## 2024-01-08 RX ORDER — ALBUTEROL SULFATE 2.5 MG/3ML
2.5 SOLUTION RESPIRATORY (INHALATION) EVERY 6 HOURS PRN
Status: DISCONTINUED | OUTPATIENT
Start: 2024-01-08 | End: 2024-01-08

## 2024-01-08 RX ADMIN — GUAIFENESIN AND DEXTROMETHORPHAN 10 ML: 100; 10 SYRUP ORAL at 09:50

## 2024-01-08 RX ADMIN — METOPROLOL TARTRATE 25 MG: 25 TABLET, FILM COATED ORAL at 09:51

## 2024-01-08 RX ADMIN — CEFTRIAXONE SODIUM 1000 MG: 1 INJECTION, POWDER, FOR SOLUTION INTRAMUSCULAR; INTRAVENOUS at 04:01

## 2024-01-08 RX ADMIN — ENOXAPARIN SODIUM 30 MG: 100 INJECTION SUBCUTANEOUS at 09:51

## 2024-01-08 RX ADMIN — ACETAMINOPHEN 650 MG: 325 TABLET ORAL at 10:08

## 2024-01-08 RX ADMIN — AZITHROMYCIN MONOHYDRATE 500 MG: 500 INJECTION, POWDER, LYOPHILIZED, FOR SOLUTION INTRAVENOUS at 06:01

## 2024-01-08 RX ADMIN — VANCOMYCIN HYDROCHLORIDE 750 MG: 750 INJECTION, POWDER, LYOPHILIZED, FOR SOLUTION INTRAVENOUS at 11:33

## 2024-01-08 RX ADMIN — ATORVASTATIN CALCIUM 40 MG: 40 TABLET, FILM COATED ORAL at 21:49

## 2024-01-08 RX ADMIN — LEVOTHYROXINE SODIUM 224 MCG: 112 TABLET ORAL at 05:53

## 2024-01-08 RX ADMIN — METOPROLOL TARTRATE 25 MG: 25 TABLET, FILM COATED ORAL at 21:49

## 2024-01-08 RX ADMIN — TAMSULOSIN HYDROCHLORIDE 0.4 MG: 0.4 CAPSULE ORAL at 09:50

## 2024-01-08 RX ADMIN — SODIUM CHLORIDE, PRESERVATIVE FREE 10 ML: 5 INJECTION INTRAVENOUS at 21:56

## 2024-01-08 RX ADMIN — GUAIFENESIN AND DEXTROMETHORPHAN 10 ML: 100; 10 SYRUP ORAL at 13:30

## 2024-01-08 RX ADMIN — BARICITINIB 4 MG: 2 TABLET, FILM COATED ORAL at 12:15

## 2024-01-08 RX ADMIN — CEFEPIME 2000 MG: 2 INJECTION, POWDER, FOR SOLUTION INTRAVENOUS at 13:33

## 2024-01-08 RX ADMIN — GUAIFENESIN AND DEXTROMETHORPHAN 10 ML: 100; 10 SYRUP ORAL at 17:16

## 2024-01-08 RX ADMIN — SODIUM CHLORIDE: 9 INJECTION, SOLUTION INTRAVENOUS at 11:32

## 2024-01-08 RX ADMIN — ENOXAPARIN SODIUM 30 MG: 100 INJECTION SUBCUTANEOUS at 21:48

## 2024-01-08 RX ADMIN — SODIUM CHLORIDE, PRESERVATIVE FREE 10 ML: 5 INJECTION INTRAVENOUS at 09:51

## 2024-01-08 RX ADMIN — GUAIFENESIN AND DEXTROMETHORPHAN 10 ML: 100; 10 SYRUP ORAL at 21:49

## 2024-01-08 RX ADMIN — CEFEPIME 2000 MG: 2 INJECTION, POWDER, FOR SOLUTION INTRAVENOUS at 22:22

## 2024-01-08 RX ADMIN — FOLIC ACID 1 MG: 1 TABLET ORAL at 09:50

## 2024-01-08 RX ADMIN — FERROUS SULFATE TAB 325 MG (65 MG ELEMENTAL FE) 325 MG: 325 (65 FE) TAB at 09:50

## 2024-01-08 RX ADMIN — DEXAMETHASONE 6 MG: 4 TABLET ORAL at 09:50

## 2024-01-08 NOTE — CONSULTS
Pharmacy Note  Vancomycin Consult    Art Gaffney is a 84 y.o. male started on Vancomycin for CAP; consult received from Dr. Sales to manage therapy. Also receiving the following antibiotics: Cefepime.    Allergies:  Penicillins and Avelox [moxifloxacin hcl in nacl]     Tmax: 101.9    Micro: n/a    Recent Labs     01/06/24 2110 01/07/24  0617   CREATININE 1.4* 1.1       Recent Labs     01/06/24 2110 01/07/24  0617   WBC 10.0 8.4       Estimated Creatinine Clearance: 61 mL/min (based on SCr of 1.1 mg/dL).    No intake or output data in the 24 hours ending 01/08/24 1032    Wt Readings from Last 1 Encounters:   01/07/24 101.2 kg (223 lb)         Body mass index is 31.1 kg/m².    Maintenance dose: 10-20 mg/kg (maximum: 2000 mg/dose and 4500 mg/day) starting at the next dosing interval determined by renal function  Goal Vancomycin AUC: 400-600     Assessment/Plan:  Will initiate Vancomycin 750 mg IV every 12 hours. Calculated Vancomycin AUC = 450 mg/L*h with an estimated steady-state vancomycin trough = 14.7 mcg/mL. Vancomycin level ordered for 1/9 1000. Timing of trough level will be determined based on culture results, renal function, and clinical response.     Thank you for the consult.  Renato Garduno, Cyrus 1/8/2024 10:36 AM    -----------------------------------------------------------------------------  1/9/2024 10:10 AM                                           Vancomycin Progress Note  Day: 2/5 Indication: PNA Other Antibiotics: cefepime     Recent Labs     01/09/24  0943   VANCORANDOM 7.3     Recent Labs     01/06/24 2110 01/07/24 0617 01/09/24  0305   CREATININE 1.4* 1.1 0.9     Recent Labs     01/06/24 2110 01/07/24 0617 01/09/24  0305   WBC 10.0 8.4 19.1*     Estimated Creatinine Clearance: 74 mL/min (based on SCr of 0.9 mg/dL).  Date Culture Results   1/8 Blood x2 pending   1/8 MRSA Nasal Probe pending   [facility] has been updated with administration times, serum creatinine levels,   and

## 2024-01-08 NOTE — CARE COORDINATION
Case Management Assessment  Initial Evaluation    Date/Time of Evaluation: 1/8/2024 4:00 PM  Assessment Completed by: Therese Yanez RN    If patient is discharged prior to next notation, then this note serves as note for discharge by case management.    Patient Name: Art Gaffney                   YOB: 1940  Diagnosis: Acute hypoxemic respiratory failure (HCC) [J96.01]  Acute COVID-19 [U07.1]  Pneumonia due to COVID-19 virus [U07.1, J12.82]                   Date / Time: 1/6/2024  7:48 PM    Patient Admission Status: Inpatient   Readmission Risk (Low < 19, Mod (19-27), High > 27): Readmission Risk Score: 15.4    Current PCP: Jeromy Salvador, DO  PCP verified by CM? Yes    Chart Reviewed: Yes      History Provided by: Significant Other  Patient Orientation: Alert and Oriented, Person, Place, Situation, Self    Patient Cognition: Alert    Hospitalization in the last 30 days (Readmission):  No    If yes, Readmission Assessment in CM Navigator will be completed.    Advance Directives:      Code Status: Full Code   Patient's Primary Decision Maker is: Named in Scanned ACP Document (spouse to bring in)    Primary Decision Maker: Mahi Gaffney - Spouse - [phone]    Secondary Decision Maker: Virgen Galeano - Child - [phone]    Discharge Planning:    Patient lives with: Spouse/Significant Other Type of Home: House  Primary Care Giver: Self  Patient Support Systems include: Spouse/Significant Other, Children   Current Financial resources: Medicare  Current community resources: None  Current services prior to admission: Durable Medical Equipment            Current DME: Cane, Walker            Type of Home Care services:  None    ADLS  Prior functional level: Independent in ADLs/IADLs  Current functional level: Assistance with the following:, Mobility    PT AM-PAC: 22 /24  OT AM-PAC: 18 /24    Family can provide assistance at DC: Yes  Would you like Case Management to discuss the discharge  plan with any other family members/significant others, and if so, who? Yes (spouse)  Plans to Return to Present Housing: Unknown at present  Other Identified Issues/Barriers to RETURNING to current housing: NONE at this time     Potential Assistance needed at discharge: Home Care            Potential DME:    Patient expects to discharge to: House  Plan for transportation at discharge: Family    Financial    Payor: MEDICARE / Plan: MEDICARE PART A AND B / Product Type: *No Product type* /     Does insurance require precert for SNF: No    Potential assistance Purchasing Medications: No  Meds-to-Beds request: Yes      [id number] - [place], [place] - [address] - P [phone] - F [phone]  [address] [place] [address] [address]  [facility] [address]  Phone: [phone] Fax: [phone]    [id number] [place], [place] - [address] [facility]  - P [phone] - F [phone]  [address] [place] [address]   [facility] [address]  Phone: [phone] Fax: [phone]      Notes:    Factors facilitating achievement of predicted outcomes: Family support, Motivated, Cooperative, Pleasant, Sense of humor, Good insight into deficits, and Has needed Durable Medical Equipment at home    Barriers to discharge: Medication managment    Additional Case Management Notes: pt IPTA in a 2 story home where he lives on 1st floor. Pt uses a cane or walker when needed and drives. Pt w/SNF and HHC experience in the past r/t surgeries.   Pt w/CAP requiring IV abx and Covid +. Will follow for needs as they arise. Electronically signed by Therese Yanez RN on 1/8/2024 at 4:04 PM      The Plan for Transition of Care is related to the following treatment goals of Acute hypoxemic respiratory failure (HCC) [J96.01]  Acute COVID-19 [U07.1]  Pneumonia due to COVID-19 virus [U07.1, J12.82]    IF APPLICABLE: The Patient and/or patient representative Art and his family were provided with a choice of provider and agrees with

## 2024-01-08 NOTE — PROGRESS NOTES
Pharmacy Note - Extended Infusion Beta-Lactam Adjustment    Cefepime 2000mg Q12h for treatment of Community acquired pneumonia. Per [facility] Extended Infusion Beta-Lactam Policy, cefepime will be changed to   2000mg Q8h extended infusion    Estimated Creatinine Clearance: Estimated Creatinine Clearance: 61 mL/min (based on SCr of 1.1 mg/dL).    BMI: Body mass index is 31.1 kg/m².    Please call with any questions.    Thank you,    Lili Kent, RPH

## 2024-01-08 NOTE — PROGRESS NOTES
In-Patient Progress Note    Patient:  Atr Gaffney 84 y.o. male MRN: [id number]     Date of Service: 1/8/2024    Hospital Day: 3      Chief complaint: had concerns including Fall (Squad called for fall, unresponsive.  Pt found on all fours on the ground.  Eye surgery yesterday.  Pt denies CP but squad EKG shows possible STEMI ).      Assessment and Plan   Art Gaffney, a 84 y.o. male, was admitted on 1/6/2024 with complaints of had concerns including Fall (Squad called for fall, unresponsive.  Pt found on all fours on the ground.  Eye surgery yesterday.  Pt denies CP but squad EKG shows possible STEMI ).    Assessment and plan    Acute hypoxic respiratory failure secondary to COVID-19  Multilobar pneumonia  COPD exacerbation  -presents with sob and fatigue; hypoxic  -dexamethasone 6 mg daily  -was started on ceftriaxone and azithromycin - febrile again on 1/8/2023 am - escalated antibiotic regimen to vanc and zosyn - resent infectious workup  -worsening oxygen requirement - start baricitinib  -inhalers PRN  -robitussin DM for cough  -follow infectious workup    Acute encephalopathy  -likely in the setting of COVID-19/pna  -monitor    Hypertension  - Hold home antihypertensives, gentle IV fluid hydration, encourage p.o. intake  - resume meds as able    Diabetes mellitus type 2  - Low sliding scale insulin 3 times daily ACHS  -POCT checks  - hypoglycemia treatment orders    Hypothyroidism  -resume levothyroxine    BRYON on CKD  -Cr 1.4 at presentation -improved to 1.1  -monitor renal function closely    History of rheumatoid arthritis  -monitor    Right eye cataract status post recent surgical repair  -PRN eye drops          Diet ADULT DIET; Regular; 4 carb choices (60 gm/meal)   DVT Prophylaxis [] Lovenox, []  Heparin, [] SCDs, [] Ambulation,  [] Eliquis, [] Xarelto  [] Coumadin   Peptic ulcer prophylaxis -   Code Status Full Code   Disposition From / Current living situation : home  Expected Disposition:  Quantitative    Collection Time: 01/08/24 10:50 AM   Result Value Ref Range    D-Dimer, Quant 1.22 (H) 0.00 - 0.60 ug/mL FEU   Ferritin    Collection Time: 01/08/24 10:50 AM   Result Value Ref Range    Ferritin 1,412.0 (H) 30.0 - 400.0 ng/mL   POCT Glucose    Collection Time: 01/08/24 12:32 PM   Result Value Ref Range    POC Glucose 175 (H) 70 - 99 mg/dl    Performed on ACCU-CHEK    Urinalysis with Microscopic    Collection Time: 01/08/24  3:24 PM   Result Value Ref Range    Color, UA Yellow Straw/Yellow    Clarity, UA Clear Clear    Glucose, Ur Negative Negative mg/dL    Bilirubin Urine Negative Negative    Ketones, Urine Negative Negative mg/dL    Specific Gravity, UA 1.020 1.005 - 1.030    Blood, Urine MODERATE (A) Negative    pH, UA 6.0 5.0 - 8.0    Protein, UA 30 (A) Negative mg/dL    Urobilinogen, Urine 0.2 <2.0 E.U./dL    Nitrite, Urine Negative Negative    Leukocyte Esterase, Urine Negative Negative    Microscopic Examination YES     Urine Type NotGiven     Coarse Casts, UA 0-2 0 - 2 /LPF    Mucus, UA Rare (A) None Seen /LPF    WBC, UA 0-2 0 - 5 /HPF    RBC, UA 3-4 0 - 4 /HPF    Bacteria, UA 1+ (A) None Seen /HPF    Amorphous, UA Rare /HPF   POCT Glucose    Collection Time: 01/08/24  5:10 PM   Result Value Ref Range    POC Glucose 142 (H) 70 - 99 mg/dl    Performed on ACCU-CHEK            Electronically signed by Janet Sales MD on 1/8/2024 at 5:48 PM      Comment: Please note this report has been produced using speech recognition software and may contain errors related to that system including errors in grammar, punctuation, and spelling, as well as words and phrases that may be inappropriate. If there are any questions or concerns please feel free to contact the dictating provider for clarification.

## 2024-01-09 LAB
ALBUMIN SERPL-MCNC: 3.3 G/DL (ref 3.4–5)
ALP SERPL-CCNC: 78 U/L (ref 40–129)
ALT SERPL-CCNC: 40 U/L (ref 10–40)
ANION GAP SERPL CALCULATED.3IONS-SCNC: 12 MMOL/L (ref 3–16)
AST SERPL-CCNC: 47 U/L (ref 15–37)
BASE EXCESS BLDV CALC-SCNC: 2 MMOL/L (ref -3–3)
BASOPHILS # BLD: 0 K/UL (ref 0–0.2)
BASOPHILS NFR BLD: 0 %
BILIRUB DIRECT SERPL-MCNC: <0.2 MG/DL (ref 0–0.3)
BILIRUB INDIRECT SERPL-MCNC: ABNORMAL MG/DL (ref 0–1)
BILIRUB SERPL-MCNC: 0.5 MG/DL (ref 0–1)
BUN SERPL-MCNC: 29 MG/DL (ref 7–20)
CALCIUM SERPL-MCNC: 8.8 MG/DL (ref 8.3–10.6)
CHLORIDE SERPL-SCNC: 99 MMOL/L (ref 99–110)
CO2 BLDV-SCNC: 26 MMOL/L
CO2 SERPL-SCNC: 25 MMOL/L (ref 21–32)
COHGB MFR BLDV: 1.2 % (ref 0–1.5)
CREAT SERPL-MCNC: 0.9 MG/DL (ref 0.8–1.3)
DEPRECATED RDW RBC AUTO: 15.3 % (ref 12.4–15.4)
EOSINOPHIL # BLD: 0 K/UL (ref 0–0.6)
EOSINOPHIL NFR BLD: 0 %
GFR SERPLBLD CREATININE-BSD FMLA CKD-EPI: >60 ML/MIN/{1.73_M2}
GLUCOSE BLD-MCNC: 116 MG/DL (ref 70–99)
GLUCOSE BLD-MCNC: 149 MG/DL (ref 70–99)
GLUCOSE BLD-MCNC: 226 MG/DL (ref 70–99)
GLUCOSE BLD-MCNC: 399 MG/DL (ref 70–99)
GLUCOSE SERPL-MCNC: 103 MG/DL (ref 70–99)
HCO3 BLDV-SCNC: 25.2 MMOL/L (ref 23–29)
HCT VFR BLD AUTO: 34.7 % (ref 40.5–52.5)
HGB BLD-MCNC: 11.5 G/DL (ref 13.5–17.5)
LEGIONELLA AG UR QL: NORMAL
LYMPHOCYTES # BLD: 0.4 K/UL (ref 1–5.1)
LYMPHOCYTES NFR BLD: 2.2 %
MAGNESIUM SERPL-MCNC: 1.9 MG/DL (ref 1.8–2.4)
MCH RBC QN AUTO: 31.4 PG (ref 26–34)
MCHC RBC AUTO-ENTMCNC: 33.2 G/DL (ref 31–36)
MCV RBC AUTO: 94.4 FL (ref 80–100)
METHGB MFR BLDV: 0.7 %
MONOCYTES # BLD: 1.3 K/UL (ref 0–1.3)
MONOCYTES NFR BLD: 6.9 %
NEUTROPHILS # BLD: 17.4 K/UL (ref 1.7–7.7)
NEUTROPHILS NFR BLD: 90.9 %
O2 THERAPY: ABNORMAL
PCO2 BLDV: 34.8 MMHG (ref 40–50)
PERFORMED ON: ABNORMAL
PH BLDV: 7.48 [PH] (ref 7.35–7.45)
PHOSPHATE SERPL-MCNC: 2.3 MG/DL (ref 2.5–4.9)
PLATELET # BLD AUTO: 471 K/UL (ref 135–450)
PMV BLD AUTO: 7 FL (ref 5–10.5)
PO2 BLDV: 32.9 MMHG (ref 25–40)
POTASSIUM SERPL-SCNC: 3.9 MMOL/L (ref 3.5–5.1)
PROT SERPL-MCNC: 6 G/DL (ref 6.4–8.2)
RBC # BLD AUTO: 3.67 M/UL (ref 4.2–5.9)
S PNEUM AG UR QL: NORMAL
SAO2 % BLDV: 64 %
SODIUM SERPL-SCNC: 136 MMOL/L (ref 136–145)
VANCOMYCIN SERPL-MCNC: 7.3 UG/ML
WBC # BLD AUTO: 19.1 K/UL (ref 4–11)

## 2024-01-09 PROCEDURE — 2060000000 HC ICU INTERMEDIATE R&B

## 2024-01-09 PROCEDURE — 80076 HEPATIC FUNCTION PANEL: CPT

## 2024-01-09 PROCEDURE — 94640 AIRWAY INHALATION TREATMENT: CPT

## 2024-01-09 PROCEDURE — 2700000000 HC OXYGEN THERAPY PER DAY

## 2024-01-09 PROCEDURE — 6360000002 HC RX W HCPCS: Performed by: STUDENT IN AN ORGANIZED HEALTH CARE EDUCATION/TRAINING PROGRAM

## 2024-01-09 PROCEDURE — 83735 ASSAY OF MAGNESIUM: CPT

## 2024-01-09 PROCEDURE — 80048 BASIC METABOLIC PNL TOTAL CA: CPT

## 2024-01-09 PROCEDURE — 2580000003 HC RX 258: Performed by: STUDENT IN AN ORGANIZED HEALTH CARE EDUCATION/TRAINING PROGRAM

## 2024-01-09 PROCEDURE — 2580000003 HC RX 258: Performed by: INTERNAL MEDICINE

## 2024-01-09 PROCEDURE — 6360000002 HC RX W HCPCS: Performed by: INTERNAL MEDICINE

## 2024-01-09 PROCEDURE — 6370000000 HC RX 637 (ALT 250 FOR IP): Performed by: STUDENT IN AN ORGANIZED HEALTH CARE EDUCATION/TRAINING PROGRAM

## 2024-01-09 PROCEDURE — 80202 ASSAY OF VANCOMYCIN: CPT

## 2024-01-09 PROCEDURE — 36415 COLL VENOUS BLD VENIPUNCTURE: CPT

## 2024-01-09 PROCEDURE — 6370000000 HC RX 637 (ALT 250 FOR IP): Performed by: INTERNAL MEDICINE

## 2024-01-09 PROCEDURE — 82803 BLOOD GASES ANY COMBINATION: CPT

## 2024-01-09 PROCEDURE — 94761 N-INVAS EAR/PLS OXIMETRY MLT: CPT

## 2024-01-09 PROCEDURE — 6360000002 HC RX W HCPCS: Performed by: NURSE PRACTITIONER

## 2024-01-09 PROCEDURE — 84100 ASSAY OF PHOSPHORUS: CPT

## 2024-01-09 PROCEDURE — 85025 COMPLETE CBC W/AUTO DIFF WBC: CPT

## 2024-01-09 RX ORDER — MORPHINE SULFATE 2 MG/ML
1 INJECTION, SOLUTION INTRAMUSCULAR; INTRAVENOUS ONCE
Status: COMPLETED | OUTPATIENT
Start: 2024-01-09 | End: 2024-01-09

## 2024-01-09 RX ORDER — DEXAMETHASONE SODIUM PHOSPHATE 10 MG/ML
10 INJECTION INTRAMUSCULAR; INTRAVENOUS EVERY 24 HOURS
Status: DISCONTINUED | OUTPATIENT
Start: 2024-01-14 | End: 2024-01-09

## 2024-01-09 RX ORDER — DEXAMETHASONE SODIUM PHOSPHATE 10 MG/ML
10 INJECTION INTRAMUSCULAR; INTRAVENOUS EVERY 24 HOURS
Status: DISCONTINUED | OUTPATIENT
Start: 2024-01-14 | End: 2024-01-13 | Stop reason: HOSPADM

## 2024-01-09 RX ORDER — ACETAMINOPHEN 650 MG/1
650 SUPPOSITORY RECTAL EVERY 6 HOURS PRN
Status: DISCONTINUED | OUTPATIENT
Start: 2024-01-09 | End: 2024-01-13 | Stop reason: HOSPADM

## 2024-01-09 RX ORDER — ACETAMINOPHEN 325 MG/1
650 TABLET ORAL EVERY 6 HOURS PRN
Status: DISCONTINUED | OUTPATIENT
Start: 2024-01-09 | End: 2024-01-13 | Stop reason: HOSPADM

## 2024-01-09 RX ORDER — ALBUTEROL SULFATE 90 UG/1
2 AEROSOL, METERED RESPIRATORY (INHALATION)
Status: DISCONTINUED | OUTPATIENT
Start: 2024-01-09 | End: 2024-01-13 | Stop reason: HOSPADM

## 2024-01-09 RX ADMIN — MORPHINE SULFATE 1 MG: 2 INJECTION, SOLUTION INTRAMUSCULAR; INTRAVENOUS at 03:33

## 2024-01-09 RX ADMIN — METOPROLOL TARTRATE 25 MG: 25 TABLET, FILM COATED ORAL at 10:11

## 2024-01-09 RX ADMIN — Medication 2 PUFF: at 21:22

## 2024-01-09 RX ADMIN — CEFEPIME 2000 MG: 2 INJECTION, POWDER, FOR SOLUTION INTRAVENOUS at 05:34

## 2024-01-09 RX ADMIN — GUAIFENESIN AND DEXTROMETHORPHAN 10 ML: 100; 10 SYRUP ORAL at 14:05

## 2024-01-09 RX ADMIN — SODIUM CHLORIDE 40 ML: 9 INJECTION, SOLUTION INTRAVENOUS at 21:06

## 2024-01-09 RX ADMIN — INSULIN LISPRO 4 UNITS: 100 INJECTION, SOLUTION INTRAVENOUS; SUBCUTANEOUS at 21:05

## 2024-01-09 RX ADMIN — CEFEPIME 2000 MG: 2 INJECTION, POWDER, FOR SOLUTION INTRAVENOUS at 21:07

## 2024-01-09 RX ADMIN — GUAIFENESIN AND DEXTROMETHORPHAN 10 ML: 100; 10 SYRUP ORAL at 17:11

## 2024-01-09 RX ADMIN — GUAIFENESIN AND DEXTROMETHORPHAN 10 ML: 100; 10 SYRUP ORAL at 21:04

## 2024-01-09 RX ADMIN — LEVOTHYROXINE SODIUM 224 MCG: 112 TABLET ORAL at 05:28

## 2024-01-09 RX ADMIN — Medication 2 PUFF: at 21:23

## 2024-01-09 RX ADMIN — INSULIN LISPRO 1 UNITS: 100 INJECTION, SOLUTION INTRAVENOUS; SUBCUTANEOUS at 16:54

## 2024-01-09 RX ADMIN — ACETAMINOPHEN 650 MG: 325 TABLET ORAL at 21:20

## 2024-01-09 RX ADMIN — DEXAMETHASONE SODIUM PHOSPHATE 20 MG: 4 INJECTION, SOLUTION INTRAMUSCULAR; INTRAVENOUS at 10:20

## 2024-01-09 RX ADMIN — METOPROLOL TARTRATE 25 MG: 25 TABLET, FILM COATED ORAL at 21:04

## 2024-01-09 RX ADMIN — SODIUM CHLORIDE: 9 INJECTION, SOLUTION INTRAVENOUS at 14:08

## 2024-01-09 RX ADMIN — VANCOMYCIN HYDROCHLORIDE 1000 MG: 1 INJECTION, POWDER, LYOPHILIZED, FOR SOLUTION INTRAVENOUS at 10:55

## 2024-01-09 RX ADMIN — FOLIC ACID 1 MG: 1 TABLET ORAL at 10:11

## 2024-01-09 RX ADMIN — CEFEPIME 2000 MG: 2 INJECTION, POWDER, FOR SOLUTION INTRAVENOUS at 14:10

## 2024-01-09 RX ADMIN — SODIUM CHLORIDE, PRESERVATIVE FREE 10 ML: 5 INJECTION INTRAVENOUS at 20:45

## 2024-01-09 RX ADMIN — FERROUS SULFATE TAB 325 MG (65 MG ELEMENTAL FE) 325 MG: 325 (65 FE) TAB at 10:11

## 2024-01-09 RX ADMIN — ATORVASTATIN CALCIUM 40 MG: 40 TABLET, FILM COATED ORAL at 21:04

## 2024-01-09 RX ADMIN — ENOXAPARIN SODIUM 30 MG: 100 INJECTION SUBCUTANEOUS at 21:04

## 2024-01-09 RX ADMIN — BARICITINIB 4 MG: 2 TABLET, FILM COATED ORAL at 12:20

## 2024-01-09 RX ADMIN — GUAIFENESIN AND DEXTROMETHORPHAN 10 ML: 100; 10 SYRUP ORAL at 10:11

## 2024-01-09 RX ADMIN — TAMSULOSIN HYDROCHLORIDE 0.4 MG: 0.4 CAPSULE ORAL at 10:11

## 2024-01-09 RX ADMIN — ENOXAPARIN SODIUM 30 MG: 100 INJECTION SUBCUTANEOUS at 10:11

## 2024-01-09 RX ADMIN — VANCOMYCIN HYDROCHLORIDE 750 MG: 750 INJECTION, POWDER, LYOPHILIZED, FOR SOLUTION INTRAVENOUS at 03:34

## 2024-01-09 ASSESSMENT — PAIN - FUNCTIONAL ASSESSMENT: PAIN_FUNCTIONAL_ASSESSMENT: ACTIVITIES ARE NOT PREVENTED

## 2024-01-09 ASSESSMENT — PAIN DESCRIPTION - PAIN TYPE: TYPE: ACUTE PAIN;SURGICAL PAIN

## 2024-01-09 ASSESSMENT — PAIN SCALES - GENERAL
PAINLEVEL_OUTOF10: 3
PAINLEVEL_OUTOF10: 0

## 2024-01-09 ASSESSMENT — PAIN DESCRIPTION - ONSET: ONSET: ON-GOING

## 2024-01-09 ASSESSMENT — PAIN DESCRIPTION - LOCATION: LOCATION: EYE

## 2024-01-09 ASSESSMENT — PAIN DESCRIPTION - DESCRIPTORS: DESCRIPTORS: ACHING

## 2024-01-09 ASSESSMENT — PAIN DESCRIPTION - FREQUENCY: FREQUENCY: INTERMITTENT

## 2024-01-09 ASSESSMENT — PAIN DESCRIPTION - ORIENTATION: ORIENTATION: RIGHT

## 2024-01-09 NOTE — PROGRESS NOTES
Pt transferred from 526 to 426.  /62   Pulse 77   Temp 98.9 °F (37.2 °C) (Oral)   Resp 22   Ht 1.803 m (5' 11\")   Wt 101.2 kg (223 lb)   SpO2 96%   BMI 31.10 kg/m²  on high flow O2, bilateral ear foam protectors in place on tubing; continuous pulse ox in place.  Pt's wife at bedside.  Pt denies pain or shortness of breath at this time.  Lungs diminished.  Pt assisted with repositioning.  External catheter in place.  BLE elevated off bed onto pillow with heels floated off pillow.  Bed alarm activated on bed.  Pt and pt's instructed to call out for any needs and assistance.  Will continue to monitor.  Vijaya Phillips RN  1/9/2024

## 2024-01-09 NOTE — PROGRESS NOTES
In-Patient Progress Note    Patient:  Art Gaffney 84 y.o. male MRN: [id number]     Date of Service: 1/9/2024    Hospital Day: 4      Chief complaint: had concerns including Fall (Squad called for fall, unresponsive.  Pt found on all fours on the ground.  Eye surgery yesterday.  Pt denies CP but squad EKG shows possible STEMI ).      Assessment and Plan   Art Gaffney, a 84 y.o. male, was admitted on 1/6/2024 with complaints of had concerns including Fall (Squad called for fall, unresponsive.  Pt found on all fours on the ground.  Eye surgery yesterday.  Pt denies CP but squad EKG shows possible STEMI ).    Assessment and plan    Acute hypoxic respiratory failure secondary to COVID-19  Multilobar pneumonia  COPD exacerbation  -presents with sob and fatigue; hypoxic  - increase dexamethasone dosing 20 mg daily for 5 days and 10 mg for 5 days  -was started on ceftriaxone and azithromycin - febrile again on 1/8/2023 am - escalated antibiotic regimen to vanc and cefepime- resent infectious workup  -worsening oxygen requirement - start baricitinib  -Scheduled ipratropium and albuterol PRN  -robitussin DM for cough  -follow infectious workup    Acute encephalopathy  -likely in the setting of COVID-19/pna  -monitor    Hypertension  - Hold home antihypertensives, gentle IV fluid hydration, encourage p.o. intake  - resume meds as able    Diabetes mellitus type 2  - Low sliding scale insulin 3 times daily ACHS  -POCT checks  - hypoglycemia treatment orders    Hypothyroidism  -resume levothyroxine    BRYON on CKD  -Cr 1.4 at presentation -improved to 1.1  -monitor renal function closely    History of rheumatoid arthritis  -monitor    Right eye cataract status post recent surgical repair  -PRN eye drops          Diet ADULT DIET; Regular; 4 carb choices (60 gm/meal)   DVT Prophylaxis [] Lovenox, []  Heparin, [] SCDs, [] Ambulation,  [] Eliquis, [] Xarelto  [] Coumadin   Peptic ulcer prophylaxis -   Code Status Full  Range    Color, UA Yellow Straw/Yellow    Clarity, UA Clear Clear    Glucose, Ur Negative Negative mg/dL    Bilirubin Urine Negative Negative    Ketones, Urine Negative Negative mg/dL    Specific Gravity, UA 1.020 1.005 - 1.030    Blood, Urine MODERATE (A) Negative    pH, UA 6.0 5.0 - 8.0    Protein, UA 30 (A) Negative mg/dL    Urobilinogen, Urine 0.2 <2.0 E.U./dL    Nitrite, Urine Negative Negative    Leukocyte Esterase, Urine Negative Negative    Microscopic Examination YES     Urine Type NotGiven     Coarse Casts, UA 0-2 0 - 2 /LPF    Mucus, UA Rare (A) None Seen /LPF    WBC, UA 0-2 0 - 5 /HPF    RBC, UA 3-4 0 - 4 /HPF    Bacteria, UA 1+ (A) None Seen /HPF    Amorphous, UA Rare /HPF   Legionella antigen, urine    Collection Time: 01/08/24  3:24 PM    Specimen: Urine, clean catch   Result Value Ref Range    L. pneumophila Serogp 1 Ur Ag       Presumptive Negative  No Legionella pneumophila serogroup 1 antigens detected.  A negative result does not exclude infection with  Legionella pneumophila serogroup 1 nor does it rule out  other microbial-caused respiratory infections or  disease caused by other serogroups of  Legionella pneumophila.  Normal Range: Presumptive Negative     POCT Glucose    Collection Time: 01/08/24  5:10 PM   Result Value Ref Range    POC Glucose 142 (H) 70 - 99 mg/dl    Performed on ACCU-CHEK    POCT Glucose    Collection Time: 01/08/24  8:33 PM   Result Value Ref Range    POC Glucose 132 (H) 70 - 99 mg/dl    Performed on ACCU-CHEK    Basic Metabolic Panel    Collection Time: 01/09/24  3:05 AM   Result Value Ref Range    Sodium 136 136 - 145 mmol/L    Potassium 3.9 3.5 - 5.1 mmol/L    Chloride 99 99 - 110 mmol/L    CO2 25 21 - 32 mmol/L    Anion Gap 12 3 - 16    Glucose 103 (H) 70 - 99 mg/dL    BUN 29 (H) 7 - 20 mg/dL    Creatinine 0.9 0.8 - 1.3 mg/dL    Est, Glom Filt Rate >60 >60    Calcium 8.8 8.3 - 10.6 mg/dL   Magnesium    Collection Time: 01/09/24  3:05 AM   Result Value Ref Range

## 2024-01-09 NOTE — PROGRESS NOTES
4 Eyes Skin Assessment     NAME:  Art Gaffney  YOB: 1940  MEDICAL RECORD NUMBER:  [id number]    The patient is being assessed for  Transfer to New Unit    I agree that at least one RN has performed a thorough Head to Toe Skin Assessment on the patient. ALL assessment sites listed below have been assessed.      Areas assessed by both nurses:    Head, Face, Ears, Shoulders, Back, Chest, Arms, Elbows, Hands, Sacrum. Buttock, Coccyx, Ischium, Legs. Feet and Heels, and Under Medical Devices         Does the Patient have a Wound? No noted wound(s)       Trae Prevention initiated by RN: No  Wound Care Orders initiated by RN: No    Pressure Injury (Stage 3,4, Unstageable, DTI, NWPT, and Complex wounds) if present, place Wound referral order by RN under : No    New Ostomies, if present place, Ostomy referral order under : No     Nurse 1 eSignature: Electronically signed by Vijaya Phillips RN on 1/9/24 at 6:59 PM EST    **SHARE this note so that the co-signing nurse can place an eSignature**    Nurse 2 eSignature: Electronically signed by Katharine Macias RN on 1/9/24 at 8:31 PM EST

## 2024-01-09 NOTE — PROGRESS NOTES
Nurse spoke with Dr. Cortez JIMENEZ of patients change in status; pt is now requiring 15L high flow and is having intermittent confusion. MD advised pt will need to be transferred to higher level of care due to oxygen requirements; stated pt may need vapotherm.      0955: Report given to Flex BYRD for transfer of care until pt is transferred to higher level of care.

## 2024-01-09 NOTE — PROGRESS NOTES
Pt overnight became confused and required more oxygen pt was on 9L High flow NC SPO2 84-90. Increased up to 15 L pt spo2 increased to 90-94. Informed respiratory. Informed Leon Quiroz NP new order placed.

## 2024-01-09 NOTE — PROGRESS NOTES
Physical/Occupational Therapy    Pt is currently confused and has increased O2 requirement. Per RN pt is getting transferred to C4 and states to hold therapy this date. Will attempt at a later time/date a pt is appropriate and schedule permits. Thank you.     Taina Germain PT, DPT   Mehul Murguia OTR/L

## 2024-01-09 NOTE — CARE COORDINATION
Chart reviewed day 3. Pt is followed by IM. Pt now requiring HF o2 at 15L w/increased confusion. Pt Covid + and receiving treatment for PNA. Pt IPTA w/spouse. May need HHC now? Will follow for needs as they arise. Electronically signed by Therese Yanez RN on 1/9/2024 at 9:42 AM

## 2024-01-10 LAB
ANION GAP SERPL CALCULATED.3IONS-SCNC: 11 MMOL/L (ref 3–16)
BASOPHILS # BLD: 0 K/UL (ref 0–0.2)
BASOPHILS NFR BLD: 0 %
BUN SERPL-MCNC: 28 MG/DL (ref 7–20)
CALCIUM SERPL-MCNC: 8.4 MG/DL (ref 8.3–10.6)
CHLORIDE SERPL-SCNC: 103 MMOL/L (ref 99–110)
CO2 SERPL-SCNC: 22 MMOL/L (ref 21–32)
CREAT SERPL-MCNC: 0.7 MG/DL (ref 0.8–1.3)
CRP SERPL-MCNC: 144 MG/L (ref 0–5.1)
DEPRECATED RDW RBC AUTO: 15.1 % (ref 12.4–15.4)
EOSINOPHIL # BLD: 0 K/UL (ref 0–0.6)
EOSINOPHIL NFR BLD: 0 %
GFR SERPLBLD CREATININE-BSD FMLA CKD-EPI: >60 ML/MIN/{1.73_M2}
GLUCOSE BLD-MCNC: 176 MG/DL (ref 70–99)
GLUCOSE BLD-MCNC: 238 MG/DL (ref 70–99)
GLUCOSE BLD-MCNC: 239 MG/DL (ref 70–99)
GLUCOSE BLD-MCNC: 286 MG/DL (ref 70–99)
GLUCOSE SERPL-MCNC: 214 MG/DL (ref 70–99)
HCT VFR BLD AUTO: 29.5 % (ref 40.5–52.5)
HGB BLD-MCNC: 10.3 G/DL (ref 13.5–17.5)
LYMPHOCYTES # BLD: 0.6 K/UL (ref 1–5.1)
LYMPHOCYTES NFR BLD: 3 %
MACROCYTES BLD QL SMEAR: ABNORMAL
MAGNESIUM SERPL-MCNC: 2.1 MG/DL (ref 1.8–2.4)
MCH RBC QN AUTO: 34.9 PG (ref 26–34)
MCHC RBC AUTO-ENTMCNC: 34.9 G/DL (ref 31–36)
MCV RBC AUTO: 99.8 FL (ref 80–100)
MICROCYTES BLD QL SMEAR: ABNORMAL
MONOCYTES # BLD: 0.6 K/UL (ref 0–1.3)
MONOCYTES NFR BLD: 5 %
MRSA SPEC QL CULT: NORMAL
MYELOCYTES NFR BLD MANUAL: 1 %
NEUTROPHILS # BLD: 10.7 K/UL (ref 1.7–7.7)
NEUTROPHILS NFR BLD: 82 %
NEUTS BAND NFR BLD MANUAL: 7 % (ref 0–7)
OVALOCYTES BLD QL SMEAR: ABNORMAL
PERFORMED ON: ABNORMAL
PHOSPHATE SERPL-MCNC: 2.6 MG/DL (ref 2.5–4.9)
PLATELET # BLD AUTO: 495 K/UL (ref 135–450)
PLATELET BLD QL SMEAR: ADEQUATE
PMV BLD AUTO: 7.4 FL (ref 5–10.5)
POTASSIUM SERPL-SCNC: 4 MMOL/L (ref 3.5–5.1)
RBC # BLD AUTO: 2.96 M/UL (ref 4.2–5.9)
SLIDE REVIEW: ABNORMAL
SMUDGE CELLS BLD QL SMEAR: PRESENT
SODIUM SERPL-SCNC: 136 MMOL/L (ref 136–145)
SPHEROCYTES BLD QL SMEAR: ABNORMAL
VARIANT LYMPHS NFR BLD MANUAL: 2 % (ref 0–6)
WBC # BLD AUTO: 11.9 K/UL (ref 4–11)

## 2024-01-10 PROCEDURE — 36569 INSJ PICC 5 YR+ W/O IMAGING: CPT

## 2024-01-10 PROCEDURE — 6370000000 HC RX 637 (ALT 250 FOR IP): Performed by: INTERNAL MEDICINE

## 2024-01-10 PROCEDURE — 94761 N-INVAS EAR/PLS OXIMETRY MLT: CPT

## 2024-01-10 PROCEDURE — 6370000000 HC RX 637 (ALT 250 FOR IP): Performed by: STUDENT IN AN ORGANIZED HEALTH CARE EDUCATION/TRAINING PROGRAM

## 2024-01-10 PROCEDURE — 94640 AIRWAY INHALATION TREATMENT: CPT

## 2024-01-10 PROCEDURE — 97530 THERAPEUTIC ACTIVITIES: CPT

## 2024-01-10 PROCEDURE — 84100 ASSAY OF PHOSPHORUS: CPT

## 2024-01-10 PROCEDURE — 97110 THERAPEUTIC EXERCISES: CPT

## 2024-01-10 PROCEDURE — 97535 SELF CARE MNGMENT TRAINING: CPT

## 2024-01-10 PROCEDURE — 80048 BASIC METABOLIC PNL TOTAL CA: CPT

## 2024-01-10 PROCEDURE — 83735 ASSAY OF MAGNESIUM: CPT

## 2024-01-10 PROCEDURE — 94669 MECHANICAL CHEST WALL OSCILL: CPT

## 2024-01-10 PROCEDURE — 02HV33Z INSERTION OF INFUSION DEVICE INTO SUPERIOR VENA CAVA, PERCUTANEOUS APPROACH: ICD-10-PCS | Performed by: INTERNAL MEDICINE

## 2024-01-10 PROCEDURE — 86140 C-REACTIVE PROTEIN: CPT

## 2024-01-10 PROCEDURE — 6360000002 HC RX W HCPCS: Performed by: STUDENT IN AN ORGANIZED HEALTH CARE EDUCATION/TRAINING PROGRAM

## 2024-01-10 PROCEDURE — 6360000002 HC RX W HCPCS: Performed by: INTERNAL MEDICINE

## 2024-01-10 PROCEDURE — 2060000000 HC ICU INTERMEDIATE R&B

## 2024-01-10 PROCEDURE — 85025 COMPLETE CBC W/AUTO DIFF WBC: CPT

## 2024-01-10 PROCEDURE — 2580000003 HC RX 258: Performed by: INTERNAL MEDICINE

## 2024-01-10 PROCEDURE — 2580000003 HC RX 258: Performed by: STUDENT IN AN ORGANIZED HEALTH CARE EDUCATION/TRAINING PROGRAM

## 2024-01-10 PROCEDURE — 2700000000 HC OXYGEN THERAPY PER DAY

## 2024-01-10 PROCEDURE — C1751 CATH, INF, PER/CENT/MIDLINE: HCPCS

## 2024-01-10 PROCEDURE — 2580000003 HC RX 258: Performed by: NURSE PRACTITIONER

## 2024-01-10 RX ORDER — SODIUM CHLORIDE 0.9 % (FLUSH) 0.9 %
5-40 SYRINGE (ML) INJECTION PRN
Status: DISCONTINUED | OUTPATIENT
Start: 2024-01-10 | End: 2024-01-13 | Stop reason: HOSPADM

## 2024-01-10 RX ORDER — POLYETHYLENE GLYCOL 3350 17 G/17G
17 POWDER, FOR SOLUTION ORAL 2 TIMES DAILY PRN
Status: DISCONTINUED | OUTPATIENT
Start: 2024-01-10 | End: 2024-01-13 | Stop reason: HOSPADM

## 2024-01-10 RX ORDER — LIDOCAINE HYDROCHLORIDE 10 MG/ML
5 INJECTION, SOLUTION INFILTRATION; PERINEURAL ONCE
Status: DISCONTINUED | OUTPATIENT
Start: 2024-01-10 | End: 2024-01-13 | Stop reason: HOSPADM

## 2024-01-10 RX ORDER — SODIUM CHLORIDE 0.9 % (FLUSH) 0.9 %
5-40 SYRINGE (ML) INJECTION EVERY 12 HOURS SCHEDULED
Status: DISCONTINUED | OUTPATIENT
Start: 2024-01-10 | End: 2024-01-13 | Stop reason: HOSPADM

## 2024-01-10 RX ORDER — SODIUM CHLORIDE 9 MG/ML
25 INJECTION, SOLUTION INTRAVENOUS PRN
Status: DISCONTINUED | OUTPATIENT
Start: 2024-01-10 | End: 2024-01-13 | Stop reason: HOSPADM

## 2024-01-10 RX ADMIN — ACETAMINOPHEN 650 MG: 325 TABLET ORAL at 21:32

## 2024-01-10 RX ADMIN — ENOXAPARIN SODIUM 30 MG: 100 INJECTION SUBCUTANEOUS at 08:45

## 2024-01-10 RX ADMIN — METOPROLOL TARTRATE 25 MG: 25 TABLET, FILM COATED ORAL at 08:45

## 2024-01-10 RX ADMIN — CEFEPIME 2000 MG: 2 INJECTION, POWDER, FOR SOLUTION INTRAVENOUS at 12:50

## 2024-01-10 RX ADMIN — FOLIC ACID 1 MG: 1 TABLET ORAL at 08:45

## 2024-01-10 RX ADMIN — GUAIFENESIN AND DEXTROMETHORPHAN 10 ML: 100; 10 SYRUP ORAL at 17:19

## 2024-01-10 RX ADMIN — VANCOMYCIN HYDROCHLORIDE 1000 MG: 1 INJECTION, POWDER, LYOPHILIZED, FOR SOLUTION INTRAVENOUS at 02:27

## 2024-01-10 RX ADMIN — SODIUM CHLORIDE, PRESERVATIVE FREE 10 ML: 5 INJECTION INTRAVENOUS at 08:47

## 2024-01-10 RX ADMIN — GUAIFENESIN AND DEXTROMETHORPHAN 10 ML: 100; 10 SYRUP ORAL at 21:26

## 2024-01-10 RX ADMIN — GUAIFENESIN AND DEXTROMETHORPHAN 10 ML: 100; 10 SYRUP ORAL at 08:45

## 2024-01-10 RX ADMIN — INSULIN LISPRO 1 UNITS: 100 INJECTION, SOLUTION INTRAVENOUS; SUBCUTANEOUS at 17:32

## 2024-01-10 RX ADMIN — SODIUM CHLORIDE 40 ML: 9 INJECTION, SOLUTION INTRAVENOUS at 05:56

## 2024-01-10 RX ADMIN — Medication 2 PUFF: at 19:14

## 2024-01-10 RX ADMIN — Medication 2 PUFF: at 15:10

## 2024-01-10 RX ADMIN — DEXAMETHASONE SODIUM PHOSPHATE 20 MG: 4 INJECTION, SOLUTION INTRAMUSCULAR; INTRAVENOUS at 10:29

## 2024-01-10 RX ADMIN — SODIUM CHLORIDE, PRESERVATIVE FREE 10 ML: 5 INJECTION INTRAVENOUS at 21:26

## 2024-01-10 RX ADMIN — CEFEPIME 2000 MG: 2 INJECTION, POWDER, FOR SOLUTION INTRAVENOUS at 21:20

## 2024-01-10 RX ADMIN — LEVOTHYROXINE SODIUM 224 MCG: 112 TABLET ORAL at 06:01

## 2024-01-10 RX ADMIN — SODIUM CHLORIDE 40 ML: 9 INJECTION, SOLUTION INTRAVENOUS at 02:26

## 2024-01-10 RX ADMIN — TAMSULOSIN HYDROCHLORIDE 0.4 MG: 0.4 CAPSULE ORAL at 08:45

## 2024-01-10 RX ADMIN — CEFEPIME 2000 MG: 2 INJECTION, POWDER, FOR SOLUTION INTRAVENOUS at 05:57

## 2024-01-10 RX ADMIN — Medication 2 PUFF: at 07:40

## 2024-01-10 RX ADMIN — SODIUM CHLORIDE, PRESERVATIVE FREE 10 ML: 5 INJECTION INTRAVENOUS at 21:23

## 2024-01-10 RX ADMIN — GUAIFENESIN AND DEXTROMETHORPHAN 10 ML: 100; 10 SYRUP ORAL at 12:46

## 2024-01-10 RX ADMIN — ATORVASTATIN CALCIUM 40 MG: 40 TABLET, FILM COATED ORAL at 21:26

## 2024-01-10 RX ADMIN — ENOXAPARIN SODIUM 30 MG: 100 INJECTION SUBCUTANEOUS at 21:26

## 2024-01-10 RX ADMIN — FERROUS SULFATE TAB 325 MG (65 MG ELEMENTAL FE) 325 MG: 325 (65 FE) TAB at 08:45

## 2024-01-10 RX ADMIN — INSULIN LISPRO 2 UNITS: 100 INJECTION, SOLUTION INTRAVENOUS; SUBCUTANEOUS at 12:46

## 2024-01-10 RX ADMIN — VANCOMYCIN HYDROCHLORIDE 1000 MG: 1 INJECTION, POWDER, LYOPHILIZED, FOR SOLUTION INTRAVENOUS at 17:18

## 2024-01-10 RX ADMIN — BARICITINIB 4 MG: 2 TABLET, FILM COATED ORAL at 08:45

## 2024-01-10 RX ADMIN — METOPROLOL TARTRATE 25 MG: 25 TABLET, FILM COATED ORAL at 21:25

## 2024-01-10 ASSESSMENT — PAIN DESCRIPTION - ONSET: ONSET: ON-GOING

## 2024-01-10 ASSESSMENT — PAIN DESCRIPTION - LOCATION: LOCATION: EYE

## 2024-01-10 ASSESSMENT — PAIN DESCRIPTION - FREQUENCY: FREQUENCY: INTERMITTENT

## 2024-01-10 ASSESSMENT — PAIN DESCRIPTION - DESCRIPTORS: DESCRIPTORS: ACHING

## 2024-01-10 ASSESSMENT — PAIN DESCRIPTION - PAIN TYPE: TYPE: ACUTE PAIN;SURGICAL PAIN

## 2024-01-10 ASSESSMENT — PAIN SCALES - GENERAL
PAINLEVEL_OUTOF10: 5
PAINLEVEL_OUTOF10: 0

## 2024-01-10 ASSESSMENT — PAIN DESCRIPTION - ORIENTATION: ORIENTATION: RIGHT

## 2024-01-10 NOTE — PROGRESS NOTES
Physician Progress Note      PATIENT:               ALDAIR HERNANDEZ  [facility] #:                  [id number]  :                       1940  ADMIT DATE:       2024 7:48 PM  DISCH DATE:  RESPONDING  PROVIDER #:        JACQUELYN BYNUM          QUERY TEXT:    Pt admitted with COVID and pneumonia. Pt noted to have encephalopathy and   fever. If possible, please document in the progress notes and discharge   summary if you are evaluating and /or treating any of the following:    The medical record reflects the following:  Risk Factors: Covid, pneumonia  Clinical Indicators: .9, encephalopathy, acute respiratory failure, WBC   10, vitals on  : T 99.9, RR 16-28, SPO2 88-95%, : T 101.9, HR   Treatment:  IV ceftriaxone and azithromycin, cultures, IVF, CXR, supportive   care    Thank you,  Zofia Germain RN, CDS  [email or web address]  Options provided:  -- Evolving sepsis, present on admission due to Covid and pneumonia  -- Sepsis, due to Covid and pneumonia developed following admission  -- Covid and pneumonia without Sepsis  -- Other - I will add my own diagnosis  -- Disagree - Not applicable / Not valid  -- Disagree - Clinically unable to determine / Unknown  -- Refer to Clinical Documentation Reviewer    PROVIDER RESPONSE TEXT:    This patient has evolving sepsis due to COVID and pneumonia which was present   on admission.    Query created by: Zofia Germain on 2024 7:25 PM      Electronically signed by:  JACQUELYN BYNUM 1/10/2024 2:54 PM

## 2024-01-10 NOTE — PROGRESS NOTES
Occupational Therapy  Facility/Department: [facility] C4 PCU  Daily Treatment Note  NAME: Art Gaffney  : 1940  MRN: [id number]    Date of Service: 1/10/2024    Discharge Recommendations:  24 hour supervision or assist  OT Equipment Recommendations  Equipment Needed: No      Patient Diagnosis(es): The primary encounter diagnosis was Acute hypoxemic respiratory failure (HCC). A diagnosis of Pneumonia due to COVID-19 virus was also pertinent to this visit.     Assessment    Assessment: Pt tolerated OT session this date. Pt seen with PT due to increased need for O2 support and confusion day prior. Pt completed functional transfer bed>chair with RW with Min A x 1. Pt required increased rest breaks this date due to O2 stats. Pt completed seated grooming tasks with set up. Pt would continue to benefit from acute OT at this time to improve functional mobility and ADL independence. D/c recs for home with 24hr  when medically ready.     Activity Tolerance: Patient tolerated treatment well;Patient limited by endurance  Discharge Recommendations: 24 hour supervision or assist  Equipment Needed: No      Plan   Occupational Therapy Plan  Times Per Week: 3-5x/week  Current Treatment Recommendations: Strengthening;ROM;Neuromuscular re-education;Patient/Caregiver education & training;Self-Care / ADL;Functional mobility training;Safety education & training     Restrictions  Restrictions/Precautions  Restrictions/Precautions: Up as Tolerated;General Precautions;Fall Risk;Contact Precautions;Isolation  Required Braces or Orthoses?: No  Position Activity Restriction  Other position/activity restrictions: Droplet(+) precautions 2* COVID+, 9L high-flow O2, IV lines, PureWick, continuous pulse ox    Subjective   Subjective  Subjective: pt agreeable to OT session this date.  Orientation  Overall Orientation Status: Within Functional Limits  Orientation Level: Oriented to place;Oriented to time;Oriented to situation;Oriented to

## 2024-01-10 NOTE — PROGRESS NOTES
In-Patient Progress Note    Patient:  Art Gaffney 84 y.o. male MRN: [id number]     Date of Service: 1/10/2024    Hospital Day: 5      Chief complaint: had concerns including Fall (Squad called for fall, unresponsive.  Pt found on all fours on the ground.  Eye surgery yesterday.  Pt denies CP but squad EKG shows possible STEMI ).      Assessment and Plan   Art Gaffney, a 84 y.o. male, was admitted on 1/6/2024 with complaints of had concerns including Fall (Squad called for fall, unresponsive.  Pt found on all fours on the ground.  Eye surgery yesterday.  Pt denies CP but squad EKG shows possible STEMI ).    Assessment and plan    Acute hypoxic respiratory failure secondary to COVID-19  Multilobar pneumonia  COPD exacerbation  -presents with sob and fatigue; hypoxic  - increase dexamethasone dosing 20 mg daily for 5 days and 10 mg for 5 days  -was started on ceftriaxone and azithromycin - febrile again on 1/8/2023 am - escalated antibiotic regimen to vanc and cefepime- resent infectious workup  -worsening oxygen requirement - start baricitinib  -Scheduled ipratropium and albuterol PRN  -robitussin DM for cough  -follow infectious workup    Acute encephalopathy  -likely in the setting of COVID-19/pna  -monitor    Hypertension  - Hold home antihypertensives, gentle IV fluid hydration, encourage p.o. intake  - resume meds as able    Diabetes mellitus type 2  - Low sliding scale insulin 3 times daily ACHS  -POCT checks  - hypoglycemia treatment orders    Hypothyroidism  -resume levothyroxine    BRYON on CKD  -Cr 1.4 at presentation -improved to 1.1  -monitor renal function closely    History of rheumatoid arthritis  -monitor    Right eye cataract status post recent surgical repair  -PRN eye drops          Diet ADULT DIET; Regular; 4 carb choices (60 gm/meal)   DVT Prophylaxis [] Lovenox, []  Heparin, [] SCDs, [] Ambulation,  [] Eliquis, [] Xarelto  [] Coumadin   Peptic ulcer prophylaxis -   Code Status Full  rubs, gallops  Respiratory: no respiratory distress, diffuse wheezing, rhonchi, no crackles  Gastrointestinal: Soft, non tender, nondistended, normal bowel sounds  Genitourinary: no suprapubic tenderness  Musculoskeletal: Normal tone, ROM, strength in all extremities  Ext: No edema  Skin: warm, dry, no rash  Neuro: Somnolent but arousable, oriented to person, place, not to time, no gross focal neurological deficits  Psych: stable    Current Medications      lidocaine 1 % injection  5 mL IntraDERmal Once    sodium chloride flush  5-40 mL IntraVENous 2 times per day    albuterol sulfate HFA  2 puff Inhalation 4x Daily RT    And    ipratropium  2 puff Inhalation 4x Daily RT    dexAMETHasone  20 mg IntraVENous Q24H    Followed by    [START ON 1/14/2024] dexAMETHasone  10 mg IntraVENous Q24H    vancomycin  1,000 mg IntraVENous Q12H    cefepime  2,000 mg IntraVENous Q8H    baricitinib  4 mg Oral Daily    atorvastatin  40 mg Oral Nightly    mometasone-formoterol  2 puff Inhalation BID RT    ferrous sulfate  325 mg Oral Daily with breakfast    folic acid  1 mg Oral Daily    levothyroxine  224 mcg Oral Daily    metoprolol tartrate  25 mg Oral BID    tamsulosin  0.4 mg Oral Daily    sodium chloride flush  5-40 mL IntraVENous 2 times per day    enoxaparin  30 mg SubCUTAneous BID    insulin lispro  0-4 Units SubCUTAneous TID WC    insulin lispro  0-4 Units SubCUTAneous Nightly    guaiFENesin-dextromethorphan  10 mL Oral 4x Daily         Labs and Imaging Studies   Laboratory findings:  CT C-Spine W/O Contrast    Result Date: 1/7/2024  EXAMINATION: CT OF THE CERVICAL SPINE WITHOUT CONTRAST 1/6/2024 8:34 pm TECHNIQUE: CT of the cervical spine was performed without the administration of intravenous contrast. Multiplanar reformatted images are provided for review. Automated exposure control, iterative reconstruction, and/or weight based adjustment of the mA/kV was utilized to reduce the radiation dose to as low as reasonably

## 2024-01-10 NOTE — PROGRESS NOTES
rehab progression, COVID isolation, and home safety/discharge recs.  Pt and wife verbalize understanding.    AM-PAC - Mobility    AM-PAC Basic Mobility - Inpatient   How much help is needed turning from your back to your side while in a flat bed without using bedrails?: A Little  How much help is needed moving from lying on your back to sitting on the side of a flat bed without using bedrails?: A Lot  How much help is needed moving to and from a bed to a chair?: A Little  How much help is needed standing up from a chair using your arms?: A Little  How much help is needed walking in hospital room?: A Little  How much help is needed climbing 3-5 steps with a railing?: A Lot  AM-PAC Inpatient Mobility Raw Score : 16  AM-PAC Inpatient T-Scale Score : 40.78  Mobility Inpatient CMS 0-100% Score: 54.16  Mobility Inpatient CMS G-Code Modifier : CK         Therapy Time   Individual Concurrent Group Co-treatment   Time In       1045   Time Out       1142   Minutes       57   Timed Code Treatment Minutes: 57 Minutes       Taina Carson, PT, DPT #[id number]    If pt is unable to be seen after this session, please let this note serve as discharge summary.  Please see case management note for discharge disposition.  Thank you.

## 2024-01-10 NOTE — PROGRESS NOTES
Arrived to place PICC line with bedside RN patricia. Pre-procedure and timeout done with RN, discussed limitations of placement and allergies.      Pt's basilic, brachial, cephalic are all easily collapsible with no indication for a clot. Vein selected is large enough for catheter. Pt tolerated sterile procedure well, with no difficulty accessing right brachial vein, when accessed - blood was free flowing and non-pulsatile. Guidewire, introducer, and catheter went in smoothly.   PICC line verified with 3CG technology with peaked P-waves (please see image below). PICC tip terminates in the SVC according to [employer] 3CG tip confirmation system. PICC was seen dropping into SVC with tip tracking technology and discernable peaked p waves were noted without negative deflection.     OK to use PICC.   Please use new IV tubing when connecting to the newly placed central line.      Post procedure - reorganized pt table, placed pt in lowest position, with call light and educated on line care. Reported off to bedside RN.      Please call if you have any questions about the PICC or ML. The  will direct you to the PICC RN that is on call.      [phone]   '

## 2024-01-10 NOTE — CARE COORDINATION
Patient is now weaned from 15 high flow to 9 liters high flow.Covid positive. Increased confusion. From home with spouse and normally independent with ADLS. CM following for needs.

## 2024-01-11 LAB
ANION GAP SERPL CALCULATED.3IONS-SCNC: 8 MMOL/L (ref 3–16)
ANISOCYTOSIS BLD QL SMEAR: ABNORMAL
BASOPHILS # BLD: 0 K/UL (ref 0–0.2)
BASOPHILS NFR BLD: 0 %
BUN SERPL-MCNC: 31 MG/DL (ref 7–20)
CALCIUM SERPL-MCNC: 6.7 MG/DL (ref 8.3–10.6)
CHLORIDE SERPL-SCNC: 105 MMOL/L (ref 99–110)
CO2 SERPL-SCNC: 22 MMOL/L (ref 21–32)
CREAT SERPL-MCNC: 0.6 MG/DL (ref 0.8–1.3)
CRP SERPL-MCNC: 84.2 MG/L (ref 0–5.1)
DEPRECATED RDW RBC AUTO: 15.1 % (ref 12.4–15.4)
EOSINOPHIL # BLD: 0 K/UL (ref 0–0.6)
EOSINOPHIL NFR BLD: 0 %
GFR SERPLBLD CREATININE-BSD FMLA CKD-EPI: >60 ML/MIN/{1.73_M2}
GLUCOSE BLD-MCNC: 152 MG/DL (ref 70–99)
GLUCOSE BLD-MCNC: 307 MG/DL (ref 70–99)
GLUCOSE BLD-MCNC: 320 MG/DL (ref 70–99)
GLUCOSE BLD-MCNC: 359 MG/DL (ref 70–99)
GLUCOSE SERPL-MCNC: 147 MG/DL (ref 70–99)
HCT VFR BLD AUTO: 31 % (ref 40.5–52.5)
HGB BLD-MCNC: 10.7 G/DL (ref 13.5–17.5)
LYMPHOCYTES # BLD: 0.8 K/UL (ref 1–5.1)
LYMPHOCYTES NFR BLD: 4 %
MACROCYTES BLD QL SMEAR: ABNORMAL
MAGNESIUM SERPL-MCNC: 1.4 MG/DL (ref 1.8–2.4)
MAGNESIUM SERPL-MCNC: 2.6 MG/DL (ref 1.8–2.4)
MCH RBC QN AUTO: 32.1 PG (ref 26–34)
MCHC RBC AUTO-ENTMCNC: 34.6 G/DL (ref 31–36)
MCV RBC AUTO: 93 FL (ref 80–100)
METAMYELOCYTES NFR BLD MANUAL: 1 %
MICROCYTES BLD QL SMEAR: ABNORMAL
MONOCYTES # BLD: 1.4 K/UL (ref 0–1.3)
MONOCYTES NFR BLD: 7 %
MYELOCYTES NFR BLD MANUAL: 2 %
NEUTROPHILS # BLD: 18.2 K/UL (ref 1.7–7.7)
NEUTROPHILS NFR BLD: 80 %
NEUTS BAND NFR BLD MANUAL: 6 % (ref 0–7)
OVALOCYTES BLD QL SMEAR: ABNORMAL
PERFORMED ON: ABNORMAL
PHOSPHATE SERPL-MCNC: 3.1 MG/DL (ref 2.5–4.9)
PLATELET # BLD AUTO: 705 K/UL (ref 135–450)
PLATELET BLD QL SMEAR: ABNORMAL
PMV BLD AUTO: 7.1 FL (ref 5–10.5)
POTASSIUM SERPL-SCNC: 6.1 MMOL/L (ref 3.5–5.1)
RBC # BLD AUTO: 3.34 M/UL (ref 4.2–5.9)
SLIDE REVIEW: ABNORMAL
SODIUM SERPL-SCNC: 135 MMOL/L (ref 136–145)
SPHEROCYTES BLD QL SMEAR: ABNORMAL
WBC # BLD AUTO: 20.4 K/UL (ref 4–11)

## 2024-01-11 PROCEDURE — 6360000002 HC RX W HCPCS: Performed by: INTERNAL MEDICINE

## 2024-01-11 PROCEDURE — 83735 ASSAY OF MAGNESIUM: CPT

## 2024-01-11 PROCEDURE — 2580000003 HC RX 258: Performed by: STUDENT IN AN ORGANIZED HEALTH CARE EDUCATION/TRAINING PROGRAM

## 2024-01-11 PROCEDURE — 86140 C-REACTIVE PROTEIN: CPT

## 2024-01-11 PROCEDURE — 2580000003 HC RX 258: Performed by: NURSE PRACTITIONER

## 2024-01-11 PROCEDURE — 94640 AIRWAY INHALATION TREATMENT: CPT

## 2024-01-11 PROCEDURE — 6360000002 HC RX W HCPCS: Performed by: STUDENT IN AN ORGANIZED HEALTH CARE EDUCATION/TRAINING PROGRAM

## 2024-01-11 PROCEDURE — 2700000000 HC OXYGEN THERAPY PER DAY

## 2024-01-11 PROCEDURE — 2580000003 HC RX 258: Performed by: INTERNAL MEDICINE

## 2024-01-11 PROCEDURE — 84100 ASSAY OF PHOSPHORUS: CPT

## 2024-01-11 PROCEDURE — 80048 BASIC METABOLIC PNL TOTAL CA: CPT

## 2024-01-11 PROCEDURE — 6370000000 HC RX 637 (ALT 250 FOR IP): Performed by: INTERNAL MEDICINE

## 2024-01-11 PROCEDURE — 85025 COMPLETE CBC W/AUTO DIFF WBC: CPT

## 2024-01-11 PROCEDURE — 6370000000 HC RX 637 (ALT 250 FOR IP): Performed by: STUDENT IN AN ORGANIZED HEALTH CARE EDUCATION/TRAINING PROGRAM

## 2024-01-11 PROCEDURE — 94761 N-INVAS EAR/PLS OXIMETRY MLT: CPT

## 2024-01-11 PROCEDURE — 2060000000 HC ICU INTERMEDIATE R&B

## 2024-01-11 RX ORDER — INSULIN LISPRO 100 [IU]/ML
0-8 INJECTION, SOLUTION INTRAVENOUS; SUBCUTANEOUS
Status: DISCONTINUED | OUTPATIENT
Start: 2024-01-11 | End: 2024-01-11

## 2024-01-11 RX ORDER — INSULIN LISPRO 100 [IU]/ML
0-4 INJECTION, SOLUTION INTRAVENOUS; SUBCUTANEOUS NIGHTLY
Status: DISCONTINUED | OUTPATIENT
Start: 2024-01-11 | End: 2024-01-13 | Stop reason: HOSPADM

## 2024-01-11 RX ORDER — INSULIN LISPRO 100 [IU]/ML
0-8 INJECTION, SOLUTION INTRAVENOUS; SUBCUTANEOUS
Status: DISCONTINUED | OUTPATIENT
Start: 2024-01-11 | End: 2024-01-13 | Stop reason: HOSPADM

## 2024-01-11 RX ORDER — MAGNESIUM SULFATE IN WATER 40 MG/ML
2000 INJECTION, SOLUTION INTRAVENOUS ONCE
Status: COMPLETED | OUTPATIENT
Start: 2024-01-11 | End: 2024-01-11

## 2024-01-11 RX ADMIN — Medication 2 PUFF: at 15:02

## 2024-01-11 RX ADMIN — FOLIC ACID 1 MG: 1 TABLET ORAL at 10:21

## 2024-01-11 RX ADMIN — VANCOMYCIN HYDROCHLORIDE 1000 MG: 1 INJECTION, POWDER, LYOPHILIZED, FOR SOLUTION INTRAVENOUS at 04:26

## 2024-01-11 RX ADMIN — FERROUS SULFATE TAB 325 MG (65 MG ELEMENTAL FE) 325 MG: 325 (65 FE) TAB at 10:29

## 2024-01-11 RX ADMIN — ENOXAPARIN SODIUM 30 MG: 100 INJECTION SUBCUTANEOUS at 21:33

## 2024-01-11 RX ADMIN — Medication 2 PUFF: at 19:25

## 2024-01-11 RX ADMIN — METOPROLOL TARTRATE 25 MG: 25 TABLET, FILM COATED ORAL at 10:21

## 2024-01-11 RX ADMIN — GUAIFENESIN AND DEXTROMETHORPHAN 10 ML: 100; 10 SYRUP ORAL at 21:33

## 2024-01-11 RX ADMIN — CEFEPIME 2000 MG: 2 INJECTION, POWDER, FOR SOLUTION INTRAVENOUS at 21:44

## 2024-01-11 RX ADMIN — TAMSULOSIN HYDROCHLORIDE 0.4 MG: 0.4 CAPSULE ORAL at 10:21

## 2024-01-11 RX ADMIN — CEFEPIME 2000 MG: 2 INJECTION, POWDER, FOR SOLUTION INTRAVENOUS at 12:21

## 2024-01-11 RX ADMIN — Medication 2 PUFF: at 11:15

## 2024-01-11 RX ADMIN — GUAIFENESIN AND DEXTROMETHORPHAN 10 ML: 100; 10 SYRUP ORAL at 16:59

## 2024-01-11 RX ADMIN — BARICITINIB 4 MG: 2 TABLET, FILM COATED ORAL at 10:21

## 2024-01-11 RX ADMIN — ATORVASTATIN CALCIUM 40 MG: 40 TABLET, FILM COATED ORAL at 21:33

## 2024-01-11 RX ADMIN — ENOXAPARIN SODIUM 30 MG: 100 INJECTION SUBCUTANEOUS at 10:22

## 2024-01-11 RX ADMIN — LEVOTHYROXINE SODIUM 224 MCG: 112 TABLET ORAL at 10:21

## 2024-01-11 RX ADMIN — GUAIFENESIN AND DEXTROMETHORPHAN 10 ML: 100; 10 SYRUP ORAL at 10:22

## 2024-01-11 RX ADMIN — CEFEPIME 2000 MG: 2 INJECTION, POWDER, FOR SOLUTION INTRAVENOUS at 04:15

## 2024-01-11 RX ADMIN — INSULIN LISPRO 4 UNITS: 100 INJECTION, SOLUTION INTRAVENOUS; SUBCUTANEOUS at 22:03

## 2024-01-11 RX ADMIN — DEXAMETHASONE SODIUM PHOSPHATE 20 MG: 4 INJECTION, SOLUTION INTRAMUSCULAR; INTRAVENOUS at 10:58

## 2024-01-11 RX ADMIN — Medication 2 PUFF: at 07:18

## 2024-01-11 RX ADMIN — INSULIN LISPRO 4 UNITS: 100 INJECTION, SOLUTION INTRAVENOUS; SUBCUTANEOUS at 12:21

## 2024-01-11 RX ADMIN — MAGNESIUM SULFATE IN WATER 2000 MG: 40 INJECTION, SOLUTION INTRAVENOUS at 10:56

## 2024-01-11 RX ADMIN — INSULIN LISPRO 6 UNITS: 100 INJECTION, SOLUTION INTRAVENOUS; SUBCUTANEOUS at 16:59

## 2024-01-11 RX ADMIN — SODIUM CHLORIDE, PRESERVATIVE FREE 10 ML: 5 INJECTION INTRAVENOUS at 11:01

## 2024-01-11 RX ADMIN — GUAIFENESIN AND DEXTROMETHORPHAN 10 ML: 100; 10 SYRUP ORAL at 12:21

## 2024-01-11 RX ADMIN — SODIUM CHLORIDE, PRESERVATIVE FREE 10 ML: 5 INJECTION INTRAVENOUS at 21:34

## 2024-01-11 RX ADMIN — Medication 2 PUFF: at 19:26

## 2024-01-11 RX ADMIN — ACETAMINOPHEN 650 MG: 325 TABLET ORAL at 21:56

## 2024-01-11 RX ADMIN — ACETAMINOPHEN 650 MG: 325 TABLET ORAL at 06:23

## 2024-01-11 RX ADMIN — Medication 2 PUFF: at 19:27

## 2024-01-11 ASSESSMENT — ENCOUNTER SYMPTOMS
CONSTIPATION: 0
COLOR CHANGE: 0
SHORTNESS OF BREATH: 1
SORE THROAT: 0
DIARRHEA: 0
TROUBLE SWALLOWING: 0
VOMITING: 0
CHEST TIGHTNESS: 0
SINUS PRESSURE: 0
VOICE CHANGE: 0
BACK PAIN: 0
SINUS PAIN: 0
NAUSEA: 0
WHEEZING: 0
ABDOMINAL PAIN: 0
COUGH: 1

## 2024-01-11 ASSESSMENT — PAIN DESCRIPTION - LOCATION
LOCATION: EYE
LOCATION: EYE

## 2024-01-11 ASSESSMENT — PAIN SCALES - GENERAL
PAINLEVEL_OUTOF10: 5
PAINLEVEL_OUTOF10: 0
PAINLEVEL_OUTOF10: 3
PAINLEVEL_OUTOF10: 0

## 2024-01-11 ASSESSMENT — PAIN DESCRIPTION - ORIENTATION: ORIENTATION: RIGHT

## 2024-01-11 NOTE — PLAN OF CARE
Problem: Discharge Planning  Goal: Discharge to home or other facility with appropriate resources  Outcome: Progressing     Problem: Pain  Goal: Verbalizes/displays adequate comfort level or baseline comfort level  Outcome: Progressing     Problem: Skin/Tissue Integrity  Goal: Absence of new skin breakdown  Description: 1.  Monitor for areas of redness and/or skin breakdown  2.  Assess vascular access sites hourly  3.  Every 4-6 hours minimum:  Change oxygen saturation probe site  4.  Every 4-6 hours:  If on nasal continuous positive airway pressure, respiratory therapy assess nares and determine need for appliance change or resting period.  Outcome: Progressing     
  Problem: Discharge Planning  Goal: Discharge to home or other facility with appropriate resources  Outcome: Progressing  Flowsheets (Taken 1/7/2024 2041 by Mavis Delgado)  Discharge to home or other facility with appropriate resources: Identify barriers to discharge with patient and caregiver     Problem: Pain  Goal: Verbalizes/displays adequate comfort level or baseline comfort level  Outcome: Progressing     Problem: Skin/Tissue Integrity  Goal: Absence of new skin breakdown  Description: 1.  Monitor for areas of redness and/or skin breakdown  2.  Assess vascular access sites hourly  3.  Every 4-6 hours minimum:  Change oxygen saturation probe site  4.  Every 4-6 hours:  If on nasal continuous positive airway pressure, respiratory therapy assess nares and determine need for appliance change or resting period.  Outcome: Progressing     Problem: Safety - Adult  Goal: Free from fall injury  Outcome: Progressing     Problem: ABCDS Injury Assessment  Goal: Absence of physical injury  Outcome: Progressing     
Increase function to baseline.    
Increase patients ADLs/functional status to baseline.    
Problem: Skin/Tissue Integrity  Goal: Absence of new skin breakdown  Outcome: Progressing  Note: Pt able to reposition self in bed, assisted if needed, BLE elevated off bed onto pillow with heels floated off pillow. Bilateral ear foam protectors in place on high flow O2 tubing.     Problem: Safety - Adult  Goal: Free from fall injury  Outcome: Progressing  Note: Pt is free from falls thus far this shift.  Bed in lowest position and locked, side rails up x2, non skid footwear in place, bed alarm activated on bed.  Pt calls out for needs and assistance.       Problem: Neurosensory - Adult  Goal: Achieves stable or improved neurological status  Outcome: Progressing  Note: Pt A&O x4 this shift.       Problem: Respiratory - Adult  Goal: Achieves optimal ventilation and oxygenation  Outcome: Progressing  Note: Pt transferred from C5 to C4 on 15L high flow O2, continuous pulse ox in place, weaning O2 as able. Pt currently on 9L high flow O2.       Problem: Genitourinary - Adult  Goal: Absence of urinary retention  Outcome: Progressing  Note: Pt with external catheter in place, good urine output.       Problem: Metabolic/Fluid and Electrolytes - Adult  Goal: Glucose maintained within prescribed range  Outcome: Progressing  Note: Blood sugars 100-200's today.  Pt on SSI.       
  Problem: Neurosensory - Adult  Goal: Achieves stable or improved neurological status  Outcome: Progressing  Flowsheets (Taken 1/10/2024 0740)  Achieves stable or improved neurological status: Assess for and report changes in neurological status     Problem: Respiratory - Adult  Goal: Achieves optimal ventilation and oxygenation  Outcome: Progressing  Flowsheets (Taken 1/10/2024 0740)  Achieves optimal ventilation and oxygenation: Assess for changes in respiratory status     Problem: Skin/Tissue Integrity - Adult  Goal: Skin integrity remains intact  Outcome: Progressing  Flowsheets (Taken 1/10/2024 0740)  Skin Integrity Remains Intact: Monitor for areas of redness and/or skin breakdown     Problem: Genitourinary - Adult  Goal: Absence of urinary retention  Outcome: Progressing  Flowsheets (Taken 1/10/2024 0740)  Absence of urinary retention: Assess patient’s ability to void and empty bladder     Problem: Metabolic/Fluid and Electrolytes - Adult  Goal: Glucose maintained within prescribed range  Outcome: Progressing  Flowsheets (Taken 1/10/2024 0740)  Glucose maintained within prescribed range: Monitor blood glucose as ordered     
Monitor for areas of redness and/or skin breakdown  1/10/2024 1532 by Deysi Madera, RN  Outcome: Progressing  Flowsheets (Taken 1/10/2024 0740)  Skin Integrity Remains Intact: Monitor for areas of redness and/or skin breakdown     Problem: Genitourinary - Adult  Goal: Absence of urinary retention  1/10/2024 1532 by Deysi Madera, RN  Outcome: Progressing  Flowsheets (Taken 1/10/2024 0740)  Absence of urinary retention: Assess patient’s ability to void and empty bladder     Problem: Metabolic/Fluid and Electrolytes - Adult  Goal: Glucose maintained within prescribed range  1/10/2024 1532 by Deysi Madera, RN  Outcome: Progressing  Flowsheets (Taken 1/10/2024 0740)  Glucose maintained within prescribed range: Monitor blood glucose as ordered     
as ordered  1/9/2024 1854 by Vijaya Phillips, RN  Outcome: Progressing  Note: Blood sugars 100-200's today.  Pt on SSI.

## 2024-01-11 NOTE — PROGRESS NOTES
living situation : home  Expected Disposition: home  Estimated Date of Discharge: 2-3 days  Patient requires continued admission due to COVID-19/pna   Surrogate Decision Maker/ POA -       Personally reviewed Lab Studies and Imaging       Subjective:     Chief Complaint: Fall (Squad called for fall, unresponsive.  Pt found on all fours on the ground.  Eye surgery yesterday.  Pt denies CP but squad EKG shows possible STEMI )       Art Gaffney is a 84 y.o. male who presents with SOB found to have covid and pneumonia    Patient continues to make progress on his oxygenation.  Down to 7 L this morning.  Feels that his shortness of breath and cough are both improving.    Review of System     Review of Systems   Constitutional:  Negative for activity change, appetite change, chills, fatigue and fever.   HENT:  Negative for congestion, hearing loss, sinus pressure, sinus pain, sore throat, trouble swallowing and voice change.    Eyes:  Negative for visual disturbance.   Respiratory:  Positive for cough and shortness of breath. Negative for chest tightness and wheezing.    Cardiovascular:  Negative for chest pain, palpitations and leg swelling.   Gastrointestinal:  Negative for abdominal pain, constipation, diarrhea, nausea and vomiting.   Endocrine: Negative for cold intolerance, heat intolerance and polyuria.   Genitourinary:  Negative for dysuria.   Musculoskeletal:  Negative for arthralgias, back pain and gait problem.   Skin:  Negative for color change.   Neurological:  Negative for dizziness, weakness and headaches.   Psychiatric/Behavioral:  Negative for agitation and confusion. The patient is not nervous/anxious.      -negative unless mentioned above    I have reviewed all pertinent PMHx, PSHx, FamHx, SocialHx, medications, and allergies and updated history as appropriate.    Physical Exam   VITAL SIGNS:  /68   Pulse 79   Temp 97.5 °F (36.4 °C) (Oral)   Resp 18   Ht 1.803 m (5' 11\")   Wt 101.8 kg  Medical devices: None. Mediastinum/Heart: The mediastinal contours are normal. The heart appears normal in size. Lungs: Patchy opacities in the left lung base, atelectasis versus pneumonia. Correlate clinically. Pleura: No pleural effusion. No pneumothorax.     Patchy opacities in the left lung base, atelectasis versus pneumonia. Correlate clinically.       Recent Results (from the past 24 hour(s))   POCT Glucose    Collection Time: 01/10/24  4:45 PM   Result Value Ref Range    POC Glucose 239 (H) 70 - 99 mg/dl    Performed on ACCU-CHEK    POCT Glucose    Collection Time: 01/10/24  9:27 PM   Result Value Ref Range    POC Glucose 238 (H) 70 - 99 mg/dl    Performed on ACCU-CHEK    Basic Metabolic Panel    Collection Time: 01/11/24  6:15 AM   Result Value Ref Range    Sodium 135 (L) 136 - 145 mmol/L    Potassium 6.1 (HH) 3.5 - 5.1 mmol/L    Chloride 105 99 - 110 mmol/L    CO2 22 21 - 32 mmol/L    Anion Gap 8 3 - 16    Glucose 147 (H) 70 - 99 mg/dL    BUN 31 (H) 7 - 20 mg/dL    Creatinine 0.6 (L) 0.8 - 1.3 mg/dL    Est, Glom Filt Rate >60 >60    Calcium 6.7 (L) 8.3 - 10.6 mg/dL   Magnesium    Collection Time: 01/11/24  6:15 AM   Result Value Ref Range    Magnesium 1.40 (L) 1.80 - 2.40 mg/dL   Phosphorus    Collection Time: 01/11/24  6:15 AM   Result Value Ref Range    Phosphorus 3.1 2.5 - 4.9 mg/dL   CBC with Auto Differential    Collection Time: 01/11/24  6:15 AM   Result Value Ref Range    WBC 20.4 (H) 4.0 - 11.0 K/uL    RBC 3.34 (L) 4.20 - 5.90 M/uL    Hemoglobin 10.7 (L) 13.5 - 17.5 g/dL    Hematocrit 31.0 (L) 40.5 - 52.5 %    MCV 93.0 80.0 - 100.0 fL    MCH 32.1 26.0 - 34.0 pg    MCHC 34.6 31.0 - 36.0 g/dL    RDW 15.1 12.4 - 15.4 %    Platelets 705 (H) 135 - 450 K/uL    MPV 7.1 5.0 - 10.5 fL    PLATELET SLIDE REVIEW Increased     SLIDE REVIEW see below     Neutrophils % 80.0 %    Lymphocytes % 4.0 %    Monocytes % 7.0 %    Eosinophils % 0.0 %    Basophils % 0.0 %    Neutrophils Absolute 18.2 (H) 1.7 - 7.7 K/uL

## 2024-01-11 NOTE — CARE COORDINATION
[facility]    Referral received from  to follow for home care services.   I will follow for needs, and speak with patient to verify demos.    Sn/pt/ot    Sabine Pemberton RN, BSN -[phone]  [facility]   [phone] fax [phone]  [facility] -[phone]  [facility] -[phone]

## 2024-01-11 NOTE — CARE COORDINATION
Patient now weaned to 5 liters of home O2, down from 9 liters. HHC recommendations noted and patient has no preference in providers. Referral to [facility] to follow.

## 2024-01-12 LAB
ANION GAP SERPL CALCULATED.3IONS-SCNC: 10 MMOL/L (ref 3–16)
BACTERIA BLD CULT ORG #2: NORMAL
BACTERIA BLD CULT: NORMAL
BUN SERPL-MCNC: 33 MG/DL (ref 7–20)
CALCIUM SERPL-MCNC: 8.7 MG/DL (ref 8.3–10.6)
CHLORIDE SERPL-SCNC: 103 MMOL/L (ref 99–110)
CO2 SERPL-SCNC: 23 MMOL/L (ref 21–32)
CREAT SERPL-MCNC: 0.8 MG/DL (ref 0.8–1.3)
CRP SERPL-MCNC: 48.4 MG/L (ref 0–5.1)
GFR SERPLBLD CREATININE-BSD FMLA CKD-EPI: >60 ML/MIN/{1.73_M2}
GLUCOSE BLD-MCNC: 147 MG/DL (ref 70–99)
GLUCOSE BLD-MCNC: 226 MG/DL (ref 70–99)
GLUCOSE BLD-MCNC: 254 MG/DL (ref 70–99)
GLUCOSE BLD-MCNC: 276 MG/DL (ref 70–99)
GLUCOSE BLD-MCNC: 279 MG/DL (ref 70–99)
GLUCOSE SERPL-MCNC: 141 MG/DL (ref 70–99)
MAGNESIUM SERPL-MCNC: 2.4 MG/DL (ref 1.8–2.4)
PERFORMED ON: ABNORMAL
PHOSPHATE SERPL-MCNC: 2.8 MG/DL (ref 2.5–4.9)
POTASSIUM SERPL-SCNC: 4.3 MMOL/L (ref 3.5–5.1)
SODIUM SERPL-SCNC: 136 MMOL/L (ref 136–145)

## 2024-01-12 PROCEDURE — 94761 N-INVAS EAR/PLS OXIMETRY MLT: CPT

## 2024-01-12 PROCEDURE — 6370000000 HC RX 637 (ALT 250 FOR IP): Performed by: STUDENT IN AN ORGANIZED HEALTH CARE EDUCATION/TRAINING PROGRAM

## 2024-01-12 PROCEDURE — 86140 C-REACTIVE PROTEIN: CPT

## 2024-01-12 PROCEDURE — 2580000003 HC RX 258: Performed by: NURSE PRACTITIONER

## 2024-01-12 PROCEDURE — 6370000000 HC RX 637 (ALT 250 FOR IP): Performed by: INTERNAL MEDICINE

## 2024-01-12 PROCEDURE — 94640 AIRWAY INHALATION TREATMENT: CPT

## 2024-01-12 PROCEDURE — 84100 ASSAY OF PHOSPHORUS: CPT

## 2024-01-12 PROCEDURE — 2580000003 HC RX 258: Performed by: STUDENT IN AN ORGANIZED HEALTH CARE EDUCATION/TRAINING PROGRAM

## 2024-01-12 PROCEDURE — 6360000002 HC RX W HCPCS: Performed by: INTERNAL MEDICINE

## 2024-01-12 PROCEDURE — 2700000000 HC OXYGEN THERAPY PER DAY

## 2024-01-12 PROCEDURE — 83735 ASSAY OF MAGNESIUM: CPT

## 2024-01-12 PROCEDURE — 80048 BASIC METABOLIC PNL TOTAL CA: CPT

## 2024-01-12 PROCEDURE — 6360000002 HC RX W HCPCS: Performed by: STUDENT IN AN ORGANIZED HEALTH CARE EDUCATION/TRAINING PROGRAM

## 2024-01-12 PROCEDURE — 97535 SELF CARE MNGMENT TRAINING: CPT

## 2024-01-12 PROCEDURE — 2580000003 HC RX 258: Performed by: INTERNAL MEDICINE

## 2024-01-12 PROCEDURE — 2060000000 HC ICU INTERMEDIATE R&B

## 2024-01-12 RX ADMIN — BARICITINIB 4 MG: 2 TABLET, FILM COATED ORAL at 10:31

## 2024-01-12 RX ADMIN — Medication 2 PUFF: at 16:54

## 2024-01-12 RX ADMIN — GUAIFENESIN AND DEXTROMETHORPHAN 10 ML: 100; 10 SYRUP ORAL at 08:36

## 2024-01-12 RX ADMIN — Medication 2 PUFF: at 21:10

## 2024-01-12 RX ADMIN — SODIUM CHLORIDE: 9 INJECTION, SOLUTION INTRAVENOUS at 08:49

## 2024-01-12 RX ADMIN — FOLIC ACID 1 MG: 1 TABLET ORAL at 08:36

## 2024-01-12 RX ADMIN — INSULIN LISPRO 4 UNITS: 100 INJECTION, SOLUTION INTRAVENOUS; SUBCUTANEOUS at 17:11

## 2024-01-12 RX ADMIN — SODIUM CHLORIDE: 9 INJECTION, SOLUTION INTRAVENOUS at 16:47

## 2024-01-12 RX ADMIN — Medication 2 PUFF: at 09:00

## 2024-01-12 RX ADMIN — METOPROLOL TARTRATE 25 MG: 25 TABLET, FILM COATED ORAL at 08:35

## 2024-01-12 RX ADMIN — SODIUM CHLORIDE, PRESERVATIVE FREE 10 ML: 5 INJECTION INTRAVENOUS at 08:35

## 2024-01-12 RX ADMIN — GUAIFENESIN AND DEXTROMETHORPHAN 10 ML: 100; 10 SYRUP ORAL at 13:44

## 2024-01-12 RX ADMIN — GUAIFENESIN AND DEXTROMETHORPHAN 10 ML: 100; 10 SYRUP ORAL at 22:27

## 2024-01-12 RX ADMIN — LEVOTHYROXINE SODIUM 224 MCG: 112 TABLET ORAL at 08:35

## 2024-01-12 RX ADMIN — SODIUM CHLORIDE, PRESERVATIVE FREE 10 ML: 5 INJECTION INTRAVENOUS at 22:33

## 2024-01-12 RX ADMIN — CEFEPIME 2000 MG: 2 INJECTION, POWDER, FOR SOLUTION INTRAVENOUS at 16:49

## 2024-01-12 RX ADMIN — CEFEPIME 2000 MG: 2 INJECTION, POWDER, FOR SOLUTION INTRAVENOUS at 08:50

## 2024-01-12 RX ADMIN — POLYETHYLENE GLYCOL 3350 17 G: 17 POWDER, FOR SOLUTION ORAL at 18:12

## 2024-01-12 RX ADMIN — Medication 2 PUFF: at 09:01

## 2024-01-12 RX ADMIN — TAMSULOSIN HYDROCHLORIDE 0.4 MG: 0.4 CAPSULE ORAL at 08:36

## 2024-01-12 RX ADMIN — Medication 2 PUFF: at 16:56

## 2024-01-12 RX ADMIN — INSULIN LISPRO 4 UNITS: 100 INJECTION, SOLUTION INTRAVENOUS; SUBCUTANEOUS at 13:44

## 2024-01-12 RX ADMIN — SODIUM CHLORIDE, PRESERVATIVE FREE 10 ML: 5 INJECTION INTRAVENOUS at 08:38

## 2024-01-12 RX ADMIN — ENOXAPARIN SODIUM 30 MG: 100 INJECTION SUBCUTANEOUS at 22:26

## 2024-01-12 RX ADMIN — METOPROLOL TARTRATE 25 MG: 25 TABLET, FILM COATED ORAL at 22:27

## 2024-01-12 RX ADMIN — GUAIFENESIN AND DEXTROMETHORPHAN 10 ML: 100; 10 SYRUP ORAL at 16:44

## 2024-01-12 RX ADMIN — Medication 2 PUFF: at 12:23

## 2024-01-12 RX ADMIN — DEXAMETHASONE SODIUM PHOSPHATE 20 MG: 4 INJECTION, SOLUTION INTRAMUSCULAR; INTRAVENOUS at 10:36

## 2024-01-12 RX ADMIN — ATORVASTATIN CALCIUM 40 MG: 40 TABLET, FILM COATED ORAL at 22:27

## 2024-01-12 RX ADMIN — ENOXAPARIN SODIUM 30 MG: 100 INJECTION SUBCUTANEOUS at 08:36

## 2024-01-12 RX ADMIN — FERROUS SULFATE TAB 325 MG (65 MG ELEMENTAL FE) 325 MG: 325 (65 FE) TAB at 08:36

## 2024-01-12 ASSESSMENT — PAIN SCALES - GENERAL
PAINLEVEL_OUTOF10: 0
PAINLEVEL_OUTOF10: 3
PAINLEVEL_OUTOF10: 0

## 2024-01-12 ASSESSMENT — ENCOUNTER SYMPTOMS
DIARRHEA: 0
COUGH: 1
TROUBLE SWALLOWING: 0
BACK PAIN: 0
NAUSEA: 0
SORE THROAT: 0
ABDOMINAL PAIN: 0
SHORTNESS OF BREATH: 1
VOICE CHANGE: 0
WHEEZING: 0
COLOR CHANGE: 0
CHEST TIGHTNESS: 0
VOMITING: 0
SINUS PRESSURE: 0
CONSTIPATION: 0
SINUS PAIN: 0

## 2024-01-12 NOTE — PROGRESS NOTES
In-Patient Progress Note    Patient:  Art Gaffney 84 y.o. male MRN: [id number]     Date of Service: 1/12/2024    Hospital Day: 7      Chief complaint: had concerns including Fall (Squad called for fall, unresponsive.  Pt found on all fours on the ground.  Eye surgery yesterday.  Pt denies CP but squad EKG shows possible STEMI ).      Assessment and Plan   Art Gaffney, a 84 y.o. male, was admitted on 1/6/2024 with complaints of had concerns including Fall (Squad called for fall, unresponsive.  Pt found on all fours on the ground.  Eye surgery yesterday.  Pt denies CP but squad EKG shows possible STEMI ).    Assessment and plan    Acute hypoxic respiratory failure secondary to COVID-19  Multilobar pneumonia  COPD exacerbation  -presents with sob and fatigue; hypoxic  -Supplemental Oxygen as needed: on 4L  - increase dexamethasone dosing 20 mg daily for 5 days and 10 mg for 5 days  -Continue Cefepime  - MRSA negative and Vanco stopped  -Continue baricitinib  -Scheduled ipratropium and albuterol PRN  -robitussin DM for cough    Acute encephalopathy  -likely in the setting of COVID-19/pna  -monitor    Hypertension  - Hold home antihypertensives, gentle IV fluid hydration, encourage p.o. intake  - resume meds as able    Diabetes mellitus type 2  - Low sliding scale insulin 3 times daily ACHS  -POCT checks  - hypoglycemia treatment orders    Hypothyroidism  -resume levothyroxine    BRYON on CKD  -Cr 1.4 at presentation -improved to 1.1  -monitor renal function closely    History of rheumatoid arthritis  -monitor    Right eye cataract status post recent surgical repair  -PRN eye drops          Diet ADULT ORAL NUTRITION SUPPLEMENT; Breakfast, Dinner; Diabetic Oral Supplement  ADULT DIET; Regular; 5 carb choices (75 gm/meal)   DVT Prophylaxis [] Lovenox, []  Heparin, [] SCDs, [] Ambulation,  [] Eliquis, [] Xarelto  [] Coumadin   Peptic ulcer prophylaxis -   Code Status Full Code   Disposition From / Current

## 2024-01-12 NOTE — PROGRESS NOTES
Physical Therapy  Charted pt and attempted to see pt this afternoon. RN present in room upon arrival. Pt refused therapy and sternly reported that he would not have anyone interrupt his meals. Pt denied wanting to sit up in chair to eat his dinner. Attempted to educated pt on importance of mobility and sitting in chair. Pt continued to insisted that he would stay in bed and eat his meal.  Will re-attempt at a later date         Blanka Quezada, PT

## 2024-01-12 NOTE — PROGRESS NOTES
procurement;Positioning     Restrictions  Restrictions/Precautions  Restrictions/Precautions: Up as Tolerated;General Precautions;Fall Risk;Contact Precautions;Isolation  Required Braces or Orthoses?: No  Position Activity Restriction  Other position/activity restrictions: Droplet(+) precautions 2* COVID+, O2, IV lines, continuous pulse ox    Subjective   Subjective  Subjective: Patient seated EOB upon OT arrival. Patient states that he hopes he does not require supplemental O2 when he leaves the hospital  Pain: Patient reports moderate generalized arthritic pain  Orientation  Overall Orientation Status: Within Functional Limits  Orientation Level: Oriented to place;Oriented to time;Oriented to situation;Oriented to person  Pain: patient reports moderate generalized arthritic pain  Cognition  Overall Cognitive Status: Exceptions  Arousal/Alertness: Appropriate responses to stimuli  Following Commands: Follows all commands without difficulty  Attention Span: Appears intact  Memory: Appears intact  Safety Judgement: Decreased awareness of need for assistance  Initiation: Does not require cues  Sequencing: Does not require cues        Objective    Vitals  Vitals  Pulse: 75  Heart Rate Source: Monitor  BP: 138/71  BP Location: Left upper arm  BP Method: Automatic  Patient Position: Sitting  MAP (Calculated): 93  SpO2: 95 %  O2 Device: Nasal cannula  Comment: SpO2 ranges from 86%-94% during OT session on 3L O2  Balance  Sitting: Intact  Standing: Impaired  Standing - Static: Good  Standing - Dynamic: Fair;Constant support  Transfer Training  Transfer Training: Yes  Overall Level of Assistance: Additional time;Adaptive equipment;Stand-by assistance (FWW)  Interventions: Safety awareness training;Verbal cues;Visual cues  Sit to Stand: Stand-by assistance;Adaptive equipment;Additional time  Stand to Sit: Stand-by assistance;Adaptive equipment;Additional time  Bed to Chair: Contact-guard assistance;Additional time;Adaptive  equipment  Gait  Overall Level of Assistance: Contact-guard assistance;Adaptive equipment;Additional time  Distance (ft): 50 Feet  Assistive Device: Gait belt;Walker, rolling;Other (comment) (3L O2)  Interventions: Safety awareness training;Verbal cues;Tactile cues  Base of Support: Widened  Speed/Pauly: Pace decreased (< 100 feet/min);Shuffled;Slow  Step Length: Left shortened;Right shortened  Gait Abnormalities: Decreased step clearance;Trunk sway increased     ADL  Feeding: Independent  Grooming: Stand by assistance  Grooming Skilled Clinical Factors: SBA in stance at sink for oral care and washing hands  Toileting: Stand by assistance  Toileting Skilled Clinical Factors: SBA in stance at toilet for urination. Intermittent UE support on FWW/grab bar  Functional Mobility: Contact guard assistance;Adaptive equipment;Increased time to complete  Functional Mobility Skilled Clinical Factors: CGA 50 feet using FWW and 3L O2  Additional Comments: Patient able to tolerate 12 consecutive minutes of standing activity prior to requiring seated rest break during ADLs. SpO2 ranges from 86%-94% during OT session on 3L O2        Safety Devices  Type of Devices: Nurse notified;All fall risk precautions in place;Gait belt;Chair alarm in place;Call light within reach;Left in chair;Patient at risk for falls     Patient Education  Education Given To: Patient  Education Provided: Role of Therapy;Transfer Training;Equipment;Plan of Care;Precautions;ADL Adaptive Strategies;Fall Prevention Strategies;Energy Conservation  Education Provided Comments: home safety concerns, OT discharge recommendations, safe use of FWW during ambulation, O2 cord management during ambulation.  Education Method: Demonstration;Verbal  Barriers to Learning: None  Education Outcome: Verbalized understanding;Continued education needed    Goals  Short Term Goals  Time Frame for Short Term Goals: 1/14, unless otherwise noted - ALL GOALS ONGOING 1/10  Short Term

## 2024-01-12 NOTE — CARE COORDINATION
Patient now weaned to 3 liters nasal cannula. Covid positive. [facility] following for home care needs. Will need HHC order and signed ECOC at d/c . Monitoring for need for home O2 however likely will be weaned to RA.

## 2024-01-13 VITALS
WEIGHT: 230.3 LBS | SYSTOLIC BLOOD PRESSURE: 143 MMHG | HEIGHT: 71 IN | RESPIRATION RATE: 20 BRPM | HEART RATE: 67 BPM | OXYGEN SATURATION: 93 % | DIASTOLIC BLOOD PRESSURE: 67 MMHG | TEMPERATURE: 97.9 F | BODY MASS INDEX: 32.24 KG/M2

## 2024-01-13 LAB
ANION GAP SERPL CALCULATED.3IONS-SCNC: 8 MMOL/L (ref 3–16)
ANION GAP SERPL CALCULATED.3IONS-SCNC: 8 MMOL/L (ref 3–16)
ANISOCYTOSIS BLD QL SMEAR: ABNORMAL
BASOPHILS # BLD: 0.7 K/UL (ref 0–0.2)
BASOPHILS NFR BLD: 3 %
BUN SERPL-MCNC: 31 MG/DL (ref 7–20)
BUN SERPL-MCNC: 32 MG/DL (ref 7–20)
CALCIUM SERPL-MCNC: 8.4 MG/DL (ref 8.3–10.6)
CALCIUM SERPL-MCNC: 8.5 MG/DL (ref 8.3–10.6)
CHLORIDE SERPL-SCNC: 105 MMOL/L (ref 99–110)
CHLORIDE SERPL-SCNC: 106 MMOL/L (ref 99–110)
CO2 SERPL-SCNC: 24 MMOL/L (ref 21–32)
CO2 SERPL-SCNC: 24 MMOL/L (ref 21–32)
CREAT SERPL-MCNC: 0.7 MG/DL (ref 0.8–1.3)
CREAT SERPL-MCNC: 0.7 MG/DL (ref 0.8–1.3)
CRP SERPL-MCNC: 26.7 MG/L (ref 0–5.1)
DEPRECATED RDW RBC AUTO: 15.3 % (ref 12.4–15.4)
EOSINOPHIL # BLD: 0.4 K/UL (ref 0–0.6)
EOSINOPHIL NFR BLD: 2 %
GFR SERPLBLD CREATININE-BSD FMLA CKD-EPI: >60 ML/MIN/{1.73_M2}
GFR SERPLBLD CREATININE-BSD FMLA CKD-EPI: >60 ML/MIN/{1.73_M2}
GLUCOSE BLD-MCNC: 153 MG/DL (ref 70–99)
GLUCOSE BLD-MCNC: 213 MG/DL (ref 70–99)
GLUCOSE SERPL-MCNC: 129 MG/DL (ref 70–99)
GLUCOSE SERPL-MCNC: 139 MG/DL (ref 70–99)
HCT VFR BLD AUTO: 29.1 % (ref 40.5–52.5)
HGB BLD-MCNC: 10.2 G/DL (ref 13.5–17.5)
LYMPHOCYTES # BLD: 1.3 K/UL (ref 1–5.1)
LYMPHOCYTES NFR BLD: 4 %
MACROCYTES BLD QL SMEAR: ABNORMAL
MAGNESIUM SERPL-MCNC: 2.4 MG/DL (ref 1.8–2.4)
MCH RBC QN AUTO: 34.7 PG (ref 26–34)
MCHC RBC AUTO-ENTMCNC: 35.1 G/DL (ref 31–36)
MCV RBC AUTO: 98.9 FL (ref 80–100)
METAMYELOCYTES NFR BLD MANUAL: 4 %
MICROCYTES BLD QL SMEAR: ABNORMAL
MONOCYTES # BLD: 1.1 K/UL (ref 0–1.3)
MONOCYTES NFR BLD: 5 %
NEUTROPHILS # BLD: 18.6 K/UL (ref 1.7–7.7)
NEUTROPHILS NFR BLD: 64 %
NEUTS BAND NFR BLD MANUAL: 16 % (ref 0–7)
NRBC BLD-RTO: 1 /100 WBC
PATH INTERP BLD-IMP: YES
PERFORMED ON: ABNORMAL
PERFORMED ON: ABNORMAL
PHOSPHATE SERPL-MCNC: 2.9 MG/DL (ref 2.5–4.9)
PLATELET # BLD AUTO: 753 K/UL (ref 135–450)
PLATELET BLD QL SMEAR: ABNORMAL
PMV BLD AUTO: 6.6 FL (ref 5–10.5)
POLYCHROMASIA BLD QL SMEAR: ABNORMAL
POTASSIUM SERPL-SCNC: 4.5 MMOL/L (ref 3.5–5.1)
POTASSIUM SERPL-SCNC: 4.8 MMOL/L (ref 3.5–5.1)
RBC # BLD AUTO: 2.94 M/UL (ref 4.2–5.9)
SLIDE REVIEW: ABNORMAL
SODIUM SERPL-SCNC: 137 MMOL/L (ref 136–145)
SODIUM SERPL-SCNC: 138 MMOL/L (ref 136–145)
VARIANT LYMPHS NFR BLD MANUAL: 2 % (ref 0–6)
WBC # BLD AUTO: 22.2 K/UL (ref 4–11)

## 2024-01-13 PROCEDURE — 2580000003 HC RX 258: Performed by: NURSE PRACTITIONER

## 2024-01-13 PROCEDURE — 94761 N-INVAS EAR/PLS OXIMETRY MLT: CPT

## 2024-01-13 PROCEDURE — 36415 COLL VENOUS BLD VENIPUNCTURE: CPT

## 2024-01-13 PROCEDURE — 6360000002 HC RX W HCPCS: Performed by: STUDENT IN AN ORGANIZED HEALTH CARE EDUCATION/TRAINING PROGRAM

## 2024-01-13 PROCEDURE — 6370000000 HC RX 637 (ALT 250 FOR IP): Performed by: INTERNAL MEDICINE

## 2024-01-13 PROCEDURE — 94640 AIRWAY INHALATION TREATMENT: CPT

## 2024-01-13 PROCEDURE — 2700000000 HC OXYGEN THERAPY PER DAY

## 2024-01-13 PROCEDURE — 83735 ASSAY OF MAGNESIUM: CPT

## 2024-01-13 PROCEDURE — 6360000002 HC RX W HCPCS: Performed by: INTERNAL MEDICINE

## 2024-01-13 PROCEDURE — 2580000003 HC RX 258: Performed by: STUDENT IN AN ORGANIZED HEALTH CARE EDUCATION/TRAINING PROGRAM

## 2024-01-13 PROCEDURE — 86140 C-REACTIVE PROTEIN: CPT

## 2024-01-13 PROCEDURE — 84100 ASSAY OF PHOSPHORUS: CPT

## 2024-01-13 PROCEDURE — 6370000000 HC RX 637 (ALT 250 FOR IP): Performed by: STUDENT IN AN ORGANIZED HEALTH CARE EDUCATION/TRAINING PROGRAM

## 2024-01-13 PROCEDURE — 80048 BASIC METABOLIC PNL TOTAL CA: CPT

## 2024-01-13 PROCEDURE — 2580000003 HC RX 258: Performed by: INTERNAL MEDICINE

## 2024-01-13 PROCEDURE — 85025 COMPLETE CBC W/AUTO DIFF WBC: CPT

## 2024-01-13 RX ORDER — CEFDINIR 300 MG/1
300 CAPSULE ORAL 2 TIMES DAILY
Qty: 10 CAPSULE | Refills: 0 | Status: SHIPPED | OUTPATIENT
Start: 2024-01-13 | End: 2024-01-18

## 2024-01-13 RX ORDER — DEXAMETHASONE 6 MG/1
6 TABLET ORAL
Qty: 5 TABLET | Refills: 0 | Status: SHIPPED | OUTPATIENT
Start: 2024-01-13 | End: 2024-01-18

## 2024-01-13 RX ADMIN — Medication 2 PUFF: at 07:48

## 2024-01-13 RX ADMIN — Medication 2 PUFF: at 11:47

## 2024-01-13 RX ADMIN — DEXAMETHASONE SODIUM PHOSPHATE 20 MG: 4 INJECTION, SOLUTION INTRAMUSCULAR; INTRAVENOUS at 10:13

## 2024-01-13 RX ADMIN — CEFEPIME 2000 MG: 2 INJECTION, POWDER, FOR SOLUTION INTRAVENOUS at 00:06

## 2024-01-13 RX ADMIN — CEFEPIME 2000 MG: 2 INJECTION, POWDER, FOR SOLUTION INTRAVENOUS at 10:55

## 2024-01-13 RX ADMIN — FERROUS SULFATE TAB 325 MG (65 MG ELEMENTAL FE) 325 MG: 325 (65 FE) TAB at 09:56

## 2024-01-13 RX ADMIN — GUAIFENESIN AND DEXTROMETHORPHAN 10 ML: 100; 10 SYRUP ORAL at 12:36

## 2024-01-13 RX ADMIN — ACETAMINOPHEN 650 MG: 325 TABLET ORAL at 10:52

## 2024-01-13 RX ADMIN — ENOXAPARIN SODIUM 30 MG: 100 INJECTION SUBCUTANEOUS at 09:56

## 2024-01-13 RX ADMIN — BARICITINIB 4 MG: 2 TABLET, FILM COATED ORAL at 10:52

## 2024-01-13 RX ADMIN — INSULIN LISPRO 2 UNITS: 100 INJECTION, SOLUTION INTRAVENOUS; SUBCUTANEOUS at 12:36

## 2024-01-13 RX ADMIN — SODIUM CHLORIDE, PRESERVATIVE FREE 10 ML: 5 INJECTION INTRAVENOUS at 09:59

## 2024-01-13 RX ADMIN — METOPROLOL TARTRATE 25 MG: 25 TABLET, FILM COATED ORAL at 09:56

## 2024-01-13 RX ADMIN — SODIUM CHLORIDE, PRESERVATIVE FREE 10 ML: 5 INJECTION INTRAVENOUS at 10:00

## 2024-01-13 RX ADMIN — TAMSULOSIN HYDROCHLORIDE 0.4 MG: 0.4 CAPSULE ORAL at 09:56

## 2024-01-13 RX ADMIN — GUAIFENESIN AND DEXTROMETHORPHAN 10 ML: 100; 10 SYRUP ORAL at 09:56

## 2024-01-13 RX ADMIN — FOLIC ACID 1 MG: 1 TABLET ORAL at 09:56

## 2024-01-13 RX ADMIN — LEVOTHYROXINE SODIUM 224 MCG: 112 TABLET ORAL at 05:30

## 2024-01-13 RX ADMIN — Medication 2 PUFF: at 11:48

## 2024-01-13 ASSESSMENT — PAIN SCALES - GENERAL: PAINLEVEL_OUTOF10: 6

## 2024-01-13 NOTE — DISCHARGE SUMMARY
V2.0  Discharge Summary    Name:  Art Gaffney /Age/Sex: 1940 (84 y.o. male)   Admit Date: 2024  Discharge Date: 24    MRN & CSN:  [id number] & [id number] Encounter Date and Time 24 12:15 PM EST    Attending:  Olivia Yeung MD Discharging Provider: Oilvia Yeung MD       Hospital Course:     Brief HPI: Art Gaffney is a 84 y.o. male with pmh of COPD, rheumatoid arthritis, hypertension, diabetes mellitus, hypothyroidism, CKD 3 who presented to ED with complaints of confusion, fatigue, shortness of breath.  Patient is confused, history supplemented by wife at bedside  - Patient has reportedly had symptoms of cough productive of yellowish sputum, fatigue, fevers, chills, diarrhea, progressively worsening confusion over the past 2 weeks    Brief Problem Based Course:   Acute hypoxic respiratory failure secondary to COVID-19  Multilobar pneumonia  COPD exacerbation  -presents with sob and fatigue; hypoxic  -Supplemental Oxygen as needed: on 4L  - discharge on decadron 6 mg PO daily  - was on cefepime while inpatient and dc on cefdinir  - MRSA negative and Vanco stopped  -Scheduled ipratropium and albuterol PRN  -robitussin DM for cough  -Patient remained on 2 L oxygen via nasal cannula this morning.  Qualify for home O2 eval and will discharge him with oxygen, discussed with patient regarding checking SpO2 at home and follow-up with primary care physician to further evaluate need of oxygen as he continues to clinically improved.        Hypertension  - resume home meds on discharge     Diabetes mellitus type 2  - Low sliding scale insulin 3 times daily ACHS  -POCT checks  - hypoglycemia treatment orders     Hypothyroidism  -resume levothyroxine     BRYON on CKD  -Cr 1.4 at presentation -improved to 1.1  -monitor renal function closely     History of rheumatoid arthritis  -monitor     Right eye cataract status post recent surgical repair  -PRN eye drops         The patient expressed appropriate  patient 40 minutes    Electronically signed by Olivia Yeung MD on 1/13/2024 at 12:15 PM

## 2024-01-13 NOTE — DISCHARGE INSTR - COC
Continuity of Care Form    Patient Name: Art Gaffney   :  1940  MRN:  [id number]    Admit date:  2024  Discharge date:  ***    Code Status Order: Full Code   Advance Directives:     Admitting Physician:  Amos Gould MD  PCP: Jeromy Salvador DO    Discharging Nurse: ***  Discharging Hospital Unit/Room#: 0426/0426-01  Discharging Unit Phone Number: ***    Emergency Contact:   Extended Emergency Contact Information  Primary Emergency Contact: Mahi Gaffney  Address: [address] [place] [address] [place] [address] [facility]  Home Phone: [phone]  Mobile Phone: [phone]  Relation: Spouse  Secondary Emergency Contact: Virgen Galeano  Home Phone: [phone]  Relation: Child    Past Surgical History:  Past Surgical History:   Procedure Laterality Date    ABDOMEN SURGERY  2012    abdominal wall exploration,w/ mesh;lysis of adhesions    CARPAL TUNNEL RELEASE Left 2002    CARPAL TUNNEL RELEASE Right 2005    COLON SURGERY  2002    resection    COLONOSCOPY  2015    diverticulosis    COLONOSCOPY  2001    COLONOSCOPY  2009    CORONARY ANGIOPLASTY WITH STENT PLACEMENT  2015    CYSTOSCOPY  2023    CYSTOSCOPY, URETHRAL DILATATION    CYSTOSCOPY N/A 2023    CYSTOSCOPY, URETHRAL DILATATION performed by Juan Spencer MD at [facility] OR    ELBOW SURGERY Right 1987    tennis elbow    EYE SURGERY Left 10/01/1985    cataract with lens implant    EYE SURGERY Right 1987    cataract with lens implant    HERNIA REPAIR  2005    HERNIA REPAIR  2006    HERNIA REPAIR  2012    HIP SURGERY Right 2023    RIGHT TROCHANTERIC BURSA  INJECTION WITH ULTRASOUND performed by Bin Garcia MD at [facility] OR    JOINT REPLACEMENT Right 2016    Right total knee replacement    KNEE SURGERY Right 2016    Dr. Fields--TKR    LUMBAR LAMINECTOMY  2018    LUMBAR LAMINECTOMIES , BILATERAL LAMINOTOMIES  Influenza, High Dose (Fluzone 65 yrs and older) 11/21/2016, 10/12/2017, 10/04/2018    Influenza, Triv, inactivated, subunit, adjuvanted, IM (Fluad 65 yrs and older) 10/07/2019    Pneumococcal Conjugate 7-valent (Prevnar7) 02/07/2010    Pneumococcal, PCV-13, PREVNAR 13, (age 6w+), IM, 0.5mL 10/12/2017    Pneumococcal, PPSV23, PNEUMOVAX 23, (age 2y+), SC/IM, 0.5mL 11/21/2016, 04/05/2021    TDaP, ADACEL (age 10y-64y), BOOSTRIX (age 10y+), IM, 0.5mL 10/08/2019    Zoster Live (Zostavax) 11/21/2014    Zoster Recombinant (Shingrix) 02/05/2020, 05/05/2020, 06/25/2020       Active Problems:  Patient Active Problem List   Diagnosis Code    Acquired hypothyroidism E03.9    History of prostate cancer Z85.46    Osteopenia M85.80    Spinal stenosis, lumbar region, with neurogenic claudication M48.062    Coronary artery disease of native artery of native heart with stable angina pectoris ([facility]) I25.118    HTN (hypertension) I10    DM2 (diabetes mellitus, type 2) ([facility]) E11.9    LEIA (obstructive sleep apnea) G47.33    Lumbar degenerative disc disease  severe L23 TO L5S1 , WORSE L45  M51.36    Lumbar stenosis with neurogenic claudication  severe L23 TO L5S1 , WORSE L45  M48.062    Spondylolisthesis of lumbar region M43.16    Lumbar spondylosis with myelopathy M47.16    Spinal stenosis of lumbar region with radiculopathy M48.061, M54.16    Right hip pain M25.551    Asymptomatic varicose veins of both lower extremities I83.93    Gastroesophageal reflux disease with esophagitis K21.00    Lumbar spondylosis M47.816    Sacroiliitis ([facility]) M46.1    Hyperlipidemia due to type 2 diabetes mellitus ([facility]) E11.69, E78.5    Rheumatoid arthritis of multiple sites with negative rheumatoid factor ([facility]) M06.09    Primary osteoarthritis involving multiple joints M15.9    Class 3 obesity ([facility]) E66.01    Anemia of chronic disease D63.8    Retinopathy due to secondary DM ([facility]) E13.319    Disc displacement, lumbar M51.26    Lumbar facet arthropathy M47.816

## 2024-01-13 NOTE — CARE COORDINATION
Primary RN updated writer, pt needs new home O2.  ZONIA Carreon     [id number] Addendum:  CASE MANAGEMENT DISCHARGE SUMMARY    Discharge to: Home, pt declined home care, does not think he needs it now    New Durable Medical Equipment ordered/agency: new continuous home oxygen via [employer].  Writer spoke with on call [employer] liaison, provided all details and they will pull from [employer], confirmed they will call if they need anything else.  Writer spoke with pt, confirmed he will use home O2.  Writer met with pt, explained/showed how to use portable concentrator and emergency tank, both to be taken home with pt.  Pt agreed and had no additional questions.  Writer confirmed above with primary RN Alexandra.    Transportation:    Family/car:Yes, wife on her way  ZONIA Carreon

## 2024-01-13 NOTE — FLOWSHEET NOTE
PICC line removed without difficulty. Discharge instructions given to patient and patient verbalized understanding. Follow up appointment info included in d/c instructions and pt verbalized understanding to follow up with PCP. All education completed with pt on Covid and new medications. Pt d/c with all belongings. Pt d/c to home with home oxygen.  Alexandra Blanchard

## 2024-01-13 NOTE — PROGRESS NOTES
O2 saturation at REST on ROOM AIR = 90%    O2 saturation with AMBULATION of 15 feet on ROOM AIR = 83%  O2 saturation with AMBULATION on 2 liter/min = 92%     DCP notified.

## 2024-01-15 ENCOUNTER — TELEPHONE (OUTPATIENT)
Dept: FAMILY MEDICINE CLINIC | Age: 84
End: 2024-01-15

## 2024-01-15 ENCOUNTER — CARE COORDINATION (OUTPATIENT)
Dept: CASE MANAGEMENT | Age: 84
End: 2024-01-15

## 2024-01-15 DIAGNOSIS — U07.1 ACUTE COVID-19: Primary | ICD-10-CM

## 2024-01-15 LAB — PATH INTERP BLD-IMP: NORMAL

## 2024-01-15 PROCEDURE — 1111F DSCHRG MED/CURRENT MED MERGE: CPT | Performed by: FAMILY MEDICINE

## 2024-01-15 NOTE — TELEPHONE ENCOUNTER
Care Transitions Initial Follow Up Call    Outreach made within 2 business days of discharge: Yes    Patient: Art Gaffney Patient : 1940   MRN: [id number]  Reason for Admission: There are no discharge diagnoses documented for the most recent discharge.  Discharge Date: 24       Spoke with: CTN to contact patient.    Discharge department/facility: [facility] Interactive Patient Contact:  Was patient able to fill all prescriptions: Yes  Was patient instructed to bring all medications to the follow-up visit: Yes  Is patient taking all medications as directed in the discharge summary? Yes  Does patient understand their discharge instructions: Yes  Does patient have questions or concerns that need addressed prior to 7-14 day follow up office visit: no    Scheduled appointment with PCP within 7-14 days    Follow Up  Future Appointments   Date Time Provider Department Center   2024  2:00 PM Lubna Chiu DO [facility]   2024  8:30 AM Jeromy Salvador DO [facility] Michelle Motz, LPN

## 2024-01-16 ENCOUNTER — OFFICE VISIT (OUTPATIENT)
Dept: FAMILY MEDICINE CLINIC | Age: 84
End: 2024-01-16

## 2024-01-16 ENCOUNTER — PATIENT MESSAGE (OUTPATIENT)
Dept: FAMILY MEDICINE CLINIC | Age: 84
End: 2024-01-16

## 2024-01-16 VITALS
WEIGHT: 230 LBS | DIASTOLIC BLOOD PRESSURE: 72 MMHG | HEART RATE: 69 BPM | SYSTOLIC BLOOD PRESSURE: 120 MMHG | OXYGEN SATURATION: 92 % | BODY MASS INDEX: 32.08 KG/M2

## 2024-01-16 DIAGNOSIS — I25.118 CORONARY ARTERY DISEASE OF NATIVE ARTERY OF NATIVE HEART WITH STABLE ANGINA PECTORIS (HCC): Primary | ICD-10-CM

## 2024-01-16 DIAGNOSIS — Z09 HOSPITAL DISCHARGE FOLLOW-UP: ICD-10-CM

## 2024-01-16 DIAGNOSIS — U07.1 ACUTE COVID-19: Primary | ICD-10-CM

## 2024-01-16 DIAGNOSIS — E11.319 TYPE 2 DIABETES MELLITUS WITH LEFT EYE AFFECTED BY RETINOPATHY WITHOUT MACULAR EDEMA, WITHOUT LONG-TERM CURRENT USE OF INSULIN, UNSPECIFIED RETINOPATHY SEVERITY (HCC): ICD-10-CM

## 2024-01-16 DIAGNOSIS — M06.09 RHEUMATOID ARTHRITIS OF MULTIPLE SITES WITH NEGATIVE RHEUMATOID FACTOR (HCC): ICD-10-CM

## 2024-01-16 NOTE — TELEPHONE ENCOUNTER
From: Art Gaffney  To: Dr. Jeromy Salvador  Sent: 1/16/2024 11:59 AM EST  Subject: metoprolol tartrate 25 MG tablet    I am changing prescriptions to [facility].  Can you call or fax a Rx for :  metoprolol tartrate 25 MG tablet, 180 pills to   Phone: [phone]  Fax: [phone]    Thanks,  Art Gaffney (Chuck)  [phone]

## 2024-01-16 NOTE — TELEPHONE ENCOUNTER
Refill Request     CONFIRM preferred pharmacy with the patient.    If Mail Order Rx - Pend for 90 day refill.      Last Seen: Last Seen Department: 1/16/2024  Last Seen by PCP: 10/23/2023    Last Written: 1/17/2023    If no future appointment scheduled:  Review the last OV with PCP and review information for follow-up visit,  Route STAFF MESSAGE with patient name to the  Pool for scheduling with the following information:            -  Timing of next visit           -  Visit type ie Physical, OV, etc           -  Diagnoses/Reason ie. COPD, HTN - Do not use MEDICATION, Follow-up or CHECK UP - Give reason for visit      Next Appointment:   Future Appointments   Date Time Provider Department Center   4/24/2024  8:30 AM Jeromy Salvador, DO [facility]       Message sent to  to schedule appt with patient?  NO      Requested Prescriptions     Pending Prescriptions Disp Refills    metoprolol tartrate (LOPRESSOR) 25 MG tablet 180 tablet 3     Sig: TAKE 1 TABLET BY MOUTH TWICE A DAY

## 2024-01-16 NOTE — PROGRESS NOTES
levothyroxine (SYNTHROID) 112 MCG tablet TAKE 2 TABLETS BY MOUTH EVERY  tablet 1    lisinopril (PRINIVIL;ZESTRIL) 20 MG tablet TAKE 1 TABLET BY MOUTH TWICE A  tablet 1    azelastine (ASTELIN) 0.1 % nasal spray 1 spray by Nasal route 2 times daily Use in each nostril as directed 60 mL 1    budesonide-formoterol (SYMBICORT) 160-4.5 MCG/ACT AERO Inhale 2 puffs into the lungs 2 times daily 10.2 g 5    ferrous sulfate (IRON 325) 325 (65 Fe) MG tablet Take 1 tablet by mouth daily (with breakfast)      albuterol sulfate HFA (VENTOLIN HFA) 108 (90 Base) MCG/ACT inhaler Inhale 2 puffs into the lungs 4 times daily as needed for Wheezing 18 g 5    vitamin D (ERGOCALCIFEROL) 1.25 MG (42715 UT) CAPS capsule TAKE 1 CAPSULE BY MOUTH ONE TIME PER WEEK      metoprolol tartrate (LOPRESSOR) 25 MG tablet TAKE 1 TABLET BY MOUTH TWICE A  tablet 3    blood glucose monitor kit and supplies 1 kit daily FSBS daily 1 kit 0    blood glucose monitor strips 1 strip by Other route daily 100 strip 5    folic acid (FOLVITE) 1 MG tablet Take 1 tablet by mouth daily      ipratropium (ATROVENT) 0.06 % nasal spray 2 sprays by Each Nostril route 4 times daily 1 each 10    polyethylene glycol (MIRALAX) 17 GM/SCOOP powder Take 17 g by mouth daily as needed (CONSTIPATION) 510 g 5    methotrexate (RHEUMATREX) 2.5 MG chemo tablet TAKE 8 TABLETS BY MOUTH ONCE A WEEK.      magnesium oxide (MAG-OX) 400 MG tablet Take 1 tablet by mouth daily      SENNA CO 1 tablet by Combination route 2 times daily      acetaminophen (TYLENOL) 650 MG extended release tablet Take 1 tablet by mouth every 8 hours as needed for Pain          Medications patient taking as of now reconciled against medications ordered at time of hospital discharge: Yes    A comprehensive review of systems was negative except for what was noted in the HPI.    Objective:    /72   Pulse 69   Wt 104.3 kg (230 lb)   SpO2 92%   BMI 32.08 kg/m²   General Appearance: alert and

## 2024-01-22 ENCOUNTER — CARE COORDINATION (OUTPATIENT)
Dept: CASE MANAGEMENT | Age: 84
End: 2024-01-22

## 2024-01-22 ENCOUNTER — TELEPHONE (OUTPATIENT)
Dept: FAMILY MEDICINE CLINIC | Age: 84
End: 2024-01-22

## 2024-01-22 DIAGNOSIS — J44.9 MODERATE COPD (CHRONIC OBSTRUCTIVE PULMONARY DISEASE) (HCC): ICD-10-CM

## 2024-01-22 DIAGNOSIS — J22 LOWER RESPIRATORY INFECTION: ICD-10-CM

## 2024-01-22 RX ORDER — ALBUTEROL SULFATE 90 UG/1
2 AEROSOL, METERED RESPIRATORY (INHALATION) 4 TIMES DAILY PRN
Qty: 18 G | Refills: 5 | Status: SHIPPED | OUTPATIENT
Start: 2024-01-22

## 2024-01-22 RX ORDER — BUDESONIDE AND FORMOTEROL FUMARATE DIHYDRATE 160; 4.5 UG/1; UG/1
2 AEROSOL RESPIRATORY (INHALATION) 2 TIMES DAILY
Qty: 10.2 G | Refills: 5 | Status: SHIPPED | OUTPATIENT
Start: 2024-01-22

## 2024-01-22 RX ORDER — AZELASTINE 1 MG/ML
1 SPRAY, METERED NASAL 2 TIMES DAILY
Qty: 60 ML | Refills: 1 | Status: SHIPPED | OUTPATIENT
Start: 2024-01-22

## 2024-01-22 NOTE — TELEPHONE ENCOUNTER
Refill Request     CONFIRM preferred pharmacy with the patient.    If Mail Order Rx - Pend for 90 day refill.      Last Seen: Last Seen Department: 2024  Last Seen by PCP: 10/23/2023    Last Written: 10/23/2023, #60, 1 refill    If no future appointment scheduled:  Review the last OV with PCP and review information for follow-up visit,  Route STAFF MESSAGE with patient name to the  Pool for scheduling with the following information:            -  Timing of next visit           -  Visit type ie Physical, OV, etc           -  Diagnoses/Reason ie. COPD, HTN - Do not use MEDICATION, Follow-up or CHECK UP - Give reason for visit      Next Appointment:   Future Appointments   Date Time Provider Department Center   2024  8:30 AM Jeromy Salvador, DO [facility]       Message sent to  to schedule appt with patient?  N/A      Requested Prescriptions     Pending Prescriptions Disp Refills    azelastine (ASTELIN) 0.1 % nasal spray 60 mL 1     Si spray by Nasal route 2 times daily Use in each nostril as directed

## 2024-01-22 NOTE — TELEPHONE ENCOUNTER
Refill Request     CONFIRM preferred pharmacy with the patient.    If Mail Order Rx - Pend for 90 day refill.      Last Seen: Last Seen Department: 1/16/2024  Last Seen by PCP: 7/17/2023    Last Written: 7/17/2023, #18, 5 refills    If no future appointment scheduled:  Review the last OV with PCP and review information for follow-up visit,  Route STAFF MESSAGE with patient name to the  Pool for scheduling with the following information:            -  Timing of next visit           -  Visit type ie Physical, OV, etc           -  Diagnoses/Reason ie. COPD, HTN - Do not use MEDICATION, Follow-up or CHECK UP - Give reason for visit      Next Appointment:   Future Appointments   Date Time Provider Department Center   4/24/2024  8:30 AM Jeromy Salvador, DO [facility]       Message sent to  to schedule appt with patient?  N/A      Requested Prescriptions     Pending Prescriptions Disp Refills    albuterol sulfate HFA (VENTOLIN HFA) 108 (90 Base) MCG/ACT inhaler 18 g 5     Sig: Inhale 2 puffs into the lungs 4 times daily as needed for Wheezing

## 2024-01-22 NOTE — TELEPHONE ENCOUNTER
Refill Request     CONFIRM preferred pharmacy with the patient.    If Mail Order Rx - Pend for 90 day refill.      Last Seen: Last Seen Department: 1/16/2024  Last Seen by PCP: 1/16/2024    Last Written: 8/08/2023, #10.2, 5 refills    If no future appointment scheduled:  Review the last OV with PCP and review information for follow-up visit,  Route STAFF MESSAGE with patient name to the  Pool for scheduling with the following information:            -  Timing of next visit           -  Visit type ie Physical, OV, etc           -  Diagnoses/Reason ie. COPD, HTN - Do not use MEDICATION, Follow-up or CHECK UP - Give reason for visit      Next Appointment:   Future Appointments   Date Time Provider Department Center   4/24/2024  8:30 AM Jeromy Salvador, DO [facility]       Message sent to  to schedule appt with patient?  N/A      Requested Prescriptions     Pending Prescriptions Disp Refills    budesonide-formoterol (SYMBICORT) 160-4.5 MCG/ACT AERO 10.2 g 5     Sig: Inhale 2 puffs into the lungs 2 times daily

## 2024-01-22 NOTE — CARE COORDINATION
Care Transitions Follow Up Call    Patient Current Location:  Home: [address] [place] [address] [place] [address]    Care Transition Nurse contacted the patient by telephone to follow up after admission.  Verified name and  with patient as identifiers.    Patient: Art Gaffney  Patient : 1940   MRN: [id number]  Reason for Admission: Covid-19+   Discharge Date: 24 RARS: Readmission Risk Score: 18.4      Needs to be reviewed by the provider   Additional needs identified to be addressed with provider: no           Method of communication with provider: none.    Patient answered call and verified . Patient pleasant and agreeable to transition call. Doing alright except for continued fatigue. Stated home care is scheduled for visit this afternoon to start services. Continues to wear O2 as instructed and sat is running between 95-97% with oxygen in place. Patient stated his wife is going to call [facility] in [facility] to getting [employer] or portable oxygen for convenience. Patient stated that he is finding the large metal tanks difficult to use when he has to leave his home. Confirmed that his wife has the provider office number and would call this afternoon. Denied any acute needs at present time.  Agreeable to f/u calls.  Educated on the use of urgent care or physician’s 24 hr access line if assistance is needed after hours.    Addressed changes since last contact:  none  Discussed follow-up appointments. If no appointment was previously scheduled, appointment scheduling offered: Yes.   Is follow up appointment scheduled within 7 days of discharge? Yes.    Follow Up  Future Appointments   Date Time Provider Department Center   2024  8:30 AM Jeromy Salvador DO [facility]     External follow up appointment(s):     Care Transition Nurse reviewed discharge instructions, medical action plan, and red flags with patient and discussed any barriers to care and/or understanding of plan of care after

## 2024-01-22 NOTE — TELEPHONE ENCOUNTER
Harvinder, PT,  called the office wanting to update our office regarding the patient's O2 on room air 94% with activity, 1 minute walking. Patient was able to complete the entire PT session without O2, he is currently on 2L.   Harvinder would like to know if patient can come off O2 completely or if he can use it only at night. Please advise, thanks.     Harvinder [phone]

## 2024-01-23 NOTE — TELEPHONE ENCOUNTER
Called Harvinder back at [facility] and let him know. He will reach out to the patient to let him know as well.

## 2024-01-23 NOTE — TELEPHONE ENCOUNTER
At this time, I think it is fine to continue oxygen just at night.  If his pulse ox stays above 92% without oxygen, I think he can hold off during the day.

## 2024-01-29 DIAGNOSIS — E11.69 HYPERLIPIDEMIA DUE TO TYPE 2 DIABETES MELLITUS (HCC): ICD-10-CM

## 2024-01-29 DIAGNOSIS — E78.5 HYPERLIPIDEMIA DUE TO TYPE 2 DIABETES MELLITUS (HCC): ICD-10-CM

## 2024-01-29 DIAGNOSIS — E03.9 ACQUIRED HYPOTHYROIDISM: Primary | ICD-10-CM

## 2024-01-30 ENCOUNTER — CARE COORDINATION (OUTPATIENT)
Dept: CARE COORDINATION | Age: 84
End: 2024-01-30

## 2024-01-30 ENCOUNTER — PATIENT MESSAGE (OUTPATIENT)
Dept: FAMILY MEDICINE CLINIC | Age: 84
End: 2024-01-30

## 2024-01-30 RX ORDER — HYDROCHLOROTHIAZIDE 25 MG/1
25 TABLET ORAL 2 TIMES DAILY
Qty: 180 TABLET | Refills: 1 | Status: SHIPPED | OUTPATIENT
Start: 2024-01-30

## 2024-01-30 RX ORDER — LISINOPRIL 20 MG/1
20 TABLET ORAL 2 TIMES DAILY
Qty: 180 TABLET | Refills: 1 | Status: SHIPPED | OUTPATIENT
Start: 2024-01-30

## 2024-01-30 RX ORDER — LEVOTHYROXINE SODIUM 112 UG/1
224 TABLET ORAL DAILY
Qty: 180 TABLET | Refills: 1 | Status: SHIPPED | OUTPATIENT
Start: 2024-01-30

## 2024-01-30 RX ORDER — TAMSULOSIN HYDROCHLORIDE 0.4 MG/1
0.4 CAPSULE ORAL DAILY
Qty: 90 CAPSULE | Refills: 1 | Status: SHIPPED | OUTPATIENT
Start: 2024-01-30

## 2024-01-30 RX ORDER — ATORVASTATIN CALCIUM 40 MG/1
40 TABLET, FILM COATED ORAL NIGHTLY
Qty: 90 TABLET | Refills: 1 | Status: SHIPPED | OUTPATIENT
Start: 2024-01-30

## 2024-01-30 NOTE — CARE COORDINATION
Care Transitions Follow Up Call    Patient Current Location:  Home: [address] [place] [address] [place] [address]    LPN Care Coordinator contacted the patient by telephone to follow up after admission on -.  Verified name and  with patient as identifiers.    Patient: Art Gaffney  Patient : 1940   MRN: [id number]  Reason for Admission: COVID, COPD exacerbation  Discharge Date: 24 RARS: Readmission Risk Score: 18.4      Needs to be reviewed by the provider   Additional needs identified to be addressed with provider: No  none             Method of communication with provider: none.    LPN CC spoke with patient. States he is good. Denies SOB, cough, CP. SPO2 % RA. Has not had to use O2 in approx 1 week. Appetite good. Denies problems with bowels or bladder. Denies medication changes. Denies issues with ADLs. [facility] active. Denies needs.  Chanel Nation, JAYDON CC  Care Transitions  [phone]    Addressed changes since last contact:  none  Discussed follow-up appointments. If no appointment was previously scheduled, appointment scheduling offered: Yes.   Is follow up appointment scheduled within 7 days of discharge? Yes.    Follow Up  Future Appointments   Date Time Provider Department Center   2024  8:30 AM Jeromy Salvador DO [facility]     External follow up appointment(s): DEB MENGN Care Coordinator reviewed medical action plan and red flags with patient and discussed any barriers to care and/or understanding of plan of care after discharge. Discussed appropriate site of care based on symptoms and resources available to patient including: PCP  Specialist  Urgent care clinics  [facility]  When to call [phone]  [employer]. The patient agrees to contact the PCP office for questions related to their healthcare.     Advance Care Planning:   reviewed and current.     Patients top risk factors for readmission: medical condition-COVID , COPD, HTN, DM2, hypothyroidism, BRYON on

## 2024-01-30 NOTE — TELEPHONE ENCOUNTER
Refill Request     CONFIRM preferred pharmacy with the patient.    If Mail Order Rx - Pend for 90 day refill.      Last Seen: Last Seen Department: 1/16/2024  Last Seen by PCP: 10/23/2023    Last Written: 11/2/2023 Atorvastatin  11/2/2023 HCTZ  11/2/2023 Levothyroxine  11/2/2023 Lisinopril  11/12/2023 Tamsulosin    If no future appointment scheduled:  Review the last OV with PCP and review information for follow-up visit,  Route STAFF MESSAGE with patient name to the  Pool for scheduling with the following information:            -  Timing of next visit           -  Visit type ie Physical, OV, etc           -  Diagnoses/Reason ie. COPD, HTN - Do not use MEDICATION, Follow-up or CHECK UP - Give reason for visit      Next Appointment:   Future Appointments   Date Time Provider Department Center   4/24/2024  8:30 AM Jeromy Salvador, DO [facility]       Message sent to  to schedule appt with patient?  NO      Requested Prescriptions     Pending Prescriptions Disp Refills    atorvastatin (LIPITOR) 40 MG tablet 90 tablet 1     Sig: Take 1 tablet by mouth nightly    hydroCHLOROthiazide (HYDRODIURIL) 25 MG tablet 180 tablet 1     Sig: Take 1 tablet by mouth 2 times daily    levothyroxine (SYNTHROID) 112 MCG tablet 180 tablet 1     Sig: Take 2 tablets by mouth daily    lisinopril (PRINIVIL;ZESTRIL) 20 MG tablet 180 tablet 1     Sig: Take 1 tablet by mouth 2 times daily    tamsulosin (FLOMAX) 0.4 MG capsule 90 capsule 3     Sig: Take 1 capsule by mouth daily

## 2024-01-31 ENCOUNTER — TELEPHONE (OUTPATIENT)
Dept: FAMILY MEDICINE CLINIC | Age: 84
End: 2024-01-31

## 2024-01-31 RX ORDER — POLYETHYLENE GLYCOL 3350 17 G/17G
17 POWDER, FOR SOLUTION ORAL DAILY PRN
Qty: 510 G | Refills: 3 | Status: SHIPPED | OUTPATIENT
Start: 2024-01-31

## 2024-01-31 NOTE — TELEPHONE ENCOUNTER
From: Art Gaffney  To: Dr. Jeromy Salvador  Sent: 1/30/2024 5:08 PM EST  Subject: polyethylene glycol 17 GM/SCOOP powder    Can I get a prescription for polyethylene glycol 17 GM/SCOOP powder.    Please send prescription to:  [facility] - [facility] - [address] - P [phone] -   F [phone]    Thanks,  Leon

## 2024-01-31 NOTE — TELEPHONE ENCOUNTER
AJAY [facility], called the office stating that the patient is being discharged today. Patient is not wearing oxygen any longer, O2 is staying around 95%.     Harvinder [phone]

## 2024-02-06 ENCOUNTER — CARE COORDINATION (OUTPATIENT)
Dept: CASE MANAGEMENT | Age: 84
End: 2024-02-06

## 2024-02-06 NOTE — CARE COORDINATION
ONCOLOGY/HEMATOLOGY DAILY PROGRESS NOTE    Patient: Flavio Peña Date: 2019   : 1965 Attending: Paul Garcia MD   54 year old male      ADMISSION DATE:  2019   CURRENT HOSPITAL DAY:  Hospital Day: 3  DATE OF ENCOUNTER:  2019  CODE STATUS:  Full Resuscitation  Problem List:   Patient Active Problem List   Diagnosis   • Seasonal allergic rhinitis   • Insomnia   • Prostate cancer (CMS/HCC)     Chief Complaint/Reason for consult: diarrhea, metastatic prostate cancer    Subjective/HPI: Mr. Flavio Peña is a 54 year old male seen in consultation for the above. Ongoing diarrhea. Feels weak. Eating well. No nausea or vomiting. NO chest pain.      Review of systems: 12-point review of systems was performed and found to be negative except as noted above in HPI.    Medications :  Scheduled  • octreotide  50 mcg Subcutaneous TID   • sodium chloride (PF)  2 mL Injection 2 times per day   • enoxaparin (LOVENOX) injection  40 mg Subcutaneous Daily   • dexamethasone  4 mg Oral Daily with breakfast   • gabapentin  300 mg Oral BID   • levothyroxine  150 mcg Oral QAM AC   • pantoprazole  40 mg Oral QAM AC   • simvastatin  80 mg Oral Nightly   • cefepime (MAXIPIME) extended interval IVPB  2,000 mg Intravenous 3 times per day       PRN  • sodium chloride (PF) 0.9 % injection 2 mL  2 mL Injection PRN For total dose see MAR   • sodium chloride (NORMAL SALINE) 0.9 % bolus 500 mL  500 mL Intravenous PRN For total dose see MAR   • sodium chloride 0.9 % flush bag 500 mL  500 mL Intravenous PRN For total dose see MAR   • potassium CHLORIDE (KLOR-CON M) germania ER tablet 20 mEq  20 mEq Oral Q4H PRN For total dose see MAR   • potassium CHLORIDE (KLOR-CON) packet 20 mEq  20 mEq Per NG tube Q4H PRN For total dose see MAR   • potassium CHLORIDE 20 mEq/100mL IVPB premix  20 mEq Intravenous Q4H PRN For total dose see MAR   • potassium CHLORIDE (KLOR-CON M) germania ER tablet 40 mEq  40 mEq Oral Q4H PRN For total dose see  Care Transitions Follow Up Call    Patient Current Location:  Home: [address] [place] [address] [place] [address]    Care Transition Nurse contacted the patient by telephone to follow up after admission.  Verified name and  with patient as identifiers.    Patient: Art Gaffney  Patient : 1940   MRN: [id number]  Reason for Admission: COVID, COPD exacerbation  Discharge Date: 24 RARS: Readmission Risk Score: 18.4      Needs to be reviewed by the provider   Additional needs identified to be addressed with provider: No  none             Method of communication with provider: none.    Patient answered call and verified . Patient pleasant and agreeable to transition call. Doing well since last contact.  Patient's O2 is running between % on room air. Continues to have poor appetite d/t taste disturbance from covid. Increasing exercise and working in shop. Home care has completed visits, but has support from wife. Taking all medications as directed. Introduced RPM program, but patient declined. Denied any acute needs at present time.  Agreeable to f/u calls.  Educated on the use of urgent care or physician’s 24 hr access line if assistance is needed after hours.    Addressed changes since last contact:  none  Discussed follow-up appointments. If no appointment was previously scheduled, appointment scheduling offered: Yes.   Is follow up appointment scheduled within 7 days of discharge? Yes.    Follow Up  Future Appointments   Date Time Provider Department Center   2024  8:30 AM [facility]     External follow up appointment(s):     Care Transition Nurse reviewed discharge instructions, medical action plan, and red flags with patient and discussed any barriers to care and/or understanding of plan of care after discharge. Discussed appropriate site of care based on symptoms and resources available to patient including: PCP  Specialist  When to call 911. The patient  MAR   • potassium CHLORIDE (KLOR-CON) packet 40 mEq  40 mEq Per NG tube Q4H PRN For total dose see MAR   • potassium CHLORIDE 20 mEq/100mL IVPB premix  40 mEq Intravenous Q4H PRN For total dose see MAR   • magnesium sulfate 1 g in dextrose 5% 100 mL IVPB premix  1 g Intravenous Daily PRN For total dose see MAR   • magnesium sulfate 2 g in 50 mL premix IVPB  2 g Intravenous Daily PRN For total dose see MAR   • magnesium sulfate 2 g in 50 mL premix IVPB  2 g Intravenous Q4H PRN For total dose see MAR   • HYDROcodone-acetaminophen (NORCO) 5-325 MG per tablet 1 tablet  1 tablet Oral Q4H PRN For total dose see MAR   • metoCLOPramide (REGLAN) injection 5 mg  5 mg Intravenous Q6H PRN For total dose see MAR   • cyclobenzaprine (FLEXERIL) tablet 10 mg  10 mg Oral TID PRN For total dose see MAR   • loperamide (IMODIUM) capsule 2 mg  2 mg Oral 4x Daily PRN For total dose see MAR   • LORazepam (ATIVAN) tablet 0.5 mg  0.5 mg Oral Q6H PRN For total dose see MAR   • ondansetron (ZOFRAN) tablet 8 mg  8 mg Oral Q8H PRN For total dose see MAR    Or   • ondansetron (ZOFRAN) injection 8 mg  8 mg Intravenous Q8H PRN For total dose see MAR   • diphenoxylate-atropine (LOMOTIL) 2.5-0.025 MG tablet 2 tablet  2 tablet Oral 4x Daily PRN For total dose see MAR       Continuous infusion  • sodium chloride 0.9% infusion 150 mL/hr at 07/31/19 1042       Allergies: ALLERGIES:  No Known Allergies      Vitals :     Vitals Last Value 24-Hour Range   Temperature 98.6 °F (37 °C) (07/31/19 0843) Temp  Min: 97.6 °F (36.4 °C)  Max: 98.6 °F (37 °C)   Pulse 60 (07/31/19 0806) Pulse  Min: 60  Max: 95   Respiratory 20 (07/31/19 0806) Resp  Min: 18  Max: 20   Non-Invasive  Blood Pressure 103/59 (07/31/19 0806) BP  Min: 92/49  Max: 106/60   Pulse Oximetry 100 % (07/31/19 0843) SpO2  Min: 98 %  Max: 100 %     Vitals Today Admitted   Weight 54.4 kg (07/29/19 2113) Weight: 54.4 kg (07/29/19 2113)   Height N/A Height: 5' 8\" (172.7 cm) (07/29/19 2113)   Body Mass  Index N/A BMI (Calculated): 18.25 (07/29/19 2113)     Physical Examination :  Constitutional:  Well developed, well nourished, no acute distress, non-toxic appearance  Eyes:  Pupils equal round reactive to light, conjunctiva normal   HENT:  Atraumatic, normocephalic,  external ears normal, nose normal  Neck: Supple   Respiratory:  No respiratory distress, normal breath sounds, no rales, no wheezing   Cardiovascular:  Normal rate, normal rhythm, no murmurs, no gallops, no rubs   GI:  Soft, nondistended, normal bowel sounds,  non-tender, no hernias noted  :  No costovertebral angle tenderness   Musculoskeletal:  No tenderness nor deformities.  Skin:  Well hydrated, no rash  Neurologic:  Alert & oriented x 3, no focal deficits noted   Extremities: No clubbing, cyanosis, nor edema  Psychiatric:  Speech and behavior appropriate      Laboratory Results:  Recent Labs   Lab 07/31/19  0521 07/30/19  0450 07/29/19  2135   WBC 7.2 1.4* 1.2*   HCT 26.1* 27.5* 37.1*   HGB 8.7* 9.5* 12.5*   * 116* 147   SODIUM 139 134* 129*   POTASSIUM 3.6 3.7 4.1   CHLORIDE 109* 103 97*   CO2 24 24 25   CALCIUM 7.5* 7.7* 8.9   GLUCOSE 96 108* 146*   BUN 9 15 18   CREATININE 0.69 0.66* 0.79   AST  --  13 21   GPT  --  9 14   ALKPT  --  83 114   BILIRUBIN  --  0.5 0.8   ALBUMIN  --  2.4* 3.2*   LIPA  --   --  <50*   GFRNA >90 >90 >90       Assessment and Plan:  1. Metastatic prostate cancer, metastases involving the retroperitoneal and iliac chain lymph nodes as well as extensive bone metastases  S/p multiple lines of therapy. He has sought his medical care at various institutions and has been on clinical trials.   Currently on palliative Taxotere  Complicated by neutropenia, diarrhea  follow-up with Dr. Valdivia as an outpatient to discuss further therapies.     2. Diarrhea/Acute colitis  In setting of chemotherapy    3. Neoplasm related pain  Stable     4. Nausea  Stable on Zofran    5. Pancytopenia   Improved. Secondary to  chemotherapy    Plan:  Plan for Octreotide Sq 50mcg TID  Would recommend dietary consultation, for failure to thrive.     I have discussed my findings and further recommendations with the patient and answered all questions to their satifaction and he has verbalized understanding and is in agreement. Thank you very much  for giving me the opportunity to evaluate and be involved in the care of Flavio Peña. Please dont hesitate to contact me with any further questions.    Heraclio Kelley MD  Pager: [phone]

## 2024-02-13 ENCOUNTER — CARE COORDINATION (OUTPATIENT)
Dept: CASE MANAGEMENT | Age: 84
End: 2024-02-13

## 2024-02-13 NOTE — CARE COORDINATION
Care Transitions Follow Up Call    Patient Current Location:  Home: [address] [place] [address] [place] [address]    Care Transition Nurse contacted the patient by telephone to follow up after admission.  Verified name and  with patient as identifiers.    Patient: Art Gaffney  Patient : 1940   MRN: [id number]  Reason for Admission: COVID, COPD exacerbation  Discharge Date: 24 RARS: Readmission Risk Score: 18.4      Needs to be reviewed by the provider   Additional needs identified to be addressed with provider: No  none         Method of communication with provider: none.    Patient answered call and verified . Patient pleasant and agreeable to transition call.  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 Feels that he is back to his baseline. Taking all medications as directed. Seen by PCP for TCM visit and denied any acute needs at present time. Stable and no further transition support needed. Episode ended. Educated on the use of urgent care or physician’s 24 hr access line if assistance is needed after hours.    Addressed changes since last contact:  none  Discussed follow-up appointments. If no appointment was previously scheduled, appointment scheduling offered: Yes.   Is follow up appointment scheduled within 7 days of discharge? Yes.    Follow Up  Future Appointments   Date Time Provider Department Center   2024  8:30 AM Jeromy Salvador DO [facility]     External follow up appointment(s):     Care Transition Nurse reviewed discharge instructions, medical action plan, and red flags with patient and discussed any barriers to care and/or understanding of plan of care after discharge. Discussed appropriate site of care based on symptoms and resources available to patient including: PCP  When to call 911. The patient agrees to contact the PCP office for questions related to their healthcare.     Advance Care Planning:   reviewed and

## 2024-02-19 ENCOUNTER — TELEPHONE (OUTPATIENT)
Dept: FAMILY MEDICINE CLINIC | Age: 84
End: 2024-02-19

## 2024-02-20 NOTE — TELEPHONE ENCOUNTER
Could I please have the diagnosis code you would like me to use for this medication?     Thank you

## 2024-02-21 NOTE — TELEPHONE ENCOUNTER
Submitted PA for Symbicort 160-4.5MCG/ACT aerosol  Via CMM Key: [id number] STATUS: PENDING.    Follow up done daily; if no decision with in three days we will refax.  If another three days goes by with no decision will call the insurance for status.

## 2024-04-22 SDOH — ECONOMIC STABILITY: FOOD INSECURITY: WITHIN THE PAST 12 MONTHS, YOU WORRIED THAT YOUR FOOD WOULD RUN OUT BEFORE YOU GOT MONEY TO BUY MORE.: NEVER TRUE

## 2024-04-22 SDOH — ECONOMIC STABILITY: FOOD INSECURITY: WITHIN THE PAST 12 MONTHS, THE FOOD YOU BOUGHT JUST DIDN'T LAST AND YOU DIDN'T HAVE MONEY TO GET MORE.: NEVER TRUE

## 2024-04-22 SDOH — ECONOMIC STABILITY: INCOME INSECURITY: HOW HARD IS IT FOR YOU TO PAY FOR THE VERY BASICS LIKE FOOD, HOUSING, MEDICAL CARE, AND HEATING?: NOT HARD AT ALL

## 2024-04-24 ENCOUNTER — OFFICE VISIT (OUTPATIENT)
Dept: FAMILY MEDICINE CLINIC | Age: 84
End: 2024-04-24

## 2024-04-24 ENCOUNTER — OFFICE VISIT (OUTPATIENT)
Dept: FAMILY MEDICINE CLINIC | Age: 84
End: 2024-04-24
Payer: MEDICARE

## 2024-04-24 VITALS
HEIGHT: 71 IN | HEART RATE: 63 BPM | SYSTOLIC BLOOD PRESSURE: 122 MMHG | BODY MASS INDEX: 30.24 KG/M2 | OXYGEN SATURATION: 96 % | RESPIRATION RATE: 20 BRPM | DIASTOLIC BLOOD PRESSURE: 60 MMHG | WEIGHT: 216 LBS

## 2024-04-24 VITALS
OXYGEN SATURATION: 96 % | SYSTOLIC BLOOD PRESSURE: 122 MMHG | HEART RATE: 63 BPM | DIASTOLIC BLOOD PRESSURE: 60 MMHG | BODY MASS INDEX: 30.13 KG/M2 | WEIGHT: 216 LBS

## 2024-04-24 DIAGNOSIS — I25.118 CORONARY ARTERY DISEASE OF NATIVE ARTERY OF NATIVE HEART WITH STABLE ANGINA PECTORIS (HCC): ICD-10-CM

## 2024-04-24 DIAGNOSIS — E11.319 TYPE 2 DIABETES MELLITUS WITH LEFT EYE AFFECTED BY RETINOPATHY WITHOUT MACULAR EDEMA, WITHOUT LONG-TERM CURRENT USE OF INSULIN, UNSPECIFIED RETINOPATHY SEVERITY (HCC): Primary | ICD-10-CM

## 2024-04-24 DIAGNOSIS — Z00.00 MEDICARE ANNUAL WELLNESS VISIT, SUBSEQUENT: Primary | ICD-10-CM

## 2024-04-24 DIAGNOSIS — E03.9 ACQUIRED HYPOTHYROIDISM: ICD-10-CM

## 2024-04-24 DIAGNOSIS — J44.9 CHRONIC OBSTRUCTIVE PULMONARY DISEASE, UNSPECIFIED COPD TYPE (HCC): ICD-10-CM

## 2024-04-24 DIAGNOSIS — I10 PRIMARY HYPERTENSION: ICD-10-CM

## 2024-04-24 LAB — HBA1C MFR BLD: 5.5 %

## 2024-04-24 PROCEDURE — 3074F SYST BP LT 130 MM HG: CPT | Performed by: FAMILY MEDICINE

## 2024-04-24 PROCEDURE — 3078F DIAST BP <80 MM HG: CPT | Performed by: FAMILY MEDICINE

## 2024-04-24 PROCEDURE — 1123F ACP DISCUSS/DSCN MKR DOCD: CPT | Performed by: FAMILY MEDICINE

## 2024-04-24 PROCEDURE — G0439 PPPS, SUBSEQ VISIT: HCPCS | Performed by: FAMILY MEDICINE

## 2024-04-24 RX ORDER — LEVOCETIRIZINE DIHYDROCHLORIDE 5 MG/1
5 TABLET, FILM COATED ORAL NIGHTLY
COMMUNITY

## 2024-04-24 SDOH — ECONOMIC STABILITY: FOOD INSECURITY: WITHIN THE PAST 12 MONTHS, THE FOOD YOU BOUGHT JUST DIDN'T LAST AND YOU DIDN'T HAVE MONEY TO GET MORE.: NEVER TRUE

## 2024-04-24 SDOH — ECONOMIC STABILITY: FOOD INSECURITY: WITHIN THE PAST 12 MONTHS, YOU WORRIED THAT YOUR FOOD WOULD RUN OUT BEFORE YOU GOT MONEY TO BUY MORE.: NEVER TRUE

## 2024-04-24 SDOH — ECONOMIC STABILITY: INCOME INSECURITY: HOW HARD IS IT FOR YOU TO PAY FOR THE VERY BASICS LIKE FOOD, HOUSING, MEDICAL CARE, AND HEATING?: NOT HARD AT ALL

## 2024-04-24 ASSESSMENT — ENCOUNTER SYMPTOMS
ABDOMINAL PAIN: 0
SORE THROAT: 0
COUGH: 0
CHEST TIGHTNESS: 0
SHORTNESS OF BREATH: 0
BACK PAIN: 1
CONSTIPATION: 0
RHINORRHEA: 0
BLOOD IN STOOL: 0

## 2024-04-24 ASSESSMENT — PATIENT HEALTH QUESTIONNAIRE - PHQ9
SUM OF ALL RESPONSES TO PHQ QUESTIONS 1-9: 0
DEPRESSION UNABLE TO ASSESS: FUNCTIONAL CAPACITY MOTIVATION LIMITS ACCURACY
1. LITTLE INTEREST OR PLEASURE IN DOING THINGS: NOT AT ALL
SUM OF ALL RESPONSES TO PHQ9 QUESTIONS 1 & 2: 0
SUM OF ALL RESPONSES TO PHQ QUESTIONS 1-9: 0
2. FEELING DOWN, DEPRESSED OR HOPELESS: NOT AT ALL

## 2024-04-24 ASSESSMENT — ANXIETY QUESTIONNAIRES
2. NOT BEING ABLE TO STOP OR CONTROL WORRYING: NOT AT ALL
1. FEELING NERVOUS, ANXIOUS, OR ON EDGE: NOT AT ALL
GAD7 TOTAL SCORE: 0
3. WORRYING TOO MUCH ABOUT DIFFERENT THINGS: NOT AT ALL
7. FEELING AFRAID AS IF SOMETHING AWFUL MIGHT HAPPEN: NOT AT ALL
IF YOU CHECKED OFF ANY PROBLEMS ON THIS QUESTIONNAIRE, HOW DIFFICULT HAVE THESE PROBLEMS MADE IT FOR YOU TO DO YOUR WORK, TAKE CARE OF THINGS AT HOME, OR GET ALONG WITH OTHER PEOPLE: NOT DIFFICULT AT ALL
5. BEING SO RESTLESS THAT IT IS HARD TO SIT STILL: NOT AT ALL
4. TROUBLE RELAXING: NOT AT ALL
6. BECOMING EASILY ANNOYED OR IRRITABLE: NOT AT ALL

## 2024-04-24 ASSESSMENT — LIFESTYLE VARIABLES
HOW MANY STANDARD DRINKS CONTAINING ALCOHOL DO YOU HAVE ON A TYPICAL DAY: PATIENT DOES NOT DRINK
HOW OFTEN DO YOU HAVE A DRINK CONTAINING ALCOHOL: NEVER

## 2024-04-24 NOTE — PROGRESS NOTES
times daily  Vijaya Klein DO   methotrexate (RHEUMATREX) 2.5 MG chemo tablet TAKE 8 TABLETS BY MOUTH ONCE A WEEK.  Provider, MD Paula   magnesium oxide (MAG-OX) 400 MG tablet Take 1 tablet by mouth daily  ProviderPaula MD   SENNA CO 1 tablet by Combination route 2 times daily  ProviderPaula MD   acetaminophen (TYLENOL) 650 MG extended release tablet Take 1 tablet by mouth every 8 hours as needed for Pain  Provider, MD Paula       CareTeam (Including outside providers/suppliers regularly involved in providing care):   Patient Care Team:  Jeromy Salvador DO as PCP - General  Jeromy Salvador DO as PCP - Empaneled Provider  Juan Golden MD as Consulting Physician (Orthopedic Surgery)  Jose Aguilar MD as Consulting Physician (Cardiology)  Patrick Fields MD (Orthopedic Surgery)  Art Klein MD (Orthopedic Surgery)     Reviewed and updated this visit:  Tobacco  Allergies  Meds  Problems  Med Hx  Surg Hx  Soc Hx  Fam Hx        IPrachi LPN, 4/24/2024, performed the documented evaluation under the direct supervision of the attending physician.    This encounter was performed under Jeromy daly DO’s, direct supervision, 4/24/2024.

## 2024-04-24 NOTE — PROGRESS NOTES
pressure but you are at risk for heart disease, strokes and kidney disease.  Acquired hypothyroidism  Continue medication and we will check lab next visit  Coronary artery disease of native artery of native heart with stable angina pectoris (HCC)  Continue medications and notify me regarding any persistent chest pains or shortness of breath.  Chronic obstructive pulmonary disease, unspecified COPD type (HCC)  Continue medications and notify me of any persistent increase in shortness of breath or fatigue that is not being relieved with your medication.    Chart reviewed for recent hospitalization, meds, labs and any consultations.    See me 6 months     Jeromy Salvador, DO

## 2024-06-22 DIAGNOSIS — E78.5 HYPERLIPIDEMIA DUE TO TYPE 2 DIABETES MELLITUS (HCC): ICD-10-CM

## 2024-06-22 DIAGNOSIS — E11.69 HYPERLIPIDEMIA DUE TO TYPE 2 DIABETES MELLITUS (HCC): ICD-10-CM

## 2024-06-22 DIAGNOSIS — E03.9 ACQUIRED HYPOTHYROIDISM: ICD-10-CM

## 2024-06-23 RX ORDER — HYDROCHLOROTHIAZIDE 25 MG/1
25 TABLET ORAL 2 TIMES DAILY
Qty: 180 TABLET | Refills: 1 | Status: SHIPPED | OUTPATIENT
Start: 2024-06-23

## 2024-06-23 RX ORDER — TAMSULOSIN HYDROCHLORIDE 0.4 MG/1
0.4 CAPSULE ORAL DAILY
Qty: 90 CAPSULE | Refills: 1 | Status: SHIPPED | OUTPATIENT
Start: 2024-06-23

## 2024-06-23 RX ORDER — LEVOTHYROXINE SODIUM 112 MCG
224 TABLET ORAL DAILY
Qty: 180 TABLET | Refills: 1 | Status: SHIPPED | OUTPATIENT
Start: 2024-06-23

## 2024-06-23 RX ORDER — ATORVASTATIN CALCIUM 40 MG/1
40 TABLET, FILM COATED ORAL NIGHTLY
Qty: 90 TABLET | Refills: 3 | Status: SHIPPED | OUTPATIENT
Start: 2024-06-23

## 2024-06-23 RX ORDER — LISINOPRIL 20 MG/1
20 TABLET ORAL 2 TIMES DAILY
Qty: 180 TABLET | Refills: 1 | Status: SHIPPED | OUTPATIENT
Start: 2024-06-23

## 2024-06-26 ENCOUNTER — HOSPITAL ENCOUNTER (OUTPATIENT)
Dept: GENERAL RADIOLOGY | Age: 84
Discharge: HOME | End: 2024-06-26
Payer: MEDICARE

## 2024-06-26 ENCOUNTER — OFFICE VISIT (OUTPATIENT)
Dept: FAMILY MEDICINE CLINIC | Age: 84
End: 2024-06-26

## 2024-06-26 VITALS
SYSTOLIC BLOOD PRESSURE: 118 MMHG | BODY MASS INDEX: 30.82 KG/M2 | HEART RATE: 57 BPM | OXYGEN SATURATION: 97 % | WEIGHT: 221 LBS | RESPIRATION RATE: 18 BRPM | TEMPERATURE: 97.7 F | DIASTOLIC BLOOD PRESSURE: 76 MMHG

## 2024-06-26 DIAGNOSIS — R05.1 ACUTE COUGH: ICD-10-CM

## 2024-06-26 DIAGNOSIS — J06.9 UPPER RESPIRATORY TRACT INFECTION, UNSPECIFIED TYPE: ICD-10-CM

## 2024-06-26 DIAGNOSIS — J22 LOWER RESPIRATORY INFECTION: Primary | ICD-10-CM

## 2024-06-26 DIAGNOSIS — J22 LOWER RESPIRATORY INFECTION: ICD-10-CM

## 2024-06-26 PROCEDURE — 71046 X-RAY EXAM CHEST 2 VIEWS: CPT

## 2024-06-26 RX ORDER — BENZONATATE 200 MG/1
200 CAPSULE ORAL 3 TIMES DAILY PRN
Qty: 21 CAPSULE | Refills: 0 | Status: SHIPPED | OUTPATIENT
Start: 2024-06-26 | End: 2024-07-03

## 2024-06-26 RX ORDER — PREDNISONE 20 MG/1
40 TABLET ORAL DAILY
Qty: 10 TABLET | Refills: 0 | Status: SHIPPED | OUTPATIENT
Start: 2024-06-26 | End: 2024-07-01

## 2024-06-26 RX ORDER — DOXYCYCLINE HYCLATE 100 MG
100 TABLET ORAL 2 TIMES DAILY
Qty: 14 TABLET | Refills: 0 | Status: SHIPPED | OUTPATIENT
Start: 2024-06-26 | End: 2024-07-03

## 2024-06-26 ASSESSMENT — ENCOUNTER SYMPTOMS
SHORTNESS OF BREATH: 0
VOMITING: 0
COUGH: 1
RHINORRHEA: 1
SINUS PRESSURE: 1
SINUS PAIN: 1
DIARRHEA: 0
SORE THROAT: 0
WHEEZING: 1
NAUSEA: 0

## 2024-06-26 ASSESSMENT — ANXIETY QUESTIONNAIRES
IF YOU CHECKED OFF ANY PROBLEMS ON THIS QUESTIONNAIRE, HOW DIFFICULT HAVE THESE PROBLEMS MADE IT FOR YOU TO DO YOUR WORK, TAKE CARE OF THINGS AT HOME, OR GET ALONG WITH OTHER PEOPLE: NOT DIFFICULT AT ALL
7. FEELING AFRAID AS IF SOMETHING AWFUL MIGHT HAPPEN: NOT AT ALL
1. FEELING NERVOUS, ANXIOUS, OR ON EDGE: NOT AT ALL
5. BEING SO RESTLESS THAT IT IS HARD TO SIT STILL: NOT AT ALL
6. BECOMING EASILY ANNOYED OR IRRITABLE: NOT AT ALL
4. TROUBLE RELAXING: NOT AT ALL
3. WORRYING TOO MUCH ABOUT DIFFERENT THINGS: NOT AT ALL
2. NOT BEING ABLE TO STOP OR CONTROL WORRYING: NOT AT ALL
GAD7 TOTAL SCORE: 0

## 2024-06-26 ASSESSMENT — PATIENT HEALTH QUESTIONNAIRE - PHQ9
SUM OF ALL RESPONSES TO PHQ QUESTIONS 1-9: 0
5. POOR APPETITE OR OVEREATING: NOT AT ALL
SUM OF ALL RESPONSES TO PHQ QUESTIONS 1-9: 0
7. TROUBLE CONCENTRATING ON THINGS, SUCH AS READING THE NEWSPAPER OR WATCHING TELEVISION: NOT AT ALL
SUM OF ALL RESPONSES TO PHQ QUESTIONS 1-9: 0
6. FEELING BAD ABOUT YOURSELF - OR THAT YOU ARE A FAILURE OR HAVE LET YOURSELF OR YOUR FAMILY DOWN: NOT AT ALL
SUM OF ALL RESPONSES TO PHQ QUESTIONS 1-9: 0
SUM OF ALL RESPONSES TO PHQ9 QUESTIONS 1 & 2: 0
8. MOVING OR SPEAKING SO SLOWLY THAT OTHER PEOPLE COULD HAVE NOTICED. OR THE OPPOSITE, BEING SO FIGETY OR RESTLESS THAT YOU HAVE BEEN MOVING AROUND A LOT MORE THAN USUAL: NOT AT ALL
3. TROUBLE FALLING OR STAYING ASLEEP: NOT AT ALL
4. FEELING TIRED OR HAVING LITTLE ENERGY: NOT AT ALL
1. LITTLE INTEREST OR PLEASURE IN DOING THINGS: NOT AT ALL
9. THOUGHTS THAT YOU WOULD BE BETTER OFF DEAD, OR OF HURTING YOURSELF: NOT AT ALL
2. FEELING DOWN, DEPRESSED OR HOPELESS: NOT AT ALL
10. IF YOU CHECKED OFF ANY PROBLEMS, HOW DIFFICULT HAVE THESE PROBLEMS MADE IT FOR YOU TO DO YOUR WORK, TAKE CARE OF THINGS AT HOME, OR GET ALONG WITH OTHER PEOPLE: NOT DIFFICULT AT ALL

## 2024-06-26 NOTE — PROGRESS NOTES
Art Gaffney (:  1940) is a 84 y.o. male,Established patient, here for evaluation of the following chief complaint(s):  Cough, Wheezing, and Fatigue      Assessment & Plan   ASSESSMENT/PLAN:  1. Lower respiratory infection  -     predniSONE (DELTASONE) 20 MG tablet; Take 2 tablets by mouth daily for 5 days, Disp-10 tablet, R-0Normal  -     benzonatate (TESSALON) 200 MG capsule; Take 1 capsule by mouth 3 times daily as needed for Cough, Disp-21 capsule, R-0Normal  -     doxycycline hyclate (VIBRA-TABS) 100 MG tablet; Take 1 tablet by mouth 2 times daily for 7 days, Disp-14 tablet, R-0Normal  -     XR CHEST STANDARD (2 VW); Future  2. Upper respiratory tract infection, unspecified type  -     benzonatate (TESSALON) 200 MG capsule; Take 1 capsule by mouth 3 times daily as needed for Cough, Disp-21 capsule, R-0Normal  -     doxycycline hyclate (VIBRA-TABS) 100 MG tablet; Take 1 tablet by mouth 2 times daily for 7 days, Disp-14 tablet, R-0Normal  3. Acute cough  -     XR CHEST STANDARD (2 VW); Future      -Given length of symptoms and history of pneumonia, will treat today with antibiotics. Patient with allergy to PCN. Will start doxy, advised to limit sun exposure while taking   -CXR pending   -Reviewed allergies, discussed medication risks, benefits, side effects. Take medication with food.   -Reviewed symptom management   -Reviewed alarm symptoms    Patient Instructions   Drink plenty of fluids   Get plenty of rest  Honey is a natural cough suppressant, may take a spoonful of honey by itself or mix it in warm tea   Humidifier, steamy showers  Flonase daily, normal saline nasal spray throughout the day   Tylenol or Ibuprofen per package directions as needed   Mucinex or Coricidin per package directions as needed   Notify office if symptoms worsen or do not improve   Go to nearest ER if develop shortness of breath, chest pain or significant worsening of symptoms  Finish full course of antibiotics unless told

## 2024-07-12 ENCOUNTER — TELEPHONE (OUTPATIENT)
Age: 84
End: 2024-07-12

## 2024-07-12 ENCOUNTER — TELEMEDICINE (OUTPATIENT)
Age: 84
End: 2024-07-12
Payer: MEDICARE

## 2024-07-12 DIAGNOSIS — R05.1 ACUTE COUGH: ICD-10-CM

## 2024-07-12 DIAGNOSIS — R09.82 POST-NASAL DRIP: ICD-10-CM

## 2024-07-12 DIAGNOSIS — J44.9 CHRONIC OBSTRUCTIVE PULMONARY DISEASE, UNSPECIFIED COPD TYPE (HCC): Primary | ICD-10-CM

## 2024-07-12 DIAGNOSIS — R63.4 WEIGHT LOSS: ICD-10-CM

## 2024-07-12 PROCEDURE — 99213 OFFICE O/P EST LOW 20 MIN: CPT | Performed by: NURSE PRACTITIONER

## 2024-07-12 PROCEDURE — 1123F ACP DISCUSS/DSCN MKR DOCD: CPT | Performed by: NURSE PRACTITIONER

## 2024-07-12 PROCEDURE — 3023F SPIROM DOC REV: CPT | Performed by: NURSE PRACTITIONER

## 2024-07-12 PROCEDURE — G8428 CUR MEDS NOT DOCUMENT: HCPCS | Performed by: NURSE PRACTITIONER

## 2024-07-12 PROCEDURE — 1036F TOBACCO NON-USER: CPT | Performed by: NURSE PRACTITIONER

## 2024-07-12 PROCEDURE — G8417 CALC BMI ABV UP PARAM F/U: HCPCS | Performed by: NURSE PRACTITIONER

## 2024-07-12 RX ORDER — METHYLPREDNISOLONE 4 MG/1
TABLET ORAL
Qty: 1 KIT | Refills: 0 | Status: SHIPPED | OUTPATIENT
Start: 2024-07-12 | End: 2024-07-18

## 2024-07-12 RX ORDER — DEXTROMETHORPHAN HYDROBROMIDE AND PROMETHAZINE HYDROCHLORIDE 15; 6.25 MG/5ML; MG/5ML
5 SYRUP ORAL 4 TIMES DAILY PRN
Qty: 120 ML | Refills: 0 | Status: SHIPPED | OUTPATIENT
Start: 2024-07-12

## 2024-07-12 RX ORDER — FLUTICASONE PROPIONATE 50 MCG
2 SPRAY, SUSPENSION (ML) NASAL DAILY
Qty: 16 G | Refills: 0 | Status: SHIPPED | OUTPATIENT
Start: 2024-07-12

## 2024-07-12 ASSESSMENT — ENCOUNTER SYMPTOMS
COUGH: 1
RHINORRHEA: 1
SHORTNESS OF BREATH: 1

## 2024-07-12 NOTE — PROGRESS NOTES
Patient reports that sometimes he cannot eat well. He reports today's weight as 210 lb. His last recorded weight by PCP was 221. Adivsed he add ensure to daily intake. Messaging office to get him a 2 week follow up scheduled.          Review of Systems   HENT:  Positive for postnasal drip and rhinorrhea.    Respiratory:  Positive for cough and shortness of breath.         Gagging   Neurological:  Positive for weakness.       Objective:  Patient-Reported Vitals  No data recorded         3/3/2022     3:03 PM   Patient-Reported Vitals   Patient-Reported Weight 228lb   Patient-Reported Height 5' 11\"        Physical Exam:  [INSTRUCTIONS:  \"[x]\" Indicates a positive item  \"[]\" Indicates a negative item  -- DELETE ALL ITEMS NOT EXAMINED]    Constitutional: [x] Appears well-developed and well-nourished [x] No apparent distress      [] Abnormal -     Mental status: [x] Alert and awake  [x] Oriented to person/place/time [x] Able to follow commands    [] Abnormal -     Eyes:   EOM    [x]  Normal    [] Abnormal -   Sclera  [x]  Normal    [] Abnormal -          Discharge [x]  None visible   [] Abnormal -     HENT: [x] Normocephalic, atraumatic  [] Abnormal -   [x] Mouth/Throat: Mucous membranes are moist    External Ears [x] Normal  [] Abnormal -    Neck: [x] No visualized mass [] Abnormal -     Pulmonary/Chest: [x] Respiratory effort normal   [x] No visualized signs of difficulty breathing or respiratory distress        [] Abnormal -      Musculoskeletal:   [x] Normal gait with no signs of ataxia         [x] Normal range of motion of neck        [] Abnormal -     Neurological:        [x] No Facial Asymmetry (Cranial nerve 7 motor function) (limited exam due to video visit)          [x] No gaze palsy        [] Abnormal -          Skin:        [x] No significant exanthematous lesions or discoloration noted on facial skin         [] Abnormal -            Psychiatric:       [x] Normal Affect [] Abnormal -        [x] No

## 2024-07-12 NOTE — TELEPHONE ENCOUNTER
I saw this patient today. He needs follow up in 2 weeks with first available provider. He has lost 10 lbs in last month. They are to record his daily weights and add Ensure to daily intake.  Thanks

## 2024-07-15 ENCOUNTER — OFFICE VISIT (OUTPATIENT)
Dept: FAMILY MEDICINE CLINIC | Age: 84
End: 2024-07-15
Payer: MEDICARE

## 2024-07-15 VITALS
OXYGEN SATURATION: 92 % | HEART RATE: 79 BPM | SYSTOLIC BLOOD PRESSURE: 120 MMHG | TEMPERATURE: 98 F | WEIGHT: 207 LBS | DIASTOLIC BLOOD PRESSURE: 62 MMHG | BODY MASS INDEX: 28.87 KG/M2

## 2024-07-15 DIAGNOSIS — R05.1 ACUTE COUGH: ICD-10-CM

## 2024-07-15 PROCEDURE — 99213 OFFICE O/P EST LOW 20 MIN: CPT | Performed by: FAMILY MEDICINE

## 2024-07-15 PROCEDURE — 3078F DIAST BP <80 MM HG: CPT | Performed by: FAMILY MEDICINE

## 2024-07-15 PROCEDURE — 1123F ACP DISCUSS/DSCN MKR DOCD: CPT | Performed by: FAMILY MEDICINE

## 2024-07-15 PROCEDURE — G8417 CALC BMI ABV UP PARAM F/U: HCPCS | Performed by: FAMILY MEDICINE

## 2024-07-15 PROCEDURE — 1036F TOBACCO NON-USER: CPT | Performed by: FAMILY MEDICINE

## 2024-07-15 PROCEDURE — 3074F SYST BP LT 130 MM HG: CPT | Performed by: FAMILY MEDICINE

## 2024-07-15 PROCEDURE — G8427 DOCREV CUR MEDS BY ELIG CLIN: HCPCS | Performed by: FAMILY MEDICINE

## 2024-07-15 RX ORDER — DEXTROMETHORPHAN HYDROBROMIDE AND PROMETHAZINE HYDROCHLORIDE 15; 6.25 MG/5ML; MG/5ML
5 SYRUP ORAL 4 TIMES DAILY PRN
Qty: 120 ML | Refills: 0 | Status: SHIPPED | OUTPATIENT
Start: 2024-07-15

## 2024-07-15 RX ORDER — AZELASTINE 1 MG/ML
2 SPRAY, METERED NASAL 2 TIMES DAILY
Qty: 120 ML | Refills: 1 | Status: SHIPPED | OUTPATIENT
Start: 2024-07-15

## 2024-07-15 ASSESSMENT — ANXIETY QUESTIONNAIRES
IF YOU CHECKED OFF ANY PROBLEMS ON THIS QUESTIONNAIRE, HOW DIFFICULT HAVE THESE PROBLEMS MADE IT FOR YOU TO DO YOUR WORK, TAKE CARE OF THINGS AT HOME, OR GET ALONG WITH OTHER PEOPLE: NOT DIFFICULT AT ALL
1. FEELING NERVOUS, ANXIOUS, OR ON EDGE: NOT AT ALL
3. WORRYING TOO MUCH ABOUT DIFFERENT THINGS: NOT AT ALL
5. BEING SO RESTLESS THAT IT IS HARD TO SIT STILL: NOT AT ALL
7. FEELING AFRAID AS IF SOMETHING AWFUL MIGHT HAPPEN: NOT AT ALL
4. TROUBLE RELAXING: NOT AT ALL
2. NOT BEING ABLE TO STOP OR CONTROL WORRYING: NOT AT ALL
6. BECOMING EASILY ANNOYED OR IRRITABLE: NOT AT ALL
GAD7 TOTAL SCORE: 0

## 2024-07-15 ASSESSMENT — ENCOUNTER SYMPTOMS
SINUS PRESSURE: 1
COUGH: 1
RHINORRHEA: 1
SHORTNESS OF BREATH: 1

## 2024-07-15 ASSESSMENT — PATIENT HEALTH QUESTIONNAIRE - PHQ9
DEPRESSION UNABLE TO ASSESS: FUNCTIONAL CAPACITY MOTIVATION LIMITS ACCURACY
SUM OF ALL RESPONSES TO PHQ QUESTIONS 1-9: 0
SUM OF ALL RESPONSES TO PHQ QUESTIONS 1-9: 0
2. FEELING DOWN, DEPRESSED OR HOPELESS: NOT AT ALL
SUM OF ALL RESPONSES TO PHQ QUESTIONS 1-9: 0
SUM OF ALL RESPONSES TO PHQ QUESTIONS 1-9: 0
1. LITTLE INTEREST OR PLEASURE IN DOING THINGS: NOT AT ALL
SUM OF ALL RESPONSES TO PHQ9 QUESTIONS 1 & 2: 0

## 2024-07-15 NOTE — PROGRESS NOTES
Subjective:      Patient ID: Art Gaffney is a 84 y.o. male.    HPI  Patient in for recheck on cold and cough symptoms.  He was seen once in the office and 1 video visit and he seemed to improve for a day or 2 and then things flareup again.  The worst symptom seems to be the cough-productive at times-colored at times.  He has been on Promethazine DM and steroids on several occasions.  He had a chest x-ray which was negative.        Review of Systems    Review of Systems   Constitutional:  Negative for chills, diaphoresis and fever.   HENT:  Positive for postnasal drip, rhinorrhea and sinus pressure.    Respiratory:  Positive for cough and shortness of breath.        Objective:   Physical Exam      Physical Exam  Constitutional:       General: He is not in acute distress.     Appearance: Normal appearance. He is not ill-appearing.   HENT:      Mouth/Throat:      Mouth: Mucous membranes are moist.      Pharynx: Oropharynx is clear.   Cardiovascular:      Rate and Rhythm: Regular rhythm.      Heart sounds: Normal heart sounds.   Pulmonary:      Breath sounds: Normal breath sounds.   Neurological:      Mental Status: He is alert and oriented to person, place, and time.   Psychiatric:         Mood and Affect: Mood normal.         Behavior: Behavior normal.         Thought Content: Thought content normal.         Judgment: Judgment normal.         Assessment:       Diagnosis Orders   1. Acute cough  promethazine-dextromethorphan (PROMETHAZINE-DM) 6.25-15 MG/5ML syrup            Plan:      Art was seen today for cough, nasal congestion and fatigue.    Diagnoses and all orders for this visit:    Acute cough  -     promethazine-dextromethorphan (PROMETHAZINE-DM) 6.25-15 MG/5ML syrup; Take 5 mLs by mouth 4 times daily as needed for Cough  Paper prescription given to the patient for the cough syrup which really helps at night.  Other orders  -     azelastine (ASTELIN) 0.1 % nasal spray; 2 sprays by Nasal route 2 times

## 2024-08-06 ENCOUNTER — PATIENT MESSAGE (OUTPATIENT)
Dept: FAMILY MEDICINE CLINIC | Age: 84
End: 2024-08-06

## 2024-08-06 NOTE — TELEPHONE ENCOUNTER
From: Art Gaffney  To: Dr. Jeromy Salvador  Sent: 2024 2:18 PM EDT  Subject: Renew Windshield Placard for Disability    Need a prescription for a disability windshield placard. The disability is permanent. The application said if the disability is permanent, complete and submit the Application for removable Windshield Placard (BMV 4826) with a prescription from a physician.    THE PRESCRIPTION MUST STATE THE FOLLOWING INFORMATION:  Original prescriptions required (copies are not accepted)  1. Name of the person with the disability.    2. Indicate you are applying for a removable windshield placard or    similar wording.    3. The health care provider must sign and date the prescription.    Pursuant to R.C. 4503.44(A)(3), health care provider means “a    physician, physician assistant, advanced practice nurse,    optometrist, or chiropractor as defined in this section.”    The health care provider must specify an ending date or indicate the disability is permanent. Placards  on the date specified by the health care provider.    Thank you,  Art Gaffney  [phone]

## 2024-08-08 DIAGNOSIS — R09.82 POST-NASAL DRIP: ICD-10-CM

## 2024-08-08 RX ORDER — FLUTICASONE PROPIONATE 50 MCG
2 SPRAY, SUSPENSION (ML) NASAL DAILY
OUTPATIENT
Start: 2024-08-08

## 2024-08-12 DIAGNOSIS — M47.9 OSTEOARTHRITIS OF SPINE, UNSPECIFIED SPINAL OSTEOARTHRITIS COMPLICATION STATUS, UNSPECIFIED SPINAL REGION: Primary | ICD-10-CM

## 2024-08-12 DIAGNOSIS — M15.9 GENERALIZED OSTEOARTHRITIS: Primary | ICD-10-CM

## 2024-10-10 ENCOUNTER — TELEPHONE (OUTPATIENT)
Dept: FAMILY MEDICINE CLINIC | Age: 84
End: 2024-10-10

## 2024-10-10 DIAGNOSIS — M06.09 RHEUMATOID ARTHRITIS OF MULTIPLE SITES WITH NEGATIVE RHEUMATOID FACTOR (HCC): Primary | ICD-10-CM

## 2024-10-10 NOTE — TELEPHONE ENCOUNTER
Incoming call from spouse Mahi, asking for reprint of Handicap placard prescription, this is needed for [facility] to [facility] to [facility] a walk[facility] for the patient.     Order Pended please sign.    Asks for call back when ready for  if can be picked up Friday 10/11/2024

## 2024-10-14 DIAGNOSIS — M15.9 GENERALIZED OA: Primary | ICD-10-CM

## 2024-10-30 ENCOUNTER — OFFICE VISIT (OUTPATIENT)
Dept: FAMILY MEDICINE CLINIC | Age: 84
End: 2024-10-30

## 2024-10-30 VITALS
DIASTOLIC BLOOD PRESSURE: 50 MMHG | HEIGHT: 71 IN | TEMPERATURE: 97 F | HEART RATE: 58 BPM | OXYGEN SATURATION: 98 % | WEIGHT: 215 LBS | BODY MASS INDEX: 30.1 KG/M2 | SYSTOLIC BLOOD PRESSURE: 110 MMHG

## 2024-10-30 DIAGNOSIS — J44.9 CHRONIC OBSTRUCTIVE PULMONARY DISEASE, UNSPECIFIED COPD TYPE (HCC): ICD-10-CM

## 2024-10-30 DIAGNOSIS — H69.92 ETD (EUSTACHIAN TUBE DYSFUNCTION), LEFT: ICD-10-CM

## 2024-10-30 DIAGNOSIS — E11.319 TYPE 2 DIABETES MELLITUS WITH LEFT EYE AFFECTED BY RETINOPATHY WITHOUT MACULAR EDEMA, WITHOUT LONG-TERM CURRENT USE OF INSULIN, UNSPECIFIED RETINOPATHY SEVERITY (HCC): Primary | ICD-10-CM

## 2024-10-30 DIAGNOSIS — I10 PRIMARY HYPERTENSION: ICD-10-CM

## 2024-10-30 DIAGNOSIS — I25.118 CORONARY ARTERY DISEASE OF NATIVE ARTERY OF NATIVE HEART WITH STABLE ANGINA PECTORIS (HCC): ICD-10-CM

## 2024-10-30 PROBLEM — R54 AGE-RELATED PHYSICAL DEBILITY: Status: ACTIVE | Noted: 2024-10-30

## 2024-10-30 PROBLEM — H04.129 DRY EYE SYNDROME: Status: ACTIVE | Noted: 2024-10-30

## 2024-10-30 PROBLEM — N28.9 RENAL INSUFFICIENCY SYNDROME: Status: ACTIVE | Noted: 2024-07-25

## 2024-10-30 PROBLEM — H02.839 DERMATOCHALASIS OF EYELID: Status: ACTIVE | Noted: 2024-10-30

## 2024-10-30 PROBLEM — H02.401 UNSPECIFIED PTOSIS OF RIGHT EYELID: Status: ACTIVE | Noted: 2024-10-30

## 2024-10-30 PROBLEM — H04.203 UNSPECIFIED EPIPHORA, BILATERAL: Status: ACTIVE | Noted: 2024-10-30

## 2024-10-30 PROBLEM — T15.91XA: Status: ACTIVE | Noted: 2024-10-30

## 2024-10-30 SDOH — ECONOMIC STABILITY: FOOD INSECURITY: WITHIN THE PAST 12 MONTHS, YOU WORRIED THAT YOUR FOOD WOULD RUN OUT BEFORE YOU GOT MONEY TO BUY MORE.: NEVER TRUE

## 2024-10-30 SDOH — ECONOMIC STABILITY: FOOD INSECURITY: WITHIN THE PAST 12 MONTHS, THE FOOD YOU BOUGHT JUST DIDN'T LAST AND YOU DIDN'T HAVE MONEY TO GET MORE.: NEVER TRUE

## 2024-10-30 SDOH — ECONOMIC STABILITY: INCOME INSECURITY: HOW HARD IS IT FOR YOU TO PAY FOR THE VERY BASICS LIKE FOOD, HOUSING, MEDICAL CARE, AND HEATING?: NOT HARD AT ALL

## 2024-10-30 ASSESSMENT — ENCOUNTER SYMPTOMS
RHINORRHEA: 0
BACK PAIN: 1
BLOOD IN STOOL: 0
SORE THROAT: 0
COUGH: 0
ABDOMINAL PAIN: 0
CHEST TIGHTNESS: 0
SHORTNESS OF BREATH: 0
CONSTIPATION: 0

## 2024-10-30 NOTE — PROGRESS NOTES
Subjective:      Patient ID: Art Gaffney is a 84 y.o. male.    HPI  Patient in for 6-month checkup on several medical issues.  Diabetes-not taking any medications and apparently a number of years ago he was diagnosed with diabetes and has lost about 70 pounds over the years and A1c's are always in the nondiabetic range therefore no medication.  Hypertension-on medication and well-controlled.  Coronary artery disease-she has 1 stent and has been released by his cardiologist.  COPD-does not see pulmonary at this time.  Lab work by a rheumatologist in July-his chronic very mild anemia.  He is complaining of some pressure discomfort in the left ear for several weeks and has been using Astelin and it is seems like it is trying to get better but not quite.  COPD-states he does not use albuterol and has discontinued his daily inhaler and does not see any difference.        Review of Systems    Review of Systems   Constitutional:  Negative for unexpected weight change.   HENT:  Positive for ear pain. Negative for congestion, postnasal drip, rhinorrhea and sore throat.         Lt ear press-using asteline   Eyes:  Negative for visual disturbance.   Respiratory:  Negative for cough, chest tightness and shortness of breath.    Cardiovascular:  Negative for chest pain, palpitations and leg swelling.   Gastrointestinal:  Negative for abdominal pain, blood in stool and constipation.        No gerd   Genitourinary:  Negative for dysuria, frequency and hematuria.        No nocturia   Musculoskeletal:  Positive for arthralgias and back pain. Negative for myalgias.   Skin:  Negative for pallor and rash.   Neurological:  Negative for tremors, syncope and headaches.   Psychiatric/Behavioral:  Negative for sleep disturbance. The patient is not nervous/anxious.        Objective:   Physical Exam      Physical Exam  Constitutional:       General: He is not in acute distress.     Appearance: Normal appearance. He is well-developed and

## 2024-11-15 DIAGNOSIS — E11.69 HYPERLIPIDEMIA DUE TO TYPE 2 DIABETES MELLITUS (HCC): ICD-10-CM

## 2024-11-15 DIAGNOSIS — E78.5 HYPERLIPIDEMIA DUE TO TYPE 2 DIABETES MELLITUS (HCC): ICD-10-CM

## 2024-11-15 DIAGNOSIS — E03.9 ACQUIRED HYPOTHYROIDISM: ICD-10-CM

## 2024-11-16 RX ORDER — LISINOPRIL 20 MG/1
20 TABLET ORAL 2 TIMES DAILY
Qty: 180 TABLET | Refills: 1 | Status: SHIPPED | OUTPATIENT
Start: 2024-11-16

## 2024-11-16 RX ORDER — TAMSULOSIN HYDROCHLORIDE 0.4 MG/1
0.4 CAPSULE ORAL DAILY
Qty: 90 CAPSULE | Refills: 1 | Status: SHIPPED | OUTPATIENT
Start: 2024-11-16

## 2024-11-16 RX ORDER — HYDROCHLOROTHIAZIDE 25 MG/1
25 TABLET ORAL 2 TIMES DAILY
Qty: 180 TABLET | Refills: 1 | Status: SHIPPED | OUTPATIENT
Start: 2024-11-16

## 2024-11-16 RX ORDER — LEVOTHYROXINE SODIUM 112 MCG
224 TABLET ORAL DAILY
Qty: 180 TABLET | Refills: 1 | Status: SHIPPED | OUTPATIENT
Start: 2024-11-16

## 2024-11-16 NOTE — TELEPHONE ENCOUNTER
Refill Request     CONFIRM preferred pharmacy with the patient.    If Mail Order Rx - Pend for 90 day refill.      Last Seen: Last Seen Department: 10/30/2024  Last Seen by PCP: 10/30/2024    Last Written: 6/23/2024 Lisinopril  6/23/2024 Tamsulosin  6/23/2024 HCTZ  6/23/2024 Synthroid    If no future appointment scheduled:  Review the last OV with PCP and review information for follow-up visit,  Route STAFF MESSAGE with patient name to the  Pool for scheduling with the following information:            -  Timing of next visit           -  Visit type ie Physical, OV, etc           -  Diagnoses/Reason ie. COPD, HTN - Do not use MEDICATION, Follow-up or CHECK UP - Give reason for visit      Next Appointment:   Future Appointments   Date Time Provider Department Center   4/30/2025  8:30 AM Jeromy Salvador DO [facility] DEP       Message sent to  to schedule appt with patient?  NO      Requested Prescriptions     Pending Prescriptions Disp Refills    lisinopril (PRINIVIL;ZESTRIL) 20 MG tablet [Pharmacy Med Name: LISINOPRIL TAB 20MG] 180 tablet 1     Sig: TAKE 1 TABLET TWICE A DAY    tamsulosin (FLOMAX) 0.4 MG capsule [Pharmacy Med Name: TAMSULOSIN CAP 0.4MG] 90 capsule 1     Sig: TAKE 1 CAPSULE DAILY    hydroCHLOROthiazide (HYDRODIURIL) 25 MG tablet [Pharmacy Med Name: HYDROCHLOROT TAB 25MG] 180 tablet 1     Sig: TAKE 1 TABLET TWICE A DAY    SYNTHROID 112 MCG tablet [Pharmacy Med Name: SYNTHROID TAB 112MCG] 180 tablet 1     Sig: TAKE 2 TABLETS DAILY

## 2024-12-03 ENCOUNTER — OFFICE VISIT (OUTPATIENT)
Dept: ORTHOPEDIC SURGERY | Age: 84
End: 2024-12-03
Payer: MEDICARE

## 2024-12-03 VITALS — HEIGHT: 71 IN | WEIGHT: 215 LBS | BODY MASS INDEX: 30.1 KG/M2

## 2024-12-03 DIAGNOSIS — M51.362 DEGENERATION OF INTERVERTEBRAL DISC OF LUMBAR REGION WITH DISCOGENIC BACK PAIN AND LOWER EXTREMITY PAIN: ICD-10-CM

## 2024-12-03 DIAGNOSIS — M25.551 RIGHT HIP PAIN: ICD-10-CM

## 2024-12-03 DIAGNOSIS — M54.50 LUMBAR SPINE PAIN: Primary | ICD-10-CM

## 2024-12-03 PROCEDURE — G8482 FLU IMMUNIZE ORDER/ADMIN: HCPCS

## 2024-12-03 PROCEDURE — 99214 OFFICE O/P EST MOD 30 MIN: CPT

## 2024-12-03 PROCEDURE — G8417 CALC BMI ABV UP PARAM F/U: HCPCS

## 2024-12-03 PROCEDURE — 1123F ACP DISCUSS/DSCN MKR DOCD: CPT

## 2024-12-03 PROCEDURE — 1159F MED LIST DOCD IN RCRD: CPT

## 2024-12-03 PROCEDURE — G8427 DOCREV CUR MEDS BY ELIG CLIN: HCPCS

## 2024-12-03 PROCEDURE — 1036F TOBACCO NON-USER: CPT

## 2024-12-03 RX ORDER — PREDNISONE 10 MG/1
TABLET ORAL
Qty: 30 TABLET | Refills: 0 | Status: SHIPPED | OUTPATIENT
Start: 2024-12-03

## 2024-12-03 NOTE — PROGRESS NOTES
LUMBAR SPINE (2-3 VIEWS)     Standing Status:   Future     Number of Occurrences:   1     Standing Expiration Date:   1/3/2025    XR HIP RIGHT (2-3 VIEWS)     Standing Status:   Future     Number of Occurrences:   1     Standing Expiration Date:   1/3/2025       Total evaluation time to include patient education and coordination of care: 30 min    Joyce Johns PA-C    * Please note that some or all of this record was generated using voice recognition software.  If there are any questions about the content of this document, please contact me as some errors in transcription may have occurred.       CHEST PAIN/DIZZINESS

## 2024-12-31 NOTE — PROGRESS NOTES
Comprehensive Nutrition Assessment    Type and Reason for Visit:  Initial, RD Nutrition Re-Screen/LOS    Nutrition Recommendations/Plan:   Modify diet to CC5 diet and encourage PO intake   RD to add glucerna BID   RD to order new updated weight   Monitor nutrition adequacy, pertinent labs, bowel habits, wt changes, and clinical progress     Malnutrition Assessment:  Malnutrition Status:  At risk for malnutrition (Comment) (Unable to perform NFPE d/t droplet plus precautions) (01/11/24 0725)    Context:  Acute Illness     Findings of the 6 clinical characteristics of malnutrition:  Energy Intake:  Mild decrease in energy intake (Comment)  Fluid Accumulation:  Mild Extremities    Nutrition Assessment:    LOS assessment: 84 y.o. m w/ CHF, DM admitted after a Fall and w/ hypoxic respiratory failure 2/2 COVID-19, COPD exacerbation. Pt in droplet plus precautions, called pt this morning w/ no answer. On 7 L/min HF NC. On CC4 diet. PO intakes % of meals. ELSI wt changes d/t stated wt hx. RD to add glucerna BID. Continue to encourage PO intake, will continue to monitor.    Nutrition Related Findings:    BLE + 1 edema. + BM yesterday. -236 x 24 hours. Wound Type: None       Current Nutrition Intake & Therapies:    Average Meal Intake: 26-50%, 51-75%, %  Average Supplements Intake: None Ordered  ADULT DIET; Regular; 4 carb choices (60 gm/meal)    Anthropometric Measures:  Height: 180.3 cm (5' 11\")  Ideal Body Weight (IBW): 172 lbs (78 kg)       Current Body Weight: 113.4 kg (250 lb), 145.3 % IBW. Weight Source: Bed Scale  Current BMI (kg/m2): 34.9        Weight Adjustment For: No Adjustment                 BMI Categories: Obese Class 1 (BMI 30.0-34.9)    Estimated Daily Nutrient Needs:  Energy Requirements Based On: Kcal/kg (25-30 kcals/kg)  Weight Used for Energy Requirements: Ideal (78 kg)  Energy (kcal/day): 4657-8459  Weight Used for Protein Requirements: Ideal (1-1.2 g/kg)  Protein (g/day): 78-94  Negative Screen

## 2025-02-10 ENCOUNTER — HOSPITAL ENCOUNTER (OUTPATIENT)
Age: 85
Setting detail: OBSERVATION
Discharge: HOME | End: 2025-02-12
Attending: EMERGENCY MEDICINE | Admitting: HOSPITALIST
Payer: MEDICARE

## 2025-02-10 ENCOUNTER — APPOINTMENT (OUTPATIENT)
Dept: GENERAL RADIOLOGY | Age: 85
End: 2025-02-10
Payer: MEDICARE

## 2025-02-10 DIAGNOSIS — R00.2 PALPITATIONS: ICD-10-CM

## 2025-02-10 DIAGNOSIS — I24.9 ACUTE CORONARY SYNDROME (HCC): ICD-10-CM

## 2025-02-10 DIAGNOSIS — R00.1 BRADYCARDIA: Primary | ICD-10-CM

## 2025-02-10 DIAGNOSIS — I49.8 VENTRICULAR BIGEMINY: ICD-10-CM

## 2025-02-10 DIAGNOSIS — I20.9 ANGINA PECTORIS (HCC): ICD-10-CM

## 2025-02-10 DIAGNOSIS — I49.8 BIGEMINY: ICD-10-CM

## 2025-02-10 PROBLEM — M43.16 SPONDYLOLISTHESIS OF LUMBAR REGION: Status: RESOLVED | Noted: 2018-05-11 | Resolved: 2025-02-10

## 2025-02-10 PROBLEM — E11.8 TYPE 2 DIABETES MELLITUS WITH COMPLICATION, WITHOUT LONG-TERM CURRENT USE OF INSULIN (HCC): Status: ACTIVE | Noted: 2025-02-10

## 2025-02-10 LAB
ALBUMIN SERPL-MCNC: 4.1 G/DL (ref 3.4–5)
ALBUMIN/GLOB SERPL: 2.2 {RATIO} (ref 1.1–2.2)
ALP SERPL-CCNC: 72 U/L (ref 40–129)
ALT SERPL-CCNC: 35 U/L (ref 10–40)
ANION GAP SERPL CALCULATED.3IONS-SCNC: 11 MMOL/L (ref 3–16)
APTT BLD: 29.9 SEC (ref 22.1–36.4)
AST SERPL-CCNC: 23 U/L (ref 15–37)
BASOPHILS # BLD: 0.1 K/UL (ref 0–0.2)
BASOPHILS NFR BLD: 1 %
BILIRUB SERPL-MCNC: <0.2 MG/DL (ref 0–1)
BILIRUB UR QL STRIP.AUTO: NEGATIVE
BUN SERPL-MCNC: 35 MG/DL (ref 7–20)
CALCIUM SERPL-MCNC: 8.3 MG/DL (ref 8.3–10.6)
CHLORIDE SERPL-SCNC: 109 MMOL/L (ref 99–110)
CLARITY UR: CLEAR
CO2 SERPL-SCNC: 25 MMOL/L (ref 21–32)
COLOR UR: YELLOW
CREAT SERPL-MCNC: 1.2 MG/DL (ref 0.8–1.3)
DEPRECATED RDW RBC AUTO: 17 % (ref 12.4–15.4)
EOSINOPHIL # BLD: 0.3 K/UL (ref 0–0.6)
EOSINOPHIL NFR BLD: 2.9 %
FLUAV RNA RESP QL NAA+PROBE: NOT DETECTED
FLUBV RNA RESP QL NAA+PROBE: NOT DETECTED
GFR SERPLBLD CREATININE-BSD FMLA CKD-EPI: 59 ML/MIN/{1.73_M2}
GLUCOSE SERPL-MCNC: 119 MG/DL (ref 70–99)
GLUCOSE UR STRIP.AUTO-MCNC: NEGATIVE MG/DL
HCT VFR BLD AUTO: 38.9 % (ref 40.5–52.5)
HGB BLD-MCNC: 13.1 G/DL (ref 13.5–17.5)
HGB UR QL STRIP.AUTO: NEGATIVE
INR PPP: 1.04 (ref 0.85–1.15)
KETONES UR STRIP.AUTO-MCNC: NEGATIVE MG/DL
LEUKOCYTE ESTERASE UR QL STRIP.AUTO: NEGATIVE
LYMPHOCYTES # BLD: 1.7 K/UL (ref 1–5.1)
LYMPHOCYTES NFR BLD: 18.4 %
MAGNESIUM SERPL-MCNC: 2.19 MG/DL (ref 1.8–2.4)
MCH RBC QN AUTO: 33.1 PG (ref 26–34)
MCHC RBC AUTO-ENTMCNC: 33.6 G/DL (ref 31–36)
MCV RBC AUTO: 98.3 FL (ref 80–100)
MONOCYTES # BLD: 0.7 K/UL (ref 0–1.3)
MONOCYTES NFR BLD: 8.2 %
NEUTROPHILS # BLD: 6.3 K/UL (ref 1.7–7.7)
NEUTROPHILS NFR BLD: 69.5 %
NITRITE UR QL STRIP.AUTO: NEGATIVE
NT-PROBNP SERPL-MCNC: 1936 PG/ML (ref 0–449)
PH UR STRIP.AUTO: 6 [PH] (ref 5–8)
PLATELET # BLD AUTO: 296 K/UL (ref 135–450)
PMV BLD AUTO: 7.2 FL (ref 5–10.5)
POTASSIUM SERPL-SCNC: 4.2 MMOL/L (ref 3.5–5.1)
PROT SERPL-MCNC: 6 G/DL (ref 6.4–8.2)
PROT UR STRIP.AUTO-MCNC: NEGATIVE MG/DL
PROTHROMBIN TIME: 13.8 SEC (ref 11.9–14.9)
RBC # BLD AUTO: 3.96 M/UL (ref 4.2–5.9)
SARS-COV-2 RNA RESP QL NAA+PROBE: NOT DETECTED
SODIUM SERPL-SCNC: 145 MMOL/L (ref 136–145)
SP GR UR STRIP.AUTO: 1.01 (ref 1–1.03)
TROPONIN, HIGH SENSITIVITY: 22 NG/L (ref 0–22)
UA COMPLETE W REFLEX CULTURE PNL UR: NORMAL
UA DIPSTICK W REFLEX MICRO PNL UR: NORMAL
URN SPEC COLLECT METH UR: NORMAL
UROBILINOGEN UR STRIP-ACNC: 0.2 E.U./DL
WBC # BLD AUTO: 9.1 K/UL (ref 4–11)

## 2025-02-10 PROCEDURE — 81003 URINALYSIS AUTO W/O SCOPE: CPT

## 2025-02-10 PROCEDURE — 84484 ASSAY OF TROPONIN QUANT: CPT

## 2025-02-10 PROCEDURE — 85610 PROTHROMBIN TIME: CPT

## 2025-02-10 PROCEDURE — 85025 COMPLETE CBC W/AUTO DIFF WBC: CPT

## 2025-02-10 PROCEDURE — 85730 THROMBOPLASTIN TIME PARTIAL: CPT

## 2025-02-10 PROCEDURE — 83880 ASSAY OF NATRIURETIC PEPTIDE: CPT

## 2025-02-10 PROCEDURE — 93005 ELECTROCARDIOGRAM TRACING: CPT | Performed by: EMERGENCY MEDICINE

## 2025-02-10 PROCEDURE — 99285 EMERGENCY DEPT VISIT HI MDM: CPT

## 2025-02-10 PROCEDURE — 83735 ASSAY OF MAGNESIUM: CPT

## 2025-02-10 PROCEDURE — 87636 SARSCOV2 & INF A&B AMP PRB: CPT

## 2025-02-10 PROCEDURE — 71045 X-RAY EXAM CHEST 1 VIEW: CPT

## 2025-02-10 PROCEDURE — G0378 HOSPITAL OBSERVATION PER HR: HCPCS

## 2025-02-10 PROCEDURE — 80053 COMPREHEN METABOLIC PANEL: CPT

## 2025-02-10 RX ORDER — INSULIN GLARGINE 100 [IU]/ML
0.15 INJECTION, SOLUTION SUBCUTANEOUS DAILY
Status: DISCONTINUED | OUTPATIENT
Start: 2025-02-11 | End: 2025-02-11

## 2025-02-10 RX ORDER — FOLIC ACID 1 MG/1
1 TABLET ORAL DAILY
Status: DISCONTINUED | OUTPATIENT
Start: 2025-02-11 | End: 2025-02-12 | Stop reason: HOSPADM

## 2025-02-10 RX ORDER — SODIUM CHLORIDE 0.9 % (FLUSH) 0.9 %
5-40 SYRINGE (ML) INJECTION PRN
Status: DISCONTINUED | OUTPATIENT
Start: 2025-02-10 | End: 2025-02-12 | Stop reason: HOSPADM

## 2025-02-10 RX ORDER — POTASSIUM CHLORIDE 7.45 MG/ML
10 INJECTION INTRAVENOUS PRN
Status: DISCONTINUED | OUTPATIENT
Start: 2025-02-10 | End: 2025-02-12 | Stop reason: HOSPADM

## 2025-02-10 RX ORDER — LEVOTHYROXINE SODIUM 112 UG/1
224 TABLET ORAL DAILY
Status: DISCONTINUED | OUTPATIENT
Start: 2025-02-11 | End: 2025-02-12 | Stop reason: HOSPADM

## 2025-02-10 RX ORDER — TAMSULOSIN HYDROCHLORIDE 0.4 MG/1
0.4 CAPSULE ORAL DAILY
Status: DISCONTINUED | OUTPATIENT
Start: 2025-02-11 | End: 2025-02-12 | Stop reason: HOSPADM

## 2025-02-10 RX ORDER — INSULIN LISPRO 100 [IU]/ML
0.05 INJECTION, SOLUTION INTRAVENOUS; SUBCUTANEOUS
Status: DISCONTINUED | OUTPATIENT
Start: 2025-02-11 | End: 2025-02-12 | Stop reason: HOSPADM

## 2025-02-10 RX ORDER — ONDANSETRON 2 MG/ML
4 INJECTION INTRAMUSCULAR; INTRAVENOUS EVERY 6 HOURS PRN
Status: DISCONTINUED | OUTPATIENT
Start: 2025-02-10 | End: 2025-02-12 | Stop reason: HOSPADM

## 2025-02-10 RX ORDER — ACETAMINOPHEN 650 MG/1
650 SUPPOSITORY RECTAL EVERY 6 HOURS PRN
Status: DISCONTINUED | OUTPATIENT
Start: 2025-02-10 | End: 2025-02-12 | Stop reason: HOSPADM

## 2025-02-10 RX ORDER — POTASSIUM CHLORIDE 1500 MG/1
40 TABLET, EXTENDED RELEASE ORAL PRN
Status: DISCONTINUED | OUTPATIENT
Start: 2025-02-10 | End: 2025-02-12 | Stop reason: HOSPADM

## 2025-02-10 RX ORDER — SODIUM CHLORIDE 9 MG/ML
INJECTION, SOLUTION INTRAVENOUS PRN
Status: DISCONTINUED | OUTPATIENT
Start: 2025-02-10 | End: 2025-02-12 | Stop reason: HOSPADM

## 2025-02-10 RX ORDER — ACETAMINOPHEN 325 MG/1
650 TABLET ORAL EVERY 6 HOURS PRN
Status: DISCONTINUED | OUTPATIENT
Start: 2025-02-10 | End: 2025-02-12 | Stop reason: HOSPADM

## 2025-02-10 RX ORDER — ATORVASTATIN CALCIUM 40 MG/1
40 TABLET, FILM COATED ORAL NIGHTLY
Status: DISCONTINUED | OUTPATIENT
Start: 2025-02-10 | End: 2025-02-12 | Stop reason: HOSPADM

## 2025-02-10 RX ORDER — HYDROXYCHLOROQUINE SULFATE 200 MG/1
200 TABLET, FILM COATED ORAL 2 TIMES DAILY
Status: DISCONTINUED | OUTPATIENT
Start: 2025-02-10 | End: 2025-02-12 | Stop reason: HOSPADM

## 2025-02-10 RX ORDER — INSULIN LISPRO 100 [IU]/ML
0-4 INJECTION, SOLUTION INTRAVENOUS; SUBCUTANEOUS
Status: DISCONTINUED | OUTPATIENT
Start: 2025-02-10 | End: 2025-02-12 | Stop reason: HOSPADM

## 2025-02-10 RX ORDER — HYDROCHLOROTHIAZIDE 25 MG/1
25 TABLET ORAL DAILY
Status: DISCONTINUED | OUTPATIENT
Start: 2025-02-11 | End: 2025-02-11

## 2025-02-10 RX ORDER — LANOLIN ALCOHOL/MO/W.PET/CERES
400 CREAM (GRAM) TOPICAL DAILY
Status: DISCONTINUED | OUTPATIENT
Start: 2025-02-11 | End: 2025-02-12 | Stop reason: HOSPADM

## 2025-02-10 RX ORDER — METOPROLOL TARTRATE 25 MG/1
25 TABLET, FILM COATED ORAL 2 TIMES DAILY
Status: DISCONTINUED | OUTPATIENT
Start: 2025-02-10 | End: 2025-02-12

## 2025-02-10 RX ORDER — DEXTROSE MONOHYDRATE 100 MG/ML
INJECTION, SOLUTION INTRAVENOUS CONTINUOUS PRN
Status: DISCONTINUED | OUTPATIENT
Start: 2025-02-10 | End: 2025-02-12 | Stop reason: HOSPADM

## 2025-02-10 RX ORDER — GLUCAGON 1 MG/ML
1 KIT INJECTION PRN
Status: DISCONTINUED | OUTPATIENT
Start: 2025-02-10 | End: 2025-02-12 | Stop reason: HOSPADM

## 2025-02-10 RX ORDER — SODIUM CHLORIDE 0.9 % (FLUSH) 0.9 %
5-40 SYRINGE (ML) INJECTION EVERY 12 HOURS SCHEDULED
Status: DISCONTINUED | OUTPATIENT
Start: 2025-02-10 | End: 2025-02-12 | Stop reason: HOSPADM

## 2025-02-10 RX ORDER — MAGNESIUM SULFATE IN WATER 40 MG/ML
2000 INJECTION, SOLUTION INTRAVENOUS PRN
Status: DISCONTINUED | OUTPATIENT
Start: 2025-02-10 | End: 2025-02-12 | Stop reason: HOSPADM

## 2025-02-10 RX ORDER — ONDANSETRON 4 MG/1
4 TABLET, ORALLY DISINTEGRATING ORAL EVERY 8 HOURS PRN
Status: DISCONTINUED | OUTPATIENT
Start: 2025-02-10 | End: 2025-02-12 | Stop reason: HOSPADM

## 2025-02-10 RX ORDER — LISINOPRIL 20 MG/1
20 TABLET ORAL 2 TIMES DAILY
Status: DISCONTINUED | OUTPATIENT
Start: 2025-02-10 | End: 2025-02-12 | Stop reason: HOSPADM

## 2025-02-10 RX ORDER — 0.9 % SODIUM CHLORIDE 0.9 %
1000 INTRAVENOUS SOLUTION INTRAVENOUS ONCE
Status: DISCONTINUED | OUTPATIENT
Start: 2025-02-10 | End: 2025-02-10

## 2025-02-10 RX ORDER — POLYETHYLENE GLYCOL 3350 17 G/17G
17 POWDER, FOR SOLUTION ORAL DAILY PRN
Status: DISCONTINUED | OUTPATIENT
Start: 2025-02-10 | End: 2025-02-12 | Stop reason: HOSPADM

## 2025-02-10 ASSESSMENT — PAIN - FUNCTIONAL ASSESSMENT: PAIN_FUNCTIONAL_ASSESSMENT: 0-10

## 2025-02-11 ENCOUNTER — APPOINTMENT (OUTPATIENT)
Age: 85
End: 2025-02-11
Attending: HOSPITALIST
Payer: MEDICARE

## 2025-02-11 PROBLEM — I49.8 BIGEMINY: Status: ACTIVE | Noted: 2025-02-11

## 2025-02-11 PROBLEM — R00.1 BRADYCARDIA: Status: ACTIVE | Noted: 2025-02-11

## 2025-02-11 PROBLEM — I20.0 UNSTABLE ANGINA (HCC): Status: ACTIVE | Noted: 2025-02-11

## 2025-02-11 PROBLEM — I5A MYOCARDIAL INJURY: Status: ACTIVE | Noted: 2025-02-11

## 2025-02-11 PROBLEM — I49.3 PREMATURE VENTRICULAR BEATS: Status: ACTIVE | Noted: 2025-02-11

## 2025-02-11 LAB
ECHO BSA: 2.16 M2
ECHO LV EF PHYSICIAN: 50 %
EKG ATRIAL RATE: 65 BPM
EKG ATRIAL RATE: 66 BPM
EKG DIAGNOSIS: NORMAL
EKG DIAGNOSIS: NORMAL
EKG P AXIS: 59 DEGREES
EKG P AXIS: 66 DEGREES
EKG P-R INTERVAL: 202 MS
EKG P-R INTERVAL: 202 MS
EKG Q-T INTERVAL: 414 MS
EKG Q-T INTERVAL: 434 MS
EKG QRS DURATION: 96 MS
EKG QRS DURATION: 98 MS
EKG QTC CALCULATION (BAZETT): 430 MS
EKG QTC CALCULATION (BAZETT): 454 MS
EKG R AXIS: -10 DEGREES
EKG R AXIS: -6 DEGREES
EKG T AXIS: 52 DEGREES
EKG T AXIS: 63 DEGREES
EKG VENTRICULAR RATE: 65 BPM
EKG VENTRICULAR RATE: 66 BPM
EST. AVERAGE GLUCOSE BLD GHB EST-MCNC: 102.5 MG/DL
GLUCOSE BLD-MCNC: 81 MG/DL (ref 70–99)
GLUCOSE BLD-MCNC: 82 MG/DL (ref 70–99)
GLUCOSE BLD-MCNC: 85 MG/DL (ref 70–99)
GLUCOSE BLD-MCNC: 88 MG/DL (ref 70–99)
HBA1C MFR BLD: 5.2 %
PERFORMED ON: NORMAL
TROPONIN, HIGH SENSITIVITY: 16 NG/L (ref 0–22)
TROPONIN, HIGH SENSITIVITY: 31 NG/L (ref 0–22)

## 2025-02-11 PROCEDURE — 84484 ASSAY OF TROPONIN QUANT: CPT

## 2025-02-11 PROCEDURE — 6370000000 HC RX 637 (ALT 250 FOR IP): Performed by: HOSPITALIST

## 2025-02-11 PROCEDURE — G0378 HOSPITAL OBSERVATION PER HR: HCPCS

## 2025-02-11 PROCEDURE — 6370000000 HC RX 637 (ALT 250 FOR IP): Performed by: INTERNAL MEDICINE

## 2025-02-11 PROCEDURE — 93005 ELECTROCARDIOGRAM TRACING: CPT | Performed by: INTERNAL MEDICINE

## 2025-02-11 PROCEDURE — 83036 HEMOGLOBIN GLYCOSYLATED A1C: CPT

## 2025-02-11 PROCEDURE — 2500000003 HC RX 250 WO HCPCS: Performed by: HOSPITALIST

## 2025-02-11 PROCEDURE — 99232 SBSQ HOSP IP/OBS MODERATE 35: CPT

## 2025-02-11 PROCEDURE — 36415 COLL VENOUS BLD VENIPUNCTURE: CPT

## 2025-02-11 PROCEDURE — 93308 TTE F-UP OR LMTD: CPT

## 2025-02-11 RX ORDER — ASPIRIN 81 MG/1
81 TABLET ORAL DAILY
Status: DISCONTINUED | OUTPATIENT
Start: 2025-02-11 | End: 2025-02-12 | Stop reason: HOSPADM

## 2025-02-11 RX ORDER — PANTOPRAZOLE SODIUM 40 MG/1
40 TABLET, DELAYED RELEASE ORAL
Status: DISCONTINUED | OUTPATIENT
Start: 2025-02-11 | End: 2025-02-12 | Stop reason: HOSPADM

## 2025-02-11 RX ORDER — LEVOTHYROXINE SODIUM 112 UG/1
112 TABLET ORAL DAILY
Status: ON HOLD | COMMUNITY
End: 2025-02-12 | Stop reason: HOSPADM

## 2025-02-11 RX ORDER — PREDNISOLONE ACETATE 10 MG/ML
1 SUSPENSION/ DROPS OPHTHALMIC 2 TIMES DAILY
COMMUNITY

## 2025-02-11 RX ORDER — SENNOSIDES 8.6 MG
1 TABLET ORAL 2 TIMES DAILY
COMMUNITY

## 2025-02-11 RX ORDER — CYCLOSPORINE 0.5 MG/ML
1 EMULSION OPHTHALMIC 2 TIMES DAILY
COMMUNITY

## 2025-02-11 RX ORDER — ENOXAPARIN SODIUM 100 MG/ML
40 INJECTION SUBCUTANEOUS DAILY
Status: DISCONTINUED | OUTPATIENT
Start: 2025-02-11 | End: 2025-02-12 | Stop reason: HOSPADM

## 2025-02-11 RX ADMIN — MAGNESIUM OXIDE 400 MG (241.3 MG MAGNESIUM) TABLET 400 MG: TABLET at 09:30

## 2025-02-11 RX ADMIN — LISINOPRIL 20 MG: 20 TABLET ORAL at 20:55

## 2025-02-11 RX ADMIN — HYDROXYCHLOROQUINE SULFATE 200 MG: 200 TABLET ORAL at 20:55

## 2025-02-11 RX ADMIN — ATORVASTATIN CALCIUM 40 MG: 40 TABLET, FILM COATED ORAL at 00:28

## 2025-02-11 RX ADMIN — HYDROCHLOROTHIAZIDE 25 MG: 25 TABLET ORAL at 09:30

## 2025-02-11 RX ADMIN — HYDROXYCHLOROQUINE SULFATE 200 MG: 200 TABLET ORAL at 09:30

## 2025-02-11 RX ADMIN — LEVOTHYROXINE SODIUM 224 MCG: 0.11 TABLET ORAL at 06:46

## 2025-02-11 RX ADMIN — Medication 10 ML: at 00:27

## 2025-02-11 RX ADMIN — FOLIC ACID 1 MG: 1 TABLET ORAL at 09:30

## 2025-02-11 RX ADMIN — TAMSULOSIN HYDROCHLORIDE 0.4 MG: 0.4 CAPSULE ORAL at 09:30

## 2025-02-11 RX ADMIN — LISINOPRIL 20 MG: 20 TABLET ORAL at 09:30

## 2025-02-11 RX ADMIN — PANTOPRAZOLE SODIUM 40 MG: 40 TABLET, DELAYED RELEASE ORAL at 16:38

## 2025-02-11 RX ADMIN — ATORVASTATIN CALCIUM 40 MG: 40 TABLET, FILM COATED ORAL at 20:55

## 2025-02-11 RX ADMIN — HYDROXYCHLOROQUINE SULFATE 200 MG: 200 TABLET ORAL at 00:24

## 2025-02-11 RX ADMIN — LISINOPRIL 20 MG: 20 TABLET ORAL at 00:28

## 2025-02-11 ASSESSMENT — LIFESTYLE VARIABLES
HOW OFTEN DO YOU HAVE A DRINK CONTAINING ALCOHOL: NEVER
HOW MANY STANDARD DRINKS CONTAINING ALCOHOL DO YOU HAVE ON A TYPICAL DAY: PATIENT DOES NOT DRINK

## 2025-02-11 ASSESSMENT — PAIN SCALES - GENERAL
PAINLEVEL_OUTOF10: 0
PAINLEVEL_OUTOF10: 6
PAINLEVEL_OUTOF10: 0
PAINLEVEL_OUTOF10: 0

## 2025-02-11 ASSESSMENT — PAIN DESCRIPTION - LOCATION: LOCATION: BACK

## 2025-02-11 ASSESSMENT — PAIN DESCRIPTION - ORIENTATION: ORIENTATION: LOWER

## 2025-02-11 ASSESSMENT — PAIN DESCRIPTION - PAIN TYPE: TYPE: CHRONIC PAIN

## 2025-02-11 NOTE — CONSULTS
[facility] [place]   CONSULTATION  [phone]        Reason for Consultation/Chief Complaint: \"I have been having dizziness and irregular heart beat.\"  Cardiology consulted for bigeminy, chest pain per TARA Vargas  No known cardiologist    History of Present Illness:  Art Gaffney is a 85 y.o. patient who presented to [facility] [place]10/2024 with complaints of  slow heart rate and indigestion. As soon as I approached him he was least receptive of my presence.  Patient is accompanied by wife who gives better history. Patient c/o dizziness and she checked his pulse ox and blood pressure.Pulse was in 30's. She checked again with BP meter it was in 30's again. BP was normal.  On talking further with patient he gets intermittent indigestion unable to determine if it is related to food or exercise. He has not passed out with slow heart rate. He is able to do all his activities.  Prior h/o coronary artery stent in 2015 has not had any testing since then per wife.  I have been asked to provide consultation regarding further management and testing.    PMH  CAD, HTN, GERD, prostate Cancer s/p radioactive Rx, RA , hypothyroidism, HLD and DM II.       Past Medical History:   has a past medical history of Asymptomatic varicose veins of both lower extremities, Coronary artery disease involving native coronary artery of native heart without angina pectoris, Diverticulitis of colon without hemorrhage, Dry eye syndrome, Essential hypertension, Foreign body on external eye, part unspecified, right eye, initial encounter, Gastroesophageal reflux disease with esophagitis, History of prostate cancer, Hypothyroidism (acquired), Lumbar facet arthropathy, Lumbar stenosis with neurogenic claudication, Mixed hyperlipidemia, LEIA (obstructive sleep apnea), Primary osteoarthritis involving multiple joints, Rheumatoid arthritis of multiple sites with negative rheumatoid factor (HCC), Spondylolisthesis of lumbar region, Type 2

## 2025-02-11 NOTE — ED PROVIDER NOTES
[facility] EMERGENCY DEPARTMENT    EMERGENCY DEPARTMENT ENCOUNTER        Patient Name: Art Gaffney  MRN: [id number]  Birthdate 1940  Date of evaluation: 2/10/2025  PCP: Jeromy Salvador DO  Note Started: 8:28 PM EST 2/10/25    CHIEF COMPLAINT       Irregular Heart Beat and Dizziness (Pt to ED with complaint of having an irregular HR that started this afternoon. Pt states he felt \"funny\" and dizzy and checked his HR and it was irregular. Pt states his HR was going into the 30's. Pt does endorse dizziness and stating \"everything is fuzzy looking\")      HISTORY OF PRESENT ILLNESS: 1 or more Elements       Art Gaffney is a 85 y.o. male who presents to the department for evaluation of irregular heart rate and dizziness.  Patient reports having intermittent chest pain and feeling dizzy at times.  Describes dizziness as not feeling like his usual self and feeling like everything was fuzzy.  Denies having room spinning sensation.  Says he checked his heart rate and it was down to the 30s.  Denies having any chest pain or lightheadedness currently.  Denies having any cardiac workup recently.      No other complaints, modifying factors or associated symptoms.     History obtained by the patient unless stated otherwise as above on HPI.   No limitations unless specified as above on HPI.     Past medical history:   Past Medical History:   Diagnosis Date    BRYON (acute kidney injury) (HCC) 11/02/2017    CAD (coronary artery disease) 08/01/2015    Cancer (HCC)     PROSTATE    Chronic dCHF (grade 1 LVDD) 11/02/2017    Diverticulitis     Hepatitis     PT NOT SURE WHAT KIND, HE HAD YEARS AGO    Hyperlipidemia     Hypertension     Hypothyroidism     Lumbar radiculopathy  R AND L  05/11/2018    Lumbar stenosis with neurogenic claudication  severe L23 TO L5S1 , WORSE L45      Numbness and tingling     HANDS AND FEET    OA (osteoarthritis) of hip 07/21/2015    Obesity (BMI 30-39.9) 11/03/2017    Other disorders of

## 2025-02-11 NOTE — ASSESSMENT & PLAN NOTE
- Known hx w/o evidence of acute focal synovitis  - Resume established DMARD w/ PRN therapy  - Outpt follow up per Rheum

## 2025-02-11 NOTE — ASSESSMENT & PLAN NOTE
- Known hx w/ previous PTCA/KAYLEIGH and no evidence of acute ischemia or infarct on EKG and serial Trop  - Resume established medical regimen, statin and APT  - Outpt follow up per PCP

## 2025-02-11 NOTE — H&P
V2.0  History and Physical      Name:  Art Gaffney /Age/Sex: 1940  (85 y.o. male)   MRN & CSN:  [id number] & [id number] Encounter Date/Time: 2/10/25   Location:   PCP: Jeromy Salvador DO       Hospital Day: 1    Assessment and Plan:   Art Gaffney is a 85 y.o. male with a hx of RA, CAD and DM who presents with palpitations    Hospital Problems             Last Modified POA    Ventricular bigeminy 2/10/2025 Yes    Coronary artery disease of native artery of native heart with stable angina pectoris (HCC) 2/10/2025 Yes    Rheumatoid arthritis of multiple sites with negative rheumatoid factor (HCC) 2/10/2025 Yes    Type 2 diabetes mellitus with complication, without long-term current use of insulin (HCC) 2/10/2025 Yes     Plan:  Type 2 diabetes mellitus with complication, without long-term current use of insulin ([facility])   - A1C<6 w/o acute inpt hyperglycemia noted  - Wt based lantus & prandial Novolog initiated for inpt management  - Resume low CHO diet       Coronary artery disease of native artery of native heart with stable angina pectoris (HCC)  - Known hx w/ previous PTCA/KAYLEIGH and no evidence of acute ischemia or infarct on EKG and serial Trop  - Resume established medical regimen, statin and APT  - Outpt follow up per PCP    Rheumatoid arthritis of multiple sites with negative rheumatoid factor (HCC)  - Known hx w/o evidence of acute focal synovitis  - Resume established DMARD w/ PRN therapy  - Outpt follow up per Rheum    Ventricular bigeminy  - Reports of subjective palpitations reported with Bigeminy noted on EKG  - No secondary hypotension or LOC reported and no acute electrolyte abnormality present  - BB adjusted and TTE ordered w/ outpt Holter monitor recommended  - No inpt Cardio eval planned    Disposition:   Current Living situation: Home  Expected Disposition: Home  Estimated D/C: 25    Diet ADULT DIET; Regular; 3 carb choices (45 gm/meal); Low Fat/Low Chol/High Fiber/2 gm Na

## 2025-02-11 NOTE — ASSESSMENT & PLAN NOTE
- Reports of subjective palpitations reported with Bigeminy noted on EKG  - No secondary hypotension or LOC reported and no acute electrolyte abnormality present  - BB adjusted and TTE ordered w/ outpt Holter monitor recommended  - No inpt Cardio eval planned

## 2025-02-12 ENCOUNTER — APPOINTMENT (OUTPATIENT)
Age: 85
End: 2025-02-12
Attending: INTERNAL MEDICINE
Payer: MEDICARE

## 2025-02-12 ENCOUNTER — TELEPHONE (OUTPATIENT)
Dept: CARDIOLOGY CLINIC | Age: 85
End: 2025-02-12

## 2025-02-12 ENCOUNTER — APPOINTMENT (OUTPATIENT)
Dept: NUCLEAR MEDICINE | Age: 85
End: 2025-02-12
Attending: INTERNAL MEDICINE
Payer: MEDICARE

## 2025-02-12 ENCOUNTER — ANCILLARY PROCEDURE (OUTPATIENT)
Dept: CARDIOLOGY CLINIC | Age: 85
End: 2025-02-12

## 2025-02-12 VITALS
BODY MASS INDEX: 29.64 KG/M2 | RESPIRATION RATE: 19 BRPM | TEMPERATURE: 97.4 F | SYSTOLIC BLOOD PRESSURE: 142 MMHG | OXYGEN SATURATION: 96 % | HEIGHT: 71 IN | HEART RATE: 94 BPM | WEIGHT: 211.7 LBS | DIASTOLIC BLOOD PRESSURE: 70 MMHG

## 2025-02-12 DIAGNOSIS — R00.2 PALPITATIONS: ICD-10-CM

## 2025-02-12 LAB
ANION GAP SERPL CALCULATED.3IONS-SCNC: 11 MMOL/L (ref 3–16)
BASOPHILS # BLD: 0.1 K/UL (ref 0–0.2)
BASOPHILS NFR BLD: 0.6 %
BUN SERPL-MCNC: 25 MG/DL (ref 7–20)
CALCIUM SERPL-MCNC: 8 MG/DL (ref 8.3–10.6)
CHLORIDE SERPL-SCNC: 108 MMOL/L (ref 99–110)
CO2 SERPL-SCNC: 23 MMOL/L (ref 21–32)
CREAT SERPL-MCNC: 1 MG/DL (ref 0.8–1.3)
DEPRECATED RDW RBC AUTO: 16.5 % (ref 12.4–15.4)
ECHO BSA: 2.16 M2
ECHO BSA: 2.16 M2
EKG ATRIAL RATE: 69 BPM
EKG DIAGNOSIS: NORMAL
EKG P AXIS: 46 DEGREES
EKG P-R INTERVAL: 200 MS
EKG Q-T INTERVAL: 406 MS
EKG QRS DURATION: 98 MS
EKG QTC CALCULATION (BAZETT): 435 MS
EKG R AXIS: -24 DEGREES
EKG T AXIS: 22 DEGREES
EKG VENTRICULAR RATE: 69 BPM
EOSINOPHIL # BLD: 0.3 K/UL (ref 0–0.6)
EOSINOPHIL NFR BLD: 2.4 %
GFR SERPLBLD CREATININE-BSD FMLA CKD-EPI: 74 ML/MIN/{1.73_M2}
GLUCOSE SERPL-MCNC: 85 MG/DL (ref 70–99)
HCT VFR BLD AUTO: 34.6 % (ref 40.5–52.5)
HGB BLD-MCNC: 11.9 G/DL (ref 13.5–17.5)
LYMPHOCYTES # BLD: 1.2 K/UL (ref 1–5.1)
LYMPHOCYTES NFR BLD: 11.5 %
MCH RBC QN AUTO: 33.1 PG (ref 26–34)
MCHC RBC AUTO-ENTMCNC: 34.3 G/DL (ref 31–36)
MCV RBC AUTO: 96.7 FL (ref 80–100)
MONOCYTES # BLD: 1 K/UL (ref 0–1.3)
MONOCYTES NFR BLD: 9.1 %
NEUTROPHILS # BLD: 8.2 K/UL (ref 1.7–7.7)
NEUTROPHILS NFR BLD: 76.4 %
NUC STRESS EJECTION FRACTION: 66 %
NUC STRESS LV EDV: 114 ML (ref 67–155)
NUC STRESS LV ESV: 39 ML (ref 22–58)
NUC STRESS LV MASS: 141 G
PLATELET # BLD AUTO: 294 K/UL (ref 135–450)
PMV BLD AUTO: 7.3 FL (ref 5–10.5)
POTASSIUM SERPL-SCNC: 3.9 MMOL/L (ref 3.5–5.1)
RBC # BLD AUTO: 3.58 M/UL (ref 4.2–5.9)
SODIUM SERPL-SCNC: 142 MMOL/L (ref 136–145)
STRESS BASELINE DIAS BP: 76 MMHG
STRESS BASELINE HR: 65 BPM
STRESS BASELINE SYS BP: 128 MMHG
STRESS ESTIMATED WORKLOAD: 1 METS
STRESS PEAK DIAS BP: 61 MMHG
STRESS PEAK SYS BP: 147 MMHG
STRESS PERCENT HR ACHIEVED: 64 %
STRESS POST PEAK HR: 86 BPM
STRESS RATE PRESSURE PRODUCT: NORMAL BPM*MMHG
STRESS TARGET HR: 135 BPM
WBC # BLD AUTO: 10.7 K/UL (ref 4–11)

## 2025-02-12 PROCEDURE — G0378 HOSPITAL OBSERVATION PER HR: HCPCS

## 2025-02-12 PROCEDURE — 78452 HT MUSCLE IMAGE SPECT MULT: CPT

## 2025-02-12 PROCEDURE — 85025 COMPLETE CBC W/AUTO DIFF WBC: CPT

## 2025-02-12 PROCEDURE — 3430000000 HC RX DIAGNOSTIC RADIOPHARMACEUTICAL: Performed by: INTERNAL MEDICINE

## 2025-02-12 PROCEDURE — 6360000002 HC RX W HCPCS: Performed by: INTERNAL MEDICINE

## 2025-02-12 PROCEDURE — 80048 BASIC METABOLIC PNL TOTAL CA: CPT

## 2025-02-12 PROCEDURE — A9502 TC99M TETROFOSMIN: HCPCS | Performed by: INTERNAL MEDICINE

## 2025-02-12 PROCEDURE — 93017 CV STRESS TEST TRACING ONLY: CPT

## 2025-02-12 PROCEDURE — 36415 COLL VENOUS BLD VENIPUNCTURE: CPT

## 2025-02-12 RX ORDER — REGADENOSON 0.08 MG/ML
0.4 INJECTION, SOLUTION INTRAVENOUS
Status: COMPLETED | OUTPATIENT
Start: 2025-02-12 | End: 2025-02-12

## 2025-02-12 RX ADMIN — REGADENOSON 0.4 MG: 0.08 INJECTION, SOLUTION INTRAVENOUS at 09:51

## 2025-02-12 RX ADMIN — TETROFOSMIN 31 MILLICURIE: 1.38 INJECTION, POWDER, LYOPHILIZED, FOR SOLUTION INTRAVENOUS at 09:51

## 2025-02-12 RX ADMIN — TETROFOSMIN 11.1 MILLICURIE: 1.38 INJECTION, POWDER, LYOPHILIZED, FOR SOLUTION INTRAVENOUS at 08:07

## 2025-02-12 ASSESSMENT — PAIN SCALES - GENERAL: PAINLEVEL_OUTOF10: 0

## 2025-02-12 NOTE — CARE COORDINATION
Patient admitted as Observation/OPIB with an anticipated short hospitalization length of stay. Chart reviewed and it appears that patient has minimal needs for discharge at this time. Discussed with patient’s nurse and requested that case management be notified if discharge needs are identified.     *Case management will continue to follow progress and update discharge plan as needed.

## 2025-02-12 NOTE — TELEPHONE ENCOUNTER
Monitor placed by  RN  Monitor company [employer]  Length of monitor 2 week  Monitor ordered by SEND TO PCP  Serial number [id number]    Activation successful prior to pt leaving office? YES BUT DAUGHTER WILL PUT ON HIM AT HOME AFTER HE TAKES A SHOWER

## 2025-02-12 NOTE — DISCHARGE INSTRUCTIONS
Follow up with cardiology in 2 weeks after monitor   Hold metoprolol until then  _________________________________           Bradycardia: Care Instructions  Overview     Bradycardia is a slow heart rate. A slow heart rate can be normal and healthy. Or it could be a sign of a problem with the heart's electrical system. If your heart beats too slowly, it may not supply your body with enough blood. This can make you weak or dizzy. Or it may make you pass out.  Bradycardia can be caused by many things. This includes medicine, certain medical conditions, and changes in the heart that are the result of aging.  How bradycardia is treated depends on what is causing it. Treatments include treating another health problem, changing a medicine, and getting a pacemaker. Treatment also depends on the symptoms. If bradycardia doesn't cause symptoms, it may not be treated. You and your doctor can decide what treatment is right for you.  Follow-up care is a key part of your treatment and safety. Be sure to make and go to all appointments, and call your doctor if you are having problems. It's also a good idea to know your test results and keep a list of the medicines you take.  How can you care for yourself at home?  Take your medicines exactly as prescribed. Call your doctor if you think you are having a problem with your medicine. If your bradycardia is caused by another disease, your doctor will try to treat the disease. If it is caused by heart medicines, the doctor will adjust your medicines.  Have a heart-healthy lifestyle.  Eat a heart-healthy diet that includes vegetables, fruits, nuts, beans, lean meat, fish, and whole grains. Limit alcohol, sodium, and sugar.  Get regular exercise. Try for 30 minutes on most days of the week. If you do not have other heart problems, you likely do not have limits on the type or level of activity that you can do. You may want to walk, swim, bike, or do other activities. Ask your doctor what

## 2025-02-12 NOTE — FLOWSHEET NOTE
02/11/25 2100   Assessment   Charting Type Shift assessment   Psychosocial   Psychosocial (WDL) WDL   Neurological   Neuro (WDL) X   Level of Consciousness 0   Orientation Level Oriented X4   Cognition Appropriate judgement;Appropriate safety awareness;Appropriate attention/concentration;Appropriate for developmental age;Follows commands   Speech Clear   R Hand  Strong   L Hand  Strong   R Foot Dorsiflexion Strong   L Foot Dorsiflexion Strong   R Foot Plantar Flexion Strong   L Foot Plantar Flexion Strong   RUE Motor Response Normal extension;Normal flexion;Responds to command   RUE Sensation  Full sensation;No numbness;No pain;No tingling   LUE Motor Response Normal extension;Normal flexion;Responds to command   LUE Sensation  Full sensation;No numbness;No pain;No tingling   RLE Motor Response Normal extension;Normal flexion;Responds to command   RLE Sensation  Numbness;Tingling   LLE Motor Response Normal extension;Normal flexion;Responds to command   LLE Sensation  Numbness;Tingling   Herman Coma Scale   Eye Opening 4   Best Verbal Response 5   Best Motor Response 6   Herman Coma Scale Score 15   HEENT (Head, Ears, Eyes, Nose, & Throat)   HEENT (WDL) X   Left Eye Impaired vision;Glasses   Teeth Edentulous   Right Eye Impaired vision;Glasses   Respiratory   Respiratory (WDL) WDL   Respiratory Pattern Regular   Respiratory Depth Normal   Respiratory Quality/Effort Unlabored   Chest Assessment Chest expansion symmetrical;Trachea midline   L Breath Sounds Diminished   R Breath Sounds Clear   Breath Sounds   Breath Sounds Bilateral Diminished   Right Upper Lobe Diminished   Right Middle Lobe Diminished   Right Lower Lobe Diminished   Left Upper Lobe Diminished   Left Lower Lobe Diminished   Cardiac   Cardiac (WDL) WDL   Cardiac Rhythm Sinus rhythm;1° AV Block   Gastrointestinal   Abdominal (WDL) WDL   Genitourinary   Genitourinary (WDL) WDL   Peripheral Vascular   Peripheral Vascular (WDL) WDL   Skin

## 2025-02-12 NOTE — PROGRESS NOTES
End of shift report given to Grazyna RN  -  care transferred  -  call light in patient's reach.  
Noted patient w/ rhythm appearing to be Aflutter  (at 1658 and 1801).  Strips printed and EKG ordered  
Patient admitted to room 319 from EDrm 19. Patient oriented to room, call light, bed rails, phone, lights and bathroom. Patient instructed about the schedule of the day including: vital sign frequency, lab draws, possible tests, frequency of MD and staff rounds, daily weights, I &O's and prescribed diet. Mxtell #27 Telemetry box in place, patient aware of placement and reason. Bed locked, in lowest position, side rails up 2/4, call light within reach.        Recliner Assessment  Patient is able to demonstrate the ability to move from a reclining position to an upright position within the recliner.       4 Eyes Skin Assessment     NAME:  Art Gaffney  YOB: 1940  MEDICAL RECORD NUMBER:  [id number]    The patient is being assessed for  Admission    I agree that at least one RN has performed a thorough Head to Toe Skin Assessment on the patient. ALL assessment sites listed below have been assessed.      Areas assessed by both nurses:    Head, Face, Ears, Shoulders, Back, Chest, Arms, Elbows, Hands, Sacrum. Buttock, Coccyx, Ischium, Legs. Feet and Heels, and Under Medical Devices    -  patient refused         Does the Patient have a Wound? No noted wound(s)       Trae Prevention initiated by RN: No  Wound Care Orders initiated by RN: No    Pressure Injury (Stage 3,4, Unstageable, DTI, NWPT, and Complex wounds) if present, place Wound referral order by RN under : No    New Ostomies, if present place, Ostomy referral order under : No     Nurse 1 eSignature: Electronically signed by Анна Gibbons RN on 2/11/25 at 6:28 PM EST  
Patient has been informed he will be leaving the unit for stress test soon and denies any needs.   
Patients IV has been removed with no complications. Patient dressed and stated he has all of his belongings. Patient has ride coming and denies any needs at this time.   
Per EKG, patient in SR w/ PVC  
Pt A&Ox 4 on RA. AM assessment and vitals completed and put into flowsheets. AM medications given with no s/s of aspiration. Patient takes pills whole with water. Pt with no questions or concerns voiced to RN at this time. Fall precautions in place and call light within reach.       Vitals:    02/11/25 1115   BP: 127/71   Pulse: 64   Resp: 17   Temp: 98.2 °F (36.8 °C)   SpO2: 100%        Pt irritable and saying he wants to leave. States he \"is leaving today either way\".   
Pt admitted to Room ER 19. Pt 2 [place] hold with Tele.  Pt A/O. On RA. One PIV in place. Using urinal for elimination purpose. [place] to room.   Assessment complete as charted. Call light in reach. Denies other needs. Will continue to monitor.  
PRN  glucagon (rDNA), 1 mg, PRN  dextrose, , Continuous PRN  sodium chloride flush, 5-40 mL, PRN  sodium chloride, , PRN  potassium chloride, 40 mEq, PRN   Or  potassium alternative oral replacement, 40 mEq, PRN   Or  potassium chloride, 10 mEq, PRN  magnesium sulfate, 2,000 mg, PRN  ondansetron, 4 mg, Q8H PRN   Or  ondansetron, 4 mg, Q6H PRN  polyethylene glycol, 17 g, Daily PRN  acetaminophen, 650 mg, Q6H PRN   Or  acetaminophen, 650 mg, Q6H PRN          Data:  CBC:   Recent Labs     02/10/25  1816   WBC 9.1   HGB 13.1*   HCT 38.9*   MCV 98.3        BMP:   Recent Labs     02/10/25  1816      K 4.2      CO2 25   BUN 35*   CREATININE 1.2     LIVER PROFILE:   Recent Labs     02/10/25  1816   AST 23   ALT 35   BILITOT <0.2   ALKPHOS 72     PT/INR:   Recent Labs     02/10/25  1816   PROTIME 13.8   INR 1.04       CULTURES  Results       Procedure Component Value Units Date/Time    COVID-19 & Influenza Combo [[id number]] Collected: 02/10/25 1816    Order Status: Completed Specimen: Nasopharyngeal Swab Updated: 02/10/25 1845     SARS-CoV-2 RNA, RT PCR NOT DETECTED     Comment: Not Detected results do not preclude SARS-CoV-2 infection and  should not be used as the sole basis for patient management  decisions.  Results must be combined with clinical observations,  patient history, and epidemiological information.  Testing was performed using YELENA JOSE L SARS-CoV-2, Influenza A/B  and Respiratory Syncytial Virus nucleic acid assay. This  test is a multiplex Real-Time Reverse Transcriptase Polymerase Chain  Reaction (RT-PCR)-based in vitro diagnostic test intended for the  qualitative detection of nucleic acids from SARS-CoV-2,  influenza A,influenza B and Respiratory Syncytial Virus  in nasopharyngeal and nasal swab specimens.    Patient Fact Sheet:  https://www.fda.gov/media/104435/download  Provider Fact Sheet: https://www.fda.gov/media/565713/download  EUA: 
58% with uniform wall motion. Low risk study.    Stress Test: A pharmacological stress test was performed using regadenoson (Lexiscan). PO caffeine given as a reversal agent.    Resting ECG: NSR 65bpm; PVC's in bigeminy; low voltage    Stress ECG: Arrhythmias during stress: occasional PVCs. No significant ST changes noted. The stress ECG was not diagnostic due to failure to achieve target heart rate.     Assessment:  Sinus bradycardia  resolved post stopping metoprolol  Ventricular bigeminy resolved,  will put 2 week event monitor today after discharge follow in office if any significant arrhythmia discussed with patient and wife   CAD prior stent Indigestion type of chest pain neg ischemia on stress test. Continue asa and statin  Hypertension On lisinopril but will need to monitor as outpatient goal BP <140/90          BRITTANY BOWLIGN MD, MD 2/12/2025 1:23 PM

## 2025-02-12 NOTE — DISCHARGE SUMMARY
suspension  Commonly known as: PRED FORTE     PRESERVISION AREDS 2 PO     senna 8.6 MG Tabs tablet  Commonly known as: SENOKOT     vitamin D 1.25 MG (93023 UT) Caps capsule  Commonly known as: ERGOCALCIFEROL            STOP taking these medications      metoprolol tartrate 25 MG tablet  Commonly known as: LOPRESSOR                Discharge Condition/Location: stable/home     Follow Up:  F/w cardiology in 2 weeks after monitor   Hold metoprolol until then      Time Spent on discharge is more than 28 minutes in the examination, evaluation, counseling and review of medications and discharge plan.      Jaden Lenz MD, 2/12/2025 12:34 PM

## 2025-02-13 ENCOUNTER — CARE COORDINATION (OUTPATIENT)
Dept: CASE MANAGEMENT | Age: 85
End: 2025-02-13

## 2025-02-13 ENCOUNTER — TELEPHONE (OUTPATIENT)
Dept: FAMILY MEDICINE CLINIC | Age: 85
End: 2025-02-13

## 2025-02-13 NOTE — CARE COORDINATION
Care Transitions Note    Initial Call - Call within 2 business days of discharge: Yes    Attempted to reach patient for transitions of care follow up. Unable to reach patient.    Outreach Attempts:   Patient would not verify HIPAA. Patient encouraged to follow up with provider with any questions or concerns.     Patient: Art Gaffney    Patient : 1940   MRN: [id number]    Reason for Admission: Bradycardia   Discharge Date: 25  RURS: No data recorded  Last Discharge Facility       Date Complaint Diagnosis Description Type Department Provider    2/10/25 Irregular Heart Beat; Dizziness Bradycardia ... ED to Hosp-Admission (Discharged) (ADMITTED) [facility] Jaden Lenz MD; AMRIT Lagunas..            Was this an external facility discharge? No    Follow Up Appointment:   Patient does not have a follow up appointment scheduled at time of call.  CTN will route message to PCP office.   Future Appointments         Provider Specialty Dept Phone    2025 8:30 AM Miriam Driscoll MD Family Medicine [phone]            No further follow-up call indicated     ERROL Tenorio, RN, [facility]  Care Transition Nurse  [phone] mobile

## 2025-02-13 NOTE — TELEPHONE ENCOUNTER
Care Transitions Initial Follow Up Call    Outreach made within 2 business days of discharge: Yes    Patient: Art Gaffney Patient : 1940   MRN: [id number]  Reason for Admission: Bigeminy  Discharge Date: 25       Spoke with: Leon    Discharge department/facility: [facility] Interactive Patient Contact:  Was patient able to fill all prescriptions: Yes  Was patient instructed to bring all medications to the follow-up visit: Yes  Is patient taking all medications as directed in the discharge summary? Yes  Does patient understand their discharge instructions: Yes  Does patient have questions or concerns that need addressed prior to 7-14 day follow up office visit: no        Scheduled appointment with PCP within 7-14 days    Follow Up  Future Appointments   Date Time Provider Department Center   2025 11:30 AM Miriam Driscoll MD [facility]   2025  8:30 AM Miriam Driscoll MD [facility]       Sabine Grimes LPN

## 2025-02-17 ENCOUNTER — OFFICE VISIT (OUTPATIENT)
Dept: FAMILY MEDICINE CLINIC | Age: 85
End: 2025-02-17

## 2025-02-17 VITALS
OXYGEN SATURATION: 95 % | DIASTOLIC BLOOD PRESSURE: 60 MMHG | HEART RATE: 50 BPM | BODY MASS INDEX: 28.59 KG/M2 | WEIGHT: 205 LBS | SYSTOLIC BLOOD PRESSURE: 122 MMHG

## 2025-02-17 DIAGNOSIS — Z09 HOSPITAL DISCHARGE FOLLOW-UP: Primary | ICD-10-CM

## 2025-02-17 DIAGNOSIS — I73.9 CLAUDICATION: ICD-10-CM

## 2025-02-17 RX ORDER — GABAPENTIN 100 MG/1
100 CAPSULE ORAL NIGHTLY
Qty: 30 CAPSULE | Refills: 1 | Status: SHIPPED | OUTPATIENT
Start: 2025-02-17 | End: 2025-04-18

## 2025-02-17 ASSESSMENT — PATIENT HEALTH QUESTIONNAIRE - PHQ9
SUM OF ALL RESPONSES TO PHQ QUESTIONS 1-9: 0
1. LITTLE INTEREST OR PLEASURE IN DOING THINGS: NOT AT ALL
SUM OF ALL RESPONSES TO PHQ9 QUESTIONS 1 & 2: 0
SUM OF ALL RESPONSES TO PHQ QUESTIONS 1-9: 0
DEPRESSION UNABLE TO ASSESS: FUNCTIONAL CAPACITY MOTIVATION LIMITS ACCURACY
2. FEELING DOWN, DEPRESSED OR HOPELESS: NOT AT ALL

## 2025-02-17 NOTE — PROGRESS NOTES
Post-Discharge Transitional Care  Follow Up    Art Gaffney   YOB: 1940    Date of Office Visit:  2/17/2025  Date of Hospital Admission: 2/10/25  Date of Hospital Discharge: 2/12/25  Risk of hospital readmission (high >=14%. Medium >=10%) :No data recorded    Care management risk score Rising risk (score 2-5) and Complex Care (Scores >=6): No Risk Score On File     Non face to face  following discharge, date last encounter closed (first attempt may have been earlier): 02/13/2025    Call initiated 2 business days of discharge: Yes    ASSESSMENT/PLAN:   Hospital discharge follow-up  -     MO DISCHARGE MEDS RECONCILED W/ CURRENT OUTPATIENT MED LIST  Claudication (HCC)  -     Vascular ankle brachial index (CLAUDIA); Future  -     AFL - Kareem Richter MD, (EMG, NCV), [facility]    --HR 50-60s improved off of metoprolol with resolution of symptoms; has on holter monitor with follow-up with cardiology in two weeks.   --Reports pain in legs with ambulating, improves with rest with decreased Dps - will obtain ABIs, also reports neuropathy in bilateral feet ongoing for the past year with hx of lumbar stenosis s/p laminectomy at L3-4 and L4-5; following with ortho - will obtain EMG/NCV and discussed gabapentin nightly     Follow-up scheduled in April.        Subjective:   HPI:  Follow up of Hospital problems/diagnosis(es):     Ventricular bigeminy.  Elevated troponin.   -HR 30s - stopped metoprolol   -reported associated with palpitations, dizziness, intermittent chest pain.  -PVCs on EKG. BNP elevated at 1936. CXR showing cardiomegaly but pt stable on RA   -trop 22 >31  -cardiology consulted. Discontinued BB for low HR, ECHO with low normal EF at 50-55% - mild global hypokinesis   -stress test done today with normal myocardial perfusion , low risk study  - plans to hold BB and have event monitor x  2 weeks at [place]      Rheumatoid arthritis.  -continue DMARD - plaquenil and MTX  -continue outpatient follow up

## 2025-02-19 ENCOUNTER — TELEPHONE (OUTPATIENT)
Dept: FAMILY MEDICINE CLINIC | Age: 85
End: 2025-02-19

## 2025-02-19 DIAGNOSIS — G62.9 NEUROPATHY: ICD-10-CM

## 2025-02-19 DIAGNOSIS — I73.9 CLAUDICATION: Primary | ICD-10-CM

## 2025-02-19 DIAGNOSIS — G57.93 NEUROPATHY OF BOTH FEET: ICD-10-CM

## 2025-02-19 NOTE — TELEPHONE ENCOUNTER
Received phone call from vale with [facility]       EMG-     They are needing an order for an EMG replaced and faxed over    (I think I re pended it for you) please verify everything and re send as if you would any other EMG order.     FAX: [phone]

## 2025-02-26 ENCOUNTER — TELEPHONE (OUTPATIENT)
Dept: CARDIOLOGY CLINIC | Age: 85
End: 2025-02-26

## 2025-02-28 LAB — ECHO BSA: 2.16 M2

## 2025-03-07 LAB — ECHO BSA: 2.16 M2

## 2025-03-10 ENCOUNTER — TELEPHONE (OUTPATIENT)
Dept: CARDIOLOGY CLINIC | Age: 85
End: 2025-03-10

## 2025-03-10 ENCOUNTER — RESULTS FOLLOW-UP (OUTPATIENT)
Dept: TELEMETRY | Age: 85
End: 2025-03-10

## 2025-03-10 DIAGNOSIS — I48.0 PAROXYSMAL ATRIAL FIBRILLATION (HCC): Primary | ICD-10-CM

## 2025-03-10 NOTE — TELEPHONE ENCOUNTER
Received referral for AF clinic.  has an opening 3/13 or if he calls back can offer next available AF clinic spot     LM with office number and clinic direct number. Will route to Lanny talley he calls back

## 2025-03-12 ENCOUNTER — OFFICE VISIT (OUTPATIENT)
Dept: FAMILY MEDICINE CLINIC | Age: 85
End: 2025-03-12

## 2025-03-12 VITALS
OXYGEN SATURATION: 99 % | SYSTOLIC BLOOD PRESSURE: 102 MMHG | BODY MASS INDEX: 28.59 KG/M2 | HEART RATE: 83 BPM | DIASTOLIC BLOOD PRESSURE: 42 MMHG | WEIGHT: 205 LBS

## 2025-03-12 DIAGNOSIS — I48.0 PAROXYSMAL ATRIAL FIBRILLATION (HCC): ICD-10-CM

## 2025-03-12 DIAGNOSIS — E78.2 MIXED HYPERLIPIDEMIA: ICD-10-CM

## 2025-03-12 DIAGNOSIS — E86.1 HYPOTENSION DUE TO HYPOVOLEMIA: ICD-10-CM

## 2025-03-12 DIAGNOSIS — M54.16 LUMBAR RADICULOPATHY: ICD-10-CM

## 2025-03-12 DIAGNOSIS — I48.0 PAROXYSMAL ATRIAL FIBRILLATION (HCC): Primary | ICD-10-CM

## 2025-03-12 LAB
ALBUMIN SERPL-MCNC: 4.4 G/DL (ref 3.4–5)
ALBUMIN/GLOB SERPL: 2.4 {RATIO} (ref 1.1–2.2)
ALP SERPL-CCNC: 83 U/L (ref 40–129)
ALT SERPL-CCNC: 26 U/L (ref 10–40)
ANION GAP SERPL CALCULATED.3IONS-SCNC: 11 MMOL/L (ref 3–16)
AST SERPL-CCNC: 21 U/L (ref 15–37)
BILIRUB SERPL-MCNC: 0.5 MG/DL (ref 0–1)
BUN SERPL-MCNC: 34 MG/DL (ref 7–20)
CALCIUM SERPL-MCNC: 9.5 MG/DL (ref 8.3–10.6)
CHLORIDE SERPL-SCNC: 105 MMOL/L (ref 99–110)
CHOLEST SERPL-MCNC: 99 MG/DL (ref 0–199)
CO2 SERPL-SCNC: 28 MMOL/L (ref 21–32)
CREAT SERPL-MCNC: 1.3 MG/DL (ref 0.8–1.3)
DEPRECATED RDW RBC AUTO: 14.9 % (ref 12.4–15.4)
GFR SERPLBLD CREATININE-BSD FMLA CKD-EPI: 54 ML/MIN/{1.73_M2}
GLUCOSE SERPL-MCNC: 82 MG/DL (ref 70–99)
HCT VFR BLD AUTO: 38.2 % (ref 40.5–52.5)
HDLC SERPL-MCNC: 39 MG/DL (ref 40–60)
HGB BLD-MCNC: 12.9 G/DL (ref 13.5–17.5)
LDLC SERPL CALC-MCNC: 33 MG/DL
MAGNESIUM SERPL-MCNC: 2.36 MG/DL (ref 1.8–2.4)
MCH RBC QN AUTO: 33.1 PG (ref 26–34)
MCHC RBC AUTO-ENTMCNC: 33.7 G/DL (ref 31–36)
MCV RBC AUTO: 98.4 FL (ref 80–100)
PLATELET # BLD AUTO: 361 K/UL (ref 135–450)
PMV BLD AUTO: 7.6 FL (ref 5–10.5)
POTASSIUM SERPL-SCNC: 4.3 MMOL/L (ref 3.5–5.1)
PROT SERPL-MCNC: 6.2 G/DL (ref 6.4–8.2)
RBC # BLD AUTO: 3.88 M/UL (ref 4.2–5.9)
SODIUM SERPL-SCNC: 144 MMOL/L (ref 136–145)
TRIGL SERPL-MCNC: 135 MG/DL (ref 0–150)
TSH SERPL DL<=0.005 MIU/L-ACNC: 1.01 UIU/ML (ref 0.27–4.2)
VLDLC SERPL CALC-MCNC: 27 MG/DL
WBC # BLD AUTO: 11.9 K/UL (ref 4–11)

## 2025-03-12 RX ORDER — HYDROCHLOROTHIAZIDE 25 MG/1
25 TABLET ORAL DAILY
Qty: 180 TABLET | Refills: 1
Start: 2025-03-12

## 2025-03-12 RX ORDER — GABAPENTIN 300 MG/1
300 CAPSULE ORAL NIGHTLY
Qty: 30 CAPSULE | Refills: 0 | Status: SHIPPED | OUTPATIENT
Start: 2025-03-12 | End: 2025-04-11

## 2025-03-12 ASSESSMENT — ENCOUNTER SYMPTOMS
NAUSEA: 0
SHORTNESS OF BREATH: 0
COUGH: 0
ABDOMINAL PAIN: 0
VOMITING: 0
BLOOD IN STOOL: 0
BACK PAIN: 1

## 2025-03-12 NOTE — TELEPHONE ENCOUNTER
Spoke with pt's wife and pt at [phone] scheduled pt for afib clinic on 3/20/25 at 2pm. [facility] does not have any  openings at this time until may, also with any EPMD at this time.  would need overbook approval from EPRN's to schedule with EPMD for a sooner date.

## 2025-03-12 NOTE — PROGRESS NOTES
[facility]  3/12/2025    Art Gaffney (:  1940) is a 85 y.o. male, here for evaluation of the following medical concerns:    Chief Complaint   Patient presents with    discuss results     Discuss holter monitor results from 25        ASSESSMENT/ PLAN  Assessment & Plan  Paroxysmal atrial fibrillation (HCC)    Holter monitor with low burden atrial fibrillation/flutter (new) with borderline RVR, high burden PVCs. Discussed OAC - pt declined due to risk of falls and hx of GI bleed. HR controlled currently w hx of symptomatic bradycardia on metoprolol; will defer BB. EKG with ventricular bigeminy today. Afib clinic appt next week. Recent stress test neg. TTE w EF 50-55% with mild global hypokinesis. Risk stratification labs below.      Orders:    EKG 12 Lead    CBC; Future    Comprehensive Metabolic Panel; Future    Magnesium; Future    TSH reflex to FT4; Future    Lipid Panel; Future    Hypotension due to hypovolemia    /42 today; asymptomatic. Reports decreased water intake; on lisinopril 20mg and HCTZ 25mg BID - discussed increasing hydration, will decrease HCTZ to 25mg once a day and call with blood pressures - if still low, will discontinue HCTZ. No infectious s/s, no melena/hematochezia.          Mixed hyperlipidemia    On atorvastatin 40mg - will repeat lipid panel.     Orders:    Lipid Panel; Future    Lumbar radiculopathy    Previous lumbar stenosis status post lumbar laminectomy and instrumented fusion remotely at the L3-4 and L4-5 levels; paraspinal lumbar muscle / SI joint tenderness bilaterally with radiation to right hip and down leg with standing. Xrays with mild-mod hip OA, DDD of SI joints. Chronic bilateral peripheral neuropathy. No weakness, falls, saddle anesthesia. Following with ortho spine and hip. Discussed lidocaine patches, tylenol, alternating ice/heat, TENS unit, trial increased gabapentin at night (side effects discussed), follow-up with

## 2025-03-17 ENCOUNTER — RESULTS FOLLOW-UP (OUTPATIENT)
Dept: FAMILY MEDICINE CLINIC | Age: 85
End: 2025-03-17

## 2025-03-20 ENCOUNTER — OFFICE VISIT (OUTPATIENT)
Dept: CARDIOLOGY CLINIC | Age: 85
End: 2025-03-20

## 2025-03-20 VITALS
HEIGHT: 71 IN | HEART RATE: 69 BPM | OXYGEN SATURATION: 99 % | DIASTOLIC BLOOD PRESSURE: 50 MMHG | WEIGHT: 216 LBS | SYSTOLIC BLOOD PRESSURE: 110 MMHG | BODY MASS INDEX: 30.24 KG/M2

## 2025-03-20 DIAGNOSIS — I25.84 CORONARY ARTERY DISEASE DUE TO CALCIFIED CORONARY LESION: ICD-10-CM

## 2025-03-20 DIAGNOSIS — I48.3 TYPICAL ATRIAL FLUTTER (HCC): ICD-10-CM

## 2025-03-20 DIAGNOSIS — R00.2 PALPITATIONS: Primary | ICD-10-CM

## 2025-03-20 DIAGNOSIS — Z87.19 HISTORY OF GI BLEED: ICD-10-CM

## 2025-03-20 DIAGNOSIS — I49.8 VENTRICULAR BIGEMINY: ICD-10-CM

## 2025-03-20 DIAGNOSIS — I10 PRIMARY HYPERTENSION: ICD-10-CM

## 2025-03-20 DIAGNOSIS — I25.10 CORONARY ARTERY DISEASE DUE TO CALCIFIED CORONARY LESION: ICD-10-CM

## 2025-03-20 DIAGNOSIS — I47.19 ATRIAL TACHYCARDIA: ICD-10-CM

## 2025-03-20 DIAGNOSIS — E78.2 MIXED HYPERLIPIDEMIA: ICD-10-CM

## 2025-03-20 RX ORDER — SENNA AND DOCUSATE SODIUM 50; 8.6 MG/1; MG/1
1 TABLET, FILM COATED ORAL 2 TIMES DAILY PRN
COMMUNITY

## 2025-03-20 RX ORDER — OLOPATADINE HYDROCHLORIDE 1 MG/ML
1 SOLUTION/ DROPS OPHTHALMIC 2 TIMES DAILY
COMMUNITY

## 2025-03-20 RX ORDER — ALBUTEROL SULFATE 90 UG/1
2 INHALANT RESPIRATORY (INHALATION) EVERY 6 HOURS PRN
COMMUNITY

## 2025-03-20 NOTE — PROGRESS NOTES
[facility]   Electrophysiology Outpatient Note              Date:  March 20, 2025  Patient name: Art Gaffney  YOB: 1940    Primary Care physician: Miriam Driscoll MD    HISTORY OF PRESENT ILLNESS: The patient is a 85 y.o.  male with a history of CAD/PCI, COPD, RA, diabetes, hypothyroidism, hyperlipidemia, hypertension    Today patient is being seen for new onset atrial flutter.  Patient wore cardiac ambulatory monitor for 14 days which revealed atrial flutter 3.6% burden with the highest rate of 159 bpm.  Patient was admitted to [facility] 2/10/2025 with complaints of palpitations.  Patient was found to have ventricular bigeminy and was sent home with 2-week cardiac ambulatory monitor. ECG today shows SR with freq PVCs rate 69. Patient is accompanied by his wife. He denies chest pain, shortness of breath and palpitations.  Patient states when he is at [facility] he came in with complaints of palpitations lightheadedness and dizziness.  When he was admitted he was taking metoprolol at home.  His metoprolol was discontinued partly because he was thought to have heart rate of the 30s however heart rate was not being evaluated correctly due to frequent PVCs/bigeminy.  However patient states the dizziness and lightheadedness has resolved therefore I will not resume it.  Discussed atrial flutter at length with patient and wife.  Discussed stroke risk as well.  Patient is not interested in starting a blood thinner as he had GI bleed in 2020 after taking frequent Advil for chronic back pain.  Patient also does not want to start a beta-blocker.  He and his wife state that he is extremely active at home.  His quality of life has not changed.  He has agreed to buy a smart watch to monitor for heart rate and keep journal of symptoms if they recur.  If the smart watch does not work out we discussed loop recorder implantation.  We also discussed Watchman device if

## 2025-03-20 NOTE — PATIENT INSTRUCTIONS
Hx of symptomatic bradycardia on Metoprolol  Hx of GI bleed--Not interested in blood thinner  Continue lisinopril 20 mg twice daily  Continue hydrochlorothiazide 25 mg daily  Continue magnesium oxide 400 mg daily  Continue Lipitor 40 mg nightly    Recommend Smart Watch to monitor Heart rate and rhythm     Call me with update after Smart Watch

## 2025-03-30 ENCOUNTER — PATIENT MESSAGE (OUTPATIENT)
Dept: FAMILY MEDICINE CLINIC | Age: 85
End: 2025-03-30

## 2025-03-30 DIAGNOSIS — M54.16 LUMBAR RADICULOPATHY: ICD-10-CM

## 2025-03-31 ENCOUNTER — RESULTS FOLLOW-UP (OUTPATIENT)
Dept: FAMILY MEDICINE CLINIC | Age: 85
End: 2025-03-31

## 2025-03-31 ENCOUNTER — HOSPITAL ENCOUNTER (OUTPATIENT)
Dept: VASCULAR LAB | Age: 85
Discharge: HOME | End: 2025-04-02
Attending: STUDENT IN AN ORGANIZED HEALTH CARE EDUCATION/TRAINING PROGRAM
Payer: MEDICARE

## 2025-03-31 DIAGNOSIS — I73.9 CLAUDICATION: ICD-10-CM

## 2025-03-31 LAB
VAS LEFT ABI: 1.04
VAS LEFT ARM BP: 144 MMHG
VAS LEFT DORSALIS PEDIS BP: 142 MMHG
VAS LEFT PTA BP: 150 MMHG
VAS RIGHT ABI: 1.13
VAS RIGHT ARM BP: 136 MMHG
VAS RIGHT DORSALIS PEDIS BP: 123 MMHG
VAS RIGHT PTA BP: 163 MMHG

## 2025-03-31 PROCEDURE — 93922 UPR/L XTREMITY ART 2 LEVELS: CPT | Performed by: SURGERY

## 2025-03-31 PROCEDURE — 93922 UPR/L XTREMITY ART 2 LEVELS: CPT

## 2025-04-02 RX ORDER — GABAPENTIN 300 MG/1
300 CAPSULE ORAL NIGHTLY
Qty: 30 CAPSULE | Refills: 0 | Status: SHIPPED | OUTPATIENT
Start: 2025-04-02 | End: 2025-05-02

## 2025-04-10 DIAGNOSIS — E11.69 HYPERLIPIDEMIA DUE TO TYPE 2 DIABETES MELLITUS (HCC): ICD-10-CM

## 2025-04-10 DIAGNOSIS — E78.5 HYPERLIPIDEMIA DUE TO TYPE 2 DIABETES MELLITUS (HCC): ICD-10-CM

## 2025-04-10 NOTE — TELEPHONE ENCOUNTER
Refill Request     CONFIRM preferred pharmacy with the patient.    If Mail Order Rx - Pend for 90 day refill.      Last Seen: Last Seen Department: 3/12/2025  Last Seen by PCP: 3/12/2025    Last Written: 11/16/24 180 with 1 refill     If no future appointment scheduled:  Review the last OV with PCP and review information for follow-up visit,  Route STAFF MESSAGE with patient name to the  Pool for scheduling with the following information:            -  Timing of next visit           -  Visit type ie Physical, OV, etc           -  Diagnoses/Reason ie. COPD, HTN - Do not use MEDICATION, Follow-up or CHECK UP - Give reason for visit      Next Appointment:   Future Appointments   Date Time Provider Department Center   4/30/2025  8:30 AM Miriam Driscoll MD [facility]       Message sent to  to schedule appt with patient?  NO      Requested Prescriptions     Pending Prescriptions Disp Refills    lisinopril (PRINIVIL;ZESTRIL) 20 MG tablet 180 tablet 1     Sig: Take 1 tablet by mouth 2 times daily

## 2025-04-11 DIAGNOSIS — E78.5 HYPERLIPIDEMIA DUE TO TYPE 2 DIABETES MELLITUS (HCC): ICD-10-CM

## 2025-04-11 DIAGNOSIS — E11.69 HYPERLIPIDEMIA DUE TO TYPE 2 DIABETES MELLITUS (HCC): ICD-10-CM

## 2025-04-11 RX ORDER — HYDROCHLOROTHIAZIDE 25 MG/1
25 TABLET ORAL DAILY
Qty: 90 TABLET | Refills: 1 | Status: SHIPPED | OUTPATIENT
Start: 2025-04-11

## 2025-04-11 RX ORDER — HYDROCHLOROTHIAZIDE 25 MG/1
25 TABLET ORAL DAILY
Qty: 180 TABLET | Refills: 1 | Status: SHIPPED | OUTPATIENT
Start: 2025-04-11 | End: 2025-04-11 | Stop reason: SDUPTHER

## 2025-04-11 RX ORDER — ATORVASTATIN CALCIUM 40 MG/1
40 TABLET, FILM COATED ORAL NIGHTLY
Qty: 90 TABLET | Refills: 3 | Status: SHIPPED | OUTPATIENT
Start: 2025-04-11

## 2025-04-11 RX ORDER — LISINOPRIL 20 MG/1
20 TABLET ORAL 2 TIMES DAILY
Qty: 180 TABLET | Refills: 1 | Status: SHIPPED | OUTPATIENT
Start: 2025-04-11 | End: 2025-04-11 | Stop reason: SDUPTHER

## 2025-04-11 RX ORDER — LISINOPRIL 20 MG/1
20 TABLET ORAL DAILY
Qty: 90 TABLET | Refills: 1 | Status: SHIPPED | OUTPATIENT
Start: 2025-04-11

## 2025-04-18 ENCOUNTER — TELEPHONE (OUTPATIENT)
Dept: FAMILY MEDICINE CLINIC | Age: 85
End: 2025-04-18

## 2025-04-18 RX ORDER — TAMSULOSIN HYDROCHLORIDE 0.4 MG/1
0.4 CAPSULE ORAL DAILY
Qty: 90 CAPSULE | Refills: 1 | Status: SHIPPED | OUTPATIENT
Start: 2025-04-18

## 2025-04-30 ENCOUNTER — OFFICE VISIT (OUTPATIENT)
Dept: FAMILY MEDICINE CLINIC | Age: 85
End: 2025-04-30

## 2025-04-30 VITALS
OXYGEN SATURATION: 95 % | DIASTOLIC BLOOD PRESSURE: 54 MMHG | HEART RATE: 72 BPM | SYSTOLIC BLOOD PRESSURE: 118 MMHG | WEIGHT: 214 LBS | BODY MASS INDEX: 29.86 KG/M2

## 2025-04-30 VITALS
HEART RATE: 72 BPM | SYSTOLIC BLOOD PRESSURE: 118 MMHG | DIASTOLIC BLOOD PRESSURE: 54 MMHG | WEIGHT: 214 LBS | HEIGHT: 71 IN | RESPIRATION RATE: 18 BRPM | BODY MASS INDEX: 29.96 KG/M2 | OXYGEN SATURATION: 95 %

## 2025-04-30 DIAGNOSIS — M54.16 LUMBAR RADICULOPATHY: Primary | ICD-10-CM

## 2025-04-30 DIAGNOSIS — D64.9 ANEMIA, UNSPECIFIED TYPE: ICD-10-CM

## 2025-04-30 DIAGNOSIS — I10 PRIMARY HYPERTENSION: ICD-10-CM

## 2025-04-30 DIAGNOSIS — Z00.00 MEDICARE ANNUAL WELLNESS VISIT, SUBSEQUENT: Primary | ICD-10-CM

## 2025-04-30 RX ORDER — RIFAXIMIN 550 MG/1
TABLET ORAL
COMMUNITY
Start: 2025-04-29

## 2025-04-30 ASSESSMENT — PATIENT HEALTH QUESTIONNAIRE - PHQ9
2. FEELING DOWN, DEPRESSED OR HOPELESS: NOT AT ALL
SUM OF ALL RESPONSES TO PHQ QUESTIONS 1-9: 0
1. LITTLE INTEREST OR PLEASURE IN DOING THINGS: NOT AT ALL

## 2025-04-30 NOTE — PROGRESS NOTES
[facility]  2025    Art Gaffney (:  1940) is a 85 y.o. male, here for evaluation of the following medical concerns:    Chief Complaint   Patient presents with    new to provider    Back Pain        ASSESSMENT/ PLAN  Assessment & Plan  Lumbar radiculopathy    Acute on chronic lumbar back pain > 6wks with chronic bilateral neuropathy with hx of spinal stenosis s/p fusion and blocks/RFA; reports worsening with increased morning stiffness, decreased mobility - following with rheum for RA (on MTX and plaquenil). Planning to start OTC sciatic ease (with ALA), would like to d/c gabapentin. Interested in discussing JEROME with pain mngt, will repeat MRI, refer to pain mngt. Labs below.        Orders:    MRI LUMBAR SPINE WO CONTRAST; Future    AFL (Epic) - Ramin Booker MD, Pain Medicine, [facility]    Anemia, unspecified type   Chronic, at goal (stable), will repeat labs below.     Orders:    CBC with Auto Differential; Future    Iron and TIBC; Future    Ferritin; Future    Vitamin B12 & Folate; Future    Primary hypertension    Below goal, asymptomatic. Decreased HCTZ to 25mg daily at last visit due to low BP and elevated BUN; will repeat labs, consider further BP med adjustments pending labs.     Orders:    Basic Metabolic Panel; Future       Return in about 6 months (around 10/30/2025).    HPI  Here for follow-up.     Seen in March for Holter monitor with low burden atrial fibrillation/flutter (new) with borderline RVR, high burden PVCs. Seen by EP - declined OAC due to hx of GI bleed, declined BB due to symptomatic bradycardia in February. Lightheadedness / dizziness resolved since stopping metoprolol - HR 70 to 80s.   BP still borderline low, asymptomatic - on lisinopril 20mg BID and HCTZ 25mg daily (decreased from BID due to low BP and increased BUN, sCr at last visit).   Gas with intermittent diarrhea - seen by GI doc - ammonia breath test positive; will be starting

## 2025-04-30 NOTE — PROGRESS NOTES
Medicare Annual Wellness Visit    Art Gaffney is here for Medicare AWV    Assessment & Plan   Medicare annual wellness visit, subsequent     No follow-ups on file.     Subjective       Patient's complete Health Risk Assessment and screening values have been reviewed and are found in Flowsheets. The following problems were reviewed today and where indicated follow up appointments were made and/or referrals ordered.    Positive Risk Factor Screenings with Interventions:    Fall Risk:  Do you feel unsteady or are you worried about falling? : (!) yes  2 or more falls in past year?: no  Fall with injury in past year?: no     Interventions:    Reviewed medications, home hazards, visual acuity, and co-morbidities that can increase risk for falls  See AVS for additional education material                   Safety:  Do you always fasten your seatbelt when you are in a car?: (!) No  Interventions:  Patient advised to follow up in the office for further evaluation and treatment                   Objective   Vitals:    04/30/25 0925   BP: (!) 118/54   Pulse: 72   Resp: 18   SpO2: 95%   Weight: 97.1 kg (214 lb)   Height: 1.803 m (5' 11\")      Body mass index is 29.85 kg/m².                    Allergies   Allergen Reactions    Penicillins Hives    Avelox [Moxifloxacin Hcl In Nacl] Itching, Swelling and Rash     Prior to Visit Medications    Medication Sig Taking? Authorizing Provider   XIFAXAN 550 MG tablet   Provider, MD Paula   tamsulosin (FLOMAX) 0.4 MG capsule Take 1 capsule by mouth daily  Miriam Driscoll MD   atorvastatin (LIPITOR) 40 MG tablet Take 1 tablet by mouth nightly  Miriam Driscoll MD   lisinopril (PRINIVIL;ZESTRIL) 20 MG tablet Take 1 tablet by mouth daily  Patient taking differently: Take 1 tablet by mouth daily 1 in AM and 1 in PM  Miriam Driscoll MD   hydroCHLOROthiazide (HYDRODIURIL) 25 MG tablet Take 1 tablet by mouth daily  Miriam Driscoll MD   gabapentin (NEURONTIN) 300 MG capsule

## 2025-05-01 DIAGNOSIS — M54.16 LUMBAR RADICULOPATHY: ICD-10-CM

## 2025-05-01 DIAGNOSIS — D64.9 ANEMIA, UNSPECIFIED TYPE: ICD-10-CM

## 2025-05-01 LAB
ANION GAP SERPL CALCULATED.3IONS-SCNC: 11 MMOL/L (ref 3–16)
BASOPHILS # BLD: 0.1 K/UL (ref 0–0.2)
BASOPHILS NFR BLD: 0.9 %
BUN SERPL-MCNC: 31 MG/DL (ref 7–20)
CALCIUM SERPL-MCNC: 9.2 MG/DL (ref 8.3–10.6)
CHLORIDE SERPL-SCNC: 104 MMOL/L (ref 99–110)
CO2 SERPL-SCNC: 25 MMOL/L (ref 21–32)
CREAT SERPL-MCNC: 1.2 MG/DL (ref 0.8–1.3)
DEPRECATED RDW RBC AUTO: 14 % (ref 12.4–15.4)
EOSINOPHIL # BLD: 0.2 K/UL (ref 0–0.6)
EOSINOPHIL NFR BLD: 2.4 %
FERRITIN SERPL IA-MCNC: 97.6 NG/ML (ref 30–400)
FOLATE SERPL-MCNC: >40 NG/ML (ref 4.78–24.2)
GFR SERPLBLD CREATININE-BSD FMLA CKD-EPI: 59 ML/MIN/{1.73_M2}
GLUCOSE SERPL-MCNC: 91 MG/DL (ref 70–99)
HCT VFR BLD AUTO: 38.7 % (ref 40.5–52.5)
HGB BLD-MCNC: 13.1 G/DL (ref 13.5–17.5)
IRON SATN MFR SERPL: 31 % (ref 20–50)
IRON SERPL-MCNC: 79 UG/DL (ref 59–158)
LYMPHOCYTES # BLD: 1.2 K/UL (ref 1–5.1)
LYMPHOCYTES NFR BLD: 16.2 %
MCH RBC QN AUTO: 32.4 PG (ref 26–34)
MCHC RBC AUTO-ENTMCNC: 33.9 G/DL (ref 31–36)
MCV RBC AUTO: 95.6 FL (ref 80–100)
MONOCYTES # BLD: 0.5 K/UL (ref 0–1.3)
MONOCYTES NFR BLD: 6.3 %
NEUTROPHILS # BLD: 5.6 K/UL (ref 1.7–7.7)
NEUTROPHILS NFR BLD: 74.2 %
PLATELET # BLD AUTO: 357 K/UL (ref 135–450)
PMV BLD AUTO: 7.5 FL (ref 5–10.5)
POTASSIUM SERPL-SCNC: 4 MMOL/L (ref 3.5–5.1)
RBC # BLD AUTO: 4.05 M/UL (ref 4.2–5.9)
SODIUM SERPL-SCNC: 140 MMOL/L (ref 136–145)
TIBC SERPL-MCNC: 252 UG/DL (ref 260–445)
VIT B12 SERPL-MCNC: 806 PG/ML (ref 211–911)
WBC # BLD AUTO: 7.6 K/UL (ref 4–11)

## 2025-05-01 ASSESSMENT — ENCOUNTER SYMPTOMS
DIARRHEA: 1
VOMITING: 0
SHORTNESS OF BREATH: 0
NAUSEA: 0
ABDOMINAL PAIN: 0
COUGH: 0

## 2025-05-02 ENCOUNTER — RESULTS FOLLOW-UP (OUTPATIENT)
Dept: FAMILY MEDICINE CLINIC | Age: 85
End: 2025-05-02

## 2025-05-02 DIAGNOSIS — M48.57XA COLLAPSED VERTEBRA, NOT ELSEWHERE CLASSIFIED, LUMBOSACRAL REGION, INITIAL ENCOUNTER FOR FRACTURE (HCC): ICD-10-CM

## 2025-05-02 DIAGNOSIS — M54.16 LUMBAR RADICULOPATHY: Primary | ICD-10-CM

## 2025-05-04 ENCOUNTER — HOSPITAL ENCOUNTER (OUTPATIENT)
Dept: MRI IMAGING | Age: 85
Discharge: HOME | End: 2025-05-04
Attending: STUDENT IN AN ORGANIZED HEALTH CARE EDUCATION/TRAINING PROGRAM
Payer: MEDICARE

## 2025-05-04 DIAGNOSIS — M54.16 LUMBAR RADICULOPATHY: ICD-10-CM

## 2025-05-04 PROCEDURE — 72148 MRI LUMBAR SPINE W/O DYE: CPT

## 2025-05-07 ENCOUNTER — TELEPHONE (OUTPATIENT)
Dept: FAMILY MEDICINE CLINIC | Age: 85
End: 2025-05-07

## 2025-05-07 DIAGNOSIS — M54.16 LUMBAR RADICULOPATHY: ICD-10-CM

## 2025-05-07 DIAGNOSIS — M06.09 RHEUMATOID ARTHRITIS OF MULTIPLE SITES WITH NEGATIVE RHEUMATOID FACTOR (HCC): Primary | ICD-10-CM

## 2025-05-07 DIAGNOSIS — M54.16 LUMBAR RADICULOPATHY: Primary | ICD-10-CM

## 2025-05-07 NOTE — TELEPHONE ENCOUNTER
Central scheduling calling asking if provider can adjust imaging order.    Current order    MRI LUMBAR SPINE W CONTRAST     Needed Order    MRI LUMBAR SPINE W/WO CONTRAST     Please advise  Thank you

## 2025-05-15 ENCOUNTER — HOSPITAL ENCOUNTER (OUTPATIENT)
Dept: GENERAL RADIOLOGY | Age: 85
Discharge: HOME | End: 2025-05-15
Attending: STUDENT IN AN ORGANIZED HEALTH CARE EDUCATION/TRAINING PROGRAM
Payer: MEDICARE

## 2025-05-15 ENCOUNTER — TELEPHONE (OUTPATIENT)
Dept: CARDIOLOGY CLINIC | Age: 85
End: 2025-05-15

## 2025-05-15 DIAGNOSIS — M48.57XA COLLAPSED VERTEBRA, NOT ELSEWHERE CLASSIFIED, LUMBOSACRAL REGION, INITIAL ENCOUNTER FOR FRACTURE (HCC): ICD-10-CM

## 2025-05-15 DIAGNOSIS — M54.16 LUMBAR RADICULOPATHY: ICD-10-CM

## 2025-05-15 PROCEDURE — 77080 DXA BONE DENSITY AXIAL: CPT

## 2025-05-15 NOTE — TELEPHONE ENCOUNTER
Patient saw ANGEL BAE 3/20/2025 in AF clinic. Please schedule him to see EPMD next available to establish care for atrial flutter

## 2025-05-18 ENCOUNTER — RESULTS FOLLOW-UP (OUTPATIENT)
Dept: FAMILY MEDICINE CLINIC | Age: 85
End: 2025-05-18

## 2025-05-20 ENCOUNTER — HOSPITAL ENCOUNTER (OUTPATIENT)
Dept: MRI IMAGING | Age: 85
Discharge: HOME | End: 2025-05-20
Attending: STUDENT IN AN ORGANIZED HEALTH CARE EDUCATION/TRAINING PROGRAM
Payer: MEDICARE

## 2025-05-20 DIAGNOSIS — M54.16 LUMBAR RADICULOPATHY: ICD-10-CM

## 2025-05-20 DIAGNOSIS — M06.09 RHEUMATOID ARTHRITIS OF MULTIPLE SITES WITH NEGATIVE RHEUMATOID FACTOR (HCC): ICD-10-CM

## 2025-05-20 PROCEDURE — 6360000004 HC RX CONTRAST MEDICATION: Performed by: STUDENT IN AN ORGANIZED HEALTH CARE EDUCATION/TRAINING PROGRAM

## 2025-05-20 PROCEDURE — 72158 MRI LUMBAR SPINE W/O & W/DYE: CPT

## 2025-05-20 PROCEDURE — A9579 GAD-BASE MR CONTRAST NOS,1ML: HCPCS | Performed by: STUDENT IN AN ORGANIZED HEALTH CARE EDUCATION/TRAINING PROGRAM

## 2025-05-20 PROCEDURE — 72197 MRI PELVIS W/O & W/DYE: CPT

## 2025-05-20 RX ADMIN — GADOTERIDOL 19 ML: 279.3 INJECTION, SOLUTION INTRAVENOUS at 14:40

## 2025-05-22 ENCOUNTER — RESULTS FOLLOW-UP (OUTPATIENT)
Dept: FAMILY MEDICINE CLINIC | Age: 85
End: 2025-05-22

## 2025-05-29 ENCOUNTER — RESULTS FOLLOW-UP (OUTPATIENT)
Dept: FAMILY MEDICINE CLINIC | Age: 85
End: 2025-05-29

## 2025-05-30 RX ORDER — TIZANIDINE 2 MG/1
2 TABLET ORAL NIGHTLY PRN
Qty: 30 TABLET | Refills: 0 | Status: SHIPPED | OUTPATIENT
Start: 2025-05-30

## 2025-05-30 RX ORDER — CEPHALEXIN 500 MG/1
TABLET, FILM COATED ORAL
Qty: 12 TABLET | Refills: 0 | Status: SHIPPED | OUTPATIENT
Start: 2025-05-30 | End: 2025-05-30 | Stop reason: SDUPTHER

## 2025-05-30 RX ORDER — CEPHALEXIN 500 MG/1
TABLET, FILM COATED ORAL
Qty: 12 TABLET | Refills: 0 | Status: SHIPPED | OUTPATIENT
Start: 2025-05-30

## 2025-06-10 ENCOUNTER — PATIENT MESSAGE (OUTPATIENT)
Dept: FAMILY MEDICINE CLINIC | Age: 85
End: 2025-06-10

## 2025-06-16 ENCOUNTER — TELEPHONE (OUTPATIENT)
Dept: FAMILY MEDICINE CLINIC | Age: 85
End: 2025-06-16

## 2025-06-16 DIAGNOSIS — M54.16 LUMBAR RADICULOPATHY: ICD-10-CM

## 2025-06-16 DIAGNOSIS — M06.09 RHEUMATOID ARTHRITIS OF MULTIPLE SITES WITH NEGATIVE RHEUMATOID FACTOR (HCC): ICD-10-CM

## 2025-06-16 NOTE — TELEPHONE ENCOUNTER
[facility] Lucinda march ( [phone]) procedure has been held due to MRI on 05.20.25   Dr Varela is asking you to order and have these labs drawn to make sure there is no infection       CBC, ESR , CRP drawn.

## 2025-06-16 NOTE — TELEPHONE ENCOUNTER
Labs were placed on 05/27 after MRI resulted; will re-sign them. Please let patient know. Thanks!

## 2025-06-17 ENCOUNTER — RESULTS FOLLOW-UP (OUTPATIENT)
Dept: FAMILY MEDICINE CLINIC | Age: 85
End: 2025-06-17

## 2025-06-17 LAB
BASOPHILS # BLD: 0.1 K/UL (ref 0–0.2)
BASOPHILS NFR BLD: 0.8 %
CRP SERPL-MCNC: 5.4 MG/L (ref 0–5.1)
DEPRECATED RDW RBC AUTO: 14.8 % (ref 12.4–15.4)
EOSINOPHIL # BLD: 0.2 K/UL (ref 0–0.6)
EOSINOPHIL NFR BLD: 2.4 %
ERYTHROCYTE [SEDIMENTATION RATE] IN BLOOD BY WESTERGREN METHOD: 7 MM/HR (ref 0–20)
HCT VFR BLD AUTO: 37.9 % (ref 40.5–52.5)
HGB BLD-MCNC: 13 G/DL (ref 13.5–17.5)
LYMPHOCYTES # BLD: 1.2 K/UL (ref 1–5.1)
LYMPHOCYTES NFR BLD: 13.7 %
MCH RBC QN AUTO: 32.2 PG (ref 26–34)
MCHC RBC AUTO-ENTMCNC: 34.3 G/DL (ref 31–36)
MCV RBC AUTO: 94.1 FL (ref 80–100)
MONOCYTES # BLD: 0.7 K/UL (ref 0–1.3)
MONOCYTES NFR BLD: 8.8 %
NEUTROPHILS # BLD: 6.3 K/UL (ref 1.7–7.7)
NEUTROPHILS NFR BLD: 74.3 %
PLATELET # BLD AUTO: 333 K/UL (ref 135–450)
PMV BLD AUTO: 7.7 FL (ref 5–10.5)
RBC # BLD AUTO: 4.02 M/UL (ref 4.2–5.9)
WBC # BLD AUTO: 8.5 K/UL (ref 4–11)

## 2025-06-30 RX ORDER — TIZANIDINE 2 MG/1
2 TABLET ORAL NIGHTLY PRN
Qty: 30 TABLET | Refills: 0 | Status: SHIPPED | OUTPATIENT
Start: 2025-06-30

## 2025-06-30 NOTE — TELEPHONE ENCOUNTER
Refill Request     CONFIRM preferred pharmacy with the patient.    If Mail Order Rx - Pend for 90 day refill.      Last Seen: Last Seen Department: 4/30/2025  Last Seen by PCP: 4/30/2025    Last Written: 5/30/25 30 with no refills     If no future appointment scheduled:  Review the last OV with PCP and review information for follow-up visit,  Route STAFF MESSAGE with patient name to the  Pool for scheduling with the following information:            -  Timing of next visit           -  Visit type ie Physical, OV, etc           -  Diagnoses/Reason ie. COPD, HTN - Do not use MEDICATION, Follow-up or CHECK UP - Give reason for visit      Next Appointment:   Future Appointments   Date Time Provider Department Center   8/28/2025 11:15 AM Krista Sterling DO [facility]       Message sent to  to schedule appt with patient?  YES Return in about 6 months (around 10/30/2025).       Requested Prescriptions     Pending Prescriptions Disp Refills    tiZANidine (ZANAFLEX) 2 MG tablet [Pharmacy Med Name: TIZANIDINE HCL 2 MG TABLET] 30 tablet 0     Sig: TAKE 1 TABLET BY MOUTH NIGHTLY AS NEEDED (MUSCLE PAIN/STIFFNESS)

## 2025-07-28 RX ORDER — TIZANIDINE 2 MG/1
2 TABLET ORAL NIGHTLY PRN
Qty: 30 TABLET | Refills: 0 | Status: SHIPPED | OUTPATIENT
Start: 2025-07-28

## 2025-07-28 NOTE — TELEPHONE ENCOUNTER
Refill Request     CONFIRM preferred pharmacy with the patient.    If Mail Order Rx - Pend for 90 day refill.      Last Seen: Last Seen Department: 4/30/2025  Last Seen by PCP: 4/30/2025    Last Written: 6/30/2025    If no future appointment scheduled:  Review the last OV with PCP and review information for follow-up visit,  Route STAFF MESSAGE with patient name to the  Pool for scheduling with the following information:            -  Timing of next visit           -  Visit type ie Physical, OV, etc           -  Diagnoses/Reason ie. COPD, HTN - Do not use MEDICATION, Follow-up or CHECK UP - Give reason for visit      Next Appointment:   Future Appointments   Date Time Provider Department Center   8/28/2025 11:15 AM Krista Sterling DO [facility]   10/30/2025  8:45 AM Miriam Driscoll MD [facility]       Message sent to  to schedule appt with patient?  NO      Requested Prescriptions     Pending Prescriptions Disp Refills    tiZANidine (ZANAFLEX) 2 MG tablet [Pharmacy Med Name: TIZANIDINE HCL 2 MG TABLET] 30 tablet 0     Sig: TAKE 1 TABLET BY MOUTH NIGHTLY AS NEEDED (MUSCLE PAIN/STIFFNESS)

## 2025-08-25 RX ORDER — TIZANIDINE 2 MG/1
2 TABLET ORAL NIGHTLY PRN
Qty: 30 TABLET | Refills: 0 | Status: SHIPPED | OUTPATIENT
Start: 2025-08-25

## 2025-08-28 ENCOUNTER — OFFICE VISIT (OUTPATIENT)
Dept: CARDIOLOGY CLINIC | Age: 85
End: 2025-08-28
Payer: MEDICARE

## 2025-08-28 VITALS
DIASTOLIC BLOOD PRESSURE: 64 MMHG | OXYGEN SATURATION: 99 % | BODY MASS INDEX: 29.63 KG/M2 | HEART RATE: 58 BPM | WEIGHT: 211.64 LBS | SYSTOLIC BLOOD PRESSURE: 108 MMHG | HEIGHT: 71 IN

## 2025-08-28 DIAGNOSIS — I48.0 PAF (PAROXYSMAL ATRIAL FIBRILLATION) (HCC): ICD-10-CM

## 2025-08-28 DIAGNOSIS — I48.92 ATRIAL FLUTTER, UNSPECIFIED TYPE (HCC): Primary | ICD-10-CM

## 2025-08-28 DIAGNOSIS — I49.3 PREMATURE VENTRICULAR BEATS: ICD-10-CM

## 2025-08-28 DIAGNOSIS — K26.9 DUODENAL ULCER: ICD-10-CM

## 2025-08-28 DIAGNOSIS — Z87.19 HISTORY OF GI BLEED: ICD-10-CM

## 2025-08-28 DIAGNOSIS — Q27.30 AVM (ARTERIOVENOUS MALFORMATION): ICD-10-CM

## 2025-08-28 PROCEDURE — 1159F MED LIST DOCD IN RCRD: CPT | Performed by: INTERNAL MEDICINE

## 2025-08-28 PROCEDURE — 93000 ELECTROCARDIOGRAM COMPLETE: CPT | Performed by: INTERNAL MEDICINE

## 2025-08-28 PROCEDURE — G8417 CALC BMI ABV UP PARAM F/U: HCPCS | Performed by: INTERNAL MEDICINE

## 2025-08-28 PROCEDURE — 99204 OFFICE O/P NEW MOD 45 MIN: CPT | Performed by: INTERNAL MEDICINE

## 2025-08-28 PROCEDURE — 1123F ACP DISCUSS/DSCN MKR DOCD: CPT | Performed by: INTERNAL MEDICINE

## 2025-08-28 PROCEDURE — G8427 DOCREV CUR MEDS BY ELIG CLIN: HCPCS | Performed by: INTERNAL MEDICINE

## 2025-08-28 PROCEDURE — 1036F TOBACCO NON-USER: CPT | Performed by: INTERNAL MEDICINE

## (undated) DEVICE — Z DISCONTINUED USE 2432103 SOLUTION PREP PVP-I W/ APPL URETHANE COT TIP SPNG

## (undated) DEVICE — ENDO CARRY-ON PROCEDURE KIT INCLUDES SUCTION TUBING, LUBRICANT, GAUZE, BIOHAZARD STICKER, TRANSPORT PAD AND INTERCEPT BEDSIDE KIT.: Brand: ENDO CARRY-ON PROCEDURE KIT

## (undated) DEVICE — ELECTRODE ECG MONITR FOAM TEAR DROP ADLT RED

## (undated) DEVICE — TUBING, SUCTION, 3/16" X 10', STRAIGHT: Brand: MEDLINE

## (undated) DEVICE — FIAPC® PROBE W/ FILTER 2200 A OD 2.3MM/6.9FR; L 2.2M/7.2FT: Brand: ERBE

## (undated) DEVICE — PACK EXTREMITY

## (undated) DEVICE — CANNULA NSL 13FT TUBE AD ETCO2 DIV SAMP M

## (undated) DEVICE — SUTURE VCRL SZ 2-0 L18IN ABSRB UD CT-1 L36MM 1/2 CIR J839D

## (undated) DEVICE — SYRINGE ONLY,20ML LUER LOCK: Brand: MEDLINE INDUSTRIES, INC.

## (undated) DEVICE — GOWN,SIRUS,NONRNF,3XL,18/CS: Brand: MEDLINE

## (undated) DEVICE — BASIC CYSTO I-LF: Brand: MEDLINE INDUSTRIES, INC.

## (undated) DEVICE — GOWN SIRUS NONREIN LG W/TWL: Brand: MEDLINE INDUSTRIES, INC.

## (undated) DEVICE — NEEDLE HYPO 22GA L1.5IN BLK POLYPR HUB S STL REG BVL STR

## (undated) DEVICE — FORCEP BX STD CAP 240CM RAD JAW 4

## (undated) DEVICE — SOLUTION SCRB 3% CHLOROXYLENOL BLU ANTIMIC SKIN CLN

## (undated) DEVICE — GLOVE ORANGE PI 8   MSG9080

## (undated) DEVICE — Z DISCONTINUED (SUB = MFG CAT 4422) SPONGE GZ 4X4 IN 8 PLY NS

## (undated) DEVICE — BAG DRN HYSTSCPY CYSTO ST FL CONT UROBAG

## (undated) DEVICE — PREP SOL PVP IODINE 4%  4 OZ/BTL

## (undated) DEVICE — SKIN AFFIX SURG ADHESIVE 72/CS 0.55ML: Brand: MEDLINE

## (undated) DEVICE — TIBURON SPLIT SHEET: Brand: CONVERTORS

## (undated) DEVICE — SUTURE MCRYL SZ 4-0 L18IN ABSRB UD L19MM PS-2 3/8 CIR PRIM Y496G

## (undated) DEVICE — ALCOHOL RUBBING 16OZ 70% ISO

## (undated) DEVICE — AVANOS* UNIVERSAL BLOCK TRAYS: Brand: AVANOS

## (undated) DEVICE — BOWL MED L 32OZ PLAS W/ MOLD GRAD EZ OPN PEEL PCH

## (undated) DEVICE — DRAPE 33X23IN INCISE ANTIMICROB IOBAN 2

## (undated) DEVICE — SOLUTION IV IRRIG 500ML 0.9% SODIUM CHL 2F7123

## (undated) DEVICE — SOLUTION,SALINE,IRRGATION,500ML,STRL: Brand: MEDLINE

## (undated) DEVICE — SUTURE VCRL SZ 0 L18IN ABSRB UD L36MM CT-1 1/2 CIR J840D

## (undated) DEVICE — BANDAGE COMPR W4INXL5YD TAN BRTH SELF ADH WRP W/ HND TEAR

## (undated) DEVICE — ELECTRODE PT RET AD L9FT HI MOIST COND ADH HYDRGEL CORDED

## (undated) DEVICE — CLIP LIG L235CM RESOL 360 BX/20

## (undated) DEVICE — SOLUTION IRRIGATION STRL H2O 1000 ML UROMATIC CONTAINER

## (undated) DEVICE — DRESSING ANTIMIC FOAM MEPILEX BORD POSTOP AG PROD SZ 4X12 IN

## (undated) DEVICE — GLOVE SURG SZ 85 L12IN FNGR THK94MIL STD WHT ISOLEX LTX

## (undated) DEVICE — DRAPE,U/SHT,SPLIT,FILM,60X84,STERILE: Brand: MEDLINE